# Patient Record
Sex: MALE | Race: WHITE | NOT HISPANIC OR LATINO | Employment: OTHER | URBAN - NONMETROPOLITAN AREA
[De-identification: names, ages, dates, MRNs, and addresses within clinical notes are randomized per-mention and may not be internally consistent; named-entity substitution may affect disease eponyms.]

---

## 2017-03-13 ENCOUNTER — OFFICE VISIT (OUTPATIENT)
Dept: CARDIOLOGY | Facility: CLINIC | Age: 77
End: 2017-03-13

## 2017-03-13 VITALS
DIASTOLIC BLOOD PRESSURE: 74 MMHG | WEIGHT: 223 LBS | BODY MASS INDEX: 31.92 KG/M2 | HEART RATE: 77 BPM | HEIGHT: 70 IN | SYSTOLIC BLOOD PRESSURE: 136 MMHG

## 2017-03-13 DIAGNOSIS — E78.2 MIXED HYPERLIPIDEMIA: ICD-10-CM

## 2017-03-13 DIAGNOSIS — I10 ESSENTIAL HYPERTENSION: ICD-10-CM

## 2017-03-13 DIAGNOSIS — I25.10 CORONARY ARTERY DISEASE INVOLVING NATIVE CORONARY ARTERY OF NATIVE HEART WITHOUT ANGINA PECTORIS: Primary | ICD-10-CM

## 2017-03-13 PROCEDURE — 99213 OFFICE O/P EST LOW 20 MIN: CPT | Performed by: INTERNAL MEDICINE

## 2017-03-13 RX ORDER — DONEPEZIL HYDROCHLORIDE 5 MG/1
5 TABLET, FILM COATED ORAL NIGHTLY
COMMUNITY
End: 2017-12-18

## 2017-03-13 NOTE — PROGRESS NOTES
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      • Palpitations [R00.2]     Overview Note:     2012, Holter with greater than 3200 PVCs     • Insomnia [G47.00]   • Asymptomatic PVCs [I49.3]   • CAD in native artery [I25.10]     Overview Note:     a. February 2006: Off-pump CABG x4, sequential LIMA to first diagonal and LAD, saphenous graft to OM1, and PL of the circumflex, Kody Jeronimo MD.   b. February 2012:  MPS per [facility], normal perfusion 51%.        1. CAD:  a. February 2006: Off-pump CABG x4, sequential LIMA to first diagonal and LAD, saphenous graft to OM1, and PL of the circumflex, Kody Jeronimo MD.    b. February 2012: MPS per [facility], normal perfusion 51%.   2. HTN.        6/16 echo, CUS  wnl - [facility]  3. Dyslipidemia:   a. August 2015: Total cholesterol 102, triglycerides 92, HDL 30, and LDL 54.   4. Impotence.    5. Cervical spine disease, remotely suggested surgical intervention. Patient deferred.    6. Vertigo.    7. Palpitations:  a. 2012, Holter with greater than 3200 PVCs.   8. Prior surgeries:   a. Right knee replacement December 2007.    b. Pending left knee replacement per Dr. Ruiz.   Allergies  No Known Allergies    Current Medications    Current Outpatient Prescriptions:   •  Acetaminophen (TYLENOL EXTRA STRENGTH PO), Take  by mouth As Needed., Disp: , Rfl:   •  amitriptyline (ELAVIL) 25 MG tablet, Take 1 tablet by mouth every night., Disp: 30 tablet, Rfl: 11  •  amLODIPine (NORVASC) 5 MG tablet, Take 5 mg by mouth daily., Disp: , Rfl:   •  aspirin 81 MG EC tablet, Take 81 mg by mouth daily., Disp: ,  "Rfl:   •  atorvastatin (LIPITOR) 20 MG tablet, Take 10 mg by mouth Daily., Disp: , Rfl:   •  clopidogrel (PLAVIX) 75 MG tablet, Take 75 mg by mouth daily., Disp: , Rfl:   •  Cyanocobalamin (B-12 PO), Take  by mouth Daily., Disp: , Rfl:   •  donepezil (ARICEPT) 5 MG tablet, Take 5 mg by mouth Every Night., Disp: , Rfl:   •  losartan (COZAAR) 100 MG tablet, Take 100 mg by mouth daily., Disp: , Rfl:   •  Omega-3 Fatty Acids (FISH OIL PO), Take  by mouth Daily., Disp: , Rfl:   •  sulfacetamide (BLEPH-10) 10 % ophthalmic solution, As Needed., Disp: , Rfl:   •  vitamin D (ERGOCALCIFEROL) 83013 UNITS capsule capsule, Take 50,000 Units by mouth 1 (one) time per week., Disp: , Rfl:     History of Present Illness   HPI    Pt denies any chest pain, dyspnea, dyspnea on exertion, orthopnea, PND, palpitations, lower extremity edema.  Was seen by cardiology in [place] over the last 6 months with no new treatment algorithm or recommendations.  He states he has been attempting to lose weight despite eating Pop tarts for breakfast.  He has good functional capacity with no overt palpitations presyncope shortness of breath with any activities that he can state.  He does comment that the medial aspect of his right foot has been somewhat discolored and since his surgery now 11 years prior     ROS:  All systems have been reviewed and are negative with the exception of those mentioned in the HPI.    OBJECTIVE:  Vitals:    03/13/17 0959 03/13/17 1007   BP: 130/74 136/74   BP Location: Right arm Left arm   Patient Position: Sitting Sitting   Pulse: 77    Weight: 223 lb (101 kg)    Height: 70\" (177.8 cm)      Physical Exam   Constitutional: He appears well-developed and well-nourished.   Neck: Normal range of motion. Neck supple. No hepatojugular reflux and no JVD present. Carotid bruit is not present. No tracheal deviation present. No thyromegaly present.   Cardiovascular: Normal rate, regular rhythm, S1 normal, S2 normal, intact distal " pulses and normal pulses.  PMI is not displaced.  Exam reveals no gallop, no distant heart sounds, no friction rub, no midsystolic click and no opening snap.    No murmur heard.  Pulses:       Radial pulses are 2+ on the right side, and 2+ on the left side.        Dorsalis pedis pulses are 2+ on the right side, and 2+ on the left side.        Posterior tibial pulses are 2+ on the right side, and 2+ on the left side.   Pulmonary/Chest: Effort normal and breath sounds normal. He has no wheezes. He has no rales.   Abdominal: Soft. Bowel sounds are normal. He exhibits no mass. There is no tenderness. There is no guarding.       Diagnostic Data:  Procedures      ASSESSMENT:   Diagnosis Plan   1. Coronary artery disease involving native coronary artery of native heart without angina pectoris     2. Essential hypertension     3. Mixed hyperlipidemia         PLAN:  Cad- stable post surgical revasc now 11 yrs prior.  Cont med RX.    Hl statin tolerant, now trying vit D    Htn controlled on arb    Fu 9 months    Padmini Terrazas, APRN, thank you for referring Mr. Soni for evaluation.  I have forwarded my electronically generated recommendations to you for review.  Please do not hesitate to call with any questions.      Dwayne Swartz MD, [facility]

## 2017-11-20 ENCOUNTER — TRANSCRIBE ORDERS (OUTPATIENT)
Dept: ADMINISTRATIVE | Facility: HOSPITAL | Age: 77
End: 2017-11-20

## 2017-11-20 ENCOUNTER — HOSPITAL ENCOUNTER (OUTPATIENT)
Dept: CARDIOLOGY | Facility: HOSPITAL | Age: 77
Discharge: HOME | End: 2017-11-20
Admitting: NURSE PRACTITIONER

## 2017-11-20 DIAGNOSIS — M79.605 PAIN OF LEFT LOWER EXTREMITY: ICD-10-CM

## 2017-11-20 DIAGNOSIS — M79.605 PAIN OF LEFT LOWER EXTREMITY: Primary | ICD-10-CM

## 2017-11-20 PROCEDURE — 93971 EXTREMITY STUDY: CPT | Performed by: RADIOLOGY

## 2017-11-20 PROCEDURE — 93971 EXTREMITY STUDY: CPT

## 2017-12-18 ENCOUNTER — OFFICE VISIT (OUTPATIENT)
Dept: CARDIOLOGY | Facility: CLINIC | Age: 77
End: 2017-12-18

## 2017-12-18 VITALS
OXYGEN SATURATION: 98 % | DIASTOLIC BLOOD PRESSURE: 72 MMHG | HEIGHT: 71 IN | HEART RATE: 70 BPM | BODY MASS INDEX: 29.96 KG/M2 | SYSTOLIC BLOOD PRESSURE: 114 MMHG | WEIGHT: 214 LBS

## 2017-12-18 DIAGNOSIS — I10 ESSENTIAL HYPERTENSION: ICD-10-CM

## 2017-12-18 DIAGNOSIS — E78.5 DYSLIPIDEMIA: ICD-10-CM

## 2017-12-18 DIAGNOSIS — I25.10 CAD IN NATIVE ARTERY: Primary | ICD-10-CM

## 2017-12-18 PROCEDURE — 99214 OFFICE O/P EST MOD 30 MIN: CPT | Performed by: INTERNAL MEDICINE

## 2017-12-18 NOTE — PROGRESS NOTES
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    Chief Complaint   Patient presents with   • Coronary Artery Disease       PROBLEM LIST:   1. CAD:  a. February 2006: Off-pump CABG x4, sequential LIMA to first diagonal and LAD, saphenous graft to OM1, and PL of the circumflex, Kody Jeronimo MD.    b. February 2012: MPS per [facility], normal perfusion 51%.   2. HTN  a. 6/16 echo, CUS  wnl - [facility]  3. Dyslipidemia:   a. August 2015: Total cholesterol 102, triglycerides 92, HDL 30, and LDL 54.   4. Palpitations:  a. 2012, Holter with greater than 3200 PVCs  5. Impotence.    6. Cervical spine disease, remotely suggested surgical intervention. Patient deferred.    7. Vertigo.    8. Prior surgeries:   a. Right knee replacement December 2007.    b.  left knee replacement per Dr. Ruiz 2016    Allergies  No Known Allergies    Current Medications    Current Outpatient Prescriptions:   •  Acetaminophen (TYLENOL EXTRA STRENGTH PO), Take  by mouth As Needed., Disp: , Rfl:   •  amLODIPine (NORVASC) 5 MG tablet, Take 5 mg by mouth daily., Disp: , Rfl:   •  aspirin 81 MG EC tablet, Take 81 mg by mouth daily., Disp: , Rfl:   •  atorvastatin (LIPITOR) 20 MG tablet, Take 10 mg by mouth Daily., Disp: , Rfl:   •  clopidogrel (PLAVIX) 75 MG tablet, Take 75 mg by mouth daily., Disp: , Rfl:   •  Cyanocobalamin (B-12 PO), Take  by mouth Daily., Disp: , Rfl:   •  losartan (COZAAR) 100 MG tablet, Take 100 mg by mouth daily., Disp: , Rfl:   •  Memantine HCl-Donepezil HCl (NAMZARIC) 28-10 MG capsule sustained-release 24 hr, Take  by mouth Daily., Disp: , Rfl:   •  Omega-3 Fatty Acids (FISH OIL PO), Take  by mouth Daily., Disp: , Rfl:   •  sulfacetamide (BLEPH-10) 10 % ophthalmic solution, As Needed., Disp: , Rfl:   •   "vitamin D (ERGOCALCIFEROL) 15923 UNITS capsule capsule, Take 50,000 Units by mouth 1 (one) time per week., Disp: , Rfl:       History of Present Illness     Pt denies any chest pain, dyspnea, dyspnea on exertion, orthopnea, PND, palpitations, lower extremity edema, or claudication.  Recently bought some land but given his hearing impairment did not understand restrictions.    ROS:  All systems have been reviewed and are negative with the exception of those mentioned in the HPI.    OBJECTIVE:  Vitals:    12/18/17 1006   BP: 114/72   BP Location: Right arm   Patient Position: Sitting   Pulse: 70   SpO2: 98%   Weight: 97.1 kg (214 lb)   Height: 180.3 cm (71\")     Physical Exam   Constitutional: He appears well-developed and well-nourished.   Neck: Normal range of motion. Neck supple. No hepatojugular reflux and no JVD present. Carotid bruit is not present. No tracheal deviation present. No thyromegaly present.   Cardiovascular: Normal rate, regular rhythm, S1 normal, S2 normal, intact distal pulses and normal pulses.  PMI is not displaced.  Exam reveals no gallop, no distant heart sounds, no friction rub, no midsystolic click and no opening snap.    No murmur heard.  Pulses:       Radial pulses are 2+ on the right side, and 2+ on the left side.        Dorsalis pedis pulses are 2+ on the right side, and 2+ on the left side.        Posterior tibial pulses are 2+ on the right side, and 2+ on the left side.   Pulmonary/Chest: Effort normal and breath sounds normal. He has no wheezes. He has no rales.   Abdominal: Soft. Bowel sounds are normal. He exhibits no mass. There is no tenderness. There is no guarding.       Diagnostic Data:  Procedures      ASSESSMENT:   Diagnosis Plan   1. CAD in native artery     2. Essential hypertension     3. Dyslipidemia       PLAN:  Cad- stable post surgical revasc now 12 yrs prior.  Cont med RX.     Hl statin tolerant,on vit D.  Commended weight loss.     Htn controlled on arb, " ccb    SIMON Reynolds, thank you for referring Mr. Soni for evaluation.  I have forwarded my electronically generated recommendations to you for review.  Please do not hesitate to call with any questions.    Scribed for Dwayne Swartz MD by Sarah Abraham PA-C. 12/18/2017  10:11 AM  I, Dwayne Swartz MD, personally performed the services described in this documentation as scribed by the above named individual in my presence, and it is both accurate and complete.  12/18/2017  10:30 AM    Dwayne Swartz MD, FACC

## 2018-03-16 ENCOUNTER — TRANSCRIBE ORDERS (OUTPATIENT)
Dept: ADMINISTRATIVE | Facility: HOSPITAL | Age: 78
End: 2018-03-16

## 2018-03-16 DIAGNOSIS — R19.7 DIARRHEA, UNSPECIFIED TYPE: Primary | ICD-10-CM

## 2018-03-16 DIAGNOSIS — R19.5 DARK STOOLS: ICD-10-CM

## 2018-03-19 ENCOUNTER — TELEPHONE (OUTPATIENT)
Dept: GENERAL RADIOLOGY | Facility: HOSPITAL | Age: 78
End: 2018-03-19

## 2018-03-19 ENCOUNTER — HOSPITAL ENCOUNTER (OUTPATIENT)
Dept: CT IMAGING | Facility: HOSPITAL | Age: 78
Discharge: HOME | End: 2018-03-19
Admitting: NURSE PRACTITIONER

## 2018-03-19 DIAGNOSIS — R19.7 DIARRHEA, UNSPECIFIED TYPE: ICD-10-CM

## 2018-03-19 DIAGNOSIS — R19.5 DARK STOOLS: ICD-10-CM

## 2018-03-19 LAB — CREAT BLDA-MCNC: 1 MG/DL (ref 0.6–1.3)

## 2018-03-19 PROCEDURE — 74177 CT ABD & PELVIS W/CONTRAST: CPT

## 2018-03-19 PROCEDURE — 74177 CT ABD & PELVIS W/CONTRAST: CPT | Performed by: RADIOLOGY

## 2018-03-19 PROCEDURE — 25010000002 IOPAMIDOL 61 % SOLUTION: Performed by: NURSE PRACTITIONER

## 2018-03-19 PROCEDURE — 82565 ASSAY OF CREATININE: CPT

## 2018-03-19 RX ADMIN — IOPAMIDOL 100 ML: 612 INJECTION, SOLUTION INTRAVENOUS at 09:30

## 2018-03-22 ENCOUNTER — OFFICE VISIT (OUTPATIENT)
Dept: SURGERY | Facility: CLINIC | Age: 78
End: 2018-03-22

## 2018-03-22 VITALS — BODY MASS INDEX: 29.96 KG/M2 | WEIGHT: 214 LBS | HEIGHT: 71 IN

## 2018-03-22 DIAGNOSIS — R19.7 DIARRHEA OF PRESUMED INFECTIOUS ORIGIN: Primary | ICD-10-CM

## 2018-03-22 DIAGNOSIS — R14.0 BLOATING: ICD-10-CM

## 2018-03-22 PROCEDURE — 99203 OFFICE O/P NEW LOW 30 MIN: CPT | Performed by: SURGERY

## 2018-03-22 RX ORDER — SODIUM, POTASSIUM,MAG SULFATES 17.5-3.13G
SOLUTION, RECONSTITUTED, ORAL ORAL
Qty: 2 BOTTLE | Refills: 0 | Status: ON HOLD | OUTPATIENT
Start: 2018-03-22 | End: 2018-05-16

## 2018-03-22 NOTE — PROGRESS NOTES
Amira Soni is a 77 y.o. male.     History of Present Illness He had diarrhea 2 weeks ago and had also gotten sick with nausea a week and a half ago. He had a CT showing gallstones and a left inguinal hernia. He had melanotic stool as well. He had a colonoscopy years ago. He was given Cipro and it improved.     The following portions of the patient's history were reviewed and updated as appropriate: current medications, past family history, past medical history, past social history, past surgical history and problem list.    Review of Systems   Constitutional: Negative for activity change, appetite change, chills, fever and unexpected weight change.   HENT: Negative for congestion, facial swelling and sore throat.    Eyes: Negative for photophobia and visual disturbance.   Respiratory: Negative for chest tightness, shortness of breath and wheezing.    Cardiovascular: Negative for chest pain, palpitations and leg swelling.   Gastrointestinal: Positive for abdominal pain and diarrhea. Negative for abdominal distention, anal bleeding, blood in stool, constipation, nausea, rectal pain and vomiting.   Endocrine: Negative for cold intolerance, heat intolerance, polydipsia and polyuria.   Genitourinary: Negative for difficulty urinating, dysuria, flank pain and urgency.   Musculoskeletal: Negative for back pain and myalgias.   Skin: Negative for rash and wound.   Allergic/Immunologic: Negative for immunocompromised state.   Neurological: Negative for dizziness, seizures, syncope, light-headedness, numbness and headaches.   Hematological: Negative for adenopathy. Does not bruise/bleed easily.   Psychiatric/Behavioral: Negative for behavioral problems and confusion. The patient is not nervous/anxious.        Objective   Physical Exam   Constitutional: He is oriented to person, place, and time. He appears well-developed and well-nourished. He does not appear ill. No distress.   HENT:   Head: Normocephalic. Head is  without laceration. Hair is normal.   Right Ear: Hearing and ear canal normal.   Left Ear: Hearing and ear canal normal.   Nose: Nose normal. No sinus tenderness. No epistaxis. Right sinus exhibits no maxillary sinus tenderness and no frontal sinus tenderness. Left sinus exhibits no maxillary sinus tenderness and no frontal sinus tenderness.   Eyes: Conjunctivae and lids are normal. Pupils are equal, round, and reactive to light.   Neck: Normal range of motion. No JVD present. No tracheal tenderness present. No tracheal deviation present. No thyroid mass and no thyromegaly present.   Cardiovascular: Normal rate and regular rhythm.  Exam reveals no gallop.    No murmur heard.  Pulmonary/Chest: Effort normal and breath sounds normal. No stridor. He has no wheezes. He exhibits no tenderness.   Abdominal: Soft. Bowel sounds are normal. He exhibits no distension, no ascites and no mass. There is no tenderness. There is no rebound and no guarding. No hernia.   Musculoskeletal: He exhibits no edema or deformity.   Lymphadenopathy:     He has no cervical adenopathy.     He has no axillary adenopathy.        Right: No inguinal and no supraclavicular adenopathy present.        Left: No inguinal and no supraclavicular adenopathy present.   Neurological: He is alert and oriented to person, place, and time. He exhibits normal muscle tone.   Skin: Skin is warm, dry and intact. No rash noted. No erythema. No pallor.   Psychiatric: He has a normal mood and affect. His behavior is normal. Thought content normal.   Vitals reviewed.      Assessment/Plan   Porsha was seen today for abdominal pain, back pain, nausea, chills and diarrhea.    Diagnoses and all orders for this visit:    Diarrhea of presumed infectious origin    Bloating    colonoscopy, I do not think the gallstones are causing any symptoms now.

## 2018-03-26 ENCOUNTER — OFFICE VISIT (OUTPATIENT)
Dept: UROLOGY | Facility: CLINIC | Age: 78
End: 2018-03-26

## 2018-03-26 VITALS — BODY MASS INDEX: 29.97 KG/M2 | HEIGHT: 71 IN | WEIGHT: 214.07 LBS

## 2018-03-26 DIAGNOSIS — N13.8 BPH WITH URINARY OBSTRUCTION: Primary | ICD-10-CM

## 2018-03-26 DIAGNOSIS — N40.1 BPH WITH URINARY OBSTRUCTION: Primary | ICD-10-CM

## 2018-03-26 PROCEDURE — 84153 ASSAY OF PSA TOTAL: CPT | Performed by: UROLOGY

## 2018-03-26 PROCEDURE — 51798 US URINE CAPACITY MEASURE: CPT | Performed by: UROLOGY

## 2018-03-26 PROCEDURE — 84154 ASSAY OF PSA FREE: CPT | Performed by: UROLOGY

## 2018-03-26 PROCEDURE — 99204 OFFICE O/P NEW MOD 45 MIN: CPT | Performed by: UROLOGY

## 2018-03-26 PROCEDURE — 36415 COLL VENOUS BLD VENIPUNCTURE: CPT | Performed by: UROLOGY

## 2018-03-26 RX ORDER — FINASTERIDE 5 MG/1
5 TABLET, FILM COATED ORAL DAILY
Qty: 30 TABLET | Refills: 3 | Status: SHIPPED | OUTPATIENT
Start: 2018-03-26 | End: 2018-09-26 | Stop reason: SDUPTHER

## 2018-03-26 NOTE — PROGRESS NOTES
Chief Complaint:          Chief Complaint   Patient presents with   • Benign Prostatic Hypertrophy   • bladder wall thickening   • diverticulum       HPI:   77 y.o. male.  77-year-old white male with his wife who gets up 3-4 times per night and has been taking herbal supplement.  He took Flomax over year ago for urinary didn't think it worked.  His urinalysis was not available because he could not give a specimen, his postvoid residuals 103 cc.  He has a number of diagnoses but also has early Alzheimer's disease.  His CT scan done on March 16 showed significant cholelithiasis, and BPH with a thick wall bladder and some prostatic calcification.  After review of his symptomatology and examination which was unremarkable and a placement finasteride initially, if unsuccessful he will need a lower tract investigation , with concentric bladder wall thickening consistent with BPH and no obvious tumors or filling defects.    Past Medical History:        Past Medical History:   Diagnosis Date   • Cervical spine disease     remotely suggested surgical intervention.  Patient deferred.    • Cervical spine disease    • Coronary artery disease    • Dyslipidemia    • Hypertension    • Impotence    • Palpitations    • Vertigo          Current Meds:     Current Outpatient Prescriptions   Medication Sig Dispense Refill   • Acetaminophen (TYLENOL EXTRA STRENGTH PO) Take  by mouth As Needed.     • amLODIPine (NORVASC) 5 MG tablet Take 5 mg by mouth daily.     • aspirin 81 MG EC tablet Take 81 mg by mouth daily.     • atorvastatin (LIPITOR) 20 MG tablet Take 10 mg by mouth Daily.     • clopidogrel (PLAVIX) 75 MG tablet Take 75 mg by mouth daily.     • Cyanocobalamin (B-12 PO) Take  by mouth Daily.     • losartan (COZAAR) 100 MG tablet Take 100 mg by mouth daily.     • Memantine HCl-Donepezil HCl (NAMZARIC) 28-10 MG capsule sustained-release 24 hr Take  by mouth Daily.     • Omega-3 Fatty Acids (FISH OIL PO) Take  by mouth Daily.     •  sulfacetamide (BLEPH-10) 10 % ophthalmic solution As Needed.     • SUPREP BOWEL PREP KIT 17.5-3.13-1.6 GM/180ML solution oral solution Dispense one Kit        Sig: Used as Directed 2 bottle 0   • vitamin D (ERGOCALCIFEROL) 29966 UNITS capsule capsule Take 50,000 Units by mouth 1 (one) time per week.       No current facility-administered medications for this visit.         Allergies:      No Known Allergies     Past Surgical History:     Past Surgical History:   Procedure Laterality Date   • ARTERIAL BYPASS SURGERY     • CORONARY ARTERY BYPASS GRAFT     • KNEE ARTHROPLASTY, PARTIAL REPLACEMENT      Lt , 2015   • REPLACEMENT TOTAL KNEE      Rt         Social History:     Social History     Social History   • Marital status:      Spouse name: N/A   • Number of children: N/A   • Years of education: N/A     Occupational History   • Not on file.     Social History Main Topics   • Smoking status: Former Smoker     Packs/day: 1.50     Years: 7.00     Types: Cigarettes, Pipe, Cigars     Quit date: 6/14/1986   • Smokeless tobacco: Never Used   • Alcohol use Yes      Comment: occi   • Drug use: No   • Sexual activity: Defer     Other Topics Concern   • Not on file     Social History Narrative   • No narrative on file       Family History:     Family History   Problem Relation Age of Onset   • No Known Problems Mother    • No Known Problems Father    • No Known Problems Sister    • No Known Problems Brother        Review of Systems:     Review of Systems   Constitutional: Negative.    HENT: Negative.    Eyes: Negative.    Respiratory: Negative.    Cardiovascular: Negative.    Gastrointestinal: Negative.    Endocrine: Negative.    Genitourinary: Positive for frequency.   Musculoskeletal: Negative.    Allergic/Immunologic: Negative.    Neurological: Negative.    Hematological: Negative.    Psychiatric/Behavioral: Negative.        Physical Exam:     Physical Exam   Constitutional: He is oriented to person, place, and time.  He appears well-developed and well-nourished.   HENT:   Head: Normocephalic and atraumatic.   Eyes: Conjunctivae and EOM are normal. Pupils are equal, round, and reactive to light.   Neck: Normal range of motion.   Cardiovascular: Normal rate, regular rhythm, normal heart sounds and intact distal pulses.    Pulmonary/Chest: Effort normal and breath sounds normal.   Abdominal: Soft. Bowel sounds are normal.   Genitourinary: Rectum normal, prostate normal and penis normal.   Genitourinary Comments: Soft nontender abdomen with no organomegaly, rigidity, or tenderness.  He has normal external genitalia and circumcised phallus with a freely movable foreskin bilaterally descended testes without masses there is no inguinal hernias adenopathy or abnormalities he had good rectal tone and a large smooth firm prostate.  There is no nodularity or any suspicious rectal abnormalities     Musculoskeletal: Normal range of motion.   Neurological: He is alert and oriented to person, place, and time. He has normal reflexes.   Skin: Skin is warm and dry.   Psychiatric: He has a normal mood and affect. His behavior is normal. Judgment and thought content normal.   Nursing note and vitals reviewed.      I have reviewed the following portions of the patient's history: allergies, current medications, past family history, past medical history, past social history, past surgical history, problem list and ROS and confirm it's accurate.      Procedure:       Assessment/Plan:   BPH: Discussed the pathophysiology of BPH and obstruction.  We discussed the static and dynamic effect effects of BPH as well as using 5 alpha reductase inhibitors versus alpha blockade.  We discussed the indications for transurethral surgery as well.  And/ or other therapeutic options available including all of the newer techniques.       Discussed the patient's BMI with him. BMI is above normal parameters. Follow-up plan includes:  educational material.      This  document has been electronically signed by DAVI HANDLEY MD March 26, 2018 8:50 AM

## 2018-03-28 LAB
PSA FREE MFR SERPL: 24.9 %
PSA FREE SERPL-MCNC: 0.97 NG/ML
PSA SERPL-MCNC: 3.9 NG/ML (ref 0–4)

## 2018-03-30 ENCOUNTER — ANESTHESIA EVENT (OUTPATIENT)
Dept: PERIOP | Facility: HOSPITAL | Age: 78
End: 2018-03-30

## 2018-03-30 ENCOUNTER — HOSPITAL ENCOUNTER (OUTPATIENT)
Facility: HOSPITAL | Age: 78
Setting detail: HOSPITAL OUTPATIENT SURGERY
Discharge: HOME | End: 2018-03-30
Attending: SURGERY | Admitting: SURGERY

## 2018-03-30 ENCOUNTER — ANESTHESIA (OUTPATIENT)
Dept: PERIOP | Facility: HOSPITAL | Age: 78
End: 2018-03-30

## 2018-03-30 VITALS
RESPIRATION RATE: 18 BRPM | OXYGEN SATURATION: 96 % | SYSTOLIC BLOOD PRESSURE: 130 MMHG | TEMPERATURE: 97.2 F | HEART RATE: 62 BPM | DIASTOLIC BLOOD PRESSURE: 78 MMHG

## 2018-03-30 PROCEDURE — 25010000002 PROPOFOL 10 MG/ML EMULSION: Performed by: NURSE ANESTHETIST, CERTIFIED REGISTERED

## 2018-03-30 PROCEDURE — 45378 DIAGNOSTIC COLONOSCOPY: CPT | Performed by: SURGERY

## 2018-03-30 RX ORDER — TAMSULOSIN HYDROCHLORIDE 0.4 MG/1
1 CAPSULE ORAL DAILY
Status: ON HOLD | COMMUNITY
Start: 2015-02-06 | End: 2018-05-16

## 2018-03-30 RX ORDER — SODIUM CHLORIDE 0.9 % (FLUSH) 0.9 %
1-10 SYRINGE (ML) INJECTION AS NEEDED
Status: DISCONTINUED | OUTPATIENT
Start: 2018-03-30 | End: 2018-03-30 | Stop reason: HOSPADM

## 2018-03-30 RX ORDER — LIDOCAINE HYDROCHLORIDE 20 MG/ML
INJECTION, SOLUTION INFILTRATION; PERINEURAL AS NEEDED
Status: DISCONTINUED | OUTPATIENT
Start: 2018-03-30 | End: 2018-03-30 | Stop reason: SURG

## 2018-03-30 RX ORDER — FENTANYL CITRATE 50 UG/ML
50 INJECTION, SOLUTION INTRAMUSCULAR; INTRAVENOUS
Status: DISCONTINUED | OUTPATIENT
Start: 2018-03-30 | End: 2018-03-30 | Stop reason: HOSPADM

## 2018-03-30 RX ORDER — PROPOFOL 10 MG/ML
VIAL (ML) INTRAVENOUS AS NEEDED
Status: DISCONTINUED | OUTPATIENT
Start: 2018-03-30 | End: 2018-03-30 | Stop reason: SURG

## 2018-03-30 RX ORDER — IPRATROPIUM BROMIDE AND ALBUTEROL SULFATE 2.5; .5 MG/3ML; MG/3ML
3 SOLUTION RESPIRATORY (INHALATION) ONCE AS NEEDED
Status: DISCONTINUED | OUTPATIENT
Start: 2018-03-30 | End: 2018-03-30 | Stop reason: HOSPADM

## 2018-03-30 RX ORDER — ONDANSETRON 2 MG/ML
4 INJECTION INTRAMUSCULAR; INTRAVENOUS ONCE AS NEEDED
Status: DISCONTINUED | OUTPATIENT
Start: 2018-03-30 | End: 2018-03-30 | Stop reason: HOSPADM

## 2018-03-30 RX ORDER — SODIUM CHLORIDE, SODIUM LACTATE, POTASSIUM CHLORIDE, CALCIUM CHLORIDE 600; 310; 30; 20 MG/100ML; MG/100ML; MG/100ML; MG/100ML
125 INJECTION, SOLUTION INTRAVENOUS CONTINUOUS
Status: DISCONTINUED | OUTPATIENT
Start: 2018-03-30 | End: 2018-03-30 | Stop reason: HOSPADM

## 2018-03-30 RX ADMIN — PROPOFOL 50 MG: 10 INJECTION, EMULSION INTRAVENOUS at 11:37

## 2018-03-30 RX ADMIN — PROPOFOL 50 MG: 10 INJECTION, EMULSION INTRAVENOUS at 11:31

## 2018-03-30 RX ADMIN — PROPOFOL 80 MG: 10 INJECTION, EMULSION INTRAVENOUS at 11:22

## 2018-03-30 RX ADMIN — PROPOFOL 50 MG: 10 INJECTION, EMULSION INTRAVENOUS at 11:26

## 2018-03-30 RX ADMIN — SODIUM CHLORIDE, POTASSIUM CHLORIDE, SODIUM LACTATE AND CALCIUM CHLORIDE: 600; 310; 30; 20 INJECTION, SOLUTION INTRAVENOUS at 11:19

## 2018-03-30 RX ADMIN — LIDOCAINE HYDROCHLORIDE 60 MG: 20 INJECTION, SOLUTION INFILTRATION; PERINEURAL at 11:22

## 2018-03-30 NOTE — ANESTHESIA PREPROCEDURE EVALUATION
Anesthesia Evaluation     no history of anesthetic complications:  NPO Solid Status: > 8 hours  NPO Liquid Status: > 8 hours           Airway   Mallampati: II  TM distance: >3 FB  Neck ROM: full  No difficulty expected  Dental    (+) poor dentition    Pulmonary - normal exam   Cardiovascular - normal exam    (+) hypertension, CAD, CABG,   (-) past MI      Neuro/Psych  (+) dizziness/light headedness,     GI/Hepatic/Renal/Endo      Musculoskeletal     Abdominal  - normal exam   Substance History      OB/GYN          Other                        Anesthesia Plan    ASA 3     general     intravenous induction   Anesthetic plan and risks discussed with patient.

## 2018-03-30 NOTE — ANESTHESIA POSTPROCEDURE EVALUATION
Patient: Porsha Soni    Procedure Summary     Date:  03/30/18 Room / Location:   [facility]  /  [facility]    Anesthesia Start:  1119 Anesthesia Stop:  1142    Procedure:  COLONOSCOPY (N/A ) Diagnosis:       Diarrhea of presumed infectious origin      Bloating      (Diarrhea of presumed infectious origin [A09])      (Bloating [R14.0])    Surgeon:  Simone Valderrama MD Provider:  Kentrell Villatoro MD    Anesthesia Type:  general ASA Status:  3          Anesthesia Type: general  Last vitals  BP   124/76 (03/30/18 1158)   Temp   97.2 °F (36.2 °C) (03/30/18 1143)   Pulse   94 (03/30/18 1158)   Resp   18 (03/30/18 1158)     SpO2   94 % (03/30/18 1158)     Post Anesthesia Care and Evaluation    Patient location during evaluation: PHASE II  Patient participation: complete - patient participated  Level of consciousness: awake and alert  Pain score: 1  Pain management: adequate  Airway patency: patent  Anesthetic complications: No anesthetic complications  PONV Status: controlled  Cardiovascular status: acceptable  Respiratory status: acceptable  Hydration status: acceptable

## 2018-04-16 ENCOUNTER — OFFICE VISIT (OUTPATIENT)
Dept: UROLOGY | Facility: CLINIC | Age: 78
End: 2018-04-16

## 2018-04-16 DIAGNOSIS — R97.20 ELEVATED PROSTATE SPECIFIC ANTIGEN (PSA): Primary | ICD-10-CM

## 2018-04-16 PROCEDURE — 99213 OFFICE O/P EST LOW 20 MIN: CPT | Performed by: UROLOGY

## 2018-04-16 NOTE — PROGRESS NOTES
Chief Complaint:          No chief complaint on file.      HPI:   77 y.o. male.  77-year-old white male with his wife who gets up 3-4 times per night and has been taking herbal supplement.  He took Flomax over year ago for urinary didn't think it worked.  His urinalysis was not available because he could not give a specimen, his postvoid residuals 103 cc.  He has a number of diagnoses but also has early Alzheimer's disease.  His CT scan done on March 16 showed significant cholelithiasis, and BPH with a thick wall bladder and some prostatic calcification.  After review of his symptomatology and examination which was unremarkable and a placement finasteride initially, if unsuccessful he will need a lower tract investigation , with concentric bladder wall thickening consistent with BPH and no obvious tumors or filling defects.  He returns his repeat PSA is 3.9 with a free PSA of 25%.  He has no discharge or good stream and no lower urinary tract symptomatology I'm comfortable to watch him I'll recheck him in 6 months    Past Medical History:        Past Medical History:   Diagnosis Date   • Arthritis    • Cervical spine disease     remotely suggested surgical intervention.  Patient deferred.    • Cervical spine disease    • Coronary artery disease    • Dyslipidemia    • Hypertension    • Impotence    • Palpitations    • Vertigo          Current Meds:     Current Outpatient Prescriptions   Medication Sig Dispense Refill   • Acetaminophen (TYLENOL EXTRA STRENGTH PO) Take  by mouth As Needed.     • amLODIPine (NORVASC) 5 MG tablet Take 5 mg by mouth daily.     • aspirin 81 MG EC tablet Take 81 mg by mouth daily.     • atorvastatin (LIPITOR) 20 MG tablet Take 10 mg by mouth Daily.     • clopidogrel (PLAVIX) 75 MG tablet Take 75 mg by mouth daily.     • Cyanocobalamin (B-12 PO) Take  by mouth Daily.     • finasteride (PROSCAR) 5 MG tablet Take 1 tablet by mouth Daily. 30 tablet 3   • losartan (COZAAR) 100 MG tablet Take 100  mg by mouth daily.     • Memantine HCl-Donepezil HCl (NAMZARIC) 28-10 MG capsule sustained-release 24 hr Take  by mouth Daily.     • Omega-3 Fatty Acids (FISH OIL PO) Take  by mouth Daily.     • SUPREP BOWEL PREP KIT 17.5-3.13-1.6 GM/180ML solution oral solution Dispense one Kit        Sig: Used as Directed 2 bottle 0   • tamsulosin (FLOMAX) 0.4 MG capsule 24 hr capsule Take 1 capsule by mouth Daily.     • vitamin D (ERGOCALCIFEROL) 06690 UNITS capsule capsule Take 50,000 Units by mouth 1 (one) time per week.       No current facility-administered medications for this visit.         Allergies:      No Known Allergies     Past Surgical History:     Past Surgical History:   Procedure Laterality Date   • ARTERIAL BYPASS SURGERY     • COLONOSCOPY N/A 3/30/2018    Procedure: COLONOSCOPY;  Surgeon: Simone Valderrama MD;  Location: [facility];  Service: Gastroenterology   • CORONARY ARTERY BYPASS GRAFT     • KNEE ARTHROPLASTY, PARTIAL REPLACEMENT      Lt , 2015   • REPLACEMENT TOTAL KNEE      Rt         Social History:     Social History     Social History   • Marital status:      Spouse name: N/A   • Number of children: N/A   • Years of education: N/A     Occupational History   • Not on file.     Social History Main Topics   • Smoking status: Former Smoker     Packs/day: 1.50     Years: 7.00     Types: Cigarettes, Pipe, Cigars     Quit date: 6/14/1986   • Smokeless tobacco: Never Used   • Alcohol use Yes      Comment: occi   • Drug use: No   • Sexual activity: Defer     Other Topics Concern   • Not on file     Social History Narrative   • No narrative on file       Family History:     Family History   Problem Relation Age of Onset   • No Known Problems Mother    • No Known Problems Father    • No Known Problems Sister    • No Known Problems Brother        Review of Systems:     Review of Systems   Constitutional: Negative.    HENT: Negative.    Eyes: Negative.    Respiratory: Negative.    Cardiovascular: Negative.     Gastrointestinal: Negative.    Endocrine: Negative.    Musculoskeletal: Negative.    Allergic/Immunologic: Negative.    Neurological: Negative.    Hematological: Negative.    Psychiatric/Behavioral: Negative.        Physical Exam:     Physical Exam   Constitutional: He is oriented to person, place, and time. He appears well-developed and well-nourished.   HENT:   Head: Normocephalic and atraumatic.   Eyes: Conjunctivae and EOM are normal. Pupils are equal, round, and reactive to light.   Neck: Normal range of motion.   Cardiovascular: Normal rate, regular rhythm, normal heart sounds and intact distal pulses.    Pulmonary/Chest: Effort normal and breath sounds normal.   Abdominal: Soft. Bowel sounds are normal.   Genitourinary: Rectum normal, prostate normal and penis normal.   Musculoskeletal: Normal range of motion.   Neurological: He is alert and oriented to person, place, and time. He has normal reflexes.   Skin: Skin is warm and dry.   Psychiatric: He has a normal mood and affect. His behavior is normal. Judgment and thought content normal.   Nursing note and vitals reviewed.      I have reviewed the following portions of the patient's history: allergies, current medications, past family history, past medical history, past social history, past surgical history, problem list and ROS and confirm it's accurate.      Procedure:       Assessment/Plan:   Elevated prostate specific antigen-we discussed the diagnosis of elevated prostate-specific antigen.  I explained the pathophysiology of PSA.  It is a serine protease that's function in the male reproductive tract is to facilitate the liquefaction of semen.  It is for this reason the body does not want it freely floating in the serum and why typically bound tightly to albumin.  We discussed why we used both a PSA free and total to determine the need for more aggressive therapy I discussed the normal range.  Additionally it was in the range of 1-4 but more recently  has been downgraded to something less than 2 or even approaching 1.  I discussed the risk of family history particularly the fact that the average male has a 14% risk of prostate cancer and that in the face of a positive diagnosis in a father it will tablet and any other first-generation relative continued tablet insofar that a father and brother with prostate cancer will produce almost a 50% risk of prostate cancer.  I discussed the use of the temporal use of PSA as the best option for monitoring.  We also discussed the fact that an elevated PSA is an isolated event does not mean that this is prostate cancer and should not engender worry in this regard. I discussed other things that can elevate PSA including constipation, prostatitis, infection, recent intercourse etc. as well as the risks and benefits associated with this.  Also discussed the fact that this is with a dilutional test and as a consequence of such were present produce slight variations on a single specimen.  Further discussed the risks and benefits of a prostate biopsy as well.  His repeat PSAs in the normal range with a normal free PSA his likelihood of prostate cancer is in the lower side recommend observation     Patient's There is no height or weight on file to calculate BMI. BMI is above normal parameters. Follow-up plan includes:  educational material.          This document has been electronically signed by DAVI HANDLEY MD April 16, 2018 9:23 AM

## 2018-04-19 PROBLEM — R97.20 ELEVATED PROSTATE SPECIFIC ANTIGEN (PSA): Status: ACTIVE | Noted: 2018-04-19

## 2018-05-16 ENCOUNTER — APPOINTMENT (OUTPATIENT)
Dept: GENERAL RADIOLOGY | Facility: HOSPITAL | Age: 78
End: 2018-05-16

## 2018-05-16 ENCOUNTER — ANESTHESIA EVENT (OUTPATIENT)
Dept: PERIOP | Facility: HOSPITAL | Age: 78
End: 2018-05-16

## 2018-05-16 ENCOUNTER — HOSPITAL ENCOUNTER (INPATIENT)
Facility: HOSPITAL | Age: 78
LOS: 7 days | Discharge: ANOTHER HOSPITAL | End: 2018-05-23
Attending: EMERGENCY MEDICINE | Admitting: SURGERY

## 2018-05-16 ENCOUNTER — ANESTHESIA (OUTPATIENT)
Dept: PERIOP | Facility: HOSPITAL | Age: 78
End: 2018-05-16

## 2018-05-16 ENCOUNTER — APPOINTMENT (OUTPATIENT)
Dept: CT IMAGING | Facility: HOSPITAL | Age: 78
End: 2018-05-16

## 2018-05-16 DIAGNOSIS — D72.829 LEUKOCYTOSIS, UNSPECIFIED TYPE: ICD-10-CM

## 2018-05-16 DIAGNOSIS — K85.90 ACUTE PANCREATITIS, UNSPECIFIED COMPLICATION STATUS, UNSPECIFIED PANCREATITIS TYPE: ICD-10-CM

## 2018-05-16 DIAGNOSIS — A41.9 SEPSIS, DUE TO UNSPECIFIED ORGANISM: Primary | ICD-10-CM

## 2018-05-16 DIAGNOSIS — K81.9 CHOLECYSTITIS: ICD-10-CM

## 2018-05-16 LAB
A-A DO2: 196.5 MMHG (ref 0–300)
A-A DO2: 213 MMHG (ref 0–300)
A-A DO2: 34.1 MMHG (ref 0–300)
A-A DO2: >300 MMHG (ref 0–300)
ABO GROUP BLD: NORMAL
ABO GROUP BLD: NORMAL
ALBUMIN SERPL-MCNC: 3.6 G/DL (ref 3.4–4.8)
ALBUMIN/GLOB SERPL: 1.2 G/DL (ref 1.5–2.5)
ALP SERPL-CCNC: 458 U/L (ref 40–129)
ALT SERPL W P-5'-P-CCNC: 285 U/L (ref 10–44)
ANION GAP SERPL CALCULATED.3IONS-SCNC: 15.8 MMOL/L (ref 3.6–11.2)
ARTERIAL PATENCY WRIST A: ABNORMAL
ARTERIAL PATENCY WRIST A: POSITIVE
AST SERPL-CCNC: 225 U/L (ref 10–34)
ATMOSPHERIC PRESS: 726 MMHG
BASE EXCESS BLDA CALC-SCNC: -11.4 MMOL/L
BASE EXCESS BLDA CALC-SCNC: -3.7 MMOL/L
BASE EXCESS BLDA CALC-SCNC: -8.3 MMOL/L
BASE EXCESS BLDA CALC-SCNC: -9.8 MMOL/L
BASOPHILS # BLD AUTO: 0.03 10*3/MM3 (ref 0–0.3)
BASOPHILS NFR BLD AUTO: 0.1 % (ref 0–2)
BDY SITE: ABNORMAL
BILIRUB SERPL-MCNC: 4 MG/DL (ref 0.2–1.8)
BLD GP AB SCN SERPL QL: NEGATIVE
BNP SERPL-MCNC: 166 PG/ML (ref 0–100)
BODY TEMPERATURE: 98.5 C
BODY TEMPERATURE: 98.6 C
BUN BLD-MCNC: 18 MG/DL (ref 7–21)
BUN/CREAT SERPL: 13.6 (ref 7–25)
CALCIUM SPEC-SCNC: 9.1 MG/DL (ref 7.7–10)
CHLORIDE SERPL-SCNC: 91 MMOL/L (ref 99–112)
CK MB SERPL-CCNC: 1.92 NG/ML (ref 0–5)
CK MB SERPL-RTO: 3 % (ref 0–3)
CK SERPL-CCNC: 65 U/L (ref 24–204)
CO2 SERPL-SCNC: 25.2 MMOL/L (ref 24.3–31.9)
COHGB MFR BLD: 1.5 % (ref 0–5)
COHGB MFR BLD: 1.7 % (ref 0–5)
COHGB MFR BLD: 1.7 % (ref 0–5)
COHGB MFR BLD: 1.9 % (ref 0–5)
CREAT BLD-MCNC: 1.32 MG/DL (ref 0.43–1.29)
D DIMER PPP FEU-MCNC: 6.95 MCGFEU/ML (ref 0–0.5)
D-LACTATE SERPL-SCNC: 2.4 MMOL/L (ref 0.5–2)
D-LACTATE SERPL-SCNC: 3 MMOL/L (ref 0.5–2)
D-LACTATE SERPL-SCNC: 3.9 MMOL/L (ref 0.5–2)
DEPRECATED RDW RBC AUTO: 45.3 FL (ref 37–54)
EOSINOPHIL # BLD AUTO: 0 10*3/MM3 (ref 0–0.7)
EOSINOPHIL NFR BLD AUTO: 0 % (ref 0–7)
ERYTHROCYTE [DISTWIDTH] IN BLOOD BY AUTOMATED COUNT: 13.4 % (ref 11.5–14.5)
GFR SERPL CREATININE-BSD FRML MDRD: 52 ML/MIN/1.73
GLOBULIN UR ELPH-MCNC: 3.1 GM/DL
GLUCOSE BLD-MCNC: 156 MG/DL (ref 70–110)
HCO3 BLDA-SCNC: 18 MMOL/L (ref 22–26)
HCO3 BLDA-SCNC: 18.3 MMOL/L (ref 22–26)
HCO3 BLDA-SCNC: 21.4 MMOL/L (ref 22–26)
HCO3 BLDA-SCNC: 25.8 MMOL/L (ref 22–26)
HCT VFR BLD AUTO: 42.9 % (ref 42–52)
HCT VFR BLD CALC: 39 % (ref 42–52)
HCT VFR BLD CALC: 40 % (ref 42–52)
HCT VFR BLD CALC: 42 % (ref 42–52)
HCT VFR BLD CALC: 42 % (ref 42–52)
HGB BLD-MCNC: 14.4 G/DL (ref 14–18)
HGB BLDA-MCNC: 13.2 G/DL (ref 12–16)
HGB BLDA-MCNC: 13.5 G/DL (ref 12–16)
HGB BLDA-MCNC: 14.2 G/DL (ref 12–16)
HGB BLDA-MCNC: 14.2 G/DL (ref 12–16)
HOLD SPECIMEN: NORMAL
HOROWITZ INDEX BLD+IHG-RTO: 100 %
HOROWITZ INDEX BLD+IHG-RTO: 21 %
HOROWITZ INDEX BLD+IHG-RTO: 50 %
HOROWITZ INDEX BLD+IHG-RTO: 50 %
IMM GRANULOCYTES # BLD: 0.17 10*3/MM3 (ref 0–0.03)
IMM GRANULOCYTES NFR BLD: 0.6 % (ref 0–0.5)
INR PPP: 1.38 (ref 0.9–1.1)
LIPASE SERPL-CCNC: 1582 U/L (ref 13–60)
LYMPHOCYTES # BLD AUTO: 1.45 10*3/MM3 (ref 1–3)
LYMPHOCYTES NFR BLD AUTO: 5.4 % (ref 16–46)
M PNEUMO IGM SER QL: NEGATIVE
MCH RBC QN AUTO: 32.1 PG (ref 27–33)
MCHC RBC AUTO-ENTMCNC: 33.6 G/DL (ref 33–37)
MCV RBC AUTO: 95.5 FL (ref 80–94)
METHGB BLD QL: 0.2 % (ref 0–3)
METHGB BLD QL: 0.3 % (ref 0–3)
METHGB BLD QL: 0.4 % (ref 0–3)
METHGB BLD QL: 0.5 % (ref 0–3)
MODALITY: ABNORMAL
MONOCYTES # BLD AUTO: 0.64 10*3/MM3 (ref 0.1–0.9)
MONOCYTES NFR BLD AUTO: 2.4 % (ref 0–12)
NEUTROPHILS # BLD AUTO: 24.38 10*3/MM3 (ref 1.4–6.5)
NEUTROPHILS NFR BLD AUTO: 91.5 % (ref 40–75)
OSMOLALITY SERPL CALC.SUM OF ELEC: 269.6 MOSM/KG (ref 273–305)
OXYHGB MFR BLDV: 85.9 % (ref 85–100)
OXYHGB MFR BLDV: 88.9 % (ref 85–100)
OXYHGB MFR BLDV: 92.7 % (ref 85–100)
OXYHGB MFR BLDV: 93.4 % (ref 85–100)
PCO2 BLDA: 153.9 MM HG (ref 35–45)
PCO2 BLDA: 38.8 MM HG (ref 35–45)
PCO2 BLDA: 41.7 MM HG (ref 35–45)
PCO2 BLDA: 46.7 MM HG (ref 35–45)
PCO2 TEMP ADJ BLD: ABNORMAL MM HG (ref 35–48)
PEEP RESPIRATORY: 5 CM[H2O]
PH BLDA: 6.84 PH UNITS (ref 7.35–7.45)
PH BLDA: 7.2 PH UNITS (ref 7.35–7.45)
PH BLDA: 7.26 PH UNITS (ref 7.35–7.45)
PH BLDA: 7.36 PH UNITS (ref 7.35–7.45)
PH GAST: 4 [PH]
PH, TEMP CORRECTED: ABNORMAL PH UNITS (ref 7.35–7.45)
PLATELET # BLD AUTO: 567 10*3/MM3 (ref 130–400)
PMV BLD AUTO: 8.3 FL (ref 6–10)
PO2 BLDA: 62 MM HG (ref 80–100)
PO2 BLDA: 79.6 MM HG (ref 80–100)
PO2 BLDA: 88.5 MM HG (ref 80–100)
PO2 BLDA: 91.4 MM HG (ref 80–100)
PO2 TEMP ADJ BLD: ABNORMAL MM HG (ref 83–108)
POTASSIUM BLD-SCNC: 4.6 MMOL/L (ref 3.5–5.3)
PROT SERPL-MCNC: 6.7 G/DL (ref 6–8)
PROTHROMBIN TIME: 17.2 SECONDS (ref 11–15.4)
PSV: 10 CMH2O
RBC # BLD AUTO: 4.49 10*6/MM3 (ref 4.7–6.1)
RH BLD: NEGATIVE
RH BLD: NEGATIVE
SAO2 % BLDCOA: 88 % (ref 90–100)
SAO2 % BLDCOA: 90.4 % (ref 90–100)
SAO2 % BLDCOA: 94.6 % (ref 90–100)
SAO2 % BLDCOA: 95.4 % (ref 90–100)
SET MECH RESP RATE: 20
SODIUM BLD-SCNC: 132 MMOL/L (ref 135–153)
T&S EXPIRATION DATE: NORMAL
TROPONIN I SERPL-MCNC: 0.01 NG/ML
TROPONIN I SERPL-MCNC: 0.03 NG/ML
VENTILATOR MODE: ABNORMAL
VT ON VENT VENT: 550 ML
WBC NRBC COR # BLD: 26.67 10*3/MM3 (ref 4.5–12.5)
WHOLE BLOOD HOLD SPECIMEN: NORMAL
WHOLE BLOOD HOLD SPECIMEN: NORMAL

## 2018-05-16 PROCEDURE — 94799 UNLISTED PULMONARY SVC/PX: CPT

## 2018-05-16 PROCEDURE — 36600 WITHDRAWAL OF ARTERIAL BLOOD: CPT | Performed by: EMERGENCY MEDICINE

## 2018-05-16 PROCEDURE — 83050 HGB METHEMOGLOBIN QUAN: CPT | Performed by: ANESTHESIOLOGY

## 2018-05-16 PROCEDURE — 25010000002 ONDANSETRON PER 1 MG: Performed by: NURSE ANESTHETIST, CERTIFIED REGISTERED

## 2018-05-16 PROCEDURE — 83880 ASSAY OF NATRIURETIC PEPTIDE: CPT | Performed by: EMERGENCY MEDICINE

## 2018-05-16 PROCEDURE — 71275 CT ANGIOGRAPHY CHEST: CPT | Performed by: RADIOLOGY

## 2018-05-16 PROCEDURE — 25010000002 NEOSTIGMINE 10 MG/10ML SOLUTION: Performed by: NURSE ANESTHETIST, CERTIFIED REGISTERED

## 2018-05-16 PROCEDURE — 87070 CULTURE OTHR SPECIMN AEROBIC: CPT | Performed by: SURGERY

## 2018-05-16 PROCEDURE — 99285 EMERGENCY DEPT VISIT HI MDM: CPT

## 2018-05-16 PROCEDURE — 83690 ASSAY OF LIPASE: CPT | Performed by: EMERGENCY MEDICINE

## 2018-05-16 PROCEDURE — 85610 PROTHROMBIN TIME: CPT | Performed by: EMERGENCY MEDICINE

## 2018-05-16 PROCEDURE — 83050 HGB METHEMOGLOBIN QUAN: CPT | Performed by: EMERGENCY MEDICINE

## 2018-05-16 PROCEDURE — 25010000002 PIPERACILLIN-TAZOBACTAM: Performed by: PHYSICIAN ASSISTANT

## 2018-05-16 PROCEDURE — 25010000002 MORPHINE PER 10 MG: Performed by: EMERGENCY MEDICINE

## 2018-05-16 PROCEDURE — 84484 ASSAY OF TROPONIN QUANT: CPT | Performed by: EMERGENCY MEDICINE

## 2018-05-16 PROCEDURE — 82553 CREATINE MB FRACTION: CPT | Performed by: PHYSICIAN ASSISTANT

## 2018-05-16 PROCEDURE — 25010000002 PIPERACILLIN-TAZOBACTAM: Performed by: EMERGENCY MEDICINE

## 2018-05-16 PROCEDURE — C1751 CATH, INF, PER/CENT/MIDLINE: HCPCS | Performed by: SURGERY

## 2018-05-16 PROCEDURE — 87186 SC STD MICRODIL/AGAR DIL: CPT | Performed by: SURGERY

## 2018-05-16 PROCEDURE — 74177 CT ABD & PELVIS W/CONTRAST: CPT

## 2018-05-16 PROCEDURE — 93005 ELECTROCARDIOGRAM TRACING: CPT | Performed by: EMERGENCY MEDICINE

## 2018-05-16 PROCEDURE — 71045 X-RAY EXAM CHEST 1 VIEW: CPT | Performed by: RADIOLOGY

## 2018-05-16 PROCEDURE — 71275 CT ANGIOGRAPHY CHEST: CPT

## 2018-05-16 PROCEDURE — 25010000002 VITAMIN K1 PER 1 MG: Performed by: SURGERY

## 2018-05-16 PROCEDURE — 99284 EMERGENCY DEPT VISIT MOD MDM: CPT | Performed by: SURGERY

## 2018-05-16 PROCEDURE — 85379 FIBRIN DEGRADATION QUANT: CPT | Performed by: EMERGENCY MEDICINE

## 2018-05-16 PROCEDURE — 0 IOPAMIDOL PER 1 ML: Performed by: EMERGENCY MEDICINE

## 2018-05-16 PROCEDURE — 25010000002 PROPOFOL 10 MG/ML EMULSION: Performed by: NURSE ANESTHETIST, CERTIFIED REGISTERED

## 2018-05-16 PROCEDURE — 86901 BLOOD TYPING SEROLOGIC RH(D): CPT

## 2018-05-16 PROCEDURE — 71045 X-RAY EXAM CHEST 1 VIEW: CPT

## 2018-05-16 PROCEDURE — 36556 INSERT NON-TUNNEL CV CATH: CPT | Performed by: SURGERY

## 2018-05-16 PROCEDURE — 74177 CT ABD & PELVIS W/CONTRAST: CPT | Performed by: RADIOLOGY

## 2018-05-16 PROCEDURE — 83986 ASSAY PH BODY FLUID NOS: CPT | Performed by: SURGERY

## 2018-05-16 PROCEDURE — 86901 BLOOD TYPING SEROLOGIC RH(D): CPT | Performed by: SURGERY

## 2018-05-16 PROCEDURE — 47605 CHOLECYSTECTOMY W/CHOLANG: CPT | Performed by: SURGERY

## 2018-05-16 PROCEDURE — 84484 ASSAY OF TROPONIN QUANT: CPT | Performed by: PHYSICIAN ASSISTANT

## 2018-05-16 PROCEDURE — 94002 VENT MGMT INPAT INIT DAY: CPT

## 2018-05-16 PROCEDURE — 82375 ASSAY CARBOXYHB QUANT: CPT | Performed by: EMERGENCY MEDICINE

## 2018-05-16 PROCEDURE — 82550 ASSAY OF CK (CPK): CPT | Performed by: PHYSICIAN ASSISTANT

## 2018-05-16 PROCEDURE — 83605 ASSAY OF LACTIC ACID: CPT | Performed by: EMERGENCY MEDICINE

## 2018-05-16 PROCEDURE — 99291 CRITICAL CARE FIRST HOUR: CPT | Performed by: HOSPITALIST

## 2018-05-16 PROCEDURE — 76000 FLUOROSCOPY <1 HR PHYS/QHP: CPT

## 2018-05-16 PROCEDURE — 86900 BLOOD TYPING SEROLOGIC ABO: CPT

## 2018-05-16 PROCEDURE — 80053 COMPREHEN METABOLIC PANEL: CPT | Performed by: EMERGENCY MEDICINE

## 2018-05-16 PROCEDURE — 25010000002 PHENYLEPHRINE PER 1 ML: Performed by: NURSE ANESTHETIST, CERTIFIED REGISTERED

## 2018-05-16 PROCEDURE — C1726 CATH, BAL DIL, NON-VASCULAR: HCPCS | Performed by: SURGERY

## 2018-05-16 PROCEDURE — 86900 BLOOD TYPING SEROLOGIC ABO: CPT | Performed by: SURGERY

## 2018-05-16 PROCEDURE — 86738 MYCOPLASMA ANTIBODY: CPT | Performed by: PHYSICIAN ASSISTANT

## 2018-05-16 PROCEDURE — 85025 COMPLETE CBC W/AUTO DIFF WBC: CPT | Performed by: EMERGENCY MEDICINE

## 2018-05-16 PROCEDURE — 88304 TISSUE EXAM BY PATHOLOGIST: CPT | Performed by: SURGERY

## 2018-05-16 PROCEDURE — 82375 ASSAY CARBOXYHB QUANT: CPT | Performed by: ANESTHESIOLOGY

## 2018-05-16 PROCEDURE — 25010000002 VANCOMYCIN PER 500 MG: Performed by: EMERGENCY MEDICINE

## 2018-05-16 PROCEDURE — 82805 BLOOD GASES W/O2 SATURATION: CPT | Performed by: ANESTHESIOLOGY

## 2018-05-16 PROCEDURE — 83605 ASSAY OF LACTIC ACID: CPT | Performed by: HOSPITALIST

## 2018-05-16 PROCEDURE — 0FT44ZZ RESECTION OF GALLBLADDER, PERCUTANEOUS ENDOSCOPIC APPROACH: ICD-10-PCS | Performed by: SURGERY

## 2018-05-16 PROCEDURE — 25010000002 IOPAMIDOL 61 % SOLUTION: Performed by: SURGERY

## 2018-05-16 PROCEDURE — 25010000002 SUCCINYLCHOLINE PER 20 MG: Performed by: NURSE ANESTHETIST, CERTIFIED REGISTERED

## 2018-05-16 PROCEDURE — 87040 BLOOD CULTURE FOR BACTERIA: CPT | Performed by: EMERGENCY MEDICINE

## 2018-05-16 PROCEDURE — 87205 SMEAR GRAM STAIN: CPT | Performed by: SURGERY

## 2018-05-16 PROCEDURE — 25010000002 FENTANYL CITRATE (PF) 100 MCG/2ML SOLUTION: Performed by: NURSE ANESTHETIST, CERTIFIED REGISTERED

## 2018-05-16 PROCEDURE — 86850 RBC ANTIBODY SCREEN: CPT | Performed by: SURGERY

## 2018-05-16 PROCEDURE — 87077 CULTURE AEROBIC IDENTIFY: CPT | Performed by: SURGERY

## 2018-05-16 PROCEDURE — 74300 X-RAY BILE DUCTS/PANCREAS: CPT | Performed by: RADIOLOGY

## 2018-05-16 PROCEDURE — 36600 WITHDRAWAL OF ARTERIAL BLOOD: CPT | Performed by: ANESTHESIOLOGY

## 2018-05-16 PROCEDURE — 82805 BLOOD GASES W/O2 SATURATION: CPT | Performed by: EMERGENCY MEDICINE

## 2018-05-16 PROCEDURE — 25010000002 LORAZEPAM PER 2 MG: Performed by: NURSE ANESTHETIST, CERTIFIED REGISTERED

## 2018-05-16 RX ORDER — MORPHINE SULFATE 2 MG/ML
2 INJECTION, SOLUTION INTRAMUSCULAR; INTRAVENOUS ONCE
Status: COMPLETED | OUTPATIENT
Start: 2018-05-16 | End: 2018-05-16

## 2018-05-16 RX ORDER — HYDROCODONE BITARTRATE AND ACETAMINOPHEN 7.5; 325 MG/1; MG/1
1 TABLET ORAL EVERY 4 HOURS PRN
Status: DISCONTINUED | OUTPATIENT
Start: 2018-05-16 | End: 2018-05-23 | Stop reason: HOSPADM

## 2018-05-16 RX ORDER — AMLODIPINE BESYLATE 5 MG/1
5 TABLET ORAL DAILY
Status: DISCONTINUED | OUTPATIENT
Start: 2018-05-16 | End: 2018-05-16

## 2018-05-16 RX ORDER — PROPOFOL 10 MG/ML
VIAL (ML) INTRAVENOUS AS NEEDED
Status: DISCONTINUED | OUTPATIENT
Start: 2018-05-16 | End: 2018-05-16 | Stop reason: SURG

## 2018-05-16 RX ORDER — LIDOCAINE HYDROCHLORIDE 20 MG/ML
INJECTION, SOLUTION INFILTRATION; PERINEURAL AS NEEDED
Status: DISCONTINUED | OUTPATIENT
Start: 2018-05-16 | End: 2018-05-16 | Stop reason: SURG

## 2018-05-16 RX ORDER — HEPARIN SODIUM 5000 [USP'U]/ML
5000 INJECTION, SOLUTION INTRAVENOUS; SUBCUTANEOUS EVERY 12 HOURS SCHEDULED
Status: DISCONTINUED | OUTPATIENT
Start: 2018-05-17 | End: 2018-05-23 | Stop reason: HOSPADM

## 2018-05-16 RX ORDER — CLOPIDOGREL BISULFATE 75 MG/1
75 TABLET ORAL DAILY
Status: CANCELLED | OUTPATIENT
Start: 2018-05-16

## 2018-05-16 RX ORDER — LORAZEPAM 2 MG/ML
2 INJECTION INTRAMUSCULAR ONCE
Status: COMPLETED | OUTPATIENT
Start: 2018-05-16 | End: 2018-05-16

## 2018-05-16 RX ORDER — ASPIRIN 81 MG/1
81 TABLET ORAL DAILY
Status: DISCONTINUED | OUTPATIENT
Start: 2018-05-16 | End: 2018-05-23 | Stop reason: HOSPADM

## 2018-05-16 RX ORDER — DONEPEZIL HYDROCHLORIDE 5 MG/1
10 TABLET, FILM COATED ORAL NIGHTLY
Status: DISCONTINUED | OUTPATIENT
Start: 2018-05-16 | End: 2018-05-23 | Stop reason: HOSPADM

## 2018-05-16 RX ORDER — GLYCOPYRROLATE 0.2 MG/ML
INJECTION INTRAMUSCULAR; INTRAVENOUS AS NEEDED
Status: DISCONTINUED | OUTPATIENT
Start: 2018-05-16 | End: 2018-05-16 | Stop reason: SURG

## 2018-05-16 RX ORDER — MEPERIDINE HYDROCHLORIDE 50 MG/ML
12.5 INJECTION INTRAMUSCULAR; INTRAVENOUS; SUBCUTANEOUS
Status: DISCONTINUED | OUTPATIENT
Start: 2018-05-16 | End: 2018-05-16 | Stop reason: HOSPADM

## 2018-05-16 RX ORDER — SODIUM CHLORIDE 9 MG/ML
100 INJECTION, SOLUTION INTRAVENOUS CONTINUOUS
Status: DISCONTINUED | OUTPATIENT
Start: 2018-05-16 | End: 2018-05-17

## 2018-05-16 RX ORDER — MEMANTINE HYDROCHLORIDE 10 MG/1
10 TABLET ORAL EVERY 12 HOURS SCHEDULED
Status: DISCONTINUED | OUTPATIENT
Start: 2018-05-16 | End: 2018-05-23 | Stop reason: HOSPADM

## 2018-05-16 RX ORDER — SODIUM CHLORIDE, SODIUM LACTATE, POTASSIUM CHLORIDE, CALCIUM CHLORIDE 600; 310; 30; 20 MG/100ML; MG/100ML; MG/100ML; MG/100ML
125 INJECTION, SOLUTION INTRAVENOUS CONTINUOUS
Status: DISCONTINUED | OUTPATIENT
Start: 2018-05-16 | End: 2018-05-16

## 2018-05-16 RX ORDER — SODIUM CHLORIDE 0.9 % (FLUSH) 0.9 %
10 SYRINGE (ML) INJECTION AS NEEDED
Status: DISCONTINUED | OUTPATIENT
Start: 2018-05-16 | End: 2018-05-23 | Stop reason: HOSPADM

## 2018-05-16 RX ORDER — CETIRIZINE HYDROCHLORIDE 10 MG/1
10 TABLET ORAL DAILY
Status: DISCONTINUED | OUTPATIENT
Start: 2018-05-16 | End: 2018-05-23 | Stop reason: HOSPADM

## 2018-05-16 RX ORDER — ONDANSETRON 2 MG/ML
INJECTION INTRAMUSCULAR; INTRAVENOUS AS NEEDED
Status: DISCONTINUED | OUTPATIENT
Start: 2018-05-16 | End: 2018-05-16 | Stop reason: SURG

## 2018-05-16 RX ORDER — SODIUM CHLORIDE 9 MG/ML
INJECTION, SOLUTION INTRAVENOUS AS NEEDED
Status: DISCONTINUED | OUTPATIENT
Start: 2018-05-16 | End: 2018-05-16 | Stop reason: HOSPADM

## 2018-05-16 RX ORDER — FENTANYL CITRATE 50 UG/ML
50 INJECTION, SOLUTION INTRAMUSCULAR; INTRAVENOUS
Status: DISCONTINUED | OUTPATIENT
Start: 2018-05-16 | End: 2018-05-16 | Stop reason: HOSPADM

## 2018-05-16 RX ORDER — SUCCINYLCHOLINE CHLORIDE 20 MG/ML
INJECTION INTRAMUSCULAR; INTRAVENOUS AS NEEDED
Status: DISCONTINUED | OUTPATIENT
Start: 2018-05-16 | End: 2018-05-16 | Stop reason: SURG

## 2018-05-16 RX ORDER — NEOSTIGMINE METHYLSULFATE 1 MG/ML
INJECTION, SOLUTION INTRAVENOUS AS NEEDED
Status: DISCONTINUED | OUTPATIENT
Start: 2018-05-16 | End: 2018-05-16 | Stop reason: SURG

## 2018-05-16 RX ORDER — LOSARTAN POTASSIUM 50 MG/1
100 TABLET ORAL DAILY
Status: DISCONTINUED | OUTPATIENT
Start: 2018-05-16 | End: 2018-05-16

## 2018-05-16 RX ORDER — OXYCODONE HYDROCHLORIDE AND ACETAMINOPHEN 5; 325 MG/1; MG/1
1 TABLET ORAL ONCE AS NEEDED
Status: DISCONTINUED | OUTPATIENT
Start: 2018-05-16 | End: 2018-05-16 | Stop reason: HOSPADM

## 2018-05-16 RX ORDER — CHLORAL HYDRATE 500 MG
1000 CAPSULE ORAL DAILY
COMMUNITY

## 2018-05-16 RX ORDER — FINASTERIDE 5 MG/1
5 TABLET, FILM COATED ORAL DAILY
Status: DISCONTINUED | OUTPATIENT
Start: 2018-05-16 | End: 2018-05-23 | Stop reason: HOSPADM

## 2018-05-16 RX ORDER — PANTOPRAZOLE SODIUM 40 MG/1
40 TABLET, DELAYED RELEASE ORAL
Status: DISCONTINUED | OUTPATIENT
Start: 2018-05-17 | End: 2018-05-17 | Stop reason: CLARIF

## 2018-05-16 RX ORDER — IPRATROPIUM BROMIDE AND ALBUTEROL SULFATE 2.5; .5 MG/3ML; MG/3ML
3 SOLUTION RESPIRATORY (INHALATION) ONCE AS NEEDED
Status: DISCONTINUED | OUTPATIENT
Start: 2018-05-16 | End: 2018-05-16 | Stop reason: HOSPADM

## 2018-05-16 RX ORDER — SODIUM CHLORIDE 0.9 % (FLUSH) 0.9 %
1-10 SYRINGE (ML) INJECTION AS NEEDED
Status: DISCONTINUED | OUTPATIENT
Start: 2018-05-16 | End: 2018-05-21

## 2018-05-16 RX ORDER — ONDANSETRON 2 MG/ML
4 INJECTION INTRAMUSCULAR; INTRAVENOUS ONCE AS NEEDED
Status: DISCONTINUED | OUTPATIENT
Start: 2018-05-16 | End: 2018-05-16 | Stop reason: HOSPADM

## 2018-05-16 RX ORDER — FENTANYL CITRATE 50 UG/ML
INJECTION, SOLUTION INTRAMUSCULAR; INTRAVENOUS AS NEEDED
Status: DISCONTINUED | OUTPATIENT
Start: 2018-05-16 | End: 2018-05-16 | Stop reason: SURG

## 2018-05-16 RX ORDER — DOCUSATE SODIUM 100 MG/1
100 CAPSULE, LIQUID FILLED ORAL 2 TIMES DAILY
Status: DISCONTINUED | OUTPATIENT
Start: 2018-05-16 | End: 2018-05-23 | Stop reason: HOSPADM

## 2018-05-16 RX ORDER — ROCURONIUM BROMIDE 10 MG/ML
INJECTION, SOLUTION INTRAVENOUS AS NEEDED
Status: DISCONTINUED | OUTPATIENT
Start: 2018-05-16 | End: 2018-05-16 | Stop reason: SURG

## 2018-05-16 RX ORDER — LORATADINE 10 MG/1
10 TABLET ORAL DAILY
Status: ON HOLD | COMMUNITY
End: 2020-10-23

## 2018-05-16 RX ORDER — FAMOTIDINE 10 MG/ML
INJECTION, SOLUTION INTRAVENOUS AS NEEDED
Status: DISCONTINUED | OUTPATIENT
Start: 2018-05-16 | End: 2018-05-16 | Stop reason: SURG

## 2018-05-16 RX ORDER — SODIUM CHLORIDE 0.9 % (FLUSH) 0.9 %
1-10 SYRINGE (ML) INJECTION AS NEEDED
Status: DISCONTINUED | OUTPATIENT
Start: 2018-05-16 | End: 2018-05-23 | Stop reason: HOSPADM

## 2018-05-16 RX ORDER — POLYETHYLENE GLYCOL 3350 17 G/17G
17 POWDER, FOR SOLUTION ORAL 2 TIMES DAILY
Status: DISPENSED | OUTPATIENT
Start: 2018-05-16 | End: 2018-05-20

## 2018-05-16 RX ORDER — SODIUM CHLORIDE 0.9 % (FLUSH) 0.9 %
1-10 SYRINGE (ML) INJECTION AS NEEDED
Status: DISCONTINUED | OUTPATIENT
Start: 2018-05-16 | End: 2018-05-16 | Stop reason: HOSPADM

## 2018-05-16 RX ORDER — ONDANSETRON 2 MG/ML
4 INJECTION INTRAMUSCULAR; INTRAVENOUS EVERY 4 HOURS PRN
Status: DISCONTINUED | OUTPATIENT
Start: 2018-05-16 | End: 2018-05-23 | Stop reason: HOSPADM

## 2018-05-16 RX ADMIN — FENTANYL CITRATE 100 MCG: 50 INJECTION INTRAMUSCULAR; INTRAVENOUS at 15:03

## 2018-05-16 RX ADMIN — PHENYLEPHRINE HYDROCHLORIDE 200 MCG: 10 INJECTION, SOLUTION INTRAMUSCULAR; INTRAVENOUS; SUBCUTANEOUS at 15:37

## 2018-05-16 RX ADMIN — PHENYLEPHRINE HYDROCHLORIDE 200 MCG: 10 INJECTION, SOLUTION INTRAMUSCULAR; INTRAVENOUS; SUBCUTANEOUS at 15:30

## 2018-05-16 RX ADMIN — PIPERACILLIN SODIUM,TAZOBACTAM SODIUM 3.38 G: 3; .375 INJECTION, POWDER, FOR SOLUTION INTRAVENOUS at 23:13

## 2018-05-16 RX ADMIN — ONDANSETRON 4 MG: 2 INJECTION, SOLUTION INTRAMUSCULAR; INTRAVENOUS at 15:06

## 2018-05-16 RX ADMIN — PHENYLEPHRINE HYDROCHLORIDE 100 MCG: 10 INJECTION, SOLUTION INTRAMUSCULAR; INTRAVENOUS; SUBCUTANEOUS at 15:15

## 2018-05-16 RX ADMIN — PROPOFOL 120 MG: 10 INJECTION, EMULSION INTRAVENOUS at 15:06

## 2018-05-16 RX ADMIN — PHENYLEPHRINE HYDROCHLORIDE 100 MCG: 10 INJECTION, SOLUTION INTRAMUSCULAR; INTRAVENOUS; SUBCUTANEOUS at 15:20

## 2018-05-16 RX ADMIN — PHENYLEPHRINE HYDROCHLORIDE 200 MCG: 10 INJECTION, SOLUTION INTRAMUSCULAR; INTRAVENOUS; SUBCUTANEOUS at 15:50

## 2018-05-16 RX ADMIN — NEOSTIGMINE METHYLSULFATE 3 MG: 1 INJECTION, SOLUTION INTRAVENOUS at 17:05

## 2018-05-16 RX ADMIN — LIDOCAINE HYDROCHLORIDE 60 MG: 20 INJECTION, SOLUTION INFILTRATION; PERINEURAL at 15:06

## 2018-05-16 RX ADMIN — SUCCINYLCHOLINE CHLORIDE 100 MG: 20 INJECTION, SOLUTION INTRAMUSCULAR; INTRAVENOUS at 18:30

## 2018-05-16 RX ADMIN — ROCURONIUM BROMIDE 30 MG: 10 INJECTION INTRAVENOUS at 15:06

## 2018-05-16 RX ADMIN — PHENYLEPHRINE HYDROCHLORIDE 200 MCG: 10 INJECTION, SOLUTION INTRAMUSCULAR; INTRAVENOUS; SUBCUTANEOUS at 15:44

## 2018-05-16 RX ADMIN — PHENYLEPHRINE HYDROCHLORIDE 200 MCG: 10 INJECTION, SOLUTION INTRAMUSCULAR; INTRAVENOUS; SUBCUTANEOUS at 15:24

## 2018-05-16 RX ADMIN — GLYCOPYRROLATE 0.4 MG: 0.2 INJECTION, SOLUTION INTRAMUSCULAR; INTRAVENOUS at 17:05

## 2018-05-16 RX ADMIN — MORPHINE SULFATE 2 MG: 2 INJECTION, SOLUTION INTRAMUSCULAR; INTRAVENOUS at 11:14

## 2018-05-16 RX ADMIN — PHENYLEPHRINE HYDROCHLORIDE 100 MCG: 10 INJECTION, SOLUTION INTRAMUSCULAR; INTRAVENOUS; SUBCUTANEOUS at 15:13

## 2018-05-16 RX ADMIN — PHENYLEPHRINE HYDROCHLORIDE 100 MCG: 10 INJECTION, SOLUTION INTRAMUSCULAR; INTRAVENOUS; SUBCUTANEOUS at 15:21

## 2018-05-16 RX ADMIN — SODIUM CHLORIDE, POTASSIUM CHLORIDE, SODIUM LACTATE AND CALCIUM CHLORIDE: 600; 310; 30; 20 INJECTION, SOLUTION INTRAVENOUS at 16:05

## 2018-05-16 RX ADMIN — PHENYLEPHRINE HYDROCHLORIDE 10 MG: 10 INJECTION INTRAVENOUS at 16:10

## 2018-05-16 RX ADMIN — VANCOMYCIN HYDROCHLORIDE 2000 MG: 5 INJECTION, POWDER, LYOPHILIZED, FOR SOLUTION INTRAVENOUS at 11:16

## 2018-05-16 RX ADMIN — IOPAMIDOL 100 ML: 755 INJECTION, SOLUTION INTRAVENOUS at 09:48

## 2018-05-16 RX ADMIN — LORAZEPAM 2 MG: 2 INJECTION INTRAMUSCULAR; INTRAVENOUS at 18:40

## 2018-05-16 RX ADMIN — SODIUM CHLORIDE 2190 ML: 9 INJECTION, SOLUTION INTRAVENOUS at 09:03

## 2018-05-16 RX ADMIN — TAZOBACTAM SODIUM AND PIPERACILLIN SODIUM 4.5 G: .5; 4 INJECTION, POWDER, LYOPHILIZED, FOR SOLUTION INTRAVENOUS at 09:03

## 2018-05-16 RX ADMIN — SODIUM CHLORIDE, POTASSIUM CHLORIDE, SODIUM LACTATE AND CALCIUM CHLORIDE 125 ML/HR: 600; 310; 30; 20 INJECTION, SOLUTION INTRAVENOUS at 14:47

## 2018-05-16 RX ADMIN — PHYTONADIONE 10 MG: 10 INJECTION, EMULSION INTRAMUSCULAR; INTRAVENOUS; SUBCUTANEOUS at 14:17

## 2018-05-16 RX ADMIN — FAMOTIDINE 20 MG: 10 INJECTION, SOLUTION INTRAVENOUS at 15:06

## 2018-05-16 RX ADMIN — NOREPINEPHRINE BITARTRATE 0.1 MCG/KG/MIN: 1 INJECTION INTRAVENOUS at 18:15

## 2018-05-16 RX ADMIN — PHENYLEPHRINE HYDROCHLORIDE 100 MCG: 10 INJECTION, SOLUTION INTRAMUSCULAR; INTRAVENOUS; SUBCUTANEOUS at 15:11

## 2018-05-16 NOTE — NURSING NOTE
Procedure verified with patient and patient family. No restrictions getting blood or blood products.

## 2018-05-16 NOTE — CONSULTS
Consulting physician:  Dr. Montalvo    Referring physician: Dr. Rolon    Date of consultation: 05/16/18     Chief complaint ruptured GB with sepsis    Subjective     Patient is a 77 y.o. male who presented to the ED with a 2 week history of right upper quadrant pain.  History is obtained from patient's wife as he cannot hear without hearing aid.  Wife reports abdominal pain occasional nausea and vomiting.  She said last night he had chills and he became so weak he could hardly stand up.  He presented to the emergency room where a CT scan demonstrated a partially ruptured gallbladder.  Patient was tachycardic and had an elevated lactate.      Review of Systems  Review of Systems - General ROS: negative for - weight loss  Psychological ROS: negative for - behavioral disorder  Ophthalmic ROS: negative for - dry eyes.  Positive for hearing loss  ENT ROS: negative for - vertigo or vocal changes  Hematological and Lymphatic ROS: negative for - swollen lymph nodes, DVT, PE.   Respiratory ROS: negative for - sputum changes or stridor.  Negative for shortness of breath  Cardiovascular ROS: negative for - irregular heartbeat or murmur.  Negative for chest pain  Gastrointestinal ROS: negative for - blood in stools or change in stools  Genito-Urinary ROS: negative for - hematuria or incontinence  Musculoskeletal ROS: negative for - gait disturbance      History  Past Medical History:   Diagnosis Date   • Arthritis    • Cervical spine disease     remotely suggested surgical intervention.  Patient deferred.    • Cervical spine disease    • Coronary artery disease    • Dyslipidemia    • Hypertension    • Impotence    • Palpitations    • Vertigo      Past Surgical History:   Procedure Laterality Date   • ARTERIAL BYPASS SURGERY     • COLONOSCOPY N/A 3/30/2018    Procedure: COLONOSCOPY;  Surgeon: Simone Valderrama MD;  Location: [facility];  Service: Gastroenterology   • CORONARY ARTERY BYPASS GRAFT     • KNEE ARTHROPLASTY,  PARTIAL REPLACEMENT      Lt , 2015   • REPLACEMENT TOTAL KNEE      Rt     Family History   Problem Relation Age of Onset   • No Known Problems Mother    • No Known Problems Father    • No Known Problems Sister    • No Known Problems Brother      Social History   Substance Use Topics   • Smoking status: Former Smoker     Packs/day: 1.50     Years: 7.00     Types: Cigarettes, Pipe, Cigars     Quit date: 6/14/1986   • Smokeless tobacco: Never Used   • Alcohol use Yes      Comment: occi       (Not in a hospital admission)  Allergies:  Patient has no known allergies.    Objective     Vital Signs  Temp:  [98.1 °F (36.7 °C)-98.7 °F (37.1 °C)] 98.1 °F (36.7 °C)  Heart Rate:  [] 102  Resp:  [18-20] 18  BP: ()/(60-80) 108/71    Physical Exam:  General:  This is a WD WN White male in no acute distress  Vital signs stable, afebrile.  Room air saturation is 94%  HEENT exam:  WNL. Sclera are anicteric.  EOMI  Neck:  supple, FROM.  No JVD.  Trachea midline  Lungs:  Respiratory effort normal. Auscultation: Clear, without wheezes, rhonchi, rales  Heart:  Regular rate and rhythm, without murmur, gallop, rub.  No pedal edema  Abdomen: Bowel sounds are present.  Right upper quadrant tenderness with guarding  Musculoskeletal:  muscle strength/tone is normal.    Psyc:  alert, oriented x 3.  Mood and affect are appropriate  skin:  Warm with good turgor.  Without rash or lesion  extremities:  Examination of the extremities revealed no cyanosis, clubbing or edema.    Results Review:     Results from last 7 days  Lab Units 05/16/18  0757   TROPONIN I ng/mL 0.011       Results from last 7 days  Lab Units 05/16/18  1218 05/16/18  0824 05/16/18  0757   LACTATE mmol/L 3.0* 3.9*  --    WBC 10*3/mm3  --   --  26.67*   HEMOGLOBIN g/dL  --   --  14.4   HEMATOCRIT %  --   --  42.9   PLATELETS 10*3/mm3  --   --  567*   INR   --   --  1.38*       Results from last 7 days  Lab Units 05/16/18  1156   PH, ARTERIAL pH units 7.359   PO2 ART mm  Hg 62.0*   PCO2, ARTERIAL mm Hg 38.8   HCO3 ART mmol/L 21.4*       Results from last 7 days  Lab Units 05/16/18  0757   SODIUM mmol/L 132*   POTASSIUM mmol/L 4.6   CHLORIDE mmol/L 91*   CO2 mmol/L 25.2   BUN mg/dL 18   CREATININE mg/dL 1.32*   EGFR IF NONAFRICN AM mL/min/1.73 52*   CALCIUM mg/dL 9.1   GLUCOSE mg/dL 156*   ALBUMIN g/dL 3.60   BILIRUBIN mg/dL 4.0*   ALK PHOS U/L 458*   AST (SGOT) U/L 225*   ALT (SGPT) U/L 285*   Estimated Creatinine Clearance: 53.4 mL/min (A) (by C-G formula based on SCr of 1.32 mg/dL (H)).  No results found for: AMMONIA                    Imaging:  Imaging Results (last 24 hours)     Procedure Component Value Units Date/Time    CT Abdomen Pelvis With Contrast [[id number]] Collected:  05/16/18 1007     Updated:  05/16/18 1011    Narrative:       EXAM: CT ABDOMEN PELVIS W CONTRAST-              TECHNIQUE: Multiple axial CT images were obtained from lung bases  through pubic symphysis WITH administration of IV contrast. Reformatted  images in the coronal and/or sagittal plane(s) were generated from the  axial data set to facilitate diagnostic accuracy and/or surgical  planning.  Oral Contrast:NONE.     Radiation dose reduction techniques were utilized per ALARA protocol.  Automated exposure control was initiated through either or CareDose or  DoseRight software packages by  protocol.       DOSE:         Clinical information  abd pain      Comparison  NONE.     Findings  LOWER THORAX: SMALL LEFT PLEURAL EFFUSION WITH LEFT LOWER LOBE AIRSPACE  DISEASE.     ABDOMEN:        LIVER: Homogeneous. No focal hepatic mass or ductal dilatation.        GALLBLADDER: FINDINGS WHICH ARE MOST CONSISTENT WITH ACUTE  CHOLECYSTITIS GIVEN PRESENCE OF MULTIPLE GALLSTONES AND WORRISOME FOR  PARTIAL RUPTURE WITH AN ADJACENT FLUID COLLECTION SITUATED WITHIN THE  GALLBLADDER FOSSA ABUTTING THE UNDERSURFACE OF SEGMENT 3 LIVER THAT IS  APPROXIMATELY 6.6 CM.        PANCREAS: Unremarkable. No mass or  ductal dilatation.        SPLEEN: Homogeneous. No splenomegaly.        ADRENALS: No mass.        KIDNEYS/URETERS: No mass. No obstructive uropathy.  No evidence of  urolithiasis.        GI TRACT: DIVERTICULOSIS OF THE COLON WITHOUT DIVERTICULITIS.        PERITONEUM: No free air. No free fluid or loculated fluid  collections.        MESENTERY: Unremarkable.        LYMPH NODES: No lymphadenopathy.        VASCULATURE: No evidence of aneurysm.        ABDOMINAL WALL: FAT ONLY CONTAINING LEFT INGUINAL HERNIA, INDIRECT  TYPE.        OTHER: None.     PELVIS:        BLADDER: RIGHT POSTERIOR BLADDER WALL DIVERTICULUM.        REPRODUCTIVE: PROSTATE ENLARGEMENT WITH CENTRAL CALCIFICATIONS.        APPENDIX: Nondistended. No surrounding inflammation.     BONES: No acute bony abnormality.       Impression:       Impression:  1. FINDINGS WHICH ARE MOST SUSPICIOUS FOR CHOLECYSTITIS WITH PARTIAL  RUPTURE AND AN ADJACENT GALLBLADDER FOSSA FLUID COLLECTION THAT IS 6.6  CM. ADJACENT INFLAMMATION OF THE LIVER WITH ABSCESS FORMATION LESS  LIKELY.  2. LEFT LOWER LOBE AIRSPACE DISEASE WITH LEFT PLEURAL EFFUSION.  3. MILD SECONDARY ILEUS.  4. PROSTATE ENLARGEMENT AND OTHER NONACUTE FINDINGS AS ABOVE.        This report was finalized on 5/16/2018 10:09 AM by Dr. Adebayo Dodd MD.       CT Chest Pulmonary Embolism With Contrast [[id number]] Collected:  05/16/18 0943     Updated:  05/16/18 0949    Narrative:       EXAMINATION: CT CHEST PULMONARY EMBOLISM W CONTRAST-      Technique: Multiple CT axial images were obtained through the level of  pulmonary arteries, following IV contrast administration per CT PE  protocol.  Volume Rendered 3D or MIP images performed.     Radiation dose reduction techniques were utilized per ALARA protocol.  Automated exposure control was initiated through either or [employer] or  DoseRight software packages by  protocol.                  CLINICAL INDICATION:    Chest pain     COMPARISON:    Chest x-ray  performed on same day.     FINDINGS: Small left pleural effusion subpulmonic in location. This  appears nonloculated. Left lower lobe airspace disease partially  consolidative representing combination of atelectasis and pneumonia.     No pulmonary embolism is identified.     Mild cardiomegaly. Moderate coronary vascular calcifications. No right  pleural effusion. No pericardial effusion. Chronic appearing lung  changes. No suspicious pulmonary nodules or masses. No pneumothorax.     Prior changes of CABG.     Gastroesophageal reflux. Hiatal hernia.     Imaging through the liver demonstrates cholelithiasis with abnormal  inflammatory changes surrounding the gallbladder. Additionally, there is  subtle low-density involving segment 3 of the liver abutting the fissure  ligament of teres that is approximately 6.3 cm and is new since the  previous exam and thus concerning for a more acute process such as  abscess. Stranding around the liver is also noted.       Impression:          1. No PE.   2. Left lower lobe pneumonia with left subpulmonic pleural effusion  which appears nonloculated.  3. 6.3 cm low-density lesion which is developed in the interim involving  the segment 3 portion of the liver or abutting the liver. Considerations  would include cholecystitis with rupture and adjacent fluid collection  formation versus involvement of the liver with liver abscess formation.  4. Cardiomegaly and chronic appearing lung changes.     This report was finalized on 5/16/2018 9:47 AM by Dr. Adebayo Dodd MD.       XR Chest 1 View [[id number]] Collected:  05/16/18 0835     Updated:  05/16/18 0838    Narrative:       EXAMINATION: XR CHEST 1 VW-      CLINICAL INDICATION:     Chest pain protocol     TECHNIQUE:  XR CHEST 1 VW-      COMPARISON: 10/15/1950      FINDINGS:   Low volumes with bibasilar airspace disease likely atelectasis.   Cardiomegaly with changes of prior CABG.   No pneumothorax.   No pleural effusion.   No acute  osseous findings.            Impression:       Decreased lung volumes with bibasilar airspace disease.  Otherwise stable chest.     This report was finalized on 5/16/2018 8:36 AM by Dr. Adebayo Dodd MD.           CT report and images were personally reviewed with Dr. Dodd.  The common bile duct is not dilated.  Although the lipase is elevated there are no significant CT findings of pancreatitis      Impression:  Patient Active Problem List   Diagnosis Code   • Insomnia G47.00   • Asymptomatic PVCs I49.3   • CAD in native artery I25.10   • Hypertension I10   • Dyslipidemia E78.5   • Palpitations R00.2   • Diarrhea of presumed infectious origin A09   • Bloating R14.0   • Elevated prostate specific antigen (PSA) R97.20   • Cholecystitis K81.9   Impression: Cholecystitis with rupture of the gallbladder and sepsis    Plan:  Proceed with open cholecystectomy with possible cholangiogram      Discussion:  Based upon the CT findings I do not see any benefit to attempt laparoscopic cholecystectomy.  In addition the patient is on Plavix which will make bleeding harder to control.  Given the fact that the patient is septic and the gallbladder is ruptured treatment with antibiotics for several days until the Plavix has worn off is not an option.  I did discuss possible ERCP with GI but given that the duct is not dilated and if the patient does not have extensive pancreatitis changes I think it is best to proceed with surgery, reserving ERCP for the postop period if needed.  Errors in dictation may reflect use of voice recognition software and not all errors in transcription may have been detected prior to signing.  Rosie Montalvo MD  05/16/18  1:56 PM    Time: Time spent:

## 2018-05-16 NOTE — OP NOTE
Open cholecystectomy with cholangiogram    Pre-op:  Perforated gallbladder with abscess    Post-op:  Same    Surgeon:  Rosie Montalvo M.D., F.A.C.S.    Assistant:  Albin    Anesthesia:  general      Indications: Perforated gallbladder with abscess       Procedure Details   After obtaining informed consent and receiving preoperative antibiotics and with venous compression boots in place, the patient was taken to the operating room and placed under anesthesia.  Ambrocio Catheter was placed and the abdomen was prepped and draped in a sterile fashion.  A right subcostal incision was made and carried down to the anterior fascia.  This was in size.  The rectus muscle was divided and the posterior fascia and peritoneum were divided and the peritoneum was entered.  There was bilious free fluid in the upper abdomen.  The Bookwalter retractor was placed.  The falciform ligament was divided.  The plane between the omentum and liver was opened and there was a large amount of bile, several 100 cc as well is libia pus.  The area was retracted.  The entire fundus of the gallbladder had perforated and was absent.  The inner wall of the remaining gallbladder was identified.  Stones were evacuated.  Once the wall of the gallbladder was identified with a finger inside the gallbladder outer wall was identified and this was used to dissect and remove the gallbladder piecemeal from distal to proximal.  Dissection was carried out very carefully as one approached the area of the vessels and the ducts the Kitner dissector was used to tease away the remaining gallbladder from the surrounding structures.  The neck of the gallbladder was identified and the cholangiocatheter was passed down into the duodenum without resistance and brought back into the main bile duct were cholangiogram demonstrated normal anatomy and no evidence of choledocholithiasis.  The gallbladder neck remnant was oversewn with a running 3-0 silk suture.  The area was  irrigated copiously.  A MISHEL drain was placed and brought out and sutured in with 2-0 silk.  Posterior fascia was closed with a running 0 Vicryl suture.  Anterior fascia was closed with a PDS suture and the skin was closed with staples.  Dressing was placed.  The patient tolerated the procedure well and was taken to the recovery room in stable condition.    Findings:  Perforated gallbladder with abscess    Estimated Blood Loss:  100 cc    Blood Administered: none           Drains: 10 flat MISHEL           Specimens:   ID Type Source Tests Collected by Time   1 : C&S FROM ABDOMINAL CAVITY. Swab Abdominal Wall CULTURE, ROUTINE Rosie Montalvo MD 5/16/2018 1538   A : Gallbladder Tissue Gallbladder TISSUE PATHOLOGY EXAM Rosie Montalvo MD 5/16/2018 1605        Grafts and Implants: No       Complications:  none           Disposition: PACU - hemodynamically stable.           Condition: stable

## 2018-05-16 NOTE — ED PROVIDER NOTES
"Subjective   Patient is a 69-year-old gentleman who presented with multiple complaints.  His original complaint was chest pain and \"not feeling well\".  Patient can't really describe the chest pain he says is an achy substernal pain that doesn't radiate.  Associated with some nausea and vomiting no shortness of breath or diaphoresis.  Patient also complaining of some epigastric and right upper quadrant abdominal pain that radiates to the back.  This has been present for weeks.  No associated melena or bright red blood per rectum.  Patient is on Plavix.  Patient says he's had some mild diarrhea but otherwise no change in urination or bowel movements        History provided by:  Patient and spouse  Chest Pain   Pain location:  Substernal area  Pain quality: aching and dull    Pain radiates to:  Does not radiate  Pain severity:  Mild  Onset quality:  Gradual  Timing:  Intermittent  Progression:  Waxing and waning  Chronicity:  New  Relieved by:  Nothing  Worsened by:  Nothing  Ineffective treatments:  None tried  Associated symptoms: abdominal pain, back pain, nausea and vomiting    Associated symptoms: no cough, no diaphoresis, no fever, no orthopnea, no shortness of breath and no syncope        Review of Systems   Constitutional: Negative.  Negative for chills, diaphoresis and fever.   HENT: Negative.    Respiratory: Negative.  Negative for cough and shortness of breath.    Cardiovascular: Positive for chest pain. Negative for orthopnea and syncope.   Gastrointestinal: Positive for abdominal pain, diarrhea, nausea and vomiting. Negative for abdominal distention, anal bleeding, blood in stool and constipation.   Endocrine: Negative.    Genitourinary: Negative.  Negative for dysuria and hematuria.   Musculoskeletal: Positive for back pain.   Skin: Negative.    Neurological: Negative.    Psychiatric/Behavioral: Negative.    All other systems reviewed and are negative.      Past Medical History:   Diagnosis Date   • " Arthritis    • Cervical spine disease     remotely suggested surgical intervention.  Patient deferred.    • Cervical spine disease    • Coronary artery disease    • Dyslipidemia    • Hypertension    • Impotence    • Palpitations    • Vertigo        No Known Allergies    Past Surgical History:   Procedure Laterality Date   • ARTERIAL BYPASS SURGERY     • COLONOSCOPY N/A 3/30/2018    Procedure: COLONOSCOPY;  Surgeon: Simone Valderrama MD;  Location: [facility];  Service: Gastroenterology   • CORONARY ARTERY BYPASS GRAFT     • KNEE ARTHROPLASTY, PARTIAL REPLACEMENT      Lt , 2015   • REPLACEMENT TOTAL KNEE      Rt       Family History   Problem Relation Age of Onset   • No Known Problems Mother    • No Known Problems Father    • No Known Problems Sister    • No Known Problems Brother        Social History     Social History   • Marital status:      Social History Main Topics   • Smoking status: Former Smoker     Packs/day: 1.50     Years: 7.00     Types: Cigarettes, Pipe, Cigars     Quit date: 6/14/1986   • Smokeless tobacco: Never Used   • Alcohol use Yes      Comment: occi   • Drug use: No   • Sexual activity: Defer     Other Topics Concern   • Not on file           Objective   Physical Exam   Constitutional: He is oriented to person, place, and time. He appears well-developed and well-nourished. No distress.   HENT:   Head: Normocephalic and atraumatic.   Right Ear: External ear normal.   Left Ear: External ear normal.   Nose: Nose normal.   Eyes: Conjunctivae and EOM are normal. Pupils are equal, round, and reactive to light.   Neck: Normal range of motion. Neck supple. No JVD present. No tracheal deviation present.   Cardiovascular: Normal rate, regular rhythm and normal heart sounds.    No murmur heard.  Pulmonary/Chest: Effort normal and breath sounds normal. No respiratory distress. He has no wheezes.   Abdominal: Soft. Bowel sounds are normal. There is no tenderness.   Musculoskeletal: Normal range of  motion. He exhibits no edema or deformity.   Neurological: He is alert and oriented to person, place, and time. No cranial nerve deficit or sensory deficit.   Skin: Skin is warm and dry. Capillary refill takes less than 2 seconds. No rash noted. He is not diaphoretic. No erythema. No pallor.   Psychiatric: He has a normal mood and affect. His behavior is normal. Thought content normal.   Vitals reviewed.      Procedures           ED Course  ED Course as of May 16 1610   Wed May 16, 2018   0805 Sinus tachcyardia, normal axis, normal interval, normal QRS. Nonspecific st/t wave changes no STEMI criteria    [NB]   1100 We have no ICU/pcu beds here.  Pt has what appears to be ruptured cholecystitis and pancreatitis. He is septic. He was given 30 cc/kg bolus of normal saline as well as Zosyn and vancomycin here.  He's had a map of greater than 65 at all times.  He has good cap refill in all 4 extremities his heart rate is now 98 his blood pressure is a map that is 65.  His good capillary refill in all 4 extremities less than 2 seconds.  Patient does not require pressors at this time.  [NB]   1112 We have no pcu/icu beds so pt will need to be transferred. Spoke with surgeon at [facility] and he said they have no icu beds so he can not accept  [NB]   1125 Spoke with linda who is reviewing pts info to see if she is willing to admit pt.   [NB]   1153 Spoke with linda who agrees to take pt to OR.  She says keep pt npo but asked that we have the hospitalist admit.  We do have a PCU bed at this time according to house supervisor  [NB]   1154 Spok with dr donnelly who agrees to admit.   [NB]   1331 Patient was seen by Dr. Montalvo here who is taking him to OR.    [NB]   1355 Pt apparently started to refuse surgery.  Pt is now altered and I don't think he can understand the risks.  His wife consented to the surgery who also thinks the pt is altered. The charge nurse sam went to see the pt with me and she agrees the pt is  altered. The pt had apparently refused surgery to dr mckeon who said the pt is altered as well.   Surgery team was here to take the pt down when he started to refuse.  I don't think the patient is of sound mind to decline surgery at this time. He is oreinted to person only.  He thinks he is at his place of employment currently and does not know he is in a hospital.  I believe he will die if he does not havesurgery. The patients wife wants him to go and she thinks if he was in his right mind he would go.   [NB]      ED Course User Index  [NB] Mark Rolon MD                  MDM  Number of Diagnoses or Management Options  Acute pancreatitis, unspecified complication status, unspecified pancreatitis type:   Cholecystitis:   Leukocytosis, unspecified type:   Sepsis, due to unspecified organism:      Amount and/or Complexity of Data Reviewed  Clinical lab tests: ordered and reviewed  Tests in the radiology section of CPT®: ordered and reviewed  Discuss the patient with other providers: yes    Risk of Complications, Morbidity, and/or Mortality  Presenting problems: high  Diagnostic procedures: high  Management options: high    Critical Care  Total time providing critical care: 30-74 minutes (54 minutes)        Final diagnoses:   Sepsis, due to unspecified organism   Acute pancreatitis, unspecified complication status, unspecified pancreatitis type   Leukocytosis, unspecified type   Cholecystitis            Mark Rolon MD  05/16/18 5527

## 2018-05-16 NOTE — ED NOTES
[facility] does not have any available beds. Dr. Rolon on phone with Dr. Montalvo at this time.      Lenore Grimm  05/16/18 3052

## 2018-05-16 NOTE — ANESTHESIA PROCEDURE NOTES
Airway  Urgency: elective    Date/Time: 5/16/2018 3:07 PM  Airway not difficult    General Information and Staff    Patient location during procedure: OR  Anesthesiologist: SANJEEV MCCORD  CRNA: IRINEO YI    Indications and Patient Condition  Indications for airway management: airway protection    Preoxygenated: yes  MILS maintained throughout  Mask difficulty assessment: 0 - not attempted    Final Airway Details  Final airway type: endotracheal airway      Successful airway: ETT  Cuffed: yes   Successful intubation technique: direct laryngoscopy  Facilitating devices/methods: intubating stylet  Endotracheal tube insertion site: oral  Blade: Nighat  Blade size: #3  ETT size: 7.5 mm  Cormack-Lehane Classification: grade I - full view of glottis  Placement verified by: chest auscultation, capnometry and palpation of cuff   Cuff volume (mL): 10  Measured from: lips  ETT to lips (cm): 21  Number of attempts at approach: 1    Additional Comments  Dentition and lips same as preop

## 2018-05-16 NOTE — ED NOTES
Orange fall risk bracelet placed on patient at this time; ensured that ED stretcher was locked and in lowest position with side rails up x1, call light within reach. Education given to patient on importance of not getting up without assistance from ED staff. Patient verbalizes understanding and is agreeable to seek help with ADL needs.      Erick Lopez RN  05/16/18 5075

## 2018-05-16 NOTE — ANESTHESIA PREPROCEDURE EVALUATION
Anesthesia Evaluation                  Airway   Mallampati: II  TM distance: >3 FB  Neck ROM: full  no difficulty expected and Possible difficult intubation  Dental - normal exam   (+) poor dentition    Pulmonary - normal exam   (+) a smoker,   Cardiovascular - normal exam    (+) hypertension, CAD, CABG,       Neuro/Psych  (+) dizziness/light headedness,     GI/Hepatic/Renal/Endo    (+) obesity,       Musculoskeletal     Abdominal  - normal exam    Bowel sounds: normal.   Substance History      OB/GYN          Other   (+) arthritis                     Anesthesia Plan    ASA 4     general     intravenous induction   Anesthetic plan and risks discussed with patient.

## 2018-05-16 NOTE — ANESTHESIA PROCEDURE NOTES
Airway  Urgency: elective    Date/Time: 5/16/2018 6:30 PM  Airway not difficult    General Information and Staff    Patient location during procedure: OR  Anesthesiologist: SANJEEV MCCORD  CRNA: IRINEO YI    Indications and Patient Condition  Indications for airway management: airway protection    Preoxygenated: yes  MILS maintained throughout  Mask difficulty assessment: 0 - not attempted    Final Airway Details  Final airway type: endotracheal airway      Successful airway: ETT  Cuffed: yes   Successful intubation technique: direct laryngoscopy  Facilitating devices/methods: intubating stylet  Endotracheal tube insertion site: oral  Blade: Nighat  Blade size: #3  ETT size: 8.0 mm  Cormack-Lehane Classification: grade I - full view of glottis  Placement verified by: chest auscultation, capnometry and palpation of cuff   Cuff volume (mL): 10  Measured from: lips  ETT to lips (cm): 21  Number of attempts at approach: 1    Additional Comments  Dentition and lips same as preop

## 2018-05-16 NOTE — ED NOTES
Pt accidentally pulled out iv in right ac, no bleeding/redness/swelling noted to site. md notified.      Kay Ramirez RN  05/16/18 9600

## 2018-05-16 NOTE — ED NOTES
Contacted [facility] regarding patient transfer to [facility]. Will have general surgeon on call paged.      Lenore Grimm  05/16/18 1715

## 2018-05-17 ENCOUNTER — APPOINTMENT (OUTPATIENT)
Dept: ULTRASOUND IMAGING | Facility: HOSPITAL | Age: 78
End: 2018-05-17

## 2018-05-17 LAB
A-A DO2: 122.4 MMHG (ref 0–300)
A-A DO2: 175.3 MMHG (ref 0–300)
ALBUMIN SERPL-MCNC: 2.2 G/DL (ref 3.4–4.8)
ALBUMIN/GLOB SERPL: 1 G/DL (ref 1.5–2.5)
ALP SERPL-CCNC: 185 U/L (ref 40–129)
ALT SERPL W P-5'-P-CCNC: 127 U/L (ref 10–44)
AMYLASE SERPL-CCNC: 192 U/L (ref 28–100)
ANION GAP SERPL CALCULATED.3IONS-SCNC: 6.4 MMOL/L (ref 3.6–11.2)
ARTERIAL PATENCY WRIST A: POSITIVE
ARTERIAL PATENCY WRIST A: POSITIVE
AST SERPL-CCNC: 54 U/L (ref 10–34)
ATMOSPHERIC PRESS: 725 MMHG
ATMOSPHERIC PRESS: 726 MMHG
BACTERIA UR QL AUTO: ABNORMAL /HPF
BASE EXCESS BLDA CALC-SCNC: -2.4 MMOL/L
BASE EXCESS BLDA CALC-SCNC: -3.1 MMOL/L
BASOPHILS # BLD AUTO: 0.02 10*3/MM3 (ref 0–0.3)
BASOPHILS NFR BLD AUTO: 0.1 % (ref 0–2)
BDY SITE: ABNORMAL
BDY SITE: ABNORMAL
BILIRUB SERPL-MCNC: 1.6 MG/DL (ref 0.2–1.8)
BILIRUB UR QL STRIP: ABNORMAL
BODY TEMPERATURE: 98.6 C
BODY TEMPERATURE: 98.6 C
BUN BLD-MCNC: 21 MG/DL (ref 7–21)
BUN/CREAT SERPL: 17.4 (ref 7–25)
CALCIUM SPEC-SCNC: 7.5 MG/DL (ref 7.7–10)
CHLORIDE SERPL-SCNC: 104 MMOL/L (ref 99–112)
CK MB SERPL-CCNC: 1.52 NG/ML (ref 0–5)
CK MB SERPL-CCNC: 1.93 NG/ML (ref 0–5)
CK MB SERPL-RTO: 2.1 % (ref 0–3)
CK MB SERPL-RTO: 2.8 % (ref 0–3)
CK SERPL-CCNC: 70 U/L (ref 24–204)
CK SERPL-CCNC: 74 U/L (ref 24–204)
CK SERPL-CCNC: 74 U/L (ref 24–204)
CLARITY UR: CLEAR
CO2 SERPL-SCNC: 22.6 MMOL/L (ref 24.3–31.9)
COHGB MFR BLD: 1 % (ref 0–5)
COHGB MFR BLD: 1.4 % (ref 0–5)
COLOR UR: ABNORMAL
CREAT BLD-MCNC: 1.21 MG/DL (ref 0.43–1.29)
CRP SERPL-MCNC: 22.97 MG/DL (ref 0–0.99)
D-LACTATE SERPL-SCNC: 1.3 MMOL/L (ref 0.5–2)
DEPRECATED RDW RBC AUTO: 48 FL (ref 37–54)
EOSINOPHIL # BLD AUTO: 0 10*3/MM3 (ref 0–0.7)
EOSINOPHIL NFR BLD AUTO: 0 % (ref 0–7)
ERYTHROCYTE [DISTWIDTH] IN BLOOD BY AUTOMATED COUNT: 13.9 % (ref 11.5–14.5)
GFR SERPL CREATININE-BSD FRML MDRD: 58 ML/MIN/1.73
GLOBULIN UR ELPH-MCNC: 2.2 GM/DL
GLUCOSE BLD-MCNC: 149 MG/DL (ref 70–110)
GLUCOSE BLDC GLUCOMTR-MCNC: 117 MG/DL (ref 70–130)
GLUCOSE UR STRIP-MCNC: NEGATIVE MG/DL
HCO3 BLDA-SCNC: 20 MMOL/L (ref 22–26)
HCO3 BLDA-SCNC: 21.8 MMOL/L (ref 22–26)
HCT VFR BLD AUTO: 35.4 % (ref 42–52)
HCT VFR BLD CALC: 38 % (ref 42–52)
HCT VFR BLD CALC: 40 % (ref 42–52)
HGB BLD-MCNC: 12 G/DL (ref 14–18)
HGB BLDA-MCNC: 12.8 G/DL (ref 12–16)
HGB BLDA-MCNC: 13.7 G/DL (ref 12–16)
HGB UR QL STRIP.AUTO: NEGATIVE
HOROWITZ INDEX BLD+IHG-RTO: 35 %
HOROWITZ INDEX BLD+IHG-RTO: 45 %
HYALINE CASTS UR QL AUTO: ABNORMAL /LPF
IMM GRANULOCYTES # BLD: 0.07 10*3/MM3 (ref 0–0.03)
IMM GRANULOCYTES NFR BLD: 0.4 % (ref 0–0.5)
INR PPP: 1.73 (ref 0.9–1.1)
KETONES UR QL STRIP: NEGATIVE
L PNEUMO1 AG UR QL IA: NEGATIVE
LEUKOCYTE ESTERASE UR QL STRIP.AUTO: ABNORMAL
LIPASE SERPL-CCNC: 135 U/L (ref 13–60)
LYMPHOCYTES # BLD AUTO: 1.69 10*3/MM3 (ref 1–3)
LYMPHOCYTES NFR BLD AUTO: 9.4 % (ref 16–46)
MAGNESIUM SERPL-MCNC: 1.6 MG/DL (ref 1.7–2.6)
MCH RBC QN AUTO: 32.1 PG (ref 27–33)
MCHC RBC AUTO-ENTMCNC: 33.9 G/DL (ref 33–37)
MCV RBC AUTO: 94.7 FL (ref 80–94)
METHGB BLD QL: 0.3 % (ref 0–3)
METHGB BLD QL: 0.3 % (ref 0–3)
MODALITY: ABNORMAL
MODALITY: ABNORMAL
MONOCYTES # BLD AUTO: 1.43 10*3/MM3 (ref 0.1–0.9)
MONOCYTES NFR BLD AUTO: 8 % (ref 0–12)
NEUTROPHILS # BLD AUTO: 14.7 10*3/MM3 (ref 1.4–6.5)
NEUTROPHILS NFR BLD AUTO: 82.1 % (ref 40–75)
NITRITE UR QL STRIP: POSITIVE
NRBC BLD MANUAL-RTO: 0 /100 WBC (ref 0–0)
OSMOLALITY SERPL CALC.SUM OF ELEC: 272.2 MOSM/KG (ref 273–305)
OXYHGB MFR BLDV: 93.3 % (ref 85–100)
OXYHGB MFR BLDV: 96.3 % (ref 85–100)
PCO2 BLDA: 30.5 MM HG (ref 35–45)
PCO2 BLDA: 36 MM HG (ref 35–45)
PEEP RESPIRATORY: 5 CM[H2O]
PEEP RESPIRATORY: 5 CM[H2O]
PH BLDA: 7.4 PH UNITS (ref 7.35–7.45)
PH BLDA: 7.43 PH UNITS (ref 7.35–7.45)
PH UR STRIP.AUTO: <=5 [PH] (ref 5–8)
PHOSPHATE SERPL-MCNC: 3.2 MG/DL (ref 2.7–4.5)
PLATELET # BLD AUTO: 459 10*3/MM3 (ref 130–400)
PMV BLD AUTO: 8.8 FL (ref 6–10)
PO2 BLDA: 73.1 MM HG (ref 80–100)
PO2 BLDA: 95.6 MM HG (ref 80–100)
POTASSIUM BLD-SCNC: 4.6 MMOL/L (ref 3.5–5.3)
PROT SERPL-MCNC: 4.4 G/DL (ref 6–8)
PROT UR QL STRIP: ABNORMAL
PROTHROMBIN TIME: 20.5 SECONDS (ref 11–15.4)
PSV: 8 CMH2O
RBC # BLD AUTO: 3.74 10*6/MM3 (ref 4.7–6.1)
RBC # UR: ABNORMAL /HPF
REF LAB TEST METHOD: ABNORMAL
SAO2 % BLDCOA: 94.5 % (ref 90–100)
SAO2 % BLDCOA: 98 % (ref 90–100)
SET MECH RESP RATE: 0
SET MECH RESP RATE: 20
SODIUM BLD-SCNC: 133 MMOL/L (ref 135–153)
SP GR UR STRIP: >1.03 (ref 1–1.03)
SQUAMOUS #/AREA URNS HPF: ABNORMAL /HPF
TROPONIN I SERPL-MCNC: 0.03 NG/ML
TROPONIN I SERPL-MCNC: 0.03 NG/ML
UROBILINOGEN UR QL STRIP: ABNORMAL
VENTILATOR MODE: ABNORMAL
VT ON VENT VENT: 550 ML
WBC NRBC COR # BLD: 17.91 10*3/MM3 (ref 4.5–12.5)
WBC UR QL AUTO: ABNORMAL /HPF

## 2018-05-17 PROCEDURE — 25010000002 PROPOFOL 1000 MG/ML EMULSION: Performed by: HOSPITALIST

## 2018-05-17 PROCEDURE — 94799 UNLISTED PULMONARY SVC/PX: CPT

## 2018-05-17 PROCEDURE — 94003 VENT MGMT INPAT SUBQ DAY: CPT

## 2018-05-17 PROCEDURE — 83050 HGB METHEMOGLOBIN QUAN: CPT | Performed by: INTERNAL MEDICINE

## 2018-05-17 PROCEDURE — 82962 GLUCOSE BLOOD TEST: CPT

## 2018-05-17 PROCEDURE — 36600 WITHDRAWAL OF ARTERIAL BLOOD: CPT | Performed by: HOSPITALIST

## 2018-05-17 PROCEDURE — 82375 ASSAY CARBOXYHB QUANT: CPT | Performed by: HOSPITALIST

## 2018-05-17 PROCEDURE — 87086 URINE CULTURE/COLONY COUNT: CPT | Performed by: EMERGENCY MEDICINE

## 2018-05-17 PROCEDURE — 84484 ASSAY OF TROPONIN QUANT: CPT | Performed by: PHYSICIAN ASSISTANT

## 2018-05-17 PROCEDURE — 82553 CREATINE MB FRACTION: CPT | Performed by: PHYSICIAN ASSISTANT

## 2018-05-17 PROCEDURE — 82375 ASSAY CARBOXYHB QUANT: CPT | Performed by: INTERNAL MEDICINE

## 2018-05-17 PROCEDURE — 25010000002 HEPARIN (PORCINE) PER 1000 UNITS: Performed by: SURGERY

## 2018-05-17 PROCEDURE — 82805 BLOOD GASES W/O2 SATURATION: CPT | Performed by: INTERNAL MEDICINE

## 2018-05-17 PROCEDURE — 25010000002 PROPOFOL 10 MG/ML EMULSION

## 2018-05-17 PROCEDURE — 93970 EXTREMITY STUDY: CPT | Performed by: RADIOLOGY

## 2018-05-17 PROCEDURE — 82805 BLOOD GASES W/O2 SATURATION: CPT | Performed by: HOSPITALIST

## 2018-05-17 PROCEDURE — 36600 WITHDRAWAL OF ARTERIAL BLOOD: CPT | Performed by: INTERNAL MEDICINE

## 2018-05-17 PROCEDURE — 25010000002 PIPERACILLIN-TAZOBACTAM: Performed by: PHYSICIAN ASSISTANT

## 2018-05-17 PROCEDURE — 83605 ASSAY OF LACTIC ACID: CPT | Performed by: INTERNAL MEDICINE

## 2018-05-17 PROCEDURE — 83690 ASSAY OF LIPASE: CPT | Performed by: PHYSICIAN ASSISTANT

## 2018-05-17 PROCEDURE — 82550 ASSAY OF CK (CPK): CPT | Performed by: PHYSICIAN ASSISTANT

## 2018-05-17 PROCEDURE — 99024 POSTOP FOLLOW-UP VISIT: CPT | Performed by: SURGERY

## 2018-05-17 PROCEDURE — 86140 C-REACTIVE PROTEIN: CPT | Performed by: PHYSICIAN ASSISTANT

## 2018-05-17 PROCEDURE — 87899 AGENT NOS ASSAY W/OPTIC: CPT | Performed by: PHYSICIAN ASSISTANT

## 2018-05-17 PROCEDURE — 93970 EXTREMITY STUDY: CPT

## 2018-05-17 PROCEDURE — 84100 ASSAY OF PHOSPHORUS: CPT | Performed by: PHYSICIAN ASSISTANT

## 2018-05-17 PROCEDURE — 85610 PROTHROMBIN TIME: CPT | Performed by: SURGERY

## 2018-05-17 PROCEDURE — 83735 ASSAY OF MAGNESIUM: CPT | Performed by: PHYSICIAN ASSISTANT

## 2018-05-17 PROCEDURE — 85025 COMPLETE CBC W/AUTO DIFF WBC: CPT | Performed by: PHYSICIAN ASSISTANT

## 2018-05-17 PROCEDURE — 80053 COMPREHEN METABOLIC PANEL: CPT | Performed by: SURGERY

## 2018-05-17 PROCEDURE — 25010000002 VANCOMYCIN PER 500 MG: Performed by: PHYSICIAN ASSISTANT

## 2018-05-17 PROCEDURE — 82150 ASSAY OF AMYLASE: CPT | Performed by: HOSPITALIST

## 2018-05-17 PROCEDURE — 83050 HGB METHEMOGLOBIN QUAN: CPT | Performed by: HOSPITALIST

## 2018-05-17 PROCEDURE — 94640 AIRWAY INHALATION TREATMENT: CPT

## 2018-05-17 PROCEDURE — 99233 SBSQ HOSP IP/OBS HIGH 50: CPT | Performed by: INTERNAL MEDICINE

## 2018-05-17 PROCEDURE — 99291 CRITICAL CARE FIRST HOUR: CPT | Performed by: INTERNAL MEDICINE

## 2018-05-17 PROCEDURE — 81001 URINALYSIS AUTO W/SCOPE: CPT | Performed by: EMERGENCY MEDICINE

## 2018-05-17 RX ORDER — MAGNESIUM SULFATE HEPTAHYDRATE 40 MG/ML
2 INJECTION, SOLUTION INTRAVENOUS AS NEEDED
Status: DISCONTINUED | OUTPATIENT
Start: 2018-05-17 | End: 2018-05-23 | Stop reason: HOSPADM

## 2018-05-17 RX ORDER — SODIUM CHLORIDE 0.9 % (FLUSH) 0.9 %
10 SYRINGE (ML) INJECTION AS NEEDED
Status: DISCONTINUED | OUTPATIENT
Start: 2018-05-17 | End: 2018-05-23 | Stop reason: HOSPADM

## 2018-05-17 RX ORDER — SODIUM CHLORIDE 0.9 % (FLUSH) 0.9 %
10 SYRINGE (ML) INJECTION EVERY 12 HOURS SCHEDULED
Status: DISCONTINUED | OUTPATIENT
Start: 2018-05-17 | End: 2018-05-23 | Stop reason: HOSPADM

## 2018-05-17 RX ORDER — MAGNESIUM SULFATE HEPTAHYDRATE 40 MG/ML
4 INJECTION, SOLUTION INTRAVENOUS AS NEEDED
Status: DISCONTINUED | OUTPATIENT
Start: 2018-05-17 | End: 2018-05-23 | Stop reason: HOSPADM

## 2018-05-17 RX ORDER — POTASSIUM CHLORIDE 7.45 MG/ML
10 INJECTION INTRAVENOUS
Status: DISCONTINUED | OUTPATIENT
Start: 2018-05-17 | End: 2018-05-23 | Stop reason: HOSPADM

## 2018-05-17 RX ORDER — MAGNESIUM SULFATE HEPTAHYDRATE 40 MG/ML
4 INJECTION, SOLUTION INTRAVENOUS ONCE
Status: COMPLETED | OUTPATIENT
Start: 2018-05-17 | End: 2018-05-17

## 2018-05-17 RX ORDER — LANSOPRAZOLE
30 KIT
Status: DISCONTINUED | OUTPATIENT
Start: 2018-05-17 | End: 2018-05-21 | Stop reason: CLARIF

## 2018-05-17 RX ORDER — POTASSIUM CHLORIDE 750 MG/1
40 CAPSULE, EXTENDED RELEASE ORAL AS NEEDED
Status: DISCONTINUED | OUTPATIENT
Start: 2018-05-17 | End: 2018-05-23 | Stop reason: HOSPADM

## 2018-05-17 RX ORDER — SODIUM CHLORIDE 9 MG/ML
50 INJECTION, SOLUTION INTRAVENOUS CONTINUOUS
Status: DISCONTINUED | OUTPATIENT
Start: 2018-05-17 | End: 2018-05-22

## 2018-05-17 RX ORDER — PROPOFOL 10 MG/ML
VIAL (ML) INTRAVENOUS
Status: COMPLETED
Start: 2018-05-17 | End: 2018-05-17

## 2018-05-17 RX ORDER — CHLORHEXIDINE GLUCONATE 0.12 MG/ML
15 RINSE ORAL EVERY 12 HOURS SCHEDULED
Status: DISCONTINUED | OUTPATIENT
Start: 2018-05-17 | End: 2018-05-19

## 2018-05-17 RX ORDER — POTASSIUM CHLORIDE 1.5 G/1.77G
40 POWDER, FOR SOLUTION ORAL AS NEEDED
Status: DISCONTINUED | OUTPATIENT
Start: 2018-05-17 | End: 2018-05-23 | Stop reason: HOSPADM

## 2018-05-17 RX ADMIN — CHLORHEXIDINE GLUCONATE 15 ML: 1.2 RINSE ORAL at 03:00

## 2018-05-17 RX ADMIN — MAGNESIUM SULFATE HEPTAHYDRATE 4 G: 40 INJECTION, SOLUTION INTRAVENOUS at 09:46

## 2018-05-17 RX ADMIN — PROPOFOL 10 MCG/KG/MIN: 10 INJECTION, EMULSION INTRAVENOUS at 01:00

## 2018-05-17 RX ADMIN — VANCOMYCIN HYDROCHLORIDE 1250 MG: 5 INJECTION, POWDER, LYOPHILIZED, FOR SOLUTION INTRAVENOUS at 04:38

## 2018-05-17 RX ADMIN — CHLORHEXIDINE GLUCONATE 15 ML: 1.2 RINSE ORAL at 09:52

## 2018-05-17 RX ADMIN — NOREPINEPHRINE BITARTRATE 0.04 MCG/KG/MIN: 1 INJECTION INTRAVENOUS at 18:42

## 2018-05-17 RX ADMIN — HEPARIN SODIUM 5000 UNITS: 5000 INJECTION, SOLUTION INTRAVENOUS; SUBCUTANEOUS at 21:26

## 2018-05-17 RX ADMIN — PIPERACILLIN SODIUM,TAZOBACTAM SODIUM 3.38 G: 3; .375 INJECTION, POWDER, FOR SOLUTION INTRAVENOUS at 15:30

## 2018-05-17 RX ADMIN — SODIUM CHLORIDE 100 ML/HR: 9 INJECTION, SOLUTION INTRAVENOUS at 15:30

## 2018-05-17 RX ADMIN — IPRATROPIUM BROMIDE 0.5 MG: 0.5 SOLUTION RESPIRATORY (INHALATION) at 19:07

## 2018-05-17 RX ADMIN — PROPOFOL 25 MCG/KG/MIN: 10 INJECTION, EMULSION INTRAVENOUS at 02:23

## 2018-05-17 RX ADMIN — IPRATROPIUM BROMIDE 0.5 MG: 0.5 SOLUTION RESPIRATORY (INHALATION) at 06:24

## 2018-05-17 RX ADMIN — SODIUM CHLORIDE 100 ML/HR: 9 INJECTION, SOLUTION INTRAVENOUS at 05:50

## 2018-05-17 RX ADMIN — Medication 10 ML: at 09:52

## 2018-05-17 RX ADMIN — IPRATROPIUM BROMIDE 0.5 MG: 0.5 SOLUTION RESPIRATORY (INHALATION) at 12:25

## 2018-05-17 RX ADMIN — PIPERACILLIN SODIUM,TAZOBACTAM SODIUM 3.38 G: 3; .375 INJECTION, POWDER, FOR SOLUTION INTRAVENOUS at 23:00

## 2018-05-17 RX ADMIN — IPRATROPIUM BROMIDE 0.5 MG: 0.5 SOLUTION RESPIRATORY (INHALATION) at 00:51

## 2018-05-17 RX ADMIN — Medication 10 ML: at 03:00

## 2018-05-17 RX ADMIN — Medication: at 01:11

## 2018-05-17 RX ADMIN — PIPERACILLIN SODIUM,TAZOBACTAM SODIUM 3.38 G: 3; .375 INJECTION, POWDER, FOR SOLUTION INTRAVENOUS at 05:53

## 2018-05-17 RX ADMIN — Medication 10 ML: at 21:26

## 2018-05-17 NOTE — PROGRESS NOTES
Pharmacy was consulted to dose vancomycin and Zosyn for sepsis/intra-abdominal infection. Based on an estimated CrCl of 56mL/min an extended infusion Zosyn dose of 3.375g q8hr has been ordered. A vancomycin dose of 1250mg q18hr has been ordered and scheduled from the initial 2g loading dose the patient received in the ED. A trough will be obtained at steady state to determine if we are within the therapeutic trough range of 15-20mg/L. Thank you for the consult. Pharmacy will continue to follow.     Thank you,   Billie Robins [facility]  10:00 PM

## 2018-05-17 NOTE — PLAN OF CARE
Problem: Patient Care Overview  Goal: Plan of Care Review   05/17/18 1852   Coping/Psychosocial   Plan of Care Reviewed With patient;spouse   Plan of Care Review   Progress improving     Goal: Interprofessional Rounds/Family Conf   05/17/18 1852   Interdisciplinary Rounds/Family Conf   Participants ;pharmacy;social work/services       Problem: Fall Risk (Adult)  Goal: Identify Related Risk Factors and Signs and Symptoms   05/17/18 1852   Fall Risk (Adult)   Related Risk Factors (Fall Risk) age-related changes;confusion/agitation;culprit medication(s);environment unfamiliar;homeostatic imbalance   Signs and Symptoms (Fall Risk) presence of risk factors     Goal: Absence of Fall   05/17/18 1852   Fall Risk (Adult)   Absence of Fall making progress toward outcome       Problem: Wound (Includes Pressure Injury) (Adult)  Goal: Signs and Symptoms of Listed Potential Problems Will be Absent, Minimized or Managed (Wound)   05/17/18 1852   Goal/Outcome Evaluation   Problems Assessed (Wound) all   Problems Present (Wound) none       Problem: Breathing Pattern Ineffective (Adult)  Goal: Identify Related Risk Factors and Signs and Symptoms   05/17/18 1852   Breathing Pattern Ineffective (Adult)   Related Risk Factors (Breathing Pattern Ineffective) immobility;surgery;infection     Goal: Anxiety/Fear Reduction   05/17/18 1852   Breathing Pattern Ineffective (Adult)   Anxiety/Fear Reduction making progress toward outcome       Problem: Ventilation, Mechanical Invasive (Adult)  Goal: Signs and Symptoms of Listed Potential Problems Will be Absent, Minimized or Managed (Ventilation, Mechanical Invasive)   05/17/18 1852   Goal/Outcome Evaluation   Problems Assessed (Mechanical Ventilation, Invasive) all   Problems Present (Mech Vent, Invasive) none       Problem: Sepsis/Septic Shock (Adult)  Goal: Signs and Symptoms of Listed Potential Problems Will be Absent, Minimized or Managed (Sepsis/Septic Shock)   05/17/18 1852    Goal/Outcome Evaluation   Problems Assessed (Sepsis) all   Problems Present (Sepsis) situational response;hypoxia/hypoxemia;hypoperfusion/hemodynamic instability       Problem: Skin Injury Risk (Adult)  Goal: Identify Related Risk Factors and Signs and Symptoms   05/17/18 1852   Skin Injury Risk (Adult)   Related Risk Factors (Skin Injury Risk) critical care admission;infection;mechanical forces;mobility impaired;medication;medical devices;tissue perfusion altered     Goal: Skin Health and Integrity   05/17/18 1852   Skin Injury Risk (Adult)   Skin Health and Integrity making progress toward outcome       Problem: Cholecystectomy (Adult)  Goal: Signs and Symptoms of Listed Potential Problems Will be Absent, Minimized or Managed (Cholecystectomy)   05/17/18 1852   Goal/Outcome Evaluation   Problems Assessed (Cholecystectomy) all   Problems Present (Cholecystectomy) situational response;respiratory compromise;postoperative urinary retention     Goal: Anesthesia/Sedation Recovery   05/17/18 1852   Goal/Outcome Evaluation   Anesthesia/Sedation Recovery recovered to baseline       Problem: Restraint, Nonbehavioral (Nonviolent)  Goal: Rationale and Justification   05/17/18 1852   Restraint, Nonbehavioral (Nonviolent)   Rationale and Justification prevent line/tube removal;failure of less restrictive safety measures     Goal: Nonbehavioral (Nonviolent) Restraint: Absence of Injury/Harm   05/17/18 1852   Restraint, Nonbehavioral (Nonviolent)   Nonbehavioral (Nonviolent) Restraint: Absence of Injury/Harm met     Goal: Nonbehavioral (Nonviolent) Restraint: Achievement of Discontinuation Criteria   05/17/18 1852   Restraint, Nonbehavioral (Nonviolent)   Nonbehavioral (Nonviolent) Restraint: Achievement of Discontinuation Criteria met     Goal: Nonbehavioral (Nonviolent) Restraint: Preservation of Dignity and Wellbeing   05/17/18 1852   Restraint, Nonbehavioral (Nonviolent)   Nonbehavioral (Nonviolent) Restraint: Preservation  of Dignity and Wellbeing met

## 2018-05-17 NOTE — OP NOTE
Central line insertion subclavian    Surgeon:  Rosie Montalvo M.D., LETY    [facility]  5/16/2018    Pre op Diagnosis: post op hypotension  Post op Diagnosis:  same    Anesthesia: 1% lidocaine    Procedure:  After obtaining informed consent patient was placed in the Trendelenburg position.  With use of the Seldinger technique the left subclavian vein was cannulated.  Guidewire was passed without resistance.  The tract was dilated and the triple-lumen catheter was passed to 20 cm and sutured in place. Good blood return was obtained from all 3 ports.  Dressing was placed.    CXR result: good positiion, no ptx    Blood administered:  none      Rosie Montalvo MD     Date: 5/16/2018  Time: 8:42 PM

## 2018-05-17 NOTE — PLAN OF CARE
Problem: Wound (Includes Pressure Injury) (Adult)  Goal: Signs and Symptoms of Listed Potential Problems Will be Absent, Minimized or Managed (Wound)  Outcome: Ongoing (interventions implemented as appropriate)

## 2018-05-17 NOTE — CONSULTS
Referring Provider: Dr. Brown  Reason for Consultation: Respiratory failure post surgery      Chief complaint shortness of breath      History of present illness:  Mr. Soni is a 77-year-old male that presented to a complaint of abdominal pain and weakness.  He stated that this has been going on for 3-4 days.  Concern for cholecystitis with partial rupture.  He was taking a large emergently and a laparoscopic cholecystectomy.  He was extubated post surgery but his CO2 level continued to elevate.  He was then reinutbated and transferred to the critical care unit for further management.    Review Of Systems:    History unobtainable from patient due to intubation        History  Past Medical History:   Diagnosis Date   • Arthritis    • Cervical spine disease     remotely suggested surgical intervention.  Patient deferred.    • Cervical spine disease    • Coronary artery disease    • Dyslipidemia    • Hypertension    • Impotence    • Palpitations    • Vertigo    , Past Surgical History:   Procedure Laterality Date   • ARTERIAL BYPASS SURGERY     • CHOLECYSTECTOMY WITH INTRAOPERATIVE CHOLANGIOGRAM N/A 5/16/2018    Procedure: OPEN CHOLECYSTECTOMY;  Surgeon: Rosie Montalvo MD;  Location: [facility] OR;  Service: General   • COLONOSCOPY N/A 3/30/2018    Procedure: COLONOSCOPY;  Surgeon: Simone Valderrama MD;  Location: [facility];  Service: Gastroenterology   • CORONARY ARTERY BYPASS GRAFT     • KNEE ARTHROPLASTY, PARTIAL REPLACEMENT      Lt , 2015   • REPLACEMENT TOTAL KNEE      Rt   , Family History   Problem Relation Age of Onset   • No Known Problems Mother    • No Known Problems Father    • No Known Problems Sister    • No Known Problems Brother    , Social History   Substance Use Topics   • Smoking status: Former Smoker     Packs/day: 1.50     Years: 7.00     Types: Cigarettes, Pipe, Cigars     Quit date: 6/14/1986   • Smokeless tobacco: Never Used   • Alcohol use Yes      Comment: occi   , Prescriptions Prior to  Admission   Medication Sig Dispense Refill Last Dose   • amLODIPine (NORVASC) 5 MG tablet Take 5 mg by mouth daily.   5/15/2018 at Unknown time   • aspirin 81 MG EC tablet Take 81 mg by mouth daily.   5/15/2018 at Unknown time   • clopidogrel (PLAVIX) 75 MG tablet Take 75 mg by mouth daily.   5/15/2018 at 0800   • finasteride (PROSCAR) 5 MG tablet Take 1 tablet by mouth Daily. 30 tablet 3 5/15/2018 at Unknown time   • loratadine (CLARITIN) 10 MG tablet Take 10 mg by mouth Daily.   5/15/2018 at Unknown time   • losartan (COZAAR) 100 MG tablet Take 100 mg by mouth daily.   5/15/2018 at Unknown time   • Memantine HCl-Donepezil HCl (NAMZARIC) 28-10 MG capsule sustained-release 24 hr Take 1 capsule by mouth Every Night.   5/15/2018 at Unknown time   • Omega-3 Fatty Acids (FISH OIL) 1000 MG capsule capsule Take 1,000 mg by mouth Daily With Breakfast.   5/15/2018 at Unknown time   • vitamin D (ERGOCALCIFEROL) 43951 UNITS capsule capsule Take 50,000 Units by mouth 1 (One) Time Per Week. Prior to [facility] Admission, Patient was on: takes on mondays 5/14/2018   , Scheduled Meds:    aspirin 81 mg Oral Daily   cetirizine 10 mg Oral Daily   chlorhexidine 15 mL Mouth/Throat Q12H   [START ON 5/21/2018] cholecalciferol 50,000 Units Oral Weekly   docusate sodium 100 mg Oral BID   donepezil 10 mg Oral Nightly   finasteride 5 mg Oral Daily   heparin (porcine) 5,000 Units Subcutaneous Q12H   ipratropium 0.5 mg Nebulization Q6H - RT   lansoprazole 30 mg Per G Tube Q AM   magnesium sulfate 4 g Intravenous Once   memantine 10 mg Oral Q12H   piperacillin-tazobactam 3.375 g Intravenous Q8H   polyethylene glycol 17 g Oral BID   sodium chloride 1,000 mL Intravenous Once   sodium chloride 10 mL Intracatheter Q12H   , Continuous Infusions:    fentaNYL (SUBLIMAZE) PCA 1500 mcg/30 mL syringe  Last Rate: Stopped (05/17/18 0619)   norepinephrine 0.02-0.3 mcg/kg/min Last Rate: 0.02 mcg/kg/min (05/17/18 6421)   propofol 5-50 mcg/kg/min Last Rate:  Stopped (05/17/18 0619)    and Allergies:  Patient has no known allergies.    Objective     Vital Signs   Temp:  [97 °F (36.1 °C)-99.5 °F (37.5 °C)] 99.5 °F (37.5 °C)  Heart Rate:  [] 103  Resp:  [9-28] 20  BP: ()/() 114/51  FiO2 (%):  [35 %-100 %] 35 %    Physical Exam:               GENERAL APPEARANCE: Intubated and sedated.  Appears to be resting comfortably in bed.     HEAD: normocephalic.    EYES: PERRLA.    THROAT: ET tube in place. Oral cavity and pharynx normal. No inflammation, swelling, exudate, or lesions.     NECK: Neck supple.     CARDIAC: Normal S1 and S2. No S3, S4 or murmurs. Rhythm is regular. There is no peripheral edema, cyanosis or pallor. Extremities are warm and well perfused. Capillary refill is less than 2 seconds. No carotid bruits.    RESPIRATORY: Clear to auscultation and percussion without rales, rhonchi, wheezing or diminished breath sounds.    GI: Positive bowel sounds. Soft, nondistended, nontender.     MUSCULOSKELETAL: No significant deformity or joint abnormality. No edema. Peripheral pulses intact.    NEUROLOGICAL: Unable to assess due to sedation status.     PSYCHIATRIC: Unable to assess due to sedation status.                                                                                    Results Review:    LABS:    Lab Results   Component Value Date    GLUCOSE 149 (H) 05/17/2018    BUN 21 05/17/2018    CREATININE 1.21 05/17/2018    EGFRIFNONA 58 (L) 05/17/2018    BCR 17.4 05/17/2018    CO2 22.6 (L) 05/17/2018    CALCIUM 7.5 (L) 05/17/2018    ALBUMIN 2.20 (L) 05/17/2018    LABIL2 1.0 (L) 05/17/2018    AST 54 (H) 05/17/2018     (H) 05/17/2018    WBC 17.91 (H) 05/17/2018    HGB 12.0 (L) 05/17/2018    HCT 35.4 (L) 05/17/2018    MCV 94.7 (H) 05/17/2018     (H) 05/17/2018     (L) 05/17/2018    K 4.6 05/17/2018     05/17/2018    ANIONGAP 6.4 05/17/2018       Lab Results   Component Value Date    INR 1.73 (H) 05/17/2018    INR 1.38 (H)  05/16/2018    INR 1.04 10/15/2015    PROTIME 20.5 (H) 05/17/2018    PROTIME 17.2 (H) 05/16/2018    PROTIME 11.5 10/15/2015         Results from last 7 days  Lab Units 05/17/18  0345 05/16/18  0757   INR  1.73* 1.38*                     I reviewed the patient's new clinical results.  I reviewed the patient's new imaging results and agree with the interpretation.      Assessment/Plan       Neuro: Patient is intubated and sedated per protocol. Will do sedation vacation this morning and spontaneous breathing trial accordingly.    Respiratory failure: Acute on chronic-hypercarbic-likely related to sedation medication given during surgery.  Patient placed on a weaning trial today.  He can recall-left lower lobe pneumonia noted.  Patient will need a follow-up CT scan in 6 weeks.  Discontinue IV fluids.  Continue scheduled inhalants and as needed neb treatments.      Pneumonia: Noted in left lower lobe on CT scan. WBC count is trending down.  Continue antibiotic management per infectious disease.      Peritonitis:  Continue antibiotic management per infectious disease.    Septic shock: Patient is on Levophed  and to maintain blood pressure.  Maintain  MAP > 65.  Continuous vital sign monitoring.      Hopefully patient will come off vasopressors as the pneumonia gets better and patient is off sedation.    TYRELL:  Creatinine is trending.  1.2 this morning.  Continue to monitor Monitor.       Endocrine: monitor glucose targeting 140-180.    Hypomagnesemia:  Level is 1.6.  Replaced per protocol.  Continue to monitor.    GI: Will obtain bedside swallow evaluation if he is extubated today. Continue GI prophylaxis.    DVT prophylaxis:  Continue heparin.          Principal Problem:    Cholecystitis  Active Problems:    Sepsis          Findings and my recommendations were discussed with family, nursing staff and consulting provider    SIMON Tavera  05/17/18  11:06 AM      Attestation: Scribed for Dr. James by Nohelia  SIMON Sutton, Artie James M.D. attest that the above note accurately reflects the work and decisions made  by me.  Patient was seen and evaluated by Dr. James, including history of present illness, physical exam, assessment, and treatment plan.  The above note was reviewed and edited by Dr. James. Cc time 32 minutes

## 2018-05-17 NOTE — ANESTHESIA POSTPROCEDURE EVALUATION
Patient: Porsha Soni    Procedure Summary     Date:  05/16/18 Room / Location:  [facility] OR 01 /  [facility] OR    Anesthesia Start:  1503 Anesthesia Stop:  1721    Procedure:  OPEN CHOLECYSTECTOMY (N/A Abdomen) Diagnosis:       Cholecystitis      (Cholecystitis [K81.9])    Surgeon:  Rosie Montalvo MD Provider:  Jake Parisi DO    Anesthesia Type:  general ASA Status:  4          Anesthesia Type: general  Last vitals  BP   114/55 (05/17/18 0603)   Temp   98.5 °F (36.9 °C) (05/17/18 0002)   Pulse   101 (05/17/18 0802)   Resp   19 (05/17/18 0802)     SpO2   94 % (05/17/18 0802)     Post Anesthesia Care and Evaluation    Patient location during evaluation: PACU  Patient participation: complete - patient cannot participate  Level of consciousness: obtunded/minimal responses  Pain score: 0  Pain management: adequate  PONV Status: NA  Cardiovascular status: hemodynamically unstable  Respiratory status: ETT  Hydration status: hypovolemic

## 2018-05-17 NOTE — PROGRESS NOTES
LOS: 1 day   Patient Care Team:  SIMON Reynolds as PCP - General  SIMON Reynolds as PCP - Family Medicine  Dwayne Swartz MD as PCP - Claims Attributed    Post op day # 1, status post open cholecystectomy for perforated GB    Subjective     Interval History:  Patient extubated this morning.  Was reintubated in RR last night for hypercarbia and hypotension.  Small bowel movement. Sleepy but arousable     History taken from patient.      Objective     Vital Signs  Temp:  [97 °F (36.1 °C)-99.7 °F (37.6 °C)] 99.7 °F (37.6 °C)  Heart Rate:  [] 106  Resp:  [9-28] 21  BP: ()/() 96/47  FiO2 (%):  [35 %-100 %] 35 %    Physical Exam:  This is a well-developed well-nourished  white male in no acute distress  HEENT examination: Sclera are anicteric  Lungs: Clear  Abdomen: Abdomen is obese and distended.  L sounds absent  Skin/incisions: Surgical dressing dry.  MISHEL with serosanguineous fluid, no bile     Results Review:      Results from last 7 days  Lab Units 05/17/18  0909 05/17/18  0345 05/16/18  2132   CK TOTAL U/L 74  74 70 65   CKMB ng/mL 1.52 1.93 1.92   CK MB INDEX % 2.1 2.8 3.0   TROPONIN I ng/mL 0.025 0.034 0.026       Results from last 7 days  Lab Units 05/17/18  0909 05/17/18  0345 05/16/18  2309 05/16/18  1218  05/16/18  0757   CRP mg/dL  --  22.97*  --   --   --   --    LACTATE mmol/L 1.3  --  2.4* 3.0*  < >  --    WBC 10*3/mm3  --  17.91*  --   --   --  26.67*   HEMOGLOBIN g/dL  --  12.0*  --   --   --  14.4   HEMATOCRIT %  --  35.4*  --   --   --  42.9   PLATELETS 10*3/mm3  --  459*  --   --   --  567*   INR   --  1.73*  --   --   --  1.38*   < > = values in this interval not displayed.    Results from last 7 days  Lab Units 05/17/18  0821   PH, ARTERIAL pH units 7.401   PO2 ART mm Hg 73.1*   PCO2, ARTERIAL mm Hg 36.0   HCO3 ART mmol/L 21.8*       Results from last 7 days  Lab Units 05/17/18  0345 05/16/18  0757   SODIUM mmol/L 133* 132*   POTASSIUM mmol/L 4.6 4.6    MAGNESIUM mg/dL 1.6*  --    CHLORIDE mmol/L 104 91*   CO2 mmol/L 22.6* 25.2   BUN mg/dL 21 18   CREATININE mg/dL 1.21 1.32*   EGFR IF NONAFRICN AM mL/min/1.73 58* 52*   CALCIUM mg/dL 7.5* 9.1   GLUCOSE mg/dL 149* 156*   ALBUMIN g/dL 2.20* 3.60   BILIRUBIN mg/dL 1.6 4.0*   ALK PHOS U/L 185* 458*   AST (SGOT) U/L 54* 225*   ALT (SGPT) U/L 127* 285*   Estimated Creatinine Clearance: 61.1 mL/min (by C-G formula based on SCr of 1.21 mg/dL).  No results found for: AMMONIA      Blood Culture   Date Value Ref Range Status   05/16/2018 No growth at 24 hours  Preliminary   05/16/2018 No growth at 24 hours  Preliminary                Imaging:  Imaging Results (last 24 hours)     Procedure Component Value Units Date/Time    US Venous Doppler Lower Extremity Bilateral (duplex) [[id number]] Collected:  05/17/18 1258     Updated:  05/17/18 1301    Narrative:       US VENOUS DOPPLER LOWER EXTREMITY BILATERAL (DUPLEX)-     CLINICAL INDICATION: elevated d-dimer; A41.9-Sepsis, unspecified  organism; K85.90-Acute pancreatitis without necrosis or infection,  unspecified; D72.829-Elevated white blood cell count, unspecified;  K81.9-Cholecystitis, unspecified          COMPARISON: Left leg dated 11/20/2017      TECHNIQUE: Color Doppler imaging was used with compression and  augmentation to evaluate the lower extremity deep venous system.     FINDINGS:   There is patent spontaneous flow from the common femoral vein through  the posterior tibial veins.  There was no internal clot or area of noncompressibility.  Normal augmentation was elicited where applicable.       Impression:       No DVT in the lower extremities on today's exam.      This report was finalized on 5/17/2018 12:59 PM by Dr. Cliff Brdoy MD.       XR Chest 1 View [[id number]] Collected:  05/16/18 2246     Updated:  05/16/18 2249    Narrative:       EXAMINATION: XR CHEST 1 VW-      CLINICAL INDICATION:     intubation; A41.9-Sepsis, unspecified organism;  K85.90-Acute  pancreatitis without necrosis or infection, unspecified;  D72.829-Elevated white blood cell count, unspecified;  K81.9-Cholecystitis, unspecified     TECHNIQUE:  XR CHEST 1 VW-      COMPARISON: 05/16/2018 6:04 PM      FINDINGS:   ET tube has been placed with tip at level of clavicles. Left subclavian  deep line stable in appearance and positioning. Improved aeration. Left  greater than right basilar airspace disease improved since the previous  exam. Small left effusion. Cardiomegaly and prior CABG changes again  noted.       Impression:       1. Support devices as above.  2. Improved aeration with decreasing basilar airspace disease.     This report was finalized on 5/16/2018 10:47 PM by Dr. Adebayo Dodd MD.       XR Chest 1 View [[id number]] Collected:  05/16/18 2246     Updated:  05/16/18 2248    Narrative:       EXAMINATION: XR CHEST 1 VW-      CLINICAL INDICATION:     central line placement; A41.9-Sepsis,  unspecified organism; K85.90-Acute pancreatitis without necrosis or  infection, unspecified; D72.829-Elevated white blood cell count,  unspecified; K81.9-Cholecystitis, unspecified     TECHNIQUE:  XR CHEST 1 VW-      COMPARISON: 05/16/2018      FINDINGS:   Interval placement of left subclavian deep line with tip at the  cavoatrial junction. No pneumothorax. Cardiomegaly and left greater than  right basilar airspace disease stable from previous.       Impression:       Left subclavian deep line placement with tip in satisfactory  position and no pneumothorax. Stable bilateral airspace disease.     This report was finalized on 5/16/2018 10:46 PM by Dr. Adebayo Dodd MD.       FL Surgery Fluoro [[id number]] Collected:  05/16/18 1642     Updated:  05/16/18 1645    Narrative:       EXAMINATION: FL SURGERY FLUORO-      CLINICAL INDICATION:     IOC; A41.9-Sepsis, unspecified organism;  K85.90-Acute pancreatitis without necrosis or infection, unspecified;  D72.829-Elevated white blood cell count,  unspecified;  K81.9-Cholecystitis, unspecified     TECHNIQUE:  FL SURGERY FLUORO-      FLUOROSCOPY TIME: 37.5 seconds     FINDINGS:   Intraoperative cholangiogram demonstrates normal caliber common bile  duct. No fixed filling defect or stricture. Passage of contrast into  duodenum.        Impression:       As above.     This report was finalized on 5/16/2018 4:43 PM by Dr. Adebayo Dodd MD.              Impression:  Status post open cholecystectomy for perforated gallbladder with septic shock  Laboratory studies improved and patient clinically improving    Plan:  Continue current support  Hold Plavix  Track culture results    Errors end dictation may reflect use of voice recognition software and not all errors in transcription may have been detected prior to signing.  Rosie Montalvo MD  05/17/18  1:08 PM

## 2018-05-17 NOTE — PLAN OF CARE
Problem: Ventilation, Mechanical Invasive (Adult)  Goal: Signs and Symptoms of Listed Potential Problems Will be Absent, Minimized or Managed (Ventilation, Mechanical Invasive)  Outcome: Ongoing (interventions implemented as appropriate)

## 2018-05-17 NOTE — PLAN OF CARE
Problem: Cholecystectomy (Adult)  Goal: Signs and Symptoms of Listed Potential Problems Will be Absent, Minimized or Managed (Cholecystectomy)  Outcome: Ongoing (interventions implemented as appropriate)

## 2018-05-17 NOTE — NURSING NOTE
Stage 2 pressure injury noted to right and left buttocks  Present on admission to hospital  Treatment ordered       05/15/18 1001   Wound 05/16/18 2100 Left medial gluteal pressure injury   Date first assessed/Time first assessed: 05/16/18 2100   Present On Admission : yes;picture taken  Side: Left  Orientation: medial  Location: gluteal  Type: pressure injury  Stage, Pressure Injury: Stage 2   Dressing Appearance open to air   Base moist;pink   Periwound excoriated   Wound Length (cm) 1.8 cm   Wound Width (cm) 1 cm   Wound Depth (cm) 0.1 cm   Drainage Characteristics/Odor clear   Drainage Amount scant   Wound 05/16/18 2100 Right medial gluteal pressure injury   Date first assessed/Time first assessed: 05/16/18 2100   Present On Admission : yes;picture taken  Side: Right  Orientation: medial  Location: gluteal  Type: pressure injury  Stage, Pressure Injury: Stage 2   Dressing Appearance open to air   Base pink;moist   Periwound dry;excoriated   Wound Length (cm) 0.5 cm   Wound Width (cm) 1 cm   Drainage Characteristics/Odor clear   Drainage Amount scant

## 2018-05-17 NOTE — PROGRESS NOTES
THC Physician - Brief Progress Note  PERMANENT  05/16/2018 21:34    [facility]  [facility] - U - 10 - [place], [place] ([facility])    LORRI PIMENTEL    Date of Service 05/16/2018 21:34    HPI/Events of Note AICU Provider Assessment Note    78 y/o male admitted to ICU from OR for Septic shock with need for open cholecystectomy wtih cholangiogram in setting of perforated gallbladder with abscess. TLC placed and on Levophed after fluid challenge and antibiotics. Pt remains intubated but had   required reintubation post-op. MISHEL drain in place.    PMH: gallstones, HTN, hyperlipidemia, CAD s/p CABG, R TKR, L partial knee arthroplasty    Labs: wbc 26.67, glu 156, Cr 1.32, na 132, AST//285, alk phos 458, TB 4.0, Trop 0.011, INR 1.38, lipase 1582, lactic acid 3.9 to 3.0, ABG 7.359/39/62 to 6.842/154/89 to 7.204/47/91 to 7.261/41.7/80    Imaging:  CXR: ET tube in place. prior sternotomy, L SC TLC. L effusion &/or retrocardiac opacity  CT A/P: cholecystitis with partial rupture and adjacent fossa fluid collection (6.6 cm). LLL airspace disease with L pleural effusion. mild ileus. BPH  CTA Chest: no PE. LLL PNA with effusion. Cardiomegaly    Assessment and Plan:    -s/p IV fluid challenge, lactic acid < 4, maintain MAP > 65  -on Levophed drip  -pancx  -Vanco/Zosyn  -LTVVV with goal TV < 7 cc/kg IBW, wean FIO2, oral care  -bronchodilators        __Y___   Video Assessment performed  __Y___   Most recent labs reviewed  __Y___   Vital Signs reviewed  __Y___   Best Practices addressed:                 VTE prophylaxis: SCD, HSQ                 SUP (when indicated): Protonix                 Glycemic control: < 180                      Please notify bedside physician when present or [facility] if glc > 180 X 2                 Sepsis guidelines: yes                 Lung protective strategy: yes    _____     Spoke with bedside RN  __N___     Orders written      Contact [facility] for any needs if bedside  physician is not present.      Interventions Major-Hypercarbia - evaluation and management, Hypoxemia - evaluation and management, Infection - evaluation and management, Respiratory failure - evaluation and management, Sepsis - evaluation and management, Shock - evaluation and   management        Electronically Signed by: West Cabrera) on 05/16/2018 21:58

## 2018-05-17 NOTE — PLAN OF CARE
Problem: Patient Care Overview  Goal: Plan of Care Review  Outcome: Ongoing (interventions implemented as appropriate)    Goal: Individualization and Mutuality  Outcome: Ongoing (interventions implemented as appropriate)    Goal: Discharge Needs Assessment  Outcome: Ongoing (interventions implemented as appropriate)    Goal: Interprofessional Rounds/Family Conf  Outcome: Ongoing (interventions implemented as appropriate)      Problem: Fall Risk (Adult)  Goal: Identify Related Risk Factors and Signs and Symptoms  Outcome: Ongoing (interventions implemented as appropriate)    Goal: Absence of Fall  Outcome: Ongoing (interventions implemented as appropriate)      Problem: Wound (Includes Pressure Injury) (Adult)  Goal: Signs and Symptoms of Listed Potential Problems Will be Absent, Minimized or Managed (Wound)  Outcome: Ongoing (interventions implemented as appropriate)      Problem: Breathing Pattern Ineffective (Adult)  Goal: Identify Related Risk Factors and Signs and Symptoms  Outcome: Ongoing (interventions implemented as appropriate)    Goal: Anxiety/Fear Reduction  Outcome: Ongoing (interventions implemented as appropriate)      Problem: Ventilation, Mechanical Invasive (Adult)  Goal: Signs and Symptoms of Listed Potential Problems Will be Absent, Minimized or Managed (Ventilation, Mechanical Invasive)  Outcome: Ongoing (interventions implemented as appropriate)      Problem: Sepsis/Septic Shock (Adult)  Goal: Signs and Symptoms of Listed Potential Problems Will be Absent, Minimized or Managed (Sepsis/Septic Shock)  Outcome: Ongoing (interventions implemented as appropriate)      Problem: Skin Injury Risk (Adult)  Goal: Identify Related Risk Factors and Signs and Symptoms  Outcome: Ongoing (interventions implemented as appropriate)    Goal: Skin Health and Integrity  Outcome: Ongoing (interventions implemented as appropriate)      Problem: Cholecystectomy (Adult)  Goal: Signs and Symptoms of Listed Potential  Problems Will be Absent, Minimized or Managed (Cholecystectomy)  Outcome: Ongoing (interventions implemented as appropriate)    Goal: Anesthesia/Sedation Recovery  Outcome: Ongoing (interventions implemented as appropriate)      Problem: Restraint, Nonbehavioral (Nonviolent)  Goal: Rationale and Justification  Outcome: Ongoing (interventions implemented as appropriate)    Goal: Nonbehavioral (Nonviolent) Restraint: Absence of Injury/Harm  Outcome: Ongoing (interventions implemented as appropriate)    Goal: Nonbehavioral (Nonviolent) Restraint: Achievement of Discontinuation Criteria  Outcome: Ongoing (interventions implemented as appropriate)    Goal: Nonbehavioral (Nonviolent) Restraint: Preservation of Dignity and Wellbeing  Outcome: Ongoing (interventions implemented as appropriate)

## 2018-05-17 NOTE — PLAN OF CARE
Problem: Cholecystectomy (Adult)  Goal: Anesthesia/Sedation Recovery  Outcome: Ongoing (interventions implemented as appropriate)

## 2018-05-17 NOTE — SIGNIFICANT NOTE
F/u this pm for pt status to particpate in dysphagia evaluation. His wife is present at bedside. He continues lethargic, fleetingly arousable to verbal and tactile stimuli. He is not able to functionally participate at this time. RT reports this status somewhat consistent today.     SLP to f/u     Thank you-  Laura De Jesus M.S., CCC/SLP

## 2018-05-17 NOTE — PLAN OF CARE
Problem: Breathing Pattern Ineffective (Adult)  Goal: Identify Related Risk Factors and Signs and Symptoms  Outcome: Ongoing (interventions implemented as appropriate)    Goal: Effective Oxygenation/Ventilation  Outcome: Outcome(s) achieved Date Met: 05/17/18    Goal: Anxiety/Fear Reduction  Outcome: Ongoing (interventions implemented as appropriate)

## 2018-05-17 NOTE — PROGRESS NOTES
Discharge Planning Assessment   [facility]     Patient Name: Porsha Soni  MRN: [id number]  Today's Date: 5/17/2018    Admit Date: 5/16/2018          Discharge Needs Assessment     Row Name 05/17/18 1427       Living Environment    Lives With spouse   Eliazar    Current Living Arrangements home/apartment/condo    Primary Care Provided by self    Provides Primary Care For no one    Family Caregiver if Needed none    Quality of Family Relationships helpful;involved;supportive    Able to Return to Prior Arrangements yes       Resource/Environmental Concerns    Resource/Environmental Concerns none    Transportation Concerns car, none       Transition Planning    Patient/Family Anticipates Transition to home with family    Patient/Family Anticipated Services at Transition     Transportation Anticipated family or friend will provide       Discharge Needs Assessment    Concerns to be Addressed no discharge needs identified    Equipment Currently Used at Home walker, rolling    Equipment Needed After Discharge cane, straight    Current Discharge Risk chronically ill            Discharge Plan     Row Name 05/17/18 1429       Plan    Plan Pt lives with his spouse, Eliazar and plans to return home at discharge. Pt does not have home health at this time. Pt has a rolling walker and cane via unknown provider.  Pt's spouse is requesting a 3-in-1 commode to be arranged at discharge. Pt does not have a POA or Advance Directive.  Pt fills her prescriptions at [facility] and does not have issues with prescription coverage. Pt will be transported home via private auto.  SS will follow                  Demographic Summary     Row Name 05/17/18 1420       General Information    Admission Type inpatient    Reason for Consult discharge planning    Preferred Language English            Functional Status     Row Name 05/17/18 1427       Functional Status    Usual Activity Tolerance good    Current Activity Tolerance good         Cindy Esparza

## 2018-05-17 NOTE — CONSULTS
INFECTIOUS DISEASE CONSULTATION REPORT      Referring Provider: Dr. Brown  Reason for Consultation: Severe Sepsis, GB rupture      Principal problem: Cholecystitis    Subjective .     History of present illness:    As you well know Dr. Brown, Mr. Porsha Soni is a 77 y.o. years old male with past medical history significant for coronary artery disease, hypertension, who presented to [facility] on 5/16/2018 for 2 week history of right upper quadrant pain with occasional nausea and vomiting.  Patient was found to have ruptured gallbladder and was taken to the OR.  He was admitted to CCU where he continues to be intubated and sedated.  Wife at bedside and provided most of history of present illness.  She reports he had chills with sweating and weakness.  Patient was found have an elevated lactic acid 2.4 with leukocytosis and a significantly elevated CRP level.  Culture from abdominal wall showing gram-negative bacilli.  Continues to be on the ventilator at 35% FiO2.    Infectious Disease consultation was requested for antimicrobial management.     History taken from: chart family RN    Case was discussed with family and nursing staff    Review of Systems: unable to obtain as the patient is intubated and sedated    Past Medical History    Past Medical History:   Diagnosis Date   • Arthritis    • Cervical spine disease     remotely suggested surgical intervention.  Patient deferred.    • Cervical spine disease    • Coronary artery disease    • Dyslipidemia    • Hypertension    • Impotence    • Palpitations    • Vertigo        Past Surgical History    Past Surgical History:   Procedure Laterality Date   • ARTERIAL BYPASS SURGERY     • COLONOSCOPY N/A 3/30/2018    Procedure: COLONOSCOPY;  Surgeon: Simone Valderrama MD;  Location: [facility];  Service: Gastroenterology   • CORONARY ARTERY BYPASS GRAFT     • KNEE ARTHROPLASTY, PARTIAL REPLACEMENT      Lt , 2015   • REPLACEMENT TOTAL KNEE    "   Rt       Family History    Family History   Problem Relation Age of Onset   • No Known Problems Mother    • No Known Problems Father    • No Known Problems Sister    • No Known Problems Brother        Social History    Social History   Substance Use Topics   • Smoking status: Former Smoker     Packs/day: 1.50     Years: 7.00     Types: Cigarettes, Pipe, Cigars     Quit date: 6/14/1986   • Smokeless tobacco: Never Used   • Alcohol use Yes      Comment: occi       Allergies    Patient has no known allergies.    Objective     /51   Pulse 103   Temp 99.5 °F (37.5 °C) (Oral)   Resp 20   Ht 177.8 cm (70\")   Wt 105 kg (231 lb 12.8 oz)   SpO2 94%   BMI 33.26 kg/m²     Temp:  [97 °F (36.1 °C)-99.5 °F (37.5 °C)] 99.5 °F (37.5 °C)        Intake/Output Summary (Last 24 hours) at 05/17/18 1033  Last data filed at 05/17/18 0619   Gross per 24 hour   Intake             4017 ml   Output             1365 ml   Net             2652 ml         Physical Exam:      General Appearance:   Intubated, sedated, wife at bedside    Head:    Normocephalic, without obvious abnormality, atraumatic   Eyes:            Lids and lashes normal, conjunctivae and sclerae normal, no   icterus, no pallor, corneas clear, PERRLA   Ears:    Ears appear intact with no abnormalities noted   Throat:   No oral lesions, no thrush, oral mucosa moist   Neck:   No adenopathy, supple, trachea midline, no thyromegaly, no   carotid bruit, no JVD   Back:     No tenderness to percussion or palpation, range of motion   normal   Lungs:     Clear to auscultation,respirations regular, even and unlabored. No wheezing, no ronchi and no crackles.    Heart:    Regular rhythm and normal rate, normal S1 and S2, no            murmur, no gallop, no rub, no click   Chest Wall:    No abnormalities observed   Abdomen:     Distended, Normal bowel sounds, no masses, no organomegaly, soft, non-tender, no guarding, no rebound tenderness   Rectal:     Deferred   Extremities:  "  Moves all extremities well, no edema, no cyanosis, no             redness   Pulses:   Pulses palpable and equal bilaterally   Skin:   No bleeding, bruising or rash   Lymph nodes:   No palpable adenopathy   Neurologic:   Intubated, sedated        Results:      Results from last 7 days  Lab Units 05/17/18  0345 05/16/18  0757   WBC 10*3/mm3 17.91* 26.67*     Lab Results   Component Value Date    NEUTROABS 14.70 (H) 05/17/2018         Results from last 7 days  Lab Units 05/17/18  0345   CREATININE mg/dL 1.21         Results from last 7 days  Lab Units 05/17/18  0345   CRP mg/dL 22.97*       Imaging Results (last 24 hours)     Procedure Component Value Units Date/Time    XR Chest 1 View [[id number]] Collected:  05/16/18 2246     Updated:  05/16/18 2249    Narrative:       EXAMINATION: XR CHEST 1 VW-      CLINICAL INDICATION:     intubation; A41.9-Sepsis, unspecified organism;  K85.90-Acute pancreatitis without necrosis or infection, unspecified;  D72.829-Elevated white blood cell count, unspecified;  K81.9-Cholecystitis, unspecified     TECHNIQUE:  XR CHEST 1 VW-      COMPARISON: 05/16/2018 6:04 PM      FINDINGS:   ET tube has been placed with tip at level of clavicles. Left subclavian  deep line stable in appearance and positioning. Improved aeration. Left  greater than right basilar airspace disease improved since the previous  exam. Small left effusion. Cardiomegaly and prior CABG changes again  noted.       Impression:       1. Support devices as above.  2. Improved aeration with decreasing basilar airspace disease.     This report was finalized on 5/16/2018 10:47 PM by Dr. Adebayo Dodd MD.       XR Chest 1 View [[id number]] Collected:  05/16/18 2246     Updated:  05/16/18 2248    Narrative:       EXAMINATION: XR CHEST 1 VW-      CLINICAL INDICATION:     central line placement; A41.9-Sepsis,  unspecified organism; K85.90-Acute pancreatitis without necrosis or  infection, unspecified; D72.829-Elevated white blood  cell count,  unspecified; K81.9-Cholecystitis, unspecified     TECHNIQUE:  XR CHEST 1 VW-      COMPARISON: 05/16/2018      FINDINGS:   Interval placement of left subclavian deep line with tip at the  cavoatrial junction. No pneumothorax. Cardiomegaly and left greater than  right basilar airspace disease stable from previous.       Impression:       Left subclavian deep line placement with tip in satisfactory  position and no pneumothorax. Stable bilateral airspace disease.     This report was finalized on 5/16/2018 10:46 PM by Dr. Adebayo Dodd MD.       FL Surgery Fluoro [[id number]] Collected:  05/16/18 1642     Updated:  05/16/18 1645    Narrative:       EXAMINATION: FL SURGERY FLUORO-      CLINICAL INDICATION:     IOC; A41.9-Sepsis, unspecified organism;  K85.90-Acute pancreatitis without necrosis or infection, unspecified;  D72.829-Elevated white blood cell count, unspecified;  K81.9-Cholecystitis, unspecified     TECHNIQUE:  FL SURGERY FLUORO-      FLUOROSCOPY TIME: 37.5 seconds     FINDINGS:   Intraoperative cholangiogram demonstrates normal caliber common bile  duct. No fixed filling defect or stricture. Passage of contrast into  duodenum.        Impression:       As above.     This report was finalized on 5/16/2018 4:43 PM by Dr. Adebayo Dodd MD.               Cultures:    Blood Culture   Date Value Ref Range Status   05/16/2018 No growth at 24 hours  Preliminary   05/16/2018 No growth at 24 hours  Preliminary       Results Review:    I have personally reviewed laboratory data, culture results, radiology studies and antimicrobial therapy.    Hospital Medications (active)       Dose Frequency Start End    aspirin EC tablet 81 mg 81 mg Daily 5/16/2018     Sig - Route: Take 1 tablet by mouth Daily. - Oral    cetirizine (zyrTEC) tablet 10 mg 10 mg Daily 5/16/2018     Sig - Route: Take 1 tablet by mouth Daily. - Oral    chlorhexidine (PERIDEX) 0.12 % solution 15 mL 15 mL Every 12 Hours Scheduled 5/17/2018      "Sig - Route: Apply 15 mL to the mouth or throat Every 12 (Twelve) Hours. - Mouth/Throat    cholecalciferol (VITAMIN D3) capsule 50,000 Units 50,000 Units Weekly 5/21/2018     Sig - Route: Take 1 capsule by mouth 1 (One) Time Per Week. - Oral    docusate sodium (COLACE) capsule 100 mg 100 mg 2 Times Daily 5/16/2018     Sig - Route: Take 1 capsule by mouth 2 (Two) Times a Day. - Oral    donepezil (ARICEPT) tablet 10 mg 10 mg Nightly 5/16/2018     Sig - Route: Take 2 tablets by mouth Every Night. - Oral    FENTANYL PCA 1500 MCG/30 ML (BHCOR) PCA  Titrated 5/17/2018 5/27/2018    Sig - Route: Infuse  into a venous catheter Dose Adjusted By Provider As Needed. - Intravenous    finasteride (PROSCAR) tablet 5 mg 5 mg Daily 5/16/2018     Sig - Route: Take 1 tablet by mouth Daily. - Oral    heparin (porcine) 5000 UNIT/ML injection 5,000 Units 5,000 Units Every 12 Hours Scheduled 5/17/2018     Sig - Route: Inject 1 mL under the skin Every 12 (Twelve) Hours. - Subcutaneous    HYDROcodone-acetaminophen (NORCO) 7.5-325 MG per tablet 1 tablet 1 tablet Every 4 Hours PRN 5/16/2018 5/26/2018    Sig - Route: Take 1 tablet by mouth Every 4 (Four) Hours As Needed for Moderate Pain . - Oral    ipratropium (ATROVENT) nebulizer solution 0.5 mg 0.5 mg Every 6 Hours - RT 5/17/2018     Sig - Route: Take 2.5 mL by nebulization Every 6 (Six) Hours. - Nebulization    lansoprazole (FIRST) oral suspension 30 mg 30 mg Every Early Morning 5/17/2018     Sig - Route: 10 mL by Per G Tube route Every Morning. - Per G Tube    LORazepam (ATIVAN) injection 2 mg 2 mg Once 5/16/2018 5/16/2018    Sig - Route: Infuse 1 mL into a venous catheter 1 (One) Time. - Intravenous    Magnesium Sulfate 2 gram / 50mL Infusion (GIVE X 3 BAGS TO EQUAL 6GM TOTAL DOSE) - Mg 1.1 - 1/5 mg/dl 2 g As Needed 5/17/2018     Sig - Route: Infuse 50 mL into a venous catheter As Needed (See Administration Instructions). - Intravenous    Linked Group 1:  \"Or\" Linked Group Details        " "Magnesium Sulfate 2 gram Bolus, followed by 8 gram infusion (total Mg dose 10 grams)- Mg less than or equal to 1mg/dL 2 g As Needed 5/17/2018     Sig - Route: Infuse 50 mL into a venous catheter As Needed (See Administration Instructions). - Intravenous    Linked Group 1:  \"Or\" Linked Group Details        Magnesium Sulfate 4 gram infusion- Mg 1.6-1.9 mg/dL 4 g As Needed 5/17/2018     Sig - Route: Infuse 100 mL into a venous catheter As Needed (See Administration Instructions). - Intravenous    Linked Group 1:  \"Or\" Linked Group Details        Magnesium Sulfate 4 gram infusion- Mg 1.6-1.9 mg/dL 4 g Once 5/17/2018     Sig - Route: Infuse 100 mL into a venous catheter 1 (One) Time. - Intravenous    memantine (NAMENDA) tablet 10 mg 10 mg Every 12 Hours Scheduled 5/16/2018     Sig - Route: Take 1 tablet by mouth Every 12 (Twelve) Hours. - Oral    morphine injection 2 mg 2 mg Once 5/16/2018 5/16/2018    Sig - Route: Infuse 1 mL into a venous catheter 1 (One) Time. - Intravenous    morphine injection 4 mg 4 mg Every 3 Hours PRN 5/16/2018     Sig - Route: Infuse 1 mL into a venous catheter Every 3 (Three) Hours As Needed for Severe Pain . - Intravenous    norepinephrine (LEVOPHED) 8,000 mcg in sodium chloride 0.9 % 250 mL (32 mcg/mL) infusion 0.02-0.3 mcg/kg/min × 95.3 kg Titrated 5/16/2018     Sig - Route: Infuse 1.906-28.59 mcg/min into a venous catheter Dose Adjusted By Provider As Needed. - Intravenous    ondansetron (ZOFRAN) injection 4 mg 4 mg Every 4 Hours PRN 5/16/2018     Sig - Route: Infuse 2 mL into a venous catheter Every 4 (Four) Hours As Needed for Nausea or Vomiting. - Intravenous    phytonadione (AQUA-MEPHYTON, VITAMIN K) 10 mg in sodium chloride 0.9 % 50 mL IVPB (MAR Hold) ((MAR Hold) since 5/16/2018  2:04 PM) 10 mg Once 5/16/2018 5/16/2018    Sig - Route: Infuse 10 mg into a venous catheter 1 (One) Time. - Intravenous    piperacillin-tazobactam (ZOSYN) 3.375 g/100 mL 0.9% NS IVPB (mbp) 3.375 g Every 8 " "Hours 5/16/2018 5/30/2018    Sig - Route: Infuse 100 mL into a venous catheter Every 8 (Eight) Hours. - Intravenous    polyethylene glycol (MIRALAX) powder 17 g 17 g 2 Times Daily 5/16/2018 5/20/2018    Sig - Route: Take 17 g by mouth 2 (Two) Times a Day. - Oral    potassium chloride (KLOR-CON) packet 40 mEq 40 mEq As Needed 5/17/2018     Sig - Route: Take 40 mEq by mouth As Needed (potassium replacement, see admin instructions). - Oral    Linked Group 2:  \"Or\" Linked Group Details        potassium chloride (MICRO-K) CR capsule 40 mEq 40 mEq As Needed 5/17/2018     Sig - Route: Take 4 capsules by mouth As Needed (potassium replacement.  see admin instructions). - Oral    Linked Group 2:  \"Or\" Linked Group Details        potassium chloride 10 mEq in 100 mL IVPB 10 mEq Every 1 Hour PRN 5/17/2018     Sig - Route: Infuse 100 mL into a venous catheter Every 1 (One) Hour As Needed (potassium protocol PERIPHERAL - see admin instructions). - Intravenous    Linked Group 2:  \"Or\" Linked Group Details        potassium phosphate 15 mmol in sodium chloride 0.9 % 100 mL infusion 15 mmol As Needed 5/17/2018     Sig - Route: Infuse 15 mmol into a venous catheter As Needed (Peripheral IV - Phosphorus 1.8 - 2.5). - Intravenous    Linked Group 3:  \"Or\" Linked Group Details        potassium phosphate 30 mmol in sodium chloride 0.9 % 250 mL infusion 30 mmol As Needed 5/17/2018     Sig - Route: Infuse 30 mmol into a venous catheter As Needed (Peripheral IV - Phosphorus 1.3 - 1.7). - Intravenous    Linked Group 3:  \"Or\" Linked Group Details        potassium phosphate 45 mmol in sodium chloride 0.9 % 500 mL infusion 45 mmol As Needed 5/17/2018     Sig - Route: Infuse 45 mmol into a venous catheter As Needed (Peripheral IV - Phosphorus Less Than 1.3). - Intravenous    Linked Group 3:  \"Or\" Linked Group Details        promethazine (PHENERGAN) 12.5 mg in sodium chloride 0.9 % 50 mL 12.5 mg Every 4 Hours PRN 5/16/2018     Sig - Route: Infuse " 12.5 mg into a venous catheter Every 4 (Four) Hours As Needed for Nausea or Vomiting. - Intravenous    propofol (DIPRIVAN) infusion 10 mg/mL 100 mL 5-50 mcg/kg/min × 105 kg Titrated 5/17/2018     Sig - Route: Infuse 525-5,250 mcg/min into a venous catheter Dose Adjusted By Provider As Needed. - Intravenous    sodium chloride 0.9 % bolus 1,000 mL 1,000 mL Once 5/16/2018     Sig - Route: Infuse 1,000 mL into a venous catheter 1 (One) Time. - Intravenous    sodium chloride 0.9 % flush 1-10 mL 1-10 mL As Needed 5/16/2018     Sig - Route: Infuse 1-10 mL into a venous catheter As Needed for Line Care. - Intravenous    Cosign for Ordering: Accepted by Evert Brown DO on 5/17/2018  8:54 AM    sodium chloride 0.9 % flush 1-10 mL 1-10 mL As Needed 5/16/2018     Sig - Route: Infuse 1-10 mL into a venous catheter As Needed for Line Care. - Intravenous    sodium chloride 0.9 % flush 10 mL 10 mL As Needed 5/16/2018     Sig - Route: Infuse 10 mL into a venous catheter As Needed for Line Care. - Intravenous    sodium chloride 0.9 % flush 10 mL 10 mL Every 12 Hours Scheduled 5/17/2018     Sig - Route: 10 mL by Intracatheter route Every 12 (Twelve) Hours. - Intracatheter    sodium chloride 0.9 % flush 10 mL 10 mL As Needed 5/17/2018     Sig - Route: 10 mL by Intracatheter route As Needed for Line Care (After Medication Administration or Blood Draw). - Intracatheter    sodium chloride 0.9 % flush 10 mL 10 mL As Needed 5/17/2018     Sig - Route: 10 mL by Intracatheter route As Needed for Line Care (After Medication Administration or Blood Draw). - Intracatheter    sodium chloride 0.9 % flush 10 mL 10 mL As Needed 5/17/2018     Sig - Route: 10 mL by Intracatheter route As Needed for Line Care (After Medication Administration or Blood Draw). - Intracatheter    sodium chloride 0.9 % flush 10 mL 10 mL As Needed 5/17/2018     Sig - Route: 10 mL by Intracatheter route As Needed for Line Care (After Medication Administration or  "Blood Draw). - Intracatheter    sodium chloride 0.9 % flush 10 mL 10 mL As Needed 5/17/2018     Sig - Route: 10 mL by Intracatheter route As Needed for Line Care (After Medication Administration or Blood Draw). - Intracatheter    sodium phosphates 15 mmol in sodium chloride 0.9 % 250 mL IVPB 15 mmol As Needed 5/17/2018     Sig - Route: Infuse 15 mmol into a venous catheter As Needed (Peripheral IV - Phosphorus 1.8 - 2.5 & Potassium Greater Than 4). - Intravenous    Linked Group 3:  \"Or\" Linked Group Details        sodium phosphates 30 mmol in sodium chloride 0.9 % 250 mL IVPB 30 mmol As Needed 5/17/2018     Sig - Route: Infuse 30 mmol into a venous catheter As Needed (Peripheral IV - Phosphorus 1.3-1.7 & Potassium Greater Than 4). - Intravenous    Linked Group 3:  \"Or\" Linked Group Details        sodium phosphates 45 mmol in sodium chloride 0.9 % 500 mL IVPB 45 mmol As Needed 5/17/2018     Sig - Route: Infuse 45 mmol into a venous catheter As Needed (Peripheral IV - Phosphorus Less Than 1.3 & Potassium Greater Than 4). - Intravenous    Linked Group 3:  \"Or\" Linked Group Details        vancomycin (VANCOCIN) 1,250 mg in sodium chloride 0.9 % 250 mL IVPB 1,250 mg Every 18 Hours 5/17/2018 5/31/2018    Sig - Route: Infuse 1,250 mg into a venous catheter Every 18 (Eighteen) Hours. - Intravenous    vancomycin (VANCOCIN) 2,000 mg in sodium chloride 0.9 % 500 mL IVPB 20 mg/kg × 95.3 kg Once 5/16/2018 5/16/2018    Sig - Route: Infuse 2,000 mg into a venous catheter 1 (One) Time. - Intravenous    amLODIPine (NORVASC) tablet 5 mg (Discontinued) 5 mg Daily 5/16/2018 5/16/2018    Sig - Route: Take 1 tablet by mouth Daily. - Oral    Reason for Discontinue: Not Efficacious    famotidine (PEPCID) injection (Discontinued)  As Needed 5/16/2018 5/16/2018    Sig - Route: Infuse  into a venous catheter As Needed. - Intravenous    Reason for Discontinue: Anesthesia Stop    fentaNYL citrate (PF) (SUBLIMAZE) injection 50 mcg (Discontinued) " 50 mcg Every 5 Minutes PRN 5/16/2018 5/16/2018    Sig - Route: Infuse 1 mL into a venous catheter Every 5 (Five) Minutes As Needed for Moderate Pain  or Severe Pain . - Intravenous    Reason for Discontinue: Patient Transfer    fentaNYL citrate (PF) (SUBLIMAZE) injection (Discontinued)  As Needed 5/16/2018 5/16/2018    Sig - Route: Infuse  into a venous catheter As Needed for Severe Pain . - Intravenous    Reason for Discontinue: Anesthesia Stop    glycopyrrolate (ROBINUL) injection (Discontinued)  As Needed 5/16/2018 5/16/2018    Sig - Route: Infuse  into a venous catheter As Needed for excess secretions. - Intravenous    Reason for Discontinue: Anesthesia Stop    iopamidol (ISOVUE-300) 61 % injection (Discontinued)  As Needed 5/16/2018 5/16/2018    Sig: As Needed.    Reason for Discontinue: Patient Discharge    ipratropium-albuterol (DUO-NEB) nebulizer solution 3 mL (Discontinued) 3 mL Once As Needed 5/16/2018 5/16/2018    Sig - Route: Take 3 mL by nebulization 1 (One) Time As Needed for Wheezing or Shortness of Air (bronchospasm). - Nebulization    Reason for Discontinue: Patient Transfer    lactated ringers infusion (Discontinued) 125 mL/hr Continuous 5/16/2018 5/16/2018    Sig - Route: Infuse 125 mL/hr into a venous catheter Continuous. - Intravenous    lidocaine (XYLOCAINE) 2% injection (Discontinued)  As Needed 5/16/2018 5/16/2018    Sig: As Needed.    Reason for Discontinue: Anesthesia Stop    losartan (COZAAR) tablet 100 mg (Discontinued) 100 mg Daily 5/16/2018 5/16/2018    Sig - Route: Take 2 tablets by mouth Daily. - Oral    Reason for Discontinue: Not Efficacious    Memantine HCl-Donepezil HCl 28-10 MG capsule sustained-release 24 hr 1 capsule (Discontinued) 1 capsule Nightly 5/16/2018 5/16/2018    Sig - Route: Take 1 capsule by mouth Every Night. - Oral    Reason for Discontinue: Formulary change    meperidine (DEMEROL) injection 12.5 mg (Discontinued) 12.5 mg Every 5 Minutes PRN 5/16/2018 5/16/2018     Sig - Route: Infuse 0.25 mL into a venous catheter Every 5 (Five) Minutes As Needed for Shivering (May repeat x 1). - Intravenous    Reason for Discontinue: Patient Transfer    neostigmine injection (Discontinued)  As Needed 5/16/2018 5/16/2018    Sig - Route: Infuse  into a venous catheter As Needed. - Intravenous    Reason for Discontinue: Anesthesia Stop    ondansetron (ZOFRAN) injection 4 mg (Discontinued) 4 mg Once As Needed 5/16/2018 5/16/2018    Sig - Route: Infuse 2 mL into a venous catheter 1 (One) Time As Needed for Nausea or Vomiting (may repeat times one dose). - Intravenous    Reason for Discontinue: Patient Transfer    ondansetron (ZOFRAN) injection (Discontinued)  As Needed 5/16/2018 5/16/2018    Sig - Route: Infuse  into a venous catheter As Needed for Nausea or Vomiting. - Intravenous    Reason for Discontinue: Anesthesia Stop    oxyCODONE-acetaminophen (PERCOCET) 5-325 MG per tablet 1 tablet (Discontinued) 1 tablet Once As Needed 5/16/2018 5/16/2018    Sig - Route: Take 1 tablet by mouth 1 (One) Time As Needed (pain). - Oral    Reason for Discontinue: Patient Transfer    pantoprazole (PROTONIX) EC tablet 40 mg (Discontinued) 40 mg Every Early Morning 5/17/2018 5/17/2018    Sig - Route: Take 1 tablet by mouth Every Morning. - Oral    Reason for Discontinue: Formulary change    Pharmacy to dose vancomycin (Discontinued)  Continuous PRN 5/16/2018 5/16/2018    Sig - Route: Continuous As Needed for Consult. - Does not apply    Reason for Discontinue: *Therapy completed    Cosign for Ordering: Accepted by Evert Brown DO on 5/17/2018  8:54 AM    Pharmacy to Dose Zosyn (Discontinued)  Continuous PRN 5/16/2018 5/16/2018    Sig - Route: Continuous As Needed for Consult. - Does not apply    Reason for Discontinue: *Therapy completed    Cosign for Ordering: Accepted by Evert Brown DO on 5/17/2018  8:54 AM    phenylephrine (VENITA-SYNEPHRINE) 10 mg in sodium chloride 0.9 % 100 mL infusion  (Discontinued)  Continuous PRN 5/16/2018 5/16/2018    Sig: Continuous As Needed.    Reason for Discontinue: Anesthesia Stop    phenylephrine (VENITA-SYNEPHRINE) injection (Discontinued)  As Needed 5/16/2018 5/16/2018    Sig - Route: Infuse  into a venous catheter As Needed. - Intravenous    Reason for Discontinue: Anesthesia Stop    piperacillin-tazobactam (ZOSYN) 3.375 g/100 mL 0.9% NS IVPB (mbp) (Discontinued) 3.375 g Every 8 Hours 5/16/2018 5/16/2018    Sig - Route: Infuse 100 mL into a venous catheter Every 8 (Eight) Hours. - Intravenous    Reason for Discontinue: Duplicate order    Propofol (DIPRIVAN) injection (Discontinued)  As Needed 5/16/2018 5/16/2018    Sig - Route: Infuse  into a venous catheter As Needed. - Intravenous    Reason for Discontinue: Anesthesia Stop    rocuronium (ZEMURON) injection (Discontinued)  As Needed 5/16/2018 5/16/2018    Sig - Route: Infuse  into a venous catheter As Needed. - Intravenous    Reason for Discontinue: Anesthesia Stop    sodium chloride 0.9 % bolus 1,000 mL (Discontinued) 1,000 mL Once 5/16/2018 5/16/2018    Sig - Route: Infuse 1,000 mL into a venous catheter 1 (One) Time. - Intravenous    sodium chloride 0.9 % flush 1-10 mL (Discontinued) 1-10 mL As Needed 5/16/2018 5/16/2018    Sig - Route: Infuse 1-10 mL into a venous catheter As Needed for Line Care. - Intravenous    Reason for Discontinue: Patient Transfer    sodium chloride 0.9 % infusion (Discontinued) 100 mL/hr Continuous 5/16/2018 5/17/2018    Sig - Route: Infuse 100 mL/hr into a venous catheter Continuous. - Intravenous    Cosign for Ordering: Accepted by Evert Brown DO on 5/17/2018  8:54 AM    sodium chloride 0.9 % solution (Discontinued)  As Needed 5/16/2018 5/16/2018    Sig: As Needed.    Reason for Discontinue: Patient Discharge    sodium chloride 0.9 % solution (Discontinued)  As Needed 5/16/2018 5/16/2018    Sig: As Needed.    Reason for Discontinue: Patient Discharge    sodium chloride 0.9% -  IBW for BMI>30 bolus 2,190 mL (Discontinued) 30 mL/kg × 73 kg (Ideal) Continuous 5/16/2018 5/16/2018    Sig - Route: Infuse 2,190 mL into a venous catheter Continuous. - Intravenous    Reason for Discontinue: Patient Transfer    succinylcholine (ANECTINE) injection (Discontinued)  As Needed 5/16/2018 5/16/2018    Sig: As Needed.    Reason for Discontinue: Anesthesia Stop              Assessment/Plan     ASSESSMENT:    1.  Septic shock on Levophed  2.  Peritonitis    PLAN:    We will follow-up on culture from abdominal wall showing growth of gram-negative bacilli and would recommend to continue Zosyn for now.  Vancomycin was discontinued as risk outweighs benefit.  CRP ordered for a.m.  We will continue to follow closely and adjust antibiotic therapy appropriately.  Case discussed with wife at bedside.      Patient's findings and recommendations were discussed with family and nursing staff    Code Status: Full Code    Kerri Fajardo PA-C  05/17/18  10:33 AM

## 2018-05-17 NOTE — SIGNIFICANT NOTE
Consult received, chart reviewed. Mr. Soni was seen at bedside in CCU. Dr. Montalvo did clear him to participate in FEES for candidacy for po intake as he is s/p cholecystectomy, however, pt is somewhat fatigued thi am. He is arousable, but fleetingly. Per this status, evaluation is deferred. SLP to f/u pm for status to participate.     Thank you-  MICHELLE Garza.S., CCC/SLP

## 2018-05-17 NOTE — PROGRESS NOTES
Subjective     History:   Porsha Soni is a 77 y.o. male admitted on 5/16/2018 secondary to Cholecystitis     Procedures:   5/16/18: Open cholecystectomy with cholangiogram  5/16/18: Reintubation in the PACU  5/16/18: Left subclavian central line placement  5/17/18: Extubation    Patient seen and examined with Jaye HESS RN. Awake with his wife present at bedside. Able to follow simple commands. RT present at bedside and have extubated to [facility]. Pt reports abdominal soreness but denies any other complaints. Currently on low dose vasopressor support but BP stable at present. No acute events overnight per RN.     History taken from: chart, and RN.      Objective     Vital Signs  Temp:  [97 °F (36.1 °C)-99.5 °F (37.5 °C)] 99.5 °F (37.5 °C)  Heart Rate:  [] 103  Resp:  [9-28] 19  BP: ()/() 114/51  FiO2 (%):  [35 %-100 %] 35 %    Intake/Output Summary (Last 24 hours) at 05/17/18 1012  Last data filed at 05/17/18 0619   Gross per 24 hour   Intake             6207 ml   Output             1365 ml   Net             4842 ml         Physical Exam:  General:    Awake, alert, able to follow simple commands,  in no acute distress   Heart:      Normal S1 and S2. Tachycardic. No significant murmur, rubs or gallops appreciated.   Lungs:     Respirations regular, even and unlabored. Diminished breath sounds throughout. No wheezes, rales or rhonchi.   Abdomen:   Soft. Mild generalized TTP. No guarding, rebound tenderness or organomegaly noted. Bowel sounds present x 4. Dressing appears c/d/i. (+) MISHEL drain in RUQ.    Extremities:  No clubbing, cyanosis or edema noted. Moves UE and LE equally B/L.     Results Review:      Results from last 7 days  Lab Units 05/17/18  0345 05/16/18  0757   WBC 10*3/mm3 17.91* 26.67*   HEMOGLOBIN g/dL 12.0* 14.4   PLATELETS 10*3/mm3 459* 567*       Results from last 7 days  Lab Units 05/17/18  0345 05/16/18  0757   SODIUM mmol/L 133* 132*   POTASSIUM mmol/L 4.6 4.6   CHLORIDE mmol/L 104 91*    CO2 mmol/L 22.6* 25.2   BUN mg/dL 21 18   CREATININE mg/dL 1.21 1.32*   CALCIUM mg/dL 7.5* 9.1   GLUCOSE mg/dL 149* 156*       Results from last 7 days  Lab Units 05/17/18  0345 05/16/18  0757   BILIRUBIN mg/dL 1.6 4.0*   ALK PHOS U/L 185* 458*   AST (SGOT) U/L 54* 225*   ALT (SGPT) U/L 127* 285*       Results from last 7 days  Lab Units 05/17/18  0345   MAGNESIUM mg/dL 1.6*       Results from last 7 days  Lab Units 05/17/18  0345 05/16/18  0757   INR  1.73* 1.38*       Results from last 7 days  Lab Units 05/17/18  0909 05/17/18  0345 05/16/18  2132   CK TOTAL U/L 74  74 70 65   TROPONIN I ng/mL 0.025 0.034 0.026   CK MB INDEX % 2.1 2.8 3.0       Imaging Results (last 24 hours)     Procedure Component Value Units Date/Time    XR Chest 1 View [[id number]] Collected:  05/16/18 2246     Updated:  05/16/18 2249    Narrative:       EXAMINATION: XR CHEST 1 VW-      CLINICAL INDICATION:     intubation; A41.9-Sepsis, unspecified organism;  K85.90-Acute pancreatitis without necrosis or infection, unspecified;  D72.829-Elevated white blood cell count, unspecified;  K81.9-Cholecystitis, unspecified     TECHNIQUE:  XR CHEST 1 VW-      COMPARISON: 05/16/2018 6:04 PM      FINDINGS:   ET tube has been placed with tip at level of clavicles. Left subclavian  deep line stable in appearance and positioning. Improved aeration. Left  greater than right basilar airspace disease improved since the previous  exam. Small left effusion. Cardiomegaly and prior CABG changes again  noted.       Impression:       1. Support devices as above.  2. Improved aeration with decreasing basilar airspace disease.     This report was finalized on 5/16/2018 10:47 PM by Dr. Adebayo Dodd MD.       XR Chest 1 View [[id number]] Collected:  05/16/18 2246     Updated:  05/16/18 2248    Narrative:       EXAMINATION: XR CHEST 1 VW-      CLINICAL INDICATION:     central line placement; A41.9-Sepsis,  unspecified organism; K85.90-Acute pancreatitis without  necrosis or  infection, unspecified; D72.829-Elevated white blood cell count,  unspecified; K81.9-Cholecystitis, unspecified     TECHNIQUE:  XR CHEST 1 VW-      COMPARISON: 05/16/2018      FINDINGS:   Interval placement of left subclavian deep line with tip at the  cavoatrial junction. No pneumothorax. Cardiomegaly and left greater than  right basilar airspace disease stable from previous.       Impression:       Left subclavian deep line placement with tip in satisfactory  position and no pneumothorax. Stable bilateral airspace disease.     This report was finalized on 5/16/2018 10:46 PM by Dr. Adebayo Dodd MD.       FL Surgery Fluoro [[id number]] Collected:  05/16/18 1642     Updated:  05/16/18 1645    Narrative:       EXAMINATION: FL SURGERY FLUORO-      CLINICAL INDICATION:     IOC; A41.9-Sepsis, unspecified organism;  K85.90-Acute pancreatitis without necrosis or infection, unspecified;  D72.829-Elevated white blood cell count, unspecified;  K81.9-Cholecystitis, unspecified     TECHNIQUE:  FL SURGERY FLUORO-      FLUOROSCOPY TIME: 37.5 seconds     FINDINGS:   Intraoperative cholangiogram demonstrates normal caliber common bile  duct. No fixed filling defect or stricture. Passage of contrast into  duodenum.        Impression:       As above.     This report was finalized on 5/16/2018 4:43 PM by Dr. Adebayo Dodd MD.               Medications:    aspirin 81 mg Oral Daily   cetirizine 10 mg Oral Daily   chlorhexidine 15 mL Mouth/Throat Q12H   [START ON 5/21/2018] cholecalciferol 50,000 Units Oral Weekly   docusate sodium 100 mg Oral BID   donepezil 10 mg Oral Nightly   finasteride 5 mg Oral Daily   heparin (porcine) 5,000 Units Subcutaneous Q12H   ipratropium 0.5 mg Nebulization Q6H - RT   lansoprazole 30 mg Per G Tube Q AM   magnesium sulfate 4 g Intravenous Once   memantine 10 mg Oral Q12H   piperacillin-tazobactam 3.375 g Intravenous Q8H   polyethylene glycol 17 g Oral BID   sodium chloride 1,000 mL  Intravenous Once   sodium chloride 10 mL Intracatheter Q12H   vancomycin 1,250 mg Intravenous Q18H       fentaNYL (SUBLIMAZE) PCA 1500 mcg/30 mL syringe  Last Rate: Stopped (05/17/18 0619)   norepinephrine 0.02-0.3 mcg/kg/min Last Rate: 0.02 mcg/kg/min (05/17/18 0729)   propofol 5-50 mcg/kg/min Last Rate: Stopped (05/17/18 0619)           Assessment/Plan   Septic shock: Likely 2/2 perforated gallbladder with abscess and concern for pneumonia. Leukocytosis has improved this AM and lactic acid has normalized. CRP elevated. Hypotension improving and vasopressor support is being weaned. Wound culture revealing growth of a GNR. Currently on Vanc and Zosyn. ID has been consulted. Cont to follow cultures, attempt to wean vasopressor support and repeat labs in the AM.     Acute hypoxic and hypercapnic respiratory failure: Likely 2/2 above. Reintubated in PACU. Improved overnight and extubated this AM. Currently on HFNC. Cont to monitor closely.     TYRELL: Likely 2/2 above. Improved this AM. Avoid any potentially nephrotoxic agents. Repeat labs in the AM.     Chest pain in the setting of known CAD: Likely more atypical and epigastric 2/2 above. No acute ischemic changes on EKG. Serial CE's are negative. Holding Plavix perioperatively per surgery. Monitor on telemetry.     Elevated liver enzymes: Likely 2/2 above. Improved today. Repeat CMP in the AM.     Hypomagnesemia: Start electrolyte replacement protocols.     (+) D-dimer: CT PE protocol negative for PE. Venous duplex US of the lower extremities ordered.     PPX  GI: PPI  DVT: SQ heparin       Pt is at high risk 2/2 septic shock, perforated GB with abscess, pneumonia, resp failure and TYRELL.       Evert Brown,   05/17/18  10:12 AM

## 2018-05-18 ENCOUNTER — APPOINTMENT (OUTPATIENT)
Dept: CT IMAGING | Facility: HOSPITAL | Age: 78
End: 2018-05-18

## 2018-05-18 ENCOUNTER — APPOINTMENT (OUTPATIENT)
Dept: GENERAL RADIOLOGY | Facility: HOSPITAL | Age: 78
End: 2018-05-18

## 2018-05-18 LAB
A-A DO2: 136.9 MMHG (ref 0–300)
ABO + RH BLD: NORMAL
ABO + RH BLD: NORMAL
ALBUMIN SERPL-MCNC: 2.4 G/DL (ref 3.4–4.8)
ALBUMIN/GLOB SERPL: 1.1 G/DL (ref 1.5–2.5)
ALP SERPL-CCNC: 130 U/L (ref 40–129)
ALT SERPL W P-5'-P-CCNC: 82 U/L (ref 10–44)
AMMONIA BLD-SCNC: 22 UMOL/L (ref 16–60)
ANION GAP SERPL CALCULATED.3IONS-SCNC: 2.6 MMOL/L (ref 3.6–11.2)
ARTERIAL PATENCY WRIST A: POSITIVE
AST SERPL-CCNC: 28 U/L (ref 10–34)
ATMOSPHERIC PRESS: 725 MMHG
BACTERIA SPEC AEROBE CULT: ABNORMAL
BASE EXCESS BLDA CALC-SCNC: -2.6 MMOL/L
BASOPHILS # BLD AUTO: 0.01 10*3/MM3 (ref 0–0.3)
BASOPHILS NFR BLD AUTO: 0.1 % (ref 0–2)
BDY SITE: ABNORMAL
BH BB BLOOD EXPIRATION DATE: NORMAL
BH BB BLOOD EXPIRATION DATE: NORMAL
BH BB BLOOD TYPE BARCODE: 600
BH BB BLOOD TYPE BARCODE: 600
BH BB DISPENSE STATUS: NORMAL
BH BB DISPENSE STATUS: NORMAL
BH BB PRODUCT CODE: NORMAL
BH BB PRODUCT CODE: NORMAL
BH BB UNIT NUMBER: NORMAL
BH BB UNIT NUMBER: NORMAL
BILIRUB SERPL-MCNC: 0.7 MG/DL (ref 0.2–1.8)
BODY TEMPERATURE: 98.6 C
BUN BLD-MCNC: 29 MG/DL (ref 7–21)
BUN/CREAT SERPL: 26.1 (ref 7–25)
CALCIUM SPEC-SCNC: 7.7 MG/DL (ref 7.7–10)
CHLORIDE SERPL-SCNC: 106 MMOL/L (ref 99–112)
CO2 SERPL-SCNC: 24.4 MMOL/L (ref 24.3–31.9)
COHGB MFR BLD: 0.8 % (ref 0–5)
CREAT BLD-MCNC: 1.11 MG/DL (ref 0.43–1.29)
CRP SERPL-MCNC: 26.89 MG/DL (ref 0–0.99)
DEPRECATED RDW RBC AUTO: 52 FL (ref 37–54)
EOSINOPHIL # BLD AUTO: 0 10*3/MM3 (ref 0–0.7)
EOSINOPHIL NFR BLD AUTO: 0 % (ref 0–7)
ERYTHROCYTE [DISTWIDTH] IN BLOOD BY AUTOMATED COUNT: 14.5 % (ref 11.5–14.5)
GAS FLOW AIRWAY: 45 LPM
GFR SERPL CREATININE-BSD FRML MDRD: 64 ML/MIN/1.73
GLOBULIN UR ELPH-MCNC: 2.1 GM/DL
GLUCOSE BLD-MCNC: 104 MG/DL (ref 70–110)
GRAM STN SPEC: ABNORMAL
GRAM STN SPEC: ABNORMAL
HCO3 BLDA-SCNC: 22.4 MMOL/L (ref 22–26)
HCT VFR BLD AUTO: 32.4 % (ref 42–52)
HCT VFR BLD CALC: 34 % (ref 42–52)
HGB BLD-MCNC: 10.4 G/DL (ref 14–18)
HGB BLDA-MCNC: 11.5 G/DL (ref 12–16)
HOROWITZ INDEX BLD+IHG-RTO: 39 %
IMM GRANULOCYTES # BLD: 0.07 10*3/MM3 (ref 0–0.03)
IMM GRANULOCYTES NFR BLD: 0.4 % (ref 0–0.5)
LIPASE SERPL-CCNC: 50 U/L (ref 13–60)
LYMPHOCYTES # BLD AUTO: 1.87 10*3/MM3 (ref 1–3)
LYMPHOCYTES NFR BLD AUTO: 9.6 % (ref 16–46)
MAGNESIUM SERPL-MCNC: 2.8 MG/DL (ref 1.7–2.6)
MCH RBC QN AUTO: 31.4 PG (ref 27–33)
MCHC RBC AUTO-ENTMCNC: 32.1 G/DL (ref 33–37)
MCV RBC AUTO: 97.9 FL (ref 80–94)
METHGB BLD QL: 0.2 % (ref 0–3)
MODALITY: ABNORMAL
MONOCYTES # BLD AUTO: 1.32 10*3/MM3 (ref 0.1–0.9)
MONOCYTES NFR BLD AUTO: 6.8 % (ref 0–12)
NEUTROPHILS # BLD AUTO: 16.23 10*3/MM3 (ref 1.4–6.5)
NEUTROPHILS NFR BLD AUTO: 83.1 % (ref 40–75)
NRBC BLD MANUAL-RTO: 0 /100 WBC (ref 0–0)
OSMOLALITY SERPL CALC.SUM OF ELEC: 272.5 MOSM/KG (ref 273–305)
OXYHGB MFR BLDV: 94.6 % (ref 85–100)
PCO2 BLDA: 39.6 MM HG (ref 35–45)
PH BLDA: 7.37 PH UNITS (ref 7.35–7.45)
PHOSPHATE SERPL-MCNC: 3.1 MG/DL (ref 2.7–4.5)
PLATELET # BLD AUTO: 430 10*3/MM3 (ref 130–400)
PMV BLD AUTO: 8.8 FL (ref 6–10)
PO2 BLDA: 81.9 MM HG (ref 80–100)
POTASSIUM BLD-SCNC: 4.4 MMOL/L (ref 3.5–5.3)
PROT SERPL-MCNC: 4.5 G/DL (ref 6–8)
RBC # BLD AUTO: 3.31 10*6/MM3 (ref 4.7–6.1)
SAO2 % BLDCOA: 95.6 % (ref 90–100)
SODIUM BLD-SCNC: 133 MMOL/L (ref 135–153)
UNIT  ABO: NORMAL
UNIT  ABO: NORMAL
UNIT  RH: NORMAL
UNIT  RH: NORMAL
WBC NRBC COR # BLD: 19.5 10*3/MM3 (ref 4.5–12.5)

## 2018-05-18 PROCEDURE — 36600 WITHDRAWAL OF ARTERIAL BLOOD: CPT | Performed by: INTERNAL MEDICINE

## 2018-05-18 PROCEDURE — 85025 COMPLETE CBC W/AUTO DIFF WBC: CPT | Performed by: INTERNAL MEDICINE

## 2018-05-18 PROCEDURE — 82805 BLOOD GASES W/O2 SATURATION: CPT | Performed by: INTERNAL MEDICINE

## 2018-05-18 PROCEDURE — 70450 CT HEAD/BRAIN W/O DYE: CPT | Performed by: RADIOLOGY

## 2018-05-18 PROCEDURE — 94799 UNLISTED PULMONARY SVC/PX: CPT

## 2018-05-18 PROCEDURE — 83050 HGB METHEMOGLOBIN QUAN: CPT | Performed by: INTERNAL MEDICINE

## 2018-05-18 PROCEDURE — 25010000002 PIPERACILLIN-TAZOBACTAM: Performed by: PHYSICIAN ASSISTANT

## 2018-05-18 PROCEDURE — 71045 X-RAY EXAM CHEST 1 VIEW: CPT

## 2018-05-18 PROCEDURE — 25010000002 HEPARIN (PORCINE) PER 1000 UNITS: Performed by: SURGERY

## 2018-05-18 PROCEDURE — 70450 CT HEAD/BRAIN W/O DYE: CPT

## 2018-05-18 PROCEDURE — 82375 ASSAY CARBOXYHB QUANT: CPT | Performed by: INTERNAL MEDICINE

## 2018-05-18 PROCEDURE — 80053 COMPREHEN METABOLIC PANEL: CPT | Performed by: INTERNAL MEDICINE

## 2018-05-18 PROCEDURE — 99024 POSTOP FOLLOW-UP VISIT: CPT | Performed by: SURGERY

## 2018-05-18 PROCEDURE — 83735 ASSAY OF MAGNESIUM: CPT | Performed by: INTERNAL MEDICINE

## 2018-05-18 PROCEDURE — 82140 ASSAY OF AMMONIA: CPT | Performed by: INTERNAL MEDICINE

## 2018-05-18 PROCEDURE — 86140 C-REACTIVE PROTEIN: CPT | Performed by: INTERNAL MEDICINE

## 2018-05-18 PROCEDURE — 71045 X-RAY EXAM CHEST 1 VIEW: CPT | Performed by: RADIOLOGY

## 2018-05-18 PROCEDURE — 83690 ASSAY OF LIPASE: CPT | Performed by: INTERNAL MEDICINE

## 2018-05-18 PROCEDURE — 99233 SBSQ HOSP IP/OBS HIGH 50: CPT | Performed by: INTERNAL MEDICINE

## 2018-05-18 PROCEDURE — 84100 ASSAY OF PHOSPHORUS: CPT | Performed by: INTERNAL MEDICINE

## 2018-05-18 RX ADMIN — SODIUM CHLORIDE 100 ML/HR: 9 INJECTION, SOLUTION INTRAVENOUS at 10:19

## 2018-05-18 RX ADMIN — SODIUM CHLORIDE 100 ML/HR: 9 INJECTION, SOLUTION INTRAVENOUS at 22:16

## 2018-05-18 RX ADMIN — Medication 10 ML: at 08:21

## 2018-05-18 RX ADMIN — PIPERACILLIN SODIUM,TAZOBACTAM SODIUM 3.38 G: 3; .375 INJECTION, POWDER, FOR SOLUTION INTRAVENOUS at 15:32

## 2018-05-18 RX ADMIN — IPRATROPIUM BROMIDE 0.5 MG: 0.5 SOLUTION RESPIRATORY (INHALATION) at 19:05

## 2018-05-18 RX ADMIN — IPRATROPIUM BROMIDE 0.5 MG: 0.5 SOLUTION RESPIRATORY (INHALATION) at 07:16

## 2018-05-18 RX ADMIN — SODIUM CHLORIDE 100 ML/HR: 9 INJECTION, SOLUTION INTRAVENOUS at 01:18

## 2018-05-18 RX ADMIN — HEPARIN SODIUM 5000 UNITS: 5000 INJECTION, SOLUTION INTRAVENOUS; SUBCUTANEOUS at 20:08

## 2018-05-18 RX ADMIN — PIPERACILLIN SODIUM,TAZOBACTAM SODIUM 3.38 G: 3; .375 INJECTION, POWDER, FOR SOLUTION INTRAVENOUS at 06:00

## 2018-05-18 RX ADMIN — IPRATROPIUM BROMIDE 0.5 MG: 0.5 SOLUTION RESPIRATORY (INHALATION) at 13:12

## 2018-05-18 RX ADMIN — HEPARIN SODIUM 5000 UNITS: 5000 INJECTION, SOLUTION INTRAVENOUS; SUBCUTANEOUS at 08:20

## 2018-05-18 RX ADMIN — CHLORHEXIDINE GLUCONATE 15 ML: 1.2 RINSE ORAL at 20:08

## 2018-05-18 RX ADMIN — PIPERACILLIN SODIUM,TAZOBACTAM SODIUM 3.38 G: 3; .375 INJECTION, POWDER, FOR SOLUTION INTRAVENOUS at 23:57

## 2018-05-18 RX ADMIN — DEXMEDETOMIDINE HYDROCHLORIDE 0.2 MCG/KG/HR: 100 INJECTION, SOLUTION INTRAVENOUS at 10:09

## 2018-05-18 RX ADMIN — IPRATROPIUM BROMIDE 0.5 MG: 0.5 SOLUTION RESPIRATORY (INHALATION) at 00:11

## 2018-05-18 RX ADMIN — CHLORHEXIDINE GLUCONATE 15 ML: 1.2 RINSE ORAL at 08:18

## 2018-05-18 NOTE — PLAN OF CARE
Problem: Breathing Pattern Ineffective (Adult)  Goal: Anxiety/Fear Reduction  Outcome: Ongoing (interventions implemented as appropriate)

## 2018-05-18 NOTE — PLAN OF CARE
Problem: Patient Care Overview  Goal: Plan of Care Review  Outcome: Ongoing (interventions implemented as appropriate)    Goal: Individualization and Mutuality  Outcome: Ongoing (interventions implemented as appropriate)    Goal: Discharge Needs Assessment  Outcome: Ongoing (interventions implemented as appropriate)      Problem: Fall Risk (Adult)  Goal: Identify Related Risk Factors and Signs and Symptoms  Outcome: Outcome(s) achieved Date Met: 05/18/18    Goal: Absence of Fall  Outcome: Ongoing (interventions implemented as appropriate)      Problem: Sepsis/Septic Shock (Adult)  Goal: Signs and Symptoms of Listed Potential Problems Will be Absent, Minimized or Managed (Sepsis/Septic Shock)  Outcome: Ongoing (interventions implemented as appropriate)      Problem: Skin Injury Risk (Adult)  Goal: Identify Related Risk Factors and Signs and Symptoms  Outcome: Ongoing (interventions implemented as appropriate)      Problem: Cholecystectomy (Adult)  Goal: Signs and Symptoms of Listed Potential Problems Will be Absent, Minimized or Managed (Cholecystectomy)  Outcome: Ongoing (interventions implemented as appropriate)

## 2018-05-18 NOTE — SIGNIFICANT NOTE
F/u this pm for status to participate in dysphagia evaluation. Mr. Soni is lethargic, unarousable to functionally participate. Evaluation is deferred. D/w Dr. Brown.     Thank you-  TAMI GarzaS, CCC/SLP

## 2018-05-18 NOTE — PROGRESS NOTES
Antimicrobial Length of Therapy:    Zosyn IV - Day 3 of 14    Ava Glass, [facility]  05/18/18  8:47 AM

## 2018-05-18 NOTE — SIGNIFICANT NOTE
F/u this am for pt status to participate. Dr. Brown and RN, Virginia, are present at bedside. Pt continues w/ fatigue and confusion. MD reports plan to initiate precedex therapy w/ request to defer evaluation this am.     SLP to f/u this pm.     Thank you-  Laura De Jesus M.S. CCC/SLP

## 2018-05-18 NOTE — PROGRESS NOTES
Discharge Planning Assessment   [facility]     Patient Name: Porsha Soni  MRN: [id number]  Today's Date: 5/18/2018    Admit Date: 5/16/2018         Discharge Plan     Row Name 05/18/18 1417       Plan    Plan Pt lives with his spouse, Eliazar and plans to return home at discharge. Pt does not have home health at this time. Pt has a rolling walker and cane via unknown provider.  Pt's spouse is requesting a 3-in-1 commode to be arranged at discharge.  Pt fills her prescriptions at [facility] and does not have issues with prescription coverage. Pt will be transported home via private auto.  SS will follow        Expected Discharge Date and Time     Expected Discharge Date Expected Discharge Time    May 27, 2018             Cindy Esparza

## 2018-05-18 NOTE — PLAN OF CARE
Problem: Breathing Pattern Ineffective (Adult)  Goal: Identify Related Risk Factors and Signs and Symptoms  Outcome: Ongoing (interventions implemented as appropriate)

## 2018-05-18 NOTE — PROGRESS NOTES
Subjective     History:   Porsha Soni is a 77 y.o. male admitted on 5/16/2018 secondary to Cholecystitis     Procedures:   5/16/18: Open cholecystectomy with cholangiogram  5/16/18: Reintubation in the PACU  5/16/18: Left subclavian central line placement  5/17/18: Extubation    Patient seen and examined this AM with Virginia, RN. Currently on HFNC. Briefly awakens but falls back to sleep during my encounter. Oriented to person only. Able to follow a few simple commands. Remains on vasopressor support but Levophed is being weaned. UOP has remained marginal.     History taken from: chart, and RN.      Objective     Vital Signs  Temp:  [97.8 °F (36.6 °C)-99.5 °F (37.5 °C)] 98 °F (36.7 °C)  Heart Rate:  [] 80  Resp:  [16-21] 16  BP: ()/(46-65) 107/62    Intake/Output Summary (Last 24 hours) at 05/18/18 1518  Last data filed at 05/18/18 1019   Gross per 24 hour   Intake          2727.25 ml   Output              650 ml   Net          2077.25 ml         Physical Exam:  General:    Briefly awakens but falls back to sleep during my encounter, oriented to person only, able to follow a few simple commands   Heart:      Normal S1 and S2. Regular rate and rhythm. No significant murmur, rubs or gallops appreciated.   Lungs:     Respirations regular, even and unlabored. Diminished breath sounds throughout. No wheezes, rales or rhonchi.   Abdomen:   Soft. Nontender. No guarding, rebound tenderness or organomegaly noted. Bowel sounds present x 4. Dressing appears c/d/i. (+) MISHEL drain in RUQ.    Extremities:  No clubbing, cyanosis or edema noted. Moves UE and LE equally B/L.     Results Review:      Results from last 7 days  Lab Units 05/18/18  0104 05/17/18  0345 05/16/18  0757   WBC 10*3/mm3 19.50* 17.91* 26.67*   HEMOGLOBIN g/dL 10.4* 12.0* 14.4   PLATELETS 10*3/mm3 430* 459* 567*       Results from last 7 days  Lab Units 05/18/18  0104 05/17/18  0345 05/16/18  0757   SODIUM mmol/L 133* 133* 132*   POTASSIUM mmol/L 4.4  4.6 4.6   CHLORIDE mmol/L 106 104 91*   CO2 mmol/L 24.4 22.6* 25.2   BUN mg/dL 29* 21 18   CREATININE mg/dL 1.11 1.21 1.32*   CALCIUM mg/dL 7.7 7.5* 9.1   GLUCOSE mg/dL 104 149* 156*       Results from last 7 days  Lab Units 05/18/18  0104 05/17/18  0345 05/16/18  0757   BILIRUBIN mg/dL 0.7 1.6 4.0*   ALK PHOS U/L 130* 185* 458*   AST (SGOT) U/L 28 54* 225*   ALT (SGPT) U/L 82* 127* 285*       Results from last 7 days  Lab Units 05/18/18  0104 05/17/18  0345   MAGNESIUM mg/dL 2.8* 1.6*       Results from last 7 days  Lab Units 05/17/18  0345 05/16/18  0757   INR  1.73* 1.38*       Results from last 7 days  Lab Units 05/17/18  0909 05/17/18  0345 05/16/18  2132   CK TOTAL U/L 74  74 70 65   TROPONIN I ng/mL 0.025 0.034 0.026   CK MB INDEX % 2.1 2.8 3.0       Imaging Results (last 24 hours)     Procedure Component Value Units Date/Time    XR Chest AP [[id number]] Collected:  05/18/18 0814     Updated:  05/18/18 0817    Narrative:       EXAMINATION: XR CHEST AP-      CLINICAL INDICATION:     Respiratory failure, pneumonia; A41.9-Sepsis,  unspecified organism; K85.90-Acute pancreatitis without necrosis or  infection, unspecified; D72.829-Elevated white blood cell count,  unspecified; K81.9-Cholecystitis, unspecified     TECHNIQUE:  XR CHEST AP-      COMPARISON: 05/16/2018      FINDINGS:   Left subclavian deep line with tip in the region of cavoatrial junction.  Left lower lobe airspace disease stable. Right perihilar airspace  disease also stable.   Cardiomegaly with left pleural effusion again noted.   No pneumothorax.   No right effusion.   No acute osseous findings.  ET tube has been removed.       Impression:       Interval removal of ET tube. Otherwise stable chest.     This report was finalized on 5/18/2018 8:14 AM by Dr. Adebayo Dodd MD.               Medications:    aspirin 81 mg Oral Daily   cetirizine 10 mg Oral Daily   chlorhexidine 15 mL Mouth/Throat Q12H   [START ON 5/21/2018] cholecalciferol 50,000 Units  Oral Weekly   docusate sodium 100 mg Oral BID   donepezil 10 mg Oral Nightly   finasteride 5 mg Oral Daily   heparin (porcine) 5,000 Units Subcutaneous Q12H   ipratropium 0.5 mg Nebulization Q6H - RT   lansoprazole 30 mg Per G Tube Q AM   memantine 10 mg Oral Q12H   piperacillin-tazobactam 3.375 g Intravenous Q8H   polyethylene glycol 17 g Oral BID   sodium chloride 1,000 mL Intravenous Once   sodium chloride 10 mL Intracatheter Q12H       dexmedetomidine 0.2-1.5 mcg/kg/hr Last Rate: Stopped (05/18/18 1432)   fentaNYL (SUBLIMAZE) PCA 1500 mcg/30 mL syringe  Last Rate: Stopped (05/17/18 0619)   norepinephrine 0.02-0.3 mcg/kg/min Last Rate: 0.02 mcg/kg/min (05/18/18 1143)   propofol 5-50 mcg/kg/min Last Rate: Stopped (05/17/18 0619)   sodium chloride 100 mL/hr Last Rate: 100 mL/hr (05/18/18 1019)           Assessment/Plan   Septic shock: Likely 2/2 perforated gallbladder with abscess and concern for pneumonia. WBC and CRP elevated. Hypotension improving and vasopressor support is being weaned. Wound culture revealing growth of a GNR. Currently on Zosyn per ID. Cont to follow cultures, attempt to wean vasopressor support and repeat labs in the AM.     Acute hypoxic and hypercapnic respiratory failure: Likely 2/2 above. Extubated yesterday. Remains on HFNC. ABG stable today. Cont to monitor closely.     AMS/encephalopathy: Possibly related to anesthesia during surgery and sedating meds while intubated. ABG stable this AM. Will start Precedex. May need to order CT head if no improvement today.     TYRELL: Likely 2/2 above. Overall improved and stable this AM. Cont maintenance fluids. Avoid any potentially nephrotoxic agents. Repeat labs in the AM.     Chest pain in the setting of known CAD: Likely more atypical and epigastric 2/2 above. No acute ischemic changes on EKG. Serial CE's are negative. Holding Plavix perioperatively per surgery. Monitor on telemetry.     Elevated liver enzymes: Likely 2/2 above. Improved today.  Repeat CMP in the AM.     Hypomagnesemia: Resolved today. Cont electrolyte replacement protocols.     (+) D-dimer: CT PE protocol negative for PE. Venous duplex US of the lower extremities negative for DVT.     PPX  GI: PPI  DVT: SQ heparin       Pt is at high risk 2/2 septic shock, perforated GB with abscess, pneumonia, resp failure, AMS and TYRELL.       Evert Brown DO  05/18/18  3:18 PM

## 2018-05-18 NOTE — PROGRESS NOTES
"  I have personally seen and examined the patient today and discussed overnight interval progress and pertinent issues with nursing staff.    Subjective:    Patient was extubated yesterday and continues to be drowsy.  Low-grade fever overnight with worsening leukocytosis and CRP level up to 26.89 from 22.97.  Wife at bedside in case discussed with her.      History taken from: chart RN      Vital Signs    /60   Pulse 85   Temp 99.1 °F (37.3 °C) (Oral)   Resp 16   Ht 177.8 cm (70\")   Wt 105 kg (230 lb 14.4 oz)   SpO2 96%   BMI 33.13 kg/m²     Temp:  [97.8 °F (36.6 °C)-99.7 °F (37.6 °C)] 99.1 °F (37.3 °C)      Intake/Output Summary (Last 24 hours) at 05/18/18 1017  Last data filed at 05/18/18 0600   Gross per 24 hour   Intake          2295.58 ml   Output              650 ml   Net          1645.58 ml     Intake & Output (last 3 days)       05/15 0701 - 05/16 0700 05/16 0701 - 05/17 0700 05/17 0701 - 05/18 0700 05/18 0701 - 05/19 0700    I.V. (mL/kg)  2567 (24.4) 1995.6 (19)     IV Piggyback  5930 300     Total Intake(mL/kg)  8497 (80.9) 2295.6 (21.9)     Urine (mL/kg/hr)  910 585 (0.2)     Drains  255 65 (0)     Blood  200      Total Output   1365 650      Net   +7132 +1645.6                    Physical Exam:                 General Appearance:   BiPAP, asleep, comfortable wife at bedside    Head:    Normocephalic, without obvious abnormality, atraumatic   Eyes:            Lids and lashes normal, conjunctivae and sclerae normal, no   icterus, no pallor, corneas clear, PERRLA   Ears:    Ears appear intact with no abnormalities noted   Throat:   No oral lesions, no thrush, oral mucosa moist   Neck:   No adenopathy, supple, trachea midline, no thyromegaly, no   carotid bruit, no JVD   Back:     No tenderness to percussion or palpation, range of motion   normal   Lungs:     Clear to auscultation,respirations regular, even and unlabored. No wheezing, no ronchi and no crackles.    Heart:    Regular rhythm and " normal rate, normal S1 and S2, no            murmur, no gallop, no rub, no click   Chest Wall:    No abnormalities observed   Abdomen:     Distended, Normal bowel sounds, no masses, no organomegaly, soft, non-tender, no guarding, no rebound tenderness   Rectal:     Deferred   Extremities:   Moves all extremities well, no edema, no cyanosis, no             redness   Pulses:   Pulses palpable and equal bilaterally   Skin:   No bleeding, bruising or rash   Lymph nodes:   No palpable adenopathy   Neurologic:   BiPAP, asleep          Results:      Results from last 7 days  Lab Units 05/18/18  0104 05/17/18  0345 05/16/18  0757   WBC 10*3/mm3 19.50* 17.91* 26.67*     Lab Results   Component Value Date    NEUTROABS 16.23 (H) 05/18/2018         Results from last 7 days  Lab Units 05/18/18  0104   CREATININE mg/dL 1.11         Results from last 7 days  Lab Units 05/18/18  0104 05/17/18  0345   CRP mg/dL 26.89* 22.97*       Imaging Results (last 24 hours)     Procedure Component Value Units Date/Time    XR Chest AP [[id number]] Collected:  05/18/18 0814     Updated:  05/18/18 0817    Narrative:       EXAMINATION: XR CHEST AP-      CLINICAL INDICATION:     Respiratory failure, pneumonia; A41.9-Sepsis,  unspecified organism; K85.90-Acute pancreatitis without necrosis or  infection, unspecified; D72.829-Elevated white blood cell count,  unspecified; K81.9-Cholecystitis, unspecified     TECHNIQUE:  XR CHEST AP-      COMPARISON: 05/16/2018      FINDINGS:   Left subclavian deep line with tip in the region of cavoatrial junction.  Left lower lobe airspace disease stable. Right perihilar airspace  disease also stable.   Cardiomegaly with left pleural effusion again noted.   No pneumothorax.   No right effusion.   No acute osseous findings.  ET tube has been removed.       Impression:       Interval removal of ET tube. Otherwise stable chest.     This report was finalized on 5/18/2018 8:14 AM by Dr. Adebayo Dodd MD.        Venous  Doppler Lower Extremity Bilateral (duplex) [[id number]] Collected:  05/17/18 1258     Updated:  05/17/18 1301    Narrative:       US VENOUS DOPPLER LOWER EXTREMITY BILATERAL (DUPLEX)-     CLINICAL INDICATION: elevated d-dimer; A41.9-Sepsis, unspecified  organism; K85.90-Acute pancreatitis without necrosis or infection,  unspecified; D72.829-Elevated white blood cell count, unspecified;  K81.9-Cholecystitis, unspecified          COMPARISON: Left leg dated 11/20/2017      TECHNIQUE: Color Doppler imaging was used with compression and  augmentation to evaluate the lower extremity deep venous system.     FINDINGS:   There is patent spontaneous flow from the common femoral vein through  the posterior tibial veins.  There was no internal clot or area of noncompressibility.  Normal augmentation was elicited where applicable.       Impression:       No DVT in the lower extremities on today's exam.      This report was finalized on 5/17/2018 12:59 PM by Dr. Cliff Brody MD.               Results Review:    I have personally reviewed laboratory data, culture results, radiology studies and antimicrobial therapy.    Hospital Medications (active)       Dose Frequency Start End    aspirin EC tablet 81 mg 81 mg Daily 5/16/2018     Sig - Route: Take 1 tablet by mouth Daily. - Oral    cetirizine (zyrTEC) tablet 10 mg 10 mg Daily 5/16/2018     Sig - Route: Take 1 tablet by mouth Daily. - Oral    chlorhexidine (PERIDEX) 0.12 % solution 15 mL 15 mL Every 12 Hours Scheduled 5/17/2018     Sig - Route: Apply 15 mL to the mouth or throat Every 12 (Twelve) Hours. - Mouth/Throat    cholecalciferol (VITAMIN D3) capsule 50,000 Units 50,000 Units Weekly 5/21/2018     Sig - Route: Take 1 capsule by mouth 1 (One) Time Per Week. - Oral    dexmedetomidine HCl (PRECEDEX) 400 mcg in sodium chloride 0.9 % 100 mL (4 mcg/mL) infusion 0.2-1.5 mcg/kg/hr × 105 kg Titrated 5/18/2018     Sig - Route: Infuse .5 mcg/hr into a venous catheter Dose  "Adjusted By Provider As Needed. - Intravenous    docusate sodium (COLACE) capsule 100 mg 100 mg 2 Times Daily 5/16/2018     Sig - Route: Take 1 capsule by mouth 2 (Two) Times a Day. - Oral    donepezil (ARICEPT) tablet 10 mg 10 mg Nightly 5/16/2018     Sig - Route: Take 2 tablets by mouth Every Night. - Oral    FENTANYL PCA 1500 MCG/30 ML (BHCOR) PCA  Titrated 5/17/2018 5/27/2018    Sig - Route: Infuse  into a venous catheter Dose Adjusted By Provider As Needed. - Intravenous    finasteride (PROSCAR) tablet 5 mg 5 mg Daily 5/16/2018     Sig - Route: Take 1 tablet by mouth Daily. - Oral    heparin (porcine) 5000 UNIT/ML injection 5,000 Units 5,000 Units Every 12 Hours Scheduled 5/17/2018     Sig - Route: Inject 1 mL under the skin Every 12 (Twelve) Hours. - Subcutaneous    HYDROcodone-acetaminophen (NORCO) 7.5-325 MG per tablet 1 tablet 1 tablet Every 4 Hours PRN 5/16/2018 5/26/2018    Sig - Route: Take 1 tablet by mouth Every 4 (Four) Hours As Needed for Moderate Pain . - Oral    ipratropium (ATROVENT) nebulizer solution 0.5 mg 0.5 mg Every 6 Hours - RT 5/17/2018     Sig - Route: Take 2.5 mL by nebulization Every 6 (Six) Hours. - Nebulization    lansoprazole (FIRST) oral suspension 30 mg 30 mg Every Early Morning 5/17/2018     Sig - Route: 10 mL by Per G Tube route Every Morning. - Per G Tube    Magnesium Sulfate 2 gram / 50mL Infusion (GIVE X 3 BAGS TO EQUAL 6GM TOTAL DOSE) - Mg 1.1 - 1/5 mg/dl 2 g As Needed 5/17/2018     Sig - Route: Infuse 50 mL into a venous catheter As Needed (See Administration Instructions). - Intravenous    Linked Group 1:  \"Or\" Linked Group Details        Magnesium Sulfate 2 gram Bolus, followed by 8 gram infusion (total Mg dose 10 grams)- Mg less than or equal to 1mg/dL 2 g As Needed 5/17/2018     Sig - Route: Infuse 50 mL into a venous catheter As Needed (See Administration Instructions). - Intravenous    Linked Group 1:  \"Or\" Linked Group Details        Magnesium Sulfate 4 gram infusion- " "Mg 1.6-1.9 mg/dL 4 g As Needed 5/17/2018     Sig - Route: Infuse 100 mL into a venous catheter As Needed (See Administration Instructions). - Intravenous    Linked Group 1:  \"Or\" Linked Group Details        Magnesium Sulfate 4 gram infusion- Mg 1.6-1.9 mg/dL 4 g Once 5/17/2018 5/17/2018    Sig - Route: Infuse 100 mL into a venous catheter 1 (One) Time. - Intravenous    memantine (NAMENDA) tablet 10 mg 10 mg Every 12 Hours Scheduled 5/16/2018     Sig - Route: Take 1 tablet by mouth Every 12 (Twelve) Hours. - Oral    morphine injection 4 mg 4 mg Every 3 Hours PRN 5/16/2018     Sig - Route: Infuse 1 mL into a venous catheter Every 3 (Three) Hours As Needed for Severe Pain . - Intravenous    norepinephrine (LEVOPHED) 8,000 mcg in sodium chloride 0.9 % 250 mL (32 mcg/mL) infusion 0.02-0.3 mcg/kg/min × 95.3 kg Titrated 5/16/2018     Sig - Route: Infuse 1.906-28.59 mcg/min into a venous catheter Dose Adjusted By Provider As Needed. - Intravenous    ondansetron (ZOFRAN) injection 4 mg 4 mg Every 4 Hours PRN 5/16/2018     Sig - Route: Infuse 2 mL into a venous catheter Every 4 (Four) Hours As Needed for Nausea or Vomiting. - Intravenous    piperacillin-tazobactam (ZOSYN) 3.375 g/100 mL 0.9% NS IVPB (mbp) 3.375 g Every 8 Hours 5/16/2018 5/30/2018    Sig - Route: Infuse 100 mL into a venous catheter Every 8 (Eight) Hours. - Intravenous    polyethylene glycol (MIRALAX) powder 17 g 17 g 2 Times Daily 5/16/2018 5/20/2018    Sig - Route: Take 17 g by mouth 2 (Two) Times a Day. - Oral    potassium chloride (KLOR-CON) packet 40 mEq 40 mEq As Needed 5/17/2018     Sig - Route: Take 40 mEq by mouth As Needed (potassium replacement, see admin instructions). - Oral    Linked Group 2:  \"Or\" Linked Group Details        potassium chloride (MICRO-K) CR capsule 40 mEq 40 mEq As Needed 5/17/2018     Sig - Route: Take 4 capsules by mouth As Needed (potassium replacement.  see admin instructions). - Oral    Linked Group 2:  \"Or\" Linked Group " "Details        potassium chloride 10 mEq in 100 mL IVPB 10 mEq Every 1 Hour PRN 5/17/2018     Sig - Route: Infuse 100 mL into a venous catheter Every 1 (One) Hour As Needed (potassium protocol PERIPHERAL - see admin instructions). - Intravenous    Linked Group 2:  \"Or\" Linked Group Details        potassium phosphate 15 mmol in sodium chloride 0.9 % 100 mL infusion 15 mmol As Needed 5/17/2018     Sig - Route: Infuse 15 mmol into a venous catheter As Needed (Peripheral IV - Phosphorus 1.8 - 2.5). - Intravenous    Linked Group 3:  \"Or\" Linked Group Details        potassium phosphate 30 mmol in sodium chloride 0.9 % 250 mL infusion 30 mmol As Needed 5/17/2018     Sig - Route: Infuse 30 mmol into a venous catheter As Needed (Peripheral IV - Phosphorus 1.3 - 1.7). - Intravenous    Linked Group 3:  \"Or\" Linked Group Details        potassium phosphate 45 mmol in sodium chloride 0.9 % 500 mL infusion 45 mmol As Needed 5/17/2018     Sig - Route: Infuse 45 mmol into a venous catheter As Needed (Peripheral IV - Phosphorus Less Than 1.3). - Intravenous    Linked Group 3:  \"Or\" Linked Group Details        promethazine (PHENERGAN) 12.5 mg in sodium chloride 0.9 % 50 mL 12.5 mg Every 4 Hours PRN 5/16/2018     Sig - Route: Infuse 12.5 mg into a venous catheter Every 4 (Four) Hours As Needed for Nausea or Vomiting. - Intravenous    propofol (DIPRIVAN) infusion 10 mg/mL 100 mL 5-50 mcg/kg/min × 105 kg Titrated 5/17/2018     Sig - Route: Infuse 525-5,250 mcg/min into a venous catheter Dose Adjusted By Provider As Needed. - Intravenous    sodium chloride 0.9 % bolus 1,000 mL 1,000 mL Once 5/16/2018     Sig - Route: Infuse 1,000 mL into a venous catheter 1 (One) Time. - Intravenous    sodium chloride 0.9 % flush 1-10 mL 1-10 mL As Needed 5/16/2018     Sig - Route: Infuse 1-10 mL into a venous catheter As Needed for Line Care. - Intravenous    Cosign for Ordering: Accepted by Evert Brown DO on 5/17/2018  8:54 AM    sodium " "chloride 0.9 % flush 1-10 mL 1-10 mL As Needed 5/16/2018     Sig - Route: Infuse 1-10 mL into a venous catheter As Needed for Line Care. - Intravenous    sodium chloride 0.9 % flush 10 mL 10 mL As Needed 5/16/2018     Sig - Route: Infuse 10 mL into a venous catheter As Needed for Line Care. - Intravenous    sodium chloride 0.9 % flush 10 mL 10 mL Every 12 Hours Scheduled 5/17/2018     Sig - Route: 10 mL by Intracatheter route Every 12 (Twelve) Hours. - Intracatheter    sodium chloride 0.9 % flush 10 mL 10 mL As Needed 5/17/2018     Sig - Route: 10 mL by Intracatheter route As Needed for Line Care (After Medication Administration or Blood Draw). - Intracatheter    sodium chloride 0.9 % flush 10 mL 10 mL As Needed 5/17/2018     Sig - Route: 10 mL by Intracatheter route As Needed for Line Care (After Medication Administration or Blood Draw). - Intracatheter    sodium chloride 0.9 % flush 10 mL 10 mL As Needed 5/17/2018     Sig - Route: 10 mL by Intracatheter route As Needed for Line Care (After Medication Administration or Blood Draw). - Intracatheter    sodium chloride 0.9 % flush 10 mL 10 mL As Needed 5/17/2018     Sig - Route: 10 mL by Intracatheter route As Needed for Line Care (After Medication Administration or Blood Draw). - Intracatheter    sodium chloride 0.9 % flush 10 mL 10 mL As Needed 5/17/2018     Sig - Route: 10 mL by Intracatheter route As Needed for Line Care (After Medication Administration or Blood Draw). - Intracatheter    sodium chloride 0.9 % infusion 100 mL/hr Continuous 5/17/2018     Sig - Route: Infuse 100 mL/hr into a venous catheter Continuous. - Intravenous    sodium phosphates 15 mmol in sodium chloride 0.9 % 250 mL IVPB 15 mmol As Needed 5/17/2018     Sig - Route: Infuse 15 mmol into a venous catheter As Needed (Peripheral IV - Phosphorus 1.8 - 2.5 & Potassium Greater Than 4). - Intravenous    Linked Group 3:  \"Or\" Linked Group Details        sodium phosphates 30 mmol in sodium chloride " "0.9 % 250 mL IVPB 30 mmol As Needed 5/17/2018     Sig - Route: Infuse 30 mmol into a venous catheter As Needed (Peripheral IV - Phosphorus 1.3-1.7 & Potassium Greater Than 4). - Intravenous    Linked Group 3:  \"Or\" Linked Group Details        sodium phosphates 45 mmol in sodium chloride 0.9 % 500 mL IVPB 45 mmol As Needed 5/17/2018     Sig - Route: Infuse 45 mmol into a venous catheter As Needed (Peripheral IV - Phosphorus Less Than 1.3 & Potassium Greater Than 4). - Intravenous    Linked Group 3:  \"Or\" Linked Group Details        vancomycin (VANCOCIN) 1,250 mg in sodium chloride 0.9 % 250 mL IVPB (Discontinued) 1,250 mg Every 18 Hours 5/17/2018 5/17/2018    Sig - Route: Infuse 1,250 mg into a venous catheter Every 18 (Eighteen) Hours. - Intravenous            Cultures:    Blood Culture   Date Value Ref Range Status   05/16/2018 No growth at 2 days  Preliminary   05/16/2018 No growth at 2 days  Preliminary           Assessment/Plan     ASSESSMENT:    1.  Septic shock on Levophed  2.  Peritonitis     PLAN:    Patient was extubated yesterday and continues to be drowsy.  Low-grade fever overnight with worsening leukocytosis and CRP level up to 26.89 from 22.97.  Wife at bedside in case discussed with her.  Worsening labs could be related to recent surgery and we will recheck CRP level morning and continue to follow culture results closely and adjust antibiotic therapy appropriately.     We will follow-up on culture from abdominal wall showing growth of gram-negative bacilli and would recommend to continue Zosyn for now.  Vancomycin was discontinued as risk outweighs benefit.     Current Antimicrobials:    Zosyn 3.375 g IV every 8 hours initiated on 5/16/2018      Patient's findings and recommendations were discussed with family and nursing staff    Code Status: Full Code    Kerri Fajardo PA-C  05/18/18  10:17 AM    "

## 2018-05-18 NOTE — PROGRESS NOTES
[facility]    Patient Name:  Porsha Soni  YOB: 1940  MRN: [id number]  Admit Date:  5/16/2018    Rec MVI daily due to pressure injury.      Electronically signed by:  Madison Campos RD  05/18/18 11:47 AM

## 2018-05-18 NOTE — PROGRESS NOTES
LOS: 2 days   Patient Care Team:  SIMON Reynolds as PCP - General  SIMON Reynolds as PCP - Family Medicine  Dwayne Swartz MD as PCP - Claims Attributed    Post op day # 2, status post open cholecystectomy for perforated GB    Subjective     Interval History:  Patient extubated yesterday. Sleepy but arousable.  Follows commands but very lethargic.  Still on low dose levophed.    History taken nursing, wife.      Objective     Vital Signs  Temp:  [97.8 °F (36.6 °C)-99.5 °F (37.5 °C)] 98 °F (36.7 °C)  Heart Rate:  [] 80  Resp:  [16-21] 16  BP: ()/(46-65) 107/62    Physical Exam:  This is a well-developed well-nourished  white male in no acute distress  HEENT examination: Sclera are anicteric  Lungs: Clear  Abdomen: Abdomen is obese and distended.  bowel sounds absent  Skin/incisions: Surgical dressing dry.  MISHEL with serosanguineous fluid, no bile     Results Review:      Results from last 7 days  Lab Units 05/17/18  0909 05/17/18  0345 05/16/18  2132   CK TOTAL U/L 74  74 70 65   CKMB ng/mL 1.52 1.93 1.92   CK MB INDEX % 2.1 2.8 3.0   TROPONIN I ng/mL 0.025 0.034 0.026       Results from last 7 days  Lab Units 05/18/18  0104 05/17/18  0909 05/17/18  0345 05/16/18  2309 05/16/18  1218  05/16/18  0757   CRP mg/dL 26.89*  --  22.97*  --   --   --   --    LACTATE mmol/L  --  1.3  --  2.4* 3.0*  < >  --    WBC 10*3/mm3 19.50*  --  17.91*  --   --   --  26.67*   HEMOGLOBIN g/dL 10.4*  --  12.0*  --   --   --  14.4   HEMATOCRIT % 32.4*  --  35.4*  --   --   --  42.9   PLATELETS 10*3/mm3 430*  --  459*  --   --   --  567*   INR   --   --  1.73*  --   --   --  1.38*   < > = values in this interval not displayed.    Results from last 7 days  Lab Units 05/18/18  0451   PH, ARTERIAL pH units 7.371   PO2 ART mm Hg 81.9   PCO2, ARTERIAL mm Hg 39.6   HCO3 ART mmol/L 22.4       Results from last 7 days  Lab Units 05/18/18  0104 05/17/18  0345 05/16/18  0757   SODIUM mmol/L 133* 133* 132*    POTASSIUM mmol/L 4.4 4.6 4.6   MAGNESIUM mg/dL 2.8* 1.6*  --    CHLORIDE mmol/L 106 104 91*   CO2 mmol/L 24.4 22.6* 25.2   BUN mg/dL 29* 21 18   CREATININE mg/dL 1.11 1.21 1.32*   EGFR IF NONAFRICN AM mL/min/1.73 64 58* 52*   CALCIUM mg/dL 7.7 7.5* 9.1   GLUCOSE mg/dL 104 149* 156*   ALBUMIN g/dL 2.40* 2.20* 3.60   BILIRUBIN mg/dL 0.7 1.6 4.0*   ALK PHOS U/L 130* 185* 458*   AST (SGOT) U/L 28 54* 225*   ALT (SGPT) U/L 82* 127* 285*   Estimated Creatinine Clearance: 66.6 mL/min (by C-G formula based on SCr of 1.11 mg/dL).  No results found for: AMMONIA      Blood Culture   Date Value Ref Range Status   05/16/2018 No growth at 24 hours  Preliminary   05/16/2018 No growth at 24 hours  Preliminary                Imaging:  Imaging Results (last 24 hours)     Procedure Component Value Units Date/Time    XR Chest AP [[id number]] Collected:  05/18/18 0814     Updated:  05/18/18 0817    Narrative:       EXAMINATION: XR CHEST AP-      CLINICAL INDICATION:     Respiratory failure, pneumonia; A41.9-Sepsis,  unspecified organism; K85.90-Acute pancreatitis without necrosis or  infection, unspecified; D72.829-Elevated white blood cell count,  unspecified; K81.9-Cholecystitis, unspecified     TECHNIQUE:  XR CHEST AP-      COMPARISON: 05/16/2018      FINDINGS:   Left subclavian deep line with tip in the region of cavoatrial junction.  Left lower lobe airspace disease stable. Right perihilar airspace  disease also stable.   Cardiomegaly with left pleural effusion again noted.   No pneumothorax.   No right effusion.   No acute osseous findings.  ET tube has been removed.       Impression:       Interval removal of ET tube. Otherwise stable chest.     This report was finalized on 5/18/2018 8:14 AM by Dr. Adebayo Dodd MD.              Impression:  Status post open cholecystectomy for perforated gallbladder with septic shock - ecoli  LFT's improving but CRP and WBC bumped a little today  Lethargic - not clear why patient is so  lethargic but surgically appears stable.    Plan:  Continue current support  Hold Plavix  CT head today    Errors end dictation may reflect use of voice recognition software and not all errors in transcription may have been detected prior to signing.  Rosie Montalvo MD  05/18/18  4:07 PM

## 2018-05-19 ENCOUNTER — ANCILLARY PROCEDURE (OUTPATIENT)
Dept: SPEECH THERAPY | Facility: HOSPITAL | Age: 78
End: 2018-05-19
Attending: INTERNAL MEDICINE

## 2018-05-19 ENCOUNTER — APPOINTMENT (OUTPATIENT)
Dept: GENERAL RADIOLOGY | Facility: HOSPITAL | Age: 78
End: 2018-05-19

## 2018-05-19 LAB
A-A DO2: 144.7 MMHG (ref 0–300)
ALBUMIN SERPL-MCNC: 2.6 G/DL (ref 3.4–4.8)
ALBUMIN/GLOB SERPL: 1.2 G/DL (ref 1.5–2.5)
ALP SERPL-CCNC: 113 U/L (ref 40–129)
ALT SERPL W P-5'-P-CCNC: 59 U/L (ref 10–44)
ANION GAP SERPL CALCULATED.3IONS-SCNC: 4.4 MMOL/L (ref 3.6–11.2)
ARTERIAL PATENCY WRIST A: POSITIVE
AST SERPL-CCNC: 22 U/L (ref 10–34)
ATMOSPHERIC PRESS: 725 MMHG
BACTERIA SPEC AEROBE CULT: NO GROWTH
BASE EXCESS BLDA CALC-SCNC: -3.6 MMOL/L
BASOPHILS # BLD AUTO: 0.02 10*3/MM3 (ref 0–0.3)
BASOPHILS NFR BLD AUTO: 0.1 % (ref 0–2)
BDY SITE: ABNORMAL
BILIRUB SERPL-MCNC: 0.6 MG/DL (ref 0.2–1.8)
BODY TEMPERATURE: 98.9 C
BUN BLD-MCNC: 30 MG/DL (ref 7–21)
BUN/CREAT SERPL: 33 (ref 7–25)
CALCIUM SPEC-SCNC: 7.6 MG/DL (ref 7.7–10)
CHLORIDE SERPL-SCNC: 111 MMOL/L (ref 99–112)
CO2 SERPL-SCNC: 22.6 MMOL/L (ref 24.3–31.9)
COHGB MFR BLD: 0.9 % (ref 0–5)
CREAT BLD-MCNC: 0.91 MG/DL (ref 0.43–1.29)
CRP SERPL-MCNC: 19.59 MG/DL (ref 0–0.99)
DEPRECATED RDW RBC AUTO: 51.2 FL (ref 37–54)
EOSINOPHIL # BLD AUTO: 0 10*3/MM3 (ref 0–0.7)
EOSINOPHIL NFR BLD AUTO: 0 % (ref 0–7)
ERYTHROCYTE [DISTWIDTH] IN BLOOD BY AUTOMATED COUNT: 14.3 % (ref 11.5–14.5)
GAS FLOW AIRWAY: 45 LPM
GFR SERPL CREATININE-BSD FRML MDRD: 81 ML/MIN/1.73
GLOBULIN UR ELPH-MCNC: 2.2 GM/DL
GLUCOSE BLD-MCNC: 93 MG/DL (ref 70–110)
HCO3 BLDA-SCNC: 22.2 MMOL/L (ref 22–26)
HCT VFR BLD AUTO: 32.6 % (ref 42–52)
HCT VFR BLD CALC: 34 % (ref 42–52)
HGB BLD-MCNC: 10.4 G/DL (ref 14–18)
HGB BLDA-MCNC: 11.4 G/DL (ref 12–16)
HOROWITZ INDEX BLD+IHG-RTO: 40 %
IMM GRANULOCYTES # BLD: 0.2 10*3/MM3 (ref 0–0.03)
IMM GRANULOCYTES NFR BLD: 0.8 % (ref 0–0.5)
LYMPHOCYTES # BLD AUTO: 1.6 10*3/MM3 (ref 1–3)
LYMPHOCYTES NFR BLD AUTO: 6.4 % (ref 16–46)
MAGNESIUM SERPL-MCNC: 2.8 MG/DL (ref 1.7–2.6)
MCH RBC QN AUTO: 32.1 PG (ref 27–33)
MCHC RBC AUTO-ENTMCNC: 31.9 G/DL (ref 33–37)
MCV RBC AUTO: 100.6 FL (ref 80–94)
METHGB BLD QL: 0.3 % (ref 0–3)
MODALITY: ABNORMAL
MONOCYTES # BLD AUTO: 1.58 10*3/MM3 (ref 0.1–0.9)
MONOCYTES NFR BLD AUTO: 6.3 % (ref 0–12)
NEUTROPHILS # BLD AUTO: 21.51 10*3/MM3 (ref 1.4–6.5)
NEUTROPHILS NFR BLD AUTO: 86.4 % (ref 40–75)
OSMOLALITY SERPL CALC.SUM OF ELEC: 281.6 MOSM/KG (ref 273–305)
OXYHGB MFR BLDV: 93.2 % (ref 85–100)
PCO2 BLDA: 43.3 MM HG (ref 35–45)
PH BLDA: 7.33 PH UNITS (ref 7.35–7.45)
PHOSPHATE SERPL-MCNC: 2.9 MG/DL (ref 2.7–4.5)
PLATELET # BLD AUTO: 559 10*3/MM3 (ref 130–400)
PMV BLD AUTO: 8.1 FL (ref 6–10)
PO2 BLDA: 75.1 MM HG (ref 80–100)
POTASSIUM BLD-SCNC: 4.4 MMOL/L (ref 3.5–5.3)
PROT SERPL-MCNC: 4.8 G/DL (ref 6–8)
RBC # BLD AUTO: 3.24 10*6/MM3 (ref 4.7–6.1)
SAO2 % BLDCOA: 94.3 % (ref 90–100)
SODIUM BLD-SCNC: 138 MMOL/L (ref 135–153)
WBC NRBC COR # BLD: 24.91 10*3/MM3 (ref 4.5–12.5)

## 2018-05-19 PROCEDURE — 83735 ASSAY OF MAGNESIUM: CPT | Performed by: INTERNAL MEDICINE

## 2018-05-19 PROCEDURE — 84100 ASSAY OF PHOSPHORUS: CPT | Performed by: INTERNAL MEDICINE

## 2018-05-19 PROCEDURE — G8996 SWALLOW CURRENT STATUS: HCPCS

## 2018-05-19 PROCEDURE — 94799 UNLISTED PULMONARY SVC/PX: CPT

## 2018-05-19 PROCEDURE — 82375 ASSAY CARBOXYHB QUANT: CPT | Performed by: INTERNAL MEDICINE

## 2018-05-19 PROCEDURE — 99233 SBSQ HOSP IP/OBS HIGH 50: CPT | Performed by: INTERNAL MEDICINE

## 2018-05-19 PROCEDURE — 80053 COMPREHEN METABOLIC PANEL: CPT | Performed by: INTERNAL MEDICINE

## 2018-05-19 PROCEDURE — 83050 HGB METHEMOGLOBIN QUAN: CPT | Performed by: INTERNAL MEDICINE

## 2018-05-19 PROCEDURE — 82805 BLOOD GASES W/O2 SATURATION: CPT | Performed by: INTERNAL MEDICINE

## 2018-05-19 PROCEDURE — G8998 SWALLOW D/C STATUS: HCPCS

## 2018-05-19 PROCEDURE — 85025 COMPLETE CBC W/AUTO DIFF WBC: CPT | Performed by: INTERNAL MEDICINE

## 2018-05-19 PROCEDURE — 71045 X-RAY EXAM CHEST 1 VIEW: CPT

## 2018-05-19 PROCEDURE — G8997 SWALLOW GOAL STATUS: HCPCS

## 2018-05-19 PROCEDURE — 86140 C-REACTIVE PROTEIN: CPT | Performed by: PHYSICIAN ASSISTANT

## 2018-05-19 PROCEDURE — 71045 X-RAY EXAM CHEST 1 VIEW: CPT | Performed by: RADIOLOGY

## 2018-05-19 PROCEDURE — 25010000002 CEFTRIAXONE: Performed by: PHYSICIAN ASSISTANT

## 2018-05-19 PROCEDURE — 92612 ENDOSCOPY SWALLOW (FEES) VID: CPT

## 2018-05-19 PROCEDURE — 25010000002 PIPERACILLIN-TAZOBACTAM: Performed by: PHYSICIAN ASSISTANT

## 2018-05-19 PROCEDURE — 36600 WITHDRAWAL OF ARTERIAL BLOOD: CPT | Performed by: INTERNAL MEDICINE

## 2018-05-19 PROCEDURE — 25010000002 HEPARIN (PORCINE) PER 1000 UNITS: Performed by: SURGERY

## 2018-05-19 PROCEDURE — 99024 POSTOP FOLLOW-UP VISIT: CPT | Performed by: SURGERY

## 2018-05-19 RX ADMIN — SODIUM CHLORIDE 100 ML/HR: 9 INJECTION, SOLUTION INTRAVENOUS at 18:11

## 2018-05-19 RX ADMIN — IPRATROPIUM BROMIDE 0.5 MG: 0.5 SOLUTION RESPIRATORY (INHALATION) at 06:50

## 2018-05-19 RX ADMIN — SODIUM CHLORIDE 100 ML/HR: 9 INJECTION, SOLUTION INTRAVENOUS at 08:48

## 2018-05-19 RX ADMIN — HEPARIN SODIUM 5000 UNITS: 5000 INJECTION, SOLUTION INTRAVENOUS; SUBCUTANEOUS at 20:41

## 2018-05-19 RX ADMIN — MEMANTINE HYDROCHLORIDE 10 MG: 10 TABLET, FILM COATED ORAL at 20:41

## 2018-05-19 RX ADMIN — IPRATROPIUM BROMIDE 0.5 MG: 0.5 SOLUTION RESPIRATORY (INHALATION) at 19:21

## 2018-05-19 RX ADMIN — METRONIDAZOLE 500 MG: 500 INJECTION, SOLUTION INTRAVENOUS at 15:21

## 2018-05-19 RX ADMIN — PIPERACILLIN SODIUM,TAZOBACTAM SODIUM 3.38 G: 3; .375 INJECTION, POWDER, FOR SOLUTION INTRAVENOUS at 06:45

## 2018-05-19 RX ADMIN — IPRATROPIUM BROMIDE 0.5 MG: 0.5 SOLUTION RESPIRATORY (INHALATION) at 13:55

## 2018-05-19 RX ADMIN — HEPARIN SODIUM 5000 UNITS: 5000 INJECTION, SOLUTION INTRAVENOUS; SUBCUTANEOUS at 08:47

## 2018-05-19 RX ADMIN — DONEPEZIL HYDROCHLORIDE 10 MG: 5 TABLET, FILM COATED ORAL at 20:41

## 2018-05-19 RX ADMIN — CEFTRIAXONE 2 G: 2 INJECTION, POWDER, FOR SOLUTION INTRAMUSCULAR; INTRAVENOUS at 15:21

## 2018-05-19 RX ADMIN — Medication 10 ML: at 11:05

## 2018-05-19 RX ADMIN — IPRATROPIUM BROMIDE 0.5 MG: 0.5 SOLUTION RESPIRATORY (INHALATION) at 00:15

## 2018-05-19 NOTE — PLAN OF CARE
Problem: Patient Care Overview  Goal: Plan of Care Review  Outcome: Outcome(s) achieved Date Met: 05/19/18 05/19/18 1124   Coping/Psychosocial   Plan of Care Reviewed With patient;spouse   Plan of Care Review   Progress Improving    FEES w/ no evidence of laryngeal penetration or aspiration.

## 2018-05-19 NOTE — PLAN OF CARE
Problem: Patient Care Overview  Goal: Plan of Care Review  Outcome: Ongoing (interventions implemented as appropriate)   05/19/18 0538   Coping/Psychosocial   Plan of Care Reviewed With patient;spouse   Plan of Care Review   Progress no change   OTHER   Outcome Summary Patient remains stable throughout shift. Levophed stopped during shfit and paient remains hsmydynamically stable and afebrile..        Problem: Fall Risk (Adult)  Goal: Absence of Fall  Outcome: Ongoing (interventions implemented as appropriate)      Problem: Wound (Includes Pressure Injury) (Adult)  Goal: Signs and Symptoms of Listed Potential Problems Will be Absent, Minimized or Managed (Wound)  Outcome: Ongoing (interventions implemented as appropriate)      Problem: Breathing Pattern Ineffective (Adult)  Goal: Identify Related Risk Factors and Signs and Symptoms  Outcome: Ongoing (interventions implemented as appropriate)    Goal: Anxiety/Fear Reduction  Outcome: Ongoing (interventions implemented as appropriate)      Problem: Sepsis/Septic Shock (Adult)  Goal: Signs and Symptoms of Listed Potential Problems Will be Absent, Minimized or Managed (Sepsis/Septic Shock)  Outcome: Ongoing (interventions implemented as appropriate)      Problem: Skin Injury Risk (Adult)  Goal: Identify Related Risk Factors and Signs and Symptoms  Outcome: Ongoing (interventions implemented as appropriate)    Goal: Skin Health and Integrity  Outcome: Ongoing (interventions implemented as appropriate)      Problem: Cholecystectomy (Adult)  Goal: Signs and Symptoms of Listed Potential Problems Will be Absent, Minimized or Managed (Cholecystectomy)  Outcome: Ongoing (interventions implemented as appropriate)

## 2018-05-19 NOTE — PROGRESS NOTES
"  I have personally seen and examined the patient today and discussed overnight interval progress and pertinent issues with nursing staff.    Subjective:    Culture finalized as pansusceptible Escherichia coli showing improvement with persistent leukocytosis.   More awake today off of pressors.     History taken from: chart RN      Vital Signs    /91   Pulse 86   Temp 99.5 °F (37.5 °C) (Oral)   Resp 22   Ht 177.8 cm (70\")   Wt 105 kg (230 lb 14.4 oz)   SpO2 97%   BMI 33.13 kg/m²     Temp:  [97.9 °F (36.6 °C)-99.5 °F (37.5 °C)] 99.5 °F (37.5 °C)      Intake/Output Summary (Last 24 hours) at 05/19/18 1343  Last data filed at 05/19/18 0848   Gross per 24 hour   Intake          3222.81 ml   Output              905 ml   Net          2317.81 ml     Intake & Output (last 3 days)       05/16 0701 - 05/17 0700 05/17 0701 - 05/18 0700 05/18 0701 - 05/19 0700 05/19 0701 - 05/20 0700    I.V. (mL/kg) 2567 (24.4) 1995.6 (19) 2301.2 (21.9) 1053.3 (10)    IV Piggyback 5930 300 300     Total Intake(mL/kg) 8497 (80.9) 2295.6 (21.9) 2601.2 (24.8) 1053.3 (10)    Urine (mL/kg/hr) 910 585 (0.2) 850 (0.3)     Drains 255 65 (0) 55 (0)     Stool   0 (0)     Blood 200       Total Output 1365 650 905      Net +7132 +1645.6 +1696.2 +1053.3            Unmeasured Stool Occurrence   2 x           Physical Exam:                 General Appearance:   More awake, high flow oxygen, comfortable wife at bedside    Head:    Normocephalic, without obvious abnormality, atraumatic   Eyes:            Lids and lashes normal, conjunctivae and sclerae normal, no   icterus, no pallor, corneas clear, PERRLA   Ears:    Ears appear intact with no abnormalities noted   Throat:   No oral lesions, no thrush, oral mucosa moist   Neck:   No adenopathy, supple, trachea midline, no thyromegaly, no   carotid bruit, no JVD   Back:     No tenderness to percussion or palpation, range of motion   normal   Lungs:     Clear to auscultation,respirations regular, " even and unlabored. No wheezing, no ronchi and no crackles.    Heart:    Regular rhythm and normal rate, normal S1 and S2, no            murmur, no gallop, no rub, no click   Chest Wall:    No abnormalities observed   Abdomen:     Distended, Normal bowel sounds, no masses, no organomegaly, soft, non-tender, no guarding, no rebound tenderness   Rectal:     Deferred   Extremities:   Moves all extremities well, no edema, no cyanosis, no             redness   Pulses:   Pulses palpable and equal bilaterally   Skin:   No bleeding, bruising or rash   Lymph nodes:   No palpable adenopathy   Neurologic:   More awake and alert         Results:      Results from last 7 days  Lab Units 05/19/18  0130 05/18/18  0104 05/17/18  0345 05/16/18  0757   WBC 10*3/mm3 24.91* 19.50* 17.91* 26.67*     Lab Results   Component Value Date    NEUTROABS 21.51 (H) 05/19/2018         Results from last 7 days  Lab Units 05/19/18  0131   CREATININE mg/dL 0.91         Results from last 7 days  Lab Units 05/19/18  0131 05/18/18  0104 05/17/18  0345   CRP mg/dL 19.59* 26.89* 22.97*       Imaging Results (last 24 hours)     Procedure Component Value Units Date/Time    Fiberoptic Endo (fees) [[id number]] Resulted:  05/19/18 1136     Updated:  05/19/18 1136    Narrative:       This procedure was auto-finalized with no dictation required.    CT Head Without Contrast [[id number]] Collected:  05/19/18 1042     Updated:  05/19/18 1045    Narrative:       EXAMINATION: CT HEAD WO CONTRAST-      CLINICAL INDICATION:     AMS; A41.9-Sepsis, unspecified organism;  K85.90-Acute pancreatitis without necrosis or infection, unspecified;  D72.829-Elevated white blood cell count, unspecified;  K81.9-Cholecystitis, unspecified     COMPARISON:    None     Technique: Multiple CT axial images were obtained through the level of  the brain without IV contrast administration. Reformatted images in the  coronal and/or sagittal plane(s) were generated from the axial data set  to  facilitate diagnostic accuracy and/or surgical planning.     Radiation dose reduction techniques were utilized per ALARA protocol.  Automated exposure control was initiated through either or [employer] or  [employer] software packages by  protocol.       DOSE (DLP mGy-cm): 2393.3 mGy.cm     FINDINGS:     Diffuse cerebral atrophy is noted. Chronic small vessel ischemic  disease.  No acute intracranial abnormality.  No midline shift or mass effect.  No hydrocephalus or intracranial hemorrhage.  There is no CT evidence of acute vascular territory infarct.  Bone windows show no acute osseous abnormality.       Impression:       Atrophy and chronic small vessel ischemic disease in part  age-related. No CT evidence of acute intracranial abnormality.     This report was finalized on 5/19/2018 10:43 AM by Dr. Adebayo Dodd MD.       XR Chest AP [[id number]] Collected:  05/19/18 1041     Updated:  05/19/18 1044    Narrative:       EXAMINATION: XR CHEST AP-      CLINICAL INDICATION:     Pneumonia; A41.9-Sepsis, unspecified organism;  K85.90-Acute pancreatitis without necrosis or infection, unspecified;  D72.829-Elevated white blood cell count, unspecified;  K81.9-Cholecystitis, unspecified     TECHNIQUE:  XR CHEST AP-      COMPARISON: 05/18/2018      FINDINGS:   Left subclavian deep line noted with tip in the distal SVC.  Left third of the right basilar airspace disease not significant change.     Cardiomegaly stable.   No pneumothorax.   Stable left effusion.   Stable appearance of the bony structures.  Right-sided skin fold is noted.       Impression:       Stable chest.     This report was finalized on 5/19/2018 10:42 AM by Dr. Adebayo Dodd MD.               Results Review:    I have personally reviewed laboratory data, culture results, radiology studies and antimicrobial therapy.    Hospital Medications (active)       Dose Frequency Start End    aspirin EC tablet 81 mg 81 mg Daily 5/16/2018     Sig - Route:  Take 1 tablet by mouth Daily. - Oral    cetirizine (zyrTEC) tablet 10 mg 10 mg Daily 5/16/2018     Sig - Route: Take 1 tablet by mouth Daily. - Oral    chlorhexidine (PERIDEX) 0.12 % solution 15 mL 15 mL Every 12 Hours Scheduled 5/17/2018     Sig - Route: Apply 15 mL to the mouth or throat Every 12 (Twelve) Hours. - Mouth/Throat    cholecalciferol (VITAMIN D3) capsule 50,000 Units 50,000 Units Weekly 5/21/2018     Sig - Route: Take 1 capsule by mouth 1 (One) Time Per Week. - Oral    dexmedetomidine HCl (PRECEDEX) 400 mcg in sodium chloride 0.9 % 100 mL (4 mcg/mL) infusion 0.2-1.5 mcg/kg/hr × 105 kg Titrated 5/18/2018     Sig - Route: Infuse .5 mcg/hr into a venous catheter Dose Adjusted By Provider As Needed. - Intravenous    docusate sodium (COLACE) capsule 100 mg 100 mg 2 Times Daily 5/16/2018     Sig - Route: Take 1 capsule by mouth 2 (Two) Times a Day. - Oral    donepezil (ARICEPT) tablet 10 mg 10 mg Nightly 5/16/2018     Sig - Route: Take 2 tablets by mouth Every Night. - Oral    FENTANYL PCA 1500 MCG/30 ML (BHCOR) PCA  Titrated 5/17/2018 5/27/2018    Sig - Route: Infuse  into a venous catheter Dose Adjusted By Provider As Needed. - Intravenous    finasteride (PROSCAR) tablet 5 mg 5 mg Daily 5/16/2018     Sig - Route: Take 1 tablet by mouth Daily. - Oral    heparin (porcine) 5000 UNIT/ML injection 5,000 Units 5,000 Units Every 12 Hours Scheduled 5/17/2018     Sig - Route: Inject 1 mL under the skin Every 12 (Twelve) Hours. - Subcutaneous    HYDROcodone-acetaminophen (NORCO) 7.5-325 MG per tablet 1 tablet 1 tablet Every 4 Hours PRN 5/16/2018 5/26/2018    Sig - Route: Take 1 tablet by mouth Every 4 (Four) Hours As Needed for Moderate Pain . - Oral    ipratropium (ATROVENT) nebulizer solution 0.5 mg 0.5 mg Every 6 Hours - RT 5/17/2018     Sig - Route: Take 2.5 mL by nebulization Every 6 (Six) Hours. - Nebulization    lansoprazole (FIRST) oral suspension 30 mg 30 mg Every Early Morning 5/17/2018     Sig -  "Route: 10 mL by Per G Tube route Every Morning. - Per G Tube    Magnesium Sulfate 2 gram / 50mL Infusion (GIVE X 3 BAGS TO EQUAL 6GM TOTAL DOSE) - Mg 1.1 - 1/5 mg/dl 2 g As Needed 5/17/2018     Sig - Route: Infuse 50 mL into a venous catheter As Needed (See Administration Instructions). - Intravenous    Linked Group 1:  \"Or\" Linked Group Details        Magnesium Sulfate 2 gram Bolus, followed by 8 gram infusion (total Mg dose 10 grams)- Mg less than or equal to 1mg/dL 2 g As Needed 5/17/2018     Sig - Route: Infuse 50 mL into a venous catheter As Needed (See Administration Instructions). - Intravenous    Linked Group 1:  \"Or\" Linked Group Details        Magnesium Sulfate 4 gram infusion- Mg 1.6-1.9 mg/dL 4 g As Needed 5/17/2018     Sig - Route: Infuse 100 mL into a venous catheter As Needed (See Administration Instructions). - Intravenous    Linked Group 1:  \"Or\" Linked Group Details        Magnesium Sulfate 4 gram infusion- Mg 1.6-1.9 mg/dL 4 g Once 5/17/2018 5/17/2018    Sig - Route: Infuse 100 mL into a venous catheter 1 (One) Time. - Intravenous    memantine (NAMENDA) tablet 10 mg 10 mg Every 12 Hours Scheduled 5/16/2018     Sig - Route: Take 1 tablet by mouth Every 12 (Twelve) Hours. - Oral    morphine injection 4 mg 4 mg Every 3 Hours PRN 5/16/2018     Sig - Route: Infuse 1 mL into a venous catheter Every 3 (Three) Hours As Needed for Severe Pain . - Intravenous    norepinephrine (LEVOPHED) 8,000 mcg in sodium chloride 0.9 % 250 mL (32 mcg/mL) infusion 0.02-0.3 mcg/kg/min × 95.3 kg Titrated 5/16/2018     Sig - Route: Infuse 1.906-28.59 mcg/min into a venous catheter Dose Adjusted By Provider As Needed. - Intravenous    ondansetron (ZOFRAN) injection 4 mg 4 mg Every 4 Hours PRN 5/16/2018     Sig - Route: Infuse 2 mL into a venous catheter Every 4 (Four) Hours As Needed for Nausea or Vomiting. - Intravenous    piperacillin-tazobactam (ZOSYN) 3.375 g/100 mL 0.9% NS IVPB (mbp) 3.375 g Every 8 Hours 5/16/2018 " "5/30/2018    Sig - Route: Infuse 100 mL into a venous catheter Every 8 (Eight) Hours. - Intravenous    polyethylene glycol (MIRALAX) powder 17 g 17 g 2 Times Daily 5/16/2018 5/20/2018    Sig - Route: Take 17 g by mouth 2 (Two) Times a Day. - Oral    potassium chloride (KLOR-CON) packet 40 mEq 40 mEq As Needed 5/17/2018     Sig - Route: Take 40 mEq by mouth As Needed (potassium replacement, see admin instructions). - Oral    Linked Group 2:  \"Or\" Linked Group Details        potassium chloride (MICRO-K) CR capsule 40 mEq 40 mEq As Needed 5/17/2018     Sig - Route: Take 4 capsules by mouth As Needed (potassium replacement.  see admin instructions). - Oral    Linked Group 2:  \"Or\" Linked Group Details        potassium chloride 10 mEq in 100 mL IVPB 10 mEq Every 1 Hour PRN 5/17/2018     Sig - Route: Infuse 100 mL into a venous catheter Every 1 (One) Hour As Needed (potassium protocol PERIPHERAL - see admin instructions). - Intravenous    Linked Group 2:  \"Or\" Linked Group Details        potassium phosphate 15 mmol in sodium chloride 0.9 % 100 mL infusion 15 mmol As Needed 5/17/2018     Sig - Route: Infuse 15 mmol into a venous catheter As Needed (Peripheral IV - Phosphorus 1.8 - 2.5). - Intravenous    Linked Group 3:  \"Or\" Linked Group Details        potassium phosphate 30 mmol in sodium chloride 0.9 % 250 mL infusion 30 mmol As Needed 5/17/2018     Sig - Route: Infuse 30 mmol into a venous catheter As Needed (Peripheral IV - Phosphorus 1.3 - 1.7). - Intravenous    Linked Group 3:  \"Or\" Linked Group Details        potassium phosphate 45 mmol in sodium chloride 0.9 % 500 mL infusion 45 mmol As Needed 5/17/2018     Sig - Route: Infuse 45 mmol into a venous catheter As Needed (Peripheral IV - Phosphorus Less Than 1.3). - Intravenous    Linked Group 3:  \"Or\" Linked Group Details        promethazine (PHENERGAN) 12.5 mg in sodium chloride 0.9 % 50 mL 12.5 mg Every 4 Hours PRN 5/16/2018     Sig - Route: Infuse 12.5 mg into a " venous catheter Every 4 (Four) Hours As Needed for Nausea or Vomiting. - Intravenous    propofol (DIPRIVAN) infusion 10 mg/mL 100 mL 5-50 mcg/kg/min × 105 kg Titrated 5/17/2018     Sig - Route: Infuse 525-5,250 mcg/min into a venous catheter Dose Adjusted By Provider As Needed. - Intravenous    sodium chloride 0.9 % bolus 1,000 mL 1,000 mL Once 5/16/2018     Sig - Route: Infuse 1,000 mL into a venous catheter 1 (One) Time. - Intravenous    sodium chloride 0.9 % flush 1-10 mL 1-10 mL As Needed 5/16/2018     Sig - Route: Infuse 1-10 mL into a venous catheter As Needed for Line Care. - Intravenous    Cosign for Ordering: Accepted by Evert Brown DO on 5/17/2018  8:54 AM    sodium chloride 0.9 % flush 1-10 mL 1-10 mL As Needed 5/16/2018     Sig - Route: Infuse 1-10 mL into a venous catheter As Needed for Line Care. - Intravenous    sodium chloride 0.9 % flush 10 mL 10 mL As Needed 5/16/2018     Sig - Route: Infuse 10 mL into a venous catheter As Needed for Line Care. - Intravenous    sodium chloride 0.9 % flush 10 mL 10 mL Every 12 Hours Scheduled 5/17/2018     Sig - Route: 10 mL by Intracatheter route Every 12 (Twelve) Hours. - Intracatheter    sodium chloride 0.9 % flush 10 mL 10 mL As Needed 5/17/2018     Sig - Route: 10 mL by Intracatheter route As Needed for Line Care (After Medication Administration or Blood Draw). - Intracatheter    sodium chloride 0.9 % flush 10 mL 10 mL As Needed 5/17/2018     Sig - Route: 10 mL by Intracatheter route As Needed for Line Care (After Medication Administration or Blood Draw). - Intracatheter    sodium chloride 0.9 % flush 10 mL 10 mL As Needed 5/17/2018     Sig - Route: 10 mL by Intracatheter route As Needed for Line Care (After Medication Administration or Blood Draw). - Intracatheter    sodium chloride 0.9 % flush 10 mL 10 mL As Needed 5/17/2018     Sig - Route: 10 mL by Intracatheter route As Needed for Line Care (After Medication Administration or Blood Draw). -  "Intracatheter    sodium chloride 0.9 % flush 10 mL 10 mL As Needed 5/17/2018     Sig - Route: 10 mL by Intracatheter route As Needed for Line Care (After Medication Administration or Blood Draw). - Intracatheter    sodium chloride 0.9 % infusion 100 mL/hr Continuous 5/17/2018     Sig - Route: Infuse 100 mL/hr into a venous catheter Continuous. - Intravenous    sodium phosphates 15 mmol in sodium chloride 0.9 % 250 mL IVPB 15 mmol As Needed 5/17/2018     Sig - Route: Infuse 15 mmol into a venous catheter As Needed (Peripheral IV - Phosphorus 1.8 - 2.5 & Potassium Greater Than 4). - Intravenous    Linked Group 3:  \"Or\" Linked Group Details        sodium phosphates 30 mmol in sodium chloride 0.9 % 250 mL IVPB 30 mmol As Needed 5/17/2018     Sig - Route: Infuse 30 mmol into a venous catheter As Needed (Peripheral IV - Phosphorus 1.3-1.7 & Potassium Greater Than 4). - Intravenous    Linked Group 3:  \"Or\" Linked Group Details        sodium phosphates 45 mmol in sodium chloride 0.9 % 500 mL IVPB 45 mmol As Needed 5/17/2018     Sig - Route: Infuse 45 mmol into a venous catheter As Needed (Peripheral IV - Phosphorus Less Than 1.3 & Potassium Greater Than 4). - Intravenous    Linked Group 3:  \"Or\" Linked Group Details        vancomycin (VANCOCIN) 1,250 mg in sodium chloride 0.9 % 250 mL IVPB (Discontinued) 1,250 mg Every 18 Hours 5/17/2018 5/17/2018    Sig - Route: Infuse 1,250 mg into a venous catheter Every 18 (Eighteen) Hours. - Intravenous            Cultures:    Blood Culture   Date Value Ref Range Status   05/16/2018 No growth at 2 days  Preliminary   05/16/2018 No growth at 2 days  Preliminary           Assessment/Plan     ASSESSMENT:    1.  Septic shock on Levophed  2.  Peritonitis     PLAN:    Culture finalized as pansusceptible Escherichia coli showing improvement with persistent leukocytosis.   More awake today off of pressors. In the setting of pansusceptible E coli And clinical improvement as well as improved CRP " level would recommend to change antibiotic coverage to high dose Rocephin 2 g IV every 24 hours and Flagyl 500 g iv every 8 hours      Current Antimicrobials:    Rocephin 2 g IV every 24 hours and Flagyl 500 g iv every 8 hours      Patient's findings and recommendations were discussed with family and nursing staff    Code Status: Full Code    Kerri Fajardo PA-C  05/19/18  1:43 PM

## 2018-05-19 NOTE — PROGRESS NOTES
CC: acute cholecystitis    Patient mental status improving.  On high flow nasal cannula.  Bowel movement overnight.  MISHEL serosanguinous.    AFVSS  NAD,AOx3  RRR  CTAB diminished bases  S/appropriately tender/MISHEL serosanguinous/incision c/d/i    CT head negative    Lab Results   Component Value Date    GLUCOSE 93 05/19/2018    BUN 30 (H) 05/19/2018    CREATININE 0.91 05/19/2018    EGFRIFNONA 81 05/19/2018    BCR 33.0 (H) 05/19/2018    K 4.4 05/19/2018    CO2 22.6 (L) 05/19/2018    CALCIUM 7.6 (L) 05/19/2018    ALBUMIN 2.60 (L) 05/19/2018    LABIL2 1.2 (L) 05/19/2018    AST 22 05/19/2018    ALT 59 (H) 05/19/2018     WBC   Date Value Ref Range Status   05/19/2018 24.91 (H) 4.50 - 12.50 10*3/mm3 Final     RBC   Date Value Ref Range Status   05/19/2018 3.24 (L) 4.70 - 6.10 10*6/mm3 Final     Hemoglobin   Date Value Ref Range Status   05/19/2018 10.4 (L) 14.0 - 18.0 g/dL Final     Hematocrit   Date Value Ref Range Status   05/19/2018 32.6 (L) 42.0 - 52.0 % Final     MCV   Date Value Ref Range Status   05/19/2018 100.6 (H) 80.0 - 94.0 fL Final     MCH   Date Value Ref Range Status   05/19/2018 32.1 27.0 - 33.0 pg Final     MCHC   Date Value Ref Range Status   05/19/2018 31.9 (L) 33.0 - 37.0 g/dL Final     RDW   Date Value Ref Range Status   05/19/2018 14.3 11.5 - 14.5 % Final     RDW-SD   Date Value Ref Range Status   05/19/2018 51.2 37.0 - 54.0 fl Final     MPV   Date Value Ref Range Status   05/19/2018 8.1 6.0 - 10.0 fL Final     Platelets   Date Value Ref Range Status   05/19/2018 559 (H) 130 - 400 10*3/mm3 Final     Neutrophil %   Date Value Ref Range Status   05/19/2018 86.4 (H) 40.0 - 75.0 % Final     Lymphocyte %   Date Value Ref Range Status   05/19/2018 6.4 (L) 16.0 - 46.0 % Final     Monocyte %   Date Value Ref Range Status   05/19/2018 6.3 0.0 - 12.0 % Final     Eosinophil %   Date Value Ref Range Status   05/19/2018 0.0 0.0 - 7.0 % Final     Basophil %   Date Value Ref Range Status   05/19/2018 0.1 0.0 - 2.0 %  Final     Immature Grans %   Date Value Ref Range Status   05/19/2018 0.8 (H) 0.0 - 0.5 % Final     Neutrophils, Absolute   Date Value Ref Range Status   05/19/2018 21.51 (H) 1.40 - 6.50 10*3/mm3 Final     Lymphocytes, Absolute   Date Value Ref Range Status   05/19/2018 1.60 1.00 - 3.00 10*3/mm3 Final     Monocytes, Absolute   Date Value Ref Range Status   05/19/2018 1.58 (H) 0.10 - 0.90 10*3/mm3 Final     Eosinophils, Absolute   Date Value Ref Range Status   05/19/2018 0.00 0.00 - 0.70 10*3/mm3 Final     Basophils, Absolute   Date Value Ref Range Status   05/19/2018 0.02 0.00 - 0.30 10*3/mm3 Final     Immature Grans, Absolute   Date Value Ref Range Status   05/19/2018 0.20 (H) 0.00 - 0.03 10*3/mm3 Final     nRBC   Date Value Ref Range Status   05/18/2018 0.0 0.0 - 0.0 /100 WBC Final     76 yo M s/p open cholecystectomy.  MISHEL serosanginous.  MS improving.  BM reported.  WBC elevated but afebrile.    -continue antibiotics  -swallow evaluation then begin clear liquid if safe  -wean oxygen.

## 2018-05-19 NOTE — PROGRESS NOTES
Subjective     History:   Porsha Soni is a 77 y.o. male admitted on 5/16/2018 secondary to Cholecystitis     Procedures:   5/16/18: Open cholecystectomy with cholangiogram  5/16/18: Reintubation in the PACU  5/16/18: Left subclavian central line placement  5/17/18: Extubation    Patient seen and examined with ROQUE Robbins. Currently on HFNC. Awakens upon my arrival. Oriented to person and place today. Denies any current complaints. Denies CP, dyspnea or palpitations. Reports some flatus. RN reports BM x 2 yesterday. Intermittently required vasopressors yesterday but Levophed has been off since 2300. UOP has improved. RN reports mental status has improved over the last 24 hours.      History taken from: chart, and RN.      Objective     Vital Signs  Temp:  [97.9 °F (36.6 °C)-99.2 °F (37.3 °C)] 99.1 °F (37.3 °C)  Heart Rate:  [77-98] 87  Resp:  [8-22] 22  BP: ()/(46-77) 118/64    Intake/Output Summary (Last 24 hours) at 05/19/18 0903  Last data filed at 05/19/18 0848   Gross per 24 hour   Intake          3654.48 ml   Output              905 ml   Net          2749.48 ml         Physical Exam:  General:   Appears more awake and alert today, oriented to person and place, follows simple commands    Heart:      Normal S1 and S2. Regular rate and rhythm. No significant murmur, rubs or gallops appreciated.   Lungs:     Respirations regular, even and unlabored. Diminished bibasilar breath sounds. No wheezes, rales or rhonchi.   Abdomen:   Soft. Mild generalized TTP. No guarding, rebound tenderness or organomegaly noted. Bowel sounds present x 4. Dressing appears c/d/i. (+) MISHEL drain in RUQ.    Extremities:  No clubbing, cyanosis or edema noted. Moves UE and LE equally B/L.     Results Review:      Results from last 7 days  Lab Units 05/19/18  0130 05/18/18  0104 05/17/18  0345 05/16/18  0757   WBC 10*3/mm3 24.91* 19.50* 17.91* 26.67*   HEMOGLOBIN g/dL 10.4* 10.4* 12.0* 14.4   PLATELETS 10*3/mm3 559* 430* 459* 567*        Results from last 7 days  Lab Units 05/19/18  0131 05/18/18  0104 05/17/18  0345 05/16/18  0757   SODIUM mmol/L 138 133* 133* 132*   POTASSIUM mmol/L 4.4 4.4 4.6 4.6   CHLORIDE mmol/L 111 106 104 91*   CO2 mmol/L 22.6* 24.4 22.6* 25.2   BUN mg/dL 30* 29* 21 18   CREATININE mg/dL 0.91 1.11 1.21 1.32*   CALCIUM mg/dL 7.6* 7.7 7.5* 9.1   GLUCOSE mg/dL 93 104 149* 156*       Results from last 7 days  Lab Units 05/19/18  0131 05/18/18  0104 05/17/18  0345 05/16/18  0757   BILIRUBIN mg/dL 0.6 0.7 1.6 4.0*   ALK PHOS U/L 113 130* 185* 458*   AST (SGOT) U/L 22 28 54* 225*   ALT (SGPT) U/L 59* 82* 127* 285*       Results from last 7 days  Lab Units 05/19/18  0131 05/18/18  0104 05/17/18  0345   MAGNESIUM mg/dL 2.8* 2.8* 1.6*       Results from last 7 days  Lab Units 05/17/18  0345 05/16/18  0757   INR  1.73* 1.38*       Results from last 7 days  Lab Units 05/17/18  0909 05/17/18  0345 05/16/18  2132   CK TOTAL U/L 74  74 70 65   TROPONIN I ng/mL 0.025 0.034 0.026   CK MB INDEX % 2.1 2.8 3.0       Imaging Results (last 24 hours)     Procedure Component Value Units Date/Time    XR Chest AP [[id number]] Updated:  05/19/18 0735    CT Head Without Contrast [[id number]] Updated:  05/18/18 1743            Medications:    aspirin 81 mg Oral Daily   cetirizine 10 mg Oral Daily   chlorhexidine 15 mL Mouth/Throat Q12H   [START ON 5/21/2018] cholecalciferol 50,000 Units Oral Weekly   docusate sodium 100 mg Oral BID   donepezil 10 mg Oral Nightly   finasteride 5 mg Oral Daily   heparin (porcine) 5,000 Units Subcutaneous Q12H   ipratropium 0.5 mg Nebulization Q6H - RT   lansoprazole 30 mg Per G Tube Q AM   memantine 10 mg Oral Q12H   piperacillin-tazobactam 3.375 g Intravenous Q8H   polyethylene glycol 17 g Oral BID   sodium chloride 1,000 mL Intravenous Once   sodium chloride 10 mL Intracatheter Q12H       dexmedetomidine 0.2-1.5 mcg/kg/hr Last Rate: Stopped (05/18/18 1432)   fentaNYL (SUBLIMAZE) PCA 1500 mcg/30 mL syringe  Last  Rate: Stopped (05/17/18 0619)   norepinephrine 0.02-0.3 mcg/kg/min Last Rate: 0.02 mcg/kg/min (05/18/18 1143)   propofol 5-50 mcg/kg/min Last Rate: Stopped (05/17/18 0619)   sodium chloride 100 mL/hr Last Rate: 100 mL/hr (05/19/18 0848)           Assessment/Plan   Septic shock: Likely 2/2 perforated gallbladder with abscess and concern for pneumonia. WBC has risen today but CRP improved. Hypotension improved and now off vasopressor support. Wound culture revealing E coli. Currently on Zosyn per ID. Surgery plans to start clear liquids. Speech consulted with hopes of completing swallow eval today now that he appears more awake and alert. Repeat labs in the AM. ID input appreciated.     Acute hypoxic and hypercapnic respiratory failure: Likely 2/2 above. Remains on HFNC. ABG stable today. Cont to monitor closely.     AMS/encephalopathy: Possibly related to anesthesia during surgery and sedating meds while intubated. Pt did not show significant change with Precedex yesterday. Official CT head report pending but no acute abnormalities per [facility] report. Overall improved today. Cont supportive treatment.     TYRELL: Likely 2/2 above. Overall improved and stable this AM. UOP has improved. Cont maintenance fluids. Avoid any potentially nephrotoxic agents. Repeat labs in the AM.     Chest pain in the setting of known CAD: Likely more atypical and epigastric 2/2 above. No acute ischemic changes on EKG. Serial CE's are negative. Holding Plavix perioperatively per surgery. Monitor on telemetry.     Elevated liver enzymes: Likely 2/2 above. Improving. Repeat CMP in the AM.     Hypomagnesemia: Resolved. Cont electrolyte replacement protocols.     (+) D-dimer: CT PE protocol negative for PE. Venous duplex US of the lower extremities negative for DVT.     PPX  GI: PPI  DVT: SQ heparin     Discussed with Dr. Larsen.       Pt is at high risk 2/2 septic shock, perforated GB with abscess, pneumonia, resp failure, AMS and TYRELL.        Evert Brown DO  05/19/18  9:03 AM

## 2018-05-19 NOTE — PLAN OF CARE
Problem: Patient Care Overview  Goal: Plan of Care Review  Outcome: Ongoing (interventions implemented as appropriate)    Goal: Individualization and Mutuality  Outcome: Ongoing (interventions implemented as appropriate)    Goal: Discharge Needs Assessment  Outcome: Ongoing (interventions implemented as appropriate)    Goal: Interprofessional Rounds/Family Conf  Outcome: Ongoing (interventions implemented as appropriate)      Problem: Fall Risk (Adult)  Goal: Absence of Fall  Outcome: Ongoing (interventions implemented as appropriate)      Problem: Wound (Includes Pressure Injury) (Adult)  Goal: Signs and Symptoms of Listed Potential Problems Will be Absent, Minimized or Managed (Wound)  Outcome: Ongoing (interventions implemented as appropriate)      Problem: Breathing Pattern Ineffective (Adult)  Goal: Identify Related Risk Factors and Signs and Symptoms  Outcome: Outcome(s) achieved Date Met: 05/19/18    Goal: Anxiety/Fear Reduction  Outcome: Ongoing (interventions implemented as appropriate)      Problem: Sepsis/Septic Shock (Adult)  Goal: Signs and Symptoms of Listed Potential Problems Will be Absent, Minimized or Managed (Sepsis/Septic Shock)  Outcome: Ongoing (interventions implemented as appropriate)      Problem: Skin Injury Risk (Adult)  Goal: Identify Related Risk Factors and Signs and Symptoms  Outcome: Outcome(s) achieved Date Met: 05/19/18    Goal: Skin Health and Integrity  Outcome: Ongoing (interventions implemented as appropriate)      Problem: Cholecystectomy (Adult)  Goal: Signs and Symptoms of Listed Potential Problems Will be Absent, Minimized or Managed (Cholecystectomy)  Outcome: Ongoing (interventions implemented as appropriate)    Goal: Anesthesia/Sedation Recovery  Outcome: Ongoing (interventions implemented as appropriate)

## 2018-05-19 NOTE — PROGRESS NOTES
Acute Care - Speech Language Pathology   Swallow Initial Evaluation [facility]     Patient Name: Porsha Soni  : 1940  MRN: [id number]  Today's Date: 2018     Admit Date: 2018    F/u this am for pt status. He is seen at bedside in CCU, spouse is present. Pt is a/a, upright and centered in bed, follows simple commands and participates in conversational exchanges. D/w him risks and benefits of instrumental FEES w/ verbal agreement to participate.     Plan: FEES w/ further recs pending.     Thank  You-  Thuy Price M.A., CCC-SLP    Visit Dx:     ICD-10-CM ICD-9-CM   1. Sepsis, due to unspecified organism A41.9 038.9     995.91   2. Acute pancreatitis, unspecified complication status, unspecified pancreatitis type K85.90 577.0   3. Leukocytosis, unspecified type D72.829 288.60   4. Cholecystitis K81.9 575.10     Patient Active Problem List   Diagnosis   • Insomnia   • Asymptomatic PVCs   • CAD in native artery   • Hypertension   • Dyslipidemia   • Palpitations   • Diarrhea of presumed infectious origin   • Bloating   • Elevated prostate specific antigen (PSA)   • Cholecystitis   • Sepsis     Past Medical History:   Diagnosis Date   • Arthritis    • Cervical spine disease     remotely suggested surgical intervention.  Patient deferred.    • Cervical spine disease    • Coronary artery disease    • Dyslipidemia    • Hypertension    • Impotence    • Palpitations    • Vertigo      Past Surgical History:   Procedure Laterality Date   • ARTERIAL BYPASS SURGERY     • CHOLECYSTECTOMY WITH INTRAOPERATIVE CHOLANGIOGRAM N/A 2018    Procedure: OPEN CHOLECYSTECTOMY;  Surgeon: Rosie Montalvo MD;  Location: [facility] OR;  Service: General   • COLONOSCOPY N/A 3/30/2018    Procedure: COLONOSCOPY;  Surgeon: Simone Valderrama MD;  Location: [facility];  Service: Gastroenterology   • CORONARY ARTERY BYPASS GRAFT     • KNEE ARTHROPLASTY, PARTIAL REPLACEMENT      Lt ,    • REPLACEMENT TOTAL KNEE      Rt        EDUCATION  The patient has been educated in the following areas:   Dysphagia (Swallowing Impairment).    SLP Recommendation and Plan          Time Calculation:       Thuy Price MA,CCC-SLP  5/19/2018

## 2018-05-19 NOTE — MBS/VFSS/FEES
Acute Care - Speech Language Pathology   Swallow Initial Evaluation  [facility]   FLEXIBLE ENDOSCOPIC EVALUATION OF SWALLOWING     Patient Name: Porsha Soni  : 1940  MRN: [id number]  Today's Date: 2018     Admit Date: 2018     Porsha Soni is seen in CCU CC09/1C to participate in an instrumental FEES to evaluate safety/efficacy of swallowing fnx, determine safest/least restrictive diet tolerance and candidacy for po intake. He is a/a, upright and centered in bed, follows simple commands and participates in conversational exchanges. A/a and mental status improved. His spouse and his RN are present during this evaluation.     Social History     Social History   • Marital status:      Spouse name: N/A   • Number of children: N/A   • Years of education: N/A     Occupational History   • Not on file.     Social History Main Topics   • Smoking status: Former Smoker     Packs/day: 1.50     Years: 7.00     Types: Cigarettes, Pipe, Cigars     Quit date: 1986   • Smokeless tobacco: Never Used   • Alcohol use Yes      Comment: occi   • Drug use: No   • Sexual activity: Defer     Other Topics Concern   • Not on file     Social History Narrative   • No narrative on file      Chest xray 18 reveals stable chest per radiologist.     Diet Orders (active)     Start     Ordered    18 0157  NPO Diet  Diet Effective Now      18 0158          Currently observed on O2 via HF NC w/o complications.     Risks and benefits of this procedure are explained w/ pt agreeing to participate.     Chart review, d/w RN reveal no contraindications for this procedure. Proceed per protocol. Vitals are stable.     Pt is positioned upright and centered in bed to accept presentations of solid cracker, puree, honey thick, nectar thick, and thin liquids via spoon, cup, and straw. He does not self feed during this evaluation.     Facial/oral structures are symmetrical upon observation. Oral mucosa are slightly  xerostomic, pink and clean. Secretions are clear, thin, and well controlled. OROM/NUBIA is slightly delayed but adequate to imitate oral postures. Gag is not assessed. Volitional cough is intact, clear in quality, non-productive. Vocal quality is adequate in intensity, clear in quality w/ intelligible speech.     Endoscope is entered via the R nare w/o complications. Nasal mucosa are moist, pink, and clean. Secretions are clear, thin, controlled. Endoscope passes easily into the hypopharynx.     Upon entry into the hypopharynx, epiglottic resting posture is midline. Pharyngeal and laryngeal mucosa/structures are moist, pink, and clean. Clear thin secretions pooled diffusely in the pharynx. No significant edema or erythema. Cued vowel prolongation reveals bilateral VF adduction. Appearance concerning for contact ulcerations of the posterior lateral true VF bilaterally, R>L. No other obvious mucosal abnormalities.     Upon po presentations, adequate bolus anticipation w/ good labial seal for bolus clearance. Bolus formation, manipulation, and mastication are adequate. Transit is timely w/o significant oral residue. Linguavelar seal is adequate w/ minimal premature spillage of thin liquids only, controlled in the bilateral pyriform sinuses. No laryngeal penetration or aspiration evidenced before the swallow.     Pharyngeal swallow is timely w/ adequate hyolaryngeal elevation and full epilgottic inversion. Pharyngeal contraction is wfl w/o significant residue. No laryngeal penetration or aspiration evidenced during or after the swallow.     Endoscope is removed w/o complications. Bed is returned to lowest floor position w/ call bell within reach. RN is aware of pt status. Vitals are stable.     Impression: Per this evaluation, pt presents w/ wfl oropharyngeal swallow w/o laryngeal penetration or aspiration evidenced. RN reports clear liquid diet recommended by MD. Upgrade as tolerated per MD. Fully a/a for all po intake.      Recommmendations:  1. Clear liquids, upgrade to regular consistency and thin liquids as tolerated per MD.   2. Meds whole in puree/thins.   3. Upright and centered for all po intake.   4 Fully a/a for all po intake.   5. Oral care protocol.   6. Assist w/ meals PRN.   SLP to f/u for diet tolerance.     D/w pt and pt's spouse results and recommendations w/ verbal agreement.     D/w RN and Dr. Brown results and recommendations w/ verbal agreement.     Thank you for allowing me to participate in the care of your patient-  Thuy Price M.A., CCC-SLP    Visit Dx:     ICD-10-CM ICD-9-CM   1. Sepsis, due to unspecified organism A41.9 038.9     995.91   2. Acute pancreatitis, unspecified complication status, unspecified pancreatitis type K85.90 577.0   3. Leukocytosis, unspecified type D72.829 288.60   4. Cholecystitis K81.9 575.10     Patient Active Problem List   Diagnosis   • Insomnia   • Asymptomatic PVCs   • CAD in native artery   • Hypertension   • Dyslipidemia   • Palpitations   • Diarrhea of presumed infectious origin   • Bloating   • Elevated prostate specific antigen (PSA)   • Cholecystitis   • Sepsis     Past Medical History:   Diagnosis Date   • Arthritis    • Cervical spine disease     remotely suggested surgical intervention.  Patient deferred.    • Cervical spine disease    • Coronary artery disease    • Dyslipidemia    • Hypertension    • Impotence    • Palpitations    • Vertigo      Past Surgical History:   Procedure Laterality Date   • ARTERIAL BYPASS SURGERY     • CHOLECYSTECTOMY WITH INTRAOPERATIVE CHOLANGIOGRAM N/A 5/16/2018    Procedure: OPEN CHOLECYSTECTOMY;  Surgeon: Rosie Montalvo MD;  Location: [facility] OR;  Service: General   • COLONOSCOPY N/A 3/30/2018    Procedure: COLONOSCOPY;  Surgeon: Simone Valderrama MD;  Location: [facility];  Service: Gastroenterology   • CORONARY ARTERY BYPASS GRAFT     • KNEE ARTHROPLASTY, PARTIAL REPLACEMENT      Lt , 2015   • REPLACEMENT TOTAL KNEE      Rt        EDUCATION  The patient has been educated in the following areas:   Dysphagia (Swallowing Impairment) Oral Care/Hydration Modified Diet Instruction.    SLP Recommendation and Plan  SLP to f/u for diet tolerance.     Plan of Care Reviewed With: patient, spouse  Plan of Care Review  Plan of Care Reviewed With: patient, spouse  Progress: improving       Time Calculation:         Time Calculation- SLP     Row Name 05/19/18 1124             Time Calculation- SLP    SLP - Next Appointment 05/21/18  -CM        User Key  (r) = Recorded By, (t) = Taken By, (c) = Cosigned By    Initials Name Provider Type     Thuy Price MA,CCC-SLP Speech Therapist        Therapy Charges for Today     Code Description Service Date Service Provider Modifiers Qty    00025183878 HC ST SWALLOWING CURRENT STATUS 5/19/2018 Thuy Price MA,CCC-SLP GN,  1    18959490461 HC ST SWALLOWING PROJECTED 5/19/2018 Thuy Price MA,CCC-SLP GN,  1    25742761373 HC ST SWALLOWING DISCHARGE 5/19/2018 Thuy Price MA,CCC-SLP GN,  1    51967914194 HC ST FIBEROPTIC ENDO EVAL SWALL 8 5/19/2018 Thuy Price MA,CCC-SLP GN 1        SLP G-Codes  SLP NOMS Used?: Yes  Functional Limitations: Swallowing  Swallow Current Status (): 0 percent impaired, limited or restricted  Swallow Goal Status (): 0 percent impaired, limited or restricted  Swallow Discharge Status (): 0 percent impaired, limited or restricted    Thuy Price MA, CCC-SLP  5/19/2018

## 2018-05-20 LAB
ALBUMIN SERPL-MCNC: 2.3 G/DL (ref 3.4–4.8)
ALBUMIN/GLOB SERPL: 1.1 G/DL (ref 1.5–2.5)
ALP SERPL-CCNC: 86 U/L (ref 40–129)
ALT SERPL W P-5'-P-CCNC: 43 U/L (ref 10–44)
ANION GAP SERPL CALCULATED.3IONS-SCNC: 0.4 MMOL/L (ref 3.6–11.2)
AST SERPL-CCNC: 23 U/L (ref 10–34)
BASOPHILS # BLD AUTO: 0.01 10*3/MM3 (ref 0–0.3)
BASOPHILS NFR BLD AUTO: 0.1 % (ref 0–2)
BILIRUB SERPL-MCNC: 0.4 MG/DL (ref 0.2–1.8)
BUN BLD-MCNC: 27 MG/DL (ref 7–21)
BUN/CREAT SERPL: 38.6 (ref 7–25)
CALCIUM SPEC-SCNC: 7.3 MG/DL (ref 7.7–10)
CHLORIDE SERPL-SCNC: 115 MMOL/L (ref 99–112)
CO2 SERPL-SCNC: 24.6 MMOL/L (ref 24.3–31.9)
CREAT BLD-MCNC: 0.7 MG/DL (ref 0.43–1.29)
CRP SERPL-MCNC: 11.52 MG/DL (ref 0–0.99)
DEPRECATED RDW RBC AUTO: 52.9 FL (ref 37–54)
EOSINOPHIL # BLD AUTO: 0.07 10*3/MM3 (ref 0–0.7)
EOSINOPHIL NFR BLD AUTO: 0.5 % (ref 0–7)
ERYTHROCYTE [DISTWIDTH] IN BLOOD BY AUTOMATED COUNT: 14.5 % (ref 11.5–14.5)
GFR SERPL CREATININE-BSD FRML MDRD: 109 ML/MIN/1.73
GLOBULIN UR ELPH-MCNC: 2.1 GM/DL
GLUCOSE BLD-MCNC: 122 MG/DL (ref 70–110)
HCT VFR BLD AUTO: 30.8 % (ref 42–52)
HGB BLD-MCNC: 9.4 G/DL (ref 14–18)
IMM GRANULOCYTES # BLD: 0.06 10*3/MM3 (ref 0–0.03)
IMM GRANULOCYTES NFR BLD: 0.4 % (ref 0–0.5)
LYMPHOCYTES # BLD AUTO: 1.78 10*3/MM3 (ref 1–3)
LYMPHOCYTES NFR BLD AUTO: 11.8 % (ref 16–46)
MAGNESIUM SERPL-MCNC: 2.6 MG/DL (ref 1.7–2.6)
MCH RBC QN AUTO: 31.4 PG (ref 27–33)
MCHC RBC AUTO-ENTMCNC: 30.5 G/DL (ref 33–37)
MCV RBC AUTO: 103 FL (ref 80–94)
MONOCYTES # BLD AUTO: 1.64 10*3/MM3 (ref 0.1–0.9)
MONOCYTES NFR BLD AUTO: 10.8 % (ref 0–12)
NEUTROPHILS # BLD AUTO: 11.57 10*3/MM3 (ref 1.4–6.5)
NEUTROPHILS NFR BLD AUTO: 76.4 % (ref 40–75)
OSMOLALITY SERPL CALC.SUM OF ELEC: 285.8 MOSM/KG (ref 273–305)
PHOSPHATE SERPL-MCNC: 1.8 MG/DL (ref 2.7–4.5)
PLATELET # BLD AUTO: 492 10*3/MM3 (ref 130–400)
PMV BLD AUTO: 8.2 FL (ref 6–10)
POTASSIUM BLD-SCNC: 3.9 MMOL/L (ref 3.5–5.3)
PROT SERPL-MCNC: 4.4 G/DL (ref 6–8)
RBC # BLD AUTO: 2.99 10*6/MM3 (ref 4.7–6.1)
SODIUM BLD-SCNC: 140 MMOL/L (ref 135–153)
WBC NRBC COR # BLD: 15.13 10*3/MM3 (ref 4.5–12.5)

## 2018-05-20 PROCEDURE — 86140 C-REACTIVE PROTEIN: CPT | Performed by: INTERNAL MEDICINE

## 2018-05-20 PROCEDURE — 99024 POSTOP FOLLOW-UP VISIT: CPT | Performed by: SURGERY

## 2018-05-20 PROCEDURE — 25010000002 CEFTRIAXONE: Performed by: PHYSICIAN ASSISTANT

## 2018-05-20 PROCEDURE — 84100 ASSAY OF PHOSPHORUS: CPT | Performed by: INTERNAL MEDICINE

## 2018-05-20 PROCEDURE — 85025 COMPLETE CBC W/AUTO DIFF WBC: CPT | Performed by: INTERNAL MEDICINE

## 2018-05-20 PROCEDURE — 99233 SBSQ HOSP IP/OBS HIGH 50: CPT | Performed by: INTERNAL MEDICINE

## 2018-05-20 PROCEDURE — 94799 UNLISTED PULMONARY SVC/PX: CPT

## 2018-05-20 PROCEDURE — 80053 COMPREHEN METABOLIC PANEL: CPT | Performed by: INTERNAL MEDICINE

## 2018-05-20 PROCEDURE — 25010000002 HEPARIN (PORCINE) PER 1000 UNITS: Performed by: SURGERY

## 2018-05-20 PROCEDURE — 83735 ASSAY OF MAGNESIUM: CPT | Performed by: INTERNAL MEDICINE

## 2018-05-20 RX ADMIN — DONEPEZIL HYDROCHLORIDE 10 MG: 5 TABLET, FILM COATED ORAL at 22:07

## 2018-05-20 RX ADMIN — MEMANTINE HYDROCHLORIDE 10 MG: 10 TABLET, FILM COATED ORAL at 09:03

## 2018-05-20 RX ADMIN — FINASTERIDE 5 MG: 5 TABLET, FILM COATED ORAL at 09:03

## 2018-05-20 RX ADMIN — HEPARIN SODIUM 5000 UNITS: 5000 INJECTION, SOLUTION INTRAVENOUS; SUBCUTANEOUS at 22:07

## 2018-05-20 RX ADMIN — CETIRIZINE HYDROCHLORIDE 10 MG: 10 TABLET ORAL at 09:03

## 2018-05-20 RX ADMIN — Medication 10 ML: at 09:04

## 2018-05-20 RX ADMIN — IPRATROPIUM BROMIDE 0.5 MG: 0.5 SOLUTION RESPIRATORY (INHALATION) at 13:22

## 2018-05-20 RX ADMIN — ASPIRIN 81 MG: 81 TABLET ORAL at 09:03

## 2018-05-20 RX ADMIN — IPRATROPIUM BROMIDE 0.5 MG: 0.5 SOLUTION RESPIRATORY (INHALATION) at 00:40

## 2018-05-20 RX ADMIN — SODIUM CHLORIDE 100 ML/HR: 9 INJECTION, SOLUTION INTRAVENOUS at 05:51

## 2018-05-20 RX ADMIN — POTASSIUM PHOSPHATE, MONOBASIC AND POTASSIUM PHOSPHATE, DIBASIC 15 MMOL: 224; 236 INJECTION, SOLUTION INTRAVENOUS at 05:52

## 2018-05-20 RX ADMIN — METRONIDAZOLE 500 MG: 500 INJECTION, SOLUTION INTRAVENOUS at 16:08

## 2018-05-20 RX ADMIN — CEFTRIAXONE 2 G: 2 INJECTION, POWDER, FOR SOLUTION INTRAMUSCULAR; INTRAVENOUS at 14:45

## 2018-05-20 RX ADMIN — IPRATROPIUM BROMIDE 0.5 MG: 0.5 SOLUTION RESPIRATORY (INHALATION) at 18:37

## 2018-05-20 RX ADMIN — MEMANTINE HYDROCHLORIDE 10 MG: 10 TABLET, FILM COATED ORAL at 22:07

## 2018-05-20 RX ADMIN — HEPARIN SODIUM 5000 UNITS: 5000 INJECTION, SOLUTION INTRAVENOUS; SUBCUTANEOUS at 09:03

## 2018-05-20 RX ADMIN — METRONIDAZOLE 500 MG: 500 INJECTION, SOLUTION INTRAVENOUS at 01:07

## 2018-05-20 RX ADMIN — IPRATROPIUM BROMIDE 0.5 MG: 0.5 SOLUTION RESPIRATORY (INHALATION) at 07:52

## 2018-05-20 RX ADMIN — DOCUSATE SODIUM 100 MG: 100 CAPSULE, LIQUID FILLED ORAL at 22:07

## 2018-05-20 RX ADMIN — METRONIDAZOLE 500 MG: 500 INJECTION, SOLUTION INTRAVENOUS at 08:45

## 2018-05-20 RX ADMIN — LANSOPRAZOLE 30 MG: KIT at 05:52

## 2018-05-20 NOTE — PROGRESS NOTES
CC: acute cholecystitis     Patient mental status improving.  On high flow nasal cannula.  Bowel movement overnight.  MISHEL serosanguinous. Tolerated clear liquids     AFVSS  NAD,AOx3  RRR  CTAB diminished bases  S/appropriately tender/MISHEL serosanguinous/incision c/d/i    76 yo M s/p open cholecystectomy.  MISHEL serosanginous.  MS improving.  BM reported.  WBC improving and afebrile.     -continue antibiotics  -soft diet  -wean oxygen  -OOB to chair

## 2018-05-20 NOTE — PLAN OF CARE
Problem: Patient Care Overview  Goal: Plan of Care Review  Outcome: Ongoing (interventions implemented as appropriate)   05/19/18 1124 05/19/18 1913 05/20/18 0559   Coping/Psychosocial   Plan of Care Reviewed With --  patient;spouse --    Plan of Care Review   Progress improving --  --    OTHER   Outcome Summary --  --  Patient remained stable through shift. Afebrile and hemodynamically stable. Remained on high-flow NC throughout shift.       Problem: Fall Risk (Adult)  Goal: Absence of Fall  Outcome: Ongoing (interventions implemented as appropriate)      Problem: Wound (Includes Pressure Injury) (Adult)  Goal: Signs and Symptoms of Listed Potential Problems Will be Absent, Minimized or Managed (Wound)  Outcome: Ongoing (interventions implemented as appropriate)      Problem: Breathing Pattern Ineffective (Adult)  Goal: Effective Oxygenation/Ventilation  Outcome: Ongoing (interventions implemented as appropriate)    Goal: Anxiety/Fear Reduction  Outcome: Ongoing (interventions implemented as appropriate)      Problem: Sepsis/Septic Shock (Adult)  Goal: Signs and Symptoms of Listed Potential Problems Will be Absent, Minimized or Managed (Sepsis/Septic Shock)  Outcome: Ongoing (interventions implemented as appropriate)      Problem: Skin Injury Risk (Adult)  Goal: Skin Health and Integrity  Outcome: Ongoing (interventions implemented as appropriate)      Problem: Cholecystectomy (Adult)  Goal: Signs and Symptoms of Listed Potential Problems Will be Absent, Minimized or Managed (Cholecystectomy)  Outcome: Ongoing (interventions implemented as appropriate)    Goal: Anesthesia/Sedation Recovery  Outcome: Ongoing (interventions implemented as appropriate)

## 2018-05-20 NOTE — PROGRESS NOTES
"  I have personally seen and examined the patient today and discussed overnight interval progress and pertinent issues with nursing staff.    Subjective:    The patient is more awake and alert today on high flow oxygen.  He has no complaints and no issues reported by his nurse overnight.  Wife at bedside case discussed with her.  CRP level showing improvement from 19.59 down to 11.52 with significantly improving leukocytosis as well on Rocephin and Flagyl.    History taken from: patient chart family RN      Vital Signs    /61 (BP Location: Right arm, Patient Position: Lying)   Pulse 78   Temp 99 °F (37.2 °C) (Oral)   Resp 16   Ht 177.8 cm (70\")   Wt 105 kg (230 lb 14.4 oz)   SpO2 97%   BMI 33.13 kg/m²     Temp:  [99 °F (37.2 °C)-99.4 °F (37.4 °C)] 99 °F (37.2 °C)      Intake/Output Summary (Last 24 hours) at 05/20/18 1054  Last data filed at 05/20/18 0900   Gross per 24 hour   Intake          2598.33 ml   Output             1299 ml   Net          1299.33 ml     Intake & Output (last 3 days)       05/17 0701 - 05/18 0700 05/18 0701 - 05/19 0700 05/19 0701 - 05/20 0700 05/20 0701 - 05/21 0700    P.O.    360    I.V. (mL/kg) 1995.6 (19) 2301.2 (21.9) 2991.7 (28.5)     IV Piggyback 300 300 300     Total Intake(mL/kg) 2295.6 (21.9) 2601.2 (24.8) 3291.7 (31.3) 360 (3.4)    Urine (mL/kg/hr) 585 (0.2) 850 (0.3) 1125 (0.4)     Drains 65 (0) 55 (0) 170 (0.1)     Stool  0 (0) 4 (0)     Blood        Total Output       Net +1645.6 +1696.2 +1992.7 +360            Unmeasured Stool Occurrence  2 x 5 x           Physical Exam:                 General Appearance:   More awake, high flow oxygen, comfortable wife at bedside    Head:    Normocephalic, without obvious abnormality, atraumatic   Eyes:            Lids and lashes normal, conjunctivae and sclerae normal, no   icterus, no pallor, corneas clear, PERRLA   Ears:    Ears appear intact with no abnormalities noted   Throat:   No oral lesions, no thrush, oral " mucosa moist   Neck:   No adenopathy, supple, trachea midline, no thyromegaly, no   carotid bruit, no JVD   Back:     No tenderness to percussion or palpation, range of motion   normal   Lungs:     Clear to auscultation,respirations regular, even and unlabored. No wheezing, no ronchi and no crackles.    Heart:    Regular rhythm and normal rate, normal S1 and S2, no            murmur, no gallop, no rub, no click   Chest Wall:    No abnormalities observed   Abdomen:     Distended, Normal bowel sounds, no masses, no organomegaly, soft, non-tender, no guarding, no rebound tenderness   Rectal:     Deferred   Extremities:   Moves all extremities well, no edema, no cyanosis, no             redness   Pulses:   Pulses palpable and equal bilaterally   Skin:   No bleeding, bruising or rash   Lymph nodes:   No palpable adenopathy   Neurologic:   More awake and alert         Results:      Results from last 7 days  Lab Units 05/20/18  0123 05/19/18  0130 05/18/18  0104 05/17/18  0345 05/16/18  0757   WBC 10*3/mm3 15.13* 24.91* 19.50* 17.91* 26.67*     Lab Results   Component Value Date    NEUTROABS 11.57 (H) 05/20/2018         Results from last 7 days  Lab Units 05/20/18  0123   CREATININE mg/dL 0.70         Results from last 7 days  Lab Units 05/20/18  0123 05/19/18  0131 05/18/18  0104   CRP mg/dL 11.52* 19.59* 26.89*       Imaging Results (last 24 hours)     Procedure Component Value Units Date/Time    Fiberoptic Endo (fees) [[id number]] Resulted:  05/19/18 1136     Updated:  05/19/18 1136    Narrative:       This procedure was auto-finalized with no dictation required.            Results Review:    I have personally reviewed laboratory data, culture results, radiology studies and antimicrobial therapy.    Hospital Medications (active)       Dose Frequency Start End    aspirin EC tablet 81 mg 81 mg Daily 5/16/2018     Sig - Route: Take 1 tablet by mouth Daily. - Oral    cetirizine (zyrTEC) tablet 10 mg 10 mg Daily 5/16/2018      Sig - Route: Take 1 tablet by mouth Daily. - Oral    chlorhexidine (PERIDEX) 0.12 % solution 15 mL 15 mL Every 12 Hours Scheduled 5/17/2018     Sig - Route: Apply 15 mL to the mouth or throat Every 12 (Twelve) Hours. - Mouth/Throat    cholecalciferol (VITAMIN D3) capsule 50,000 Units 50,000 Units Weekly 5/21/2018     Sig - Route: Take 1 capsule by mouth 1 (One) Time Per Week. - Oral    dexmedetomidine HCl (PRECEDEX) 400 mcg in sodium chloride 0.9 % 100 mL (4 mcg/mL) infusion 0.2-1.5 mcg/kg/hr × 105 kg Titrated 5/18/2018     Sig - Route: Infuse .5 mcg/hr into a venous catheter Dose Adjusted By Provider As Needed. - Intravenous    docusate sodium (COLACE) capsule 100 mg 100 mg 2 Times Daily 5/16/2018     Sig - Route: Take 1 capsule by mouth 2 (Two) Times a Day. - Oral    donepezil (ARICEPT) tablet 10 mg 10 mg Nightly 5/16/2018     Sig - Route: Take 2 tablets by mouth Every Night. - Oral    FENTANYL PCA 1500 MCG/30 ML (BHCOR) PCA  Titrated 5/17/2018 5/27/2018    Sig - Route: Infuse  into a venous catheter Dose Adjusted By Provider As Needed. - Intravenous    finasteride (PROSCAR) tablet 5 mg 5 mg Daily 5/16/2018     Sig - Route: Take 1 tablet by mouth Daily. - Oral    heparin (porcine) 5000 UNIT/ML injection 5,000 Units 5,000 Units Every 12 Hours Scheduled 5/17/2018     Sig - Route: Inject 1 mL under the skin Every 12 (Twelve) Hours. - Subcutaneous    HYDROcodone-acetaminophen (NORCO) 7.5-325 MG per tablet 1 tablet 1 tablet Every 4 Hours PRN 5/16/2018 5/26/2018    Sig - Route: Take 1 tablet by mouth Every 4 (Four) Hours As Needed for Moderate Pain . - Oral    ipratropium (ATROVENT) nebulizer solution 0.5 mg 0.5 mg Every 6 Hours - RT 5/17/2018     Sig - Route: Take 2.5 mL by nebulization Every 6 (Six) Hours. - Nebulization    lansoprazole (FIRST) oral suspension 30 mg 30 mg Every Early Morning 5/17/2018     Sig - Route: 10 mL by Per G Tube route Every Morning. - Per G Tube    Magnesium Sulfate 2 gram / 50mL  "Infusion (GIVE X 3 BAGS TO EQUAL 6GM TOTAL DOSE) - Mg 1.1 - 1/5 mg/dl 2 g As Needed 5/17/2018     Sig - Route: Infuse 50 mL into a venous catheter As Needed (See Administration Instructions). - Intravenous    Linked Group 1:  \"Or\" Linked Group Details        Magnesium Sulfate 2 gram Bolus, followed by 8 gram infusion (total Mg dose 10 grams)- Mg less than or equal to 1mg/dL 2 g As Needed 5/17/2018     Sig - Route: Infuse 50 mL into a venous catheter As Needed (See Administration Instructions). - Intravenous    Linked Group 1:  \"Or\" Linked Group Details        Magnesium Sulfate 4 gram infusion- Mg 1.6-1.9 mg/dL 4 g As Needed 5/17/2018     Sig - Route: Infuse 100 mL into a venous catheter As Needed (See Administration Instructions). - Intravenous    Linked Group 1:  \"Or\" Linked Group Details        Magnesium Sulfate 4 gram infusion- Mg 1.6-1.9 mg/dL 4 g Once 5/17/2018 5/17/2018    Sig - Route: Infuse 100 mL into a venous catheter 1 (One) Time. - Intravenous    memantine (NAMENDA) tablet 10 mg 10 mg Every 12 Hours Scheduled 5/16/2018     Sig - Route: Take 1 tablet by mouth Every 12 (Twelve) Hours. - Oral    morphine injection 4 mg 4 mg Every 3 Hours PRN 5/16/2018     Sig - Route: Infuse 1 mL into a venous catheter Every 3 (Three) Hours As Needed for Severe Pain . - Intravenous    norepinephrine (LEVOPHED) 8,000 mcg in sodium chloride 0.9 % 250 mL (32 mcg/mL) infusion 0.02-0.3 mcg/kg/min × 95.3 kg Titrated 5/16/2018     Sig - Route: Infuse 1.906-28.59 mcg/min into a venous catheter Dose Adjusted By Provider As Needed. - Intravenous    ondansetron (ZOFRAN) injection 4 mg 4 mg Every 4 Hours PRN 5/16/2018     Sig - Route: Infuse 2 mL into a venous catheter Every 4 (Four) Hours As Needed for Nausea or Vomiting. - Intravenous    piperacillin-tazobactam (ZOSYN) 3.375 g/100 mL 0.9% NS IVPB (mbp) 3.375 g Every 8 Hours 5/16/2018 5/30/2018    Sig - Route: Infuse 100 mL into a venous catheter Every 8 (Eight) Hours. - Intravenous " "   polyethylene glycol (MIRALAX) powder 17 g 17 g 2 Times Daily 5/16/2018 5/20/2018    Sig - Route: Take 17 g by mouth 2 (Two) Times a Day. - Oral    potassium chloride (KLOR-CON) packet 40 mEq 40 mEq As Needed 5/17/2018     Sig - Route: Take 40 mEq by mouth As Needed (potassium replacement, see admin instructions). - Oral    Linked Group 2:  \"Or\" Linked Group Details        potassium chloride (MICRO-K) CR capsule 40 mEq 40 mEq As Needed 5/17/2018     Sig - Route: Take 4 capsules by mouth As Needed (potassium replacement.  see admin instructions). - Oral    Linked Group 2:  \"Or\" Linked Group Details        potassium chloride 10 mEq in 100 mL IVPB 10 mEq Every 1 Hour PRN 5/17/2018     Sig - Route: Infuse 100 mL into a venous catheter Every 1 (One) Hour As Needed (potassium protocol PERIPHERAL - see admin instructions). - Intravenous    Linked Group 2:  \"Or\" Linked Group Details        potassium phosphate 15 mmol in sodium chloride 0.9 % 100 mL infusion 15 mmol As Needed 5/17/2018     Sig - Route: Infuse 15 mmol into a venous catheter As Needed (Peripheral IV - Phosphorus 1.8 - 2.5). - Intravenous    Linked Group 3:  \"Or\" Linked Group Details        potassium phosphate 30 mmol in sodium chloride 0.9 % 250 mL infusion 30 mmol As Needed 5/17/2018     Sig - Route: Infuse 30 mmol into a venous catheter As Needed (Peripheral IV - Phosphorus 1.3 - 1.7). - Intravenous    Linked Group 3:  \"Or\" Linked Group Details        potassium phosphate 45 mmol in sodium chloride 0.9 % 500 mL infusion 45 mmol As Needed 5/17/2018     Sig - Route: Infuse 45 mmol into a venous catheter As Needed (Peripheral IV - Phosphorus Less Than 1.3). - Intravenous    Linked Group 3:  \"Or\" Linked Group Details        promethazine (PHENERGAN) 12.5 mg in sodium chloride 0.9 % 50 mL 12.5 mg Every 4 Hours PRN 5/16/2018     Sig - Route: Infuse 12.5 mg into a venous catheter Every 4 (Four) Hours As Needed for Nausea or Vomiting. - Intravenous    propofol " (DIPRIVAN) infusion 10 mg/mL 100 mL 5-50 mcg/kg/min × 105 kg Titrated 5/17/2018     Sig - Route: Infuse 525-5,250 mcg/min into a venous catheter Dose Adjusted By Provider As Needed. - Intravenous    sodium chloride 0.9 % bolus 1,000 mL 1,000 mL Once 5/16/2018     Sig - Route: Infuse 1,000 mL into a venous catheter 1 (One) Time. - Intravenous    sodium chloride 0.9 % flush 1-10 mL 1-10 mL As Needed 5/16/2018     Sig - Route: Infuse 1-10 mL into a venous catheter As Needed for Line Care. - Intravenous    Cosign for Ordering: Accepted by Evert Brown DO on 5/17/2018  8:54 AM    sodium chloride 0.9 % flush 1-10 mL 1-10 mL As Needed 5/16/2018     Sig - Route: Infuse 1-10 mL into a venous catheter As Needed for Line Care. - Intravenous    sodium chloride 0.9 % flush 10 mL 10 mL As Needed 5/16/2018     Sig - Route: Infuse 10 mL into a venous catheter As Needed for Line Care. - Intravenous    sodium chloride 0.9 % flush 10 mL 10 mL Every 12 Hours Scheduled 5/17/2018     Sig - Route: 10 mL by Intracatheter route Every 12 (Twelve) Hours. - Intracatheter    sodium chloride 0.9 % flush 10 mL 10 mL As Needed 5/17/2018     Sig - Route: 10 mL by Intracatheter route As Needed for Line Care (After Medication Administration or Blood Draw). - Intracatheter    sodium chloride 0.9 % flush 10 mL 10 mL As Needed 5/17/2018     Sig - Route: 10 mL by Intracatheter route As Needed for Line Care (After Medication Administration or Blood Draw). - Intracatheter    sodium chloride 0.9 % flush 10 mL 10 mL As Needed 5/17/2018     Sig - Route: 10 mL by Intracatheter route As Needed for Line Care (After Medication Administration or Blood Draw). - Intracatheter    sodium chloride 0.9 % flush 10 mL 10 mL As Needed 5/17/2018     Sig - Route: 10 mL by Intracatheter route As Needed for Line Care (After Medication Administration or Blood Draw). - Intracatheter    sodium chloride 0.9 % flush 10 mL 10 mL As Needed 5/17/2018     Sig - Route: 10 mL  "by Intracatheter route As Needed for Line Care (After Medication Administration or Blood Draw). - Intracatheter    sodium chloride 0.9 % infusion 100 mL/hr Continuous 5/17/2018     Sig - Route: Infuse 100 mL/hr into a venous catheter Continuous. - Intravenous    sodium phosphates 15 mmol in sodium chloride 0.9 % 250 mL IVPB 15 mmol As Needed 5/17/2018     Sig - Route: Infuse 15 mmol into a venous catheter As Needed (Peripheral IV - Phosphorus 1.8 - 2.5 & Potassium Greater Than 4). - Intravenous    Linked Group 3:  \"Or\" Linked Group Details        sodium phosphates 30 mmol in sodium chloride 0.9 % 250 mL IVPB 30 mmol As Needed 5/17/2018     Sig - Route: Infuse 30 mmol into a venous catheter As Needed (Peripheral IV - Phosphorus 1.3-1.7 & Potassium Greater Than 4). - Intravenous    Linked Group 3:  \"Or\" Linked Group Details        sodium phosphates 45 mmol in sodium chloride 0.9 % 500 mL IVPB 45 mmol As Needed 5/17/2018     Sig - Route: Infuse 45 mmol into a venous catheter As Needed (Peripheral IV - Phosphorus Less Than 1.3 & Potassium Greater Than 4). - Intravenous    Linked Group 3:  \"Or\" Linked Group Details        vancomycin (VANCOCIN) 1,250 mg in sodium chloride 0.9 % 250 mL IVPB (Discontinued) 1,250 mg Every 18 Hours 5/17/2018 5/17/2018    Sig - Route: Infuse 1,250 mg into a venous catheter Every 18 (Eighteen) Hours. - Intravenous            Cultures:    Blood Culture   Date Value Ref Range Status   05/16/2018 No growth at 2 days  Preliminary   05/16/2018 No growth at 2 days  Preliminary           Assessment/Plan     ASSESSMENT:    1.  Septic shock on Levophed  2.  Peritonitis     PLAN:    The patient is more awake and alert today on high flow oxygen.  He has no complaints and no issues reported by his nurse overnight.  Wife at bedside case discussed with her.  CRP level showing improvement from 19.59 down to 11.52 with significantly improving leukocytosis as well on Rocephin and Flagyl.    Culture finalized as " pansusceptible Escherichia coli.         Current Antimicrobials:    Rocephin 2 g IV every 24 hours and Flagyl 500 g iv every 8 hours (de-escalated from Zosyn on 5/20/18)      Patient's findings and recommendations were discussed with family and nursing staff    Code Status: Full Code    Kerri Fajardo PA-C  05/20/18  10:54 AM

## 2018-05-20 NOTE — PROGRESS NOTES
Subjective     History:   Porsha Soni is a 77 y.o. male admitted on 5/16/2018 secondary to Cholecystitis     Procedures:   5/16/18: Open cholecystectomy with cholangiogram  5/16/18: Reintubation in the PACU  5/16/18: Left subclavian central line placement  5/17/18: Extubation    Patient seen and examined with ROQUE Silverman. Remains on HFNC this AM. Awake and alert. Mental status appears overall improved. Oriented to person and place. Able to follow commands. His wife is at bedside and states he is behaving more like himself again. Denies any current complaints. Tolerating clear liquids. (+) BM. BP remaining stable off vasopressors. No acute events overnight per RN.       History taken from: chart, and RN.      Objective     Vital Signs  Temp:  [99 °F (37.2 °C)-99.4 °F (37.4 °C)] 99 °F (37.2 °C)  Heart Rate:  [71-97] 76  Resp:  [13-22] 16  BP: (107-135)/(52-91) 133/60    Intake/Output Summary (Last 24 hours) at 05/20/18 1019  Last data filed at 05/20/18 0900   Gross per 24 hour   Intake          2598.33 ml   Output             1299 ml   Net          1299.33 ml         Physical Exam:  General:   Appears more awake and alert again today, oriented to person and place, follows simple commands    Heart:      Normal S1 and S2. Regular rate and rhythm. No significant murmur, rubs or gallops appreciated.   Lungs:     Respirations regular, even and unlabored. Diminished bibasilar breath sounds. No wheezes, rales or rhonchi.   Abdomen:   Soft. Mild generalized TTP. No guarding, rebound tenderness or organomegaly noted. Bowel sounds present x 4. Dressing appears c/d/i. (+) MISHEL drain in RUQ.    Extremities:  No clubbing, cyanosis or edema noted. Moves UE and LE equally B/L.     Results Review:      Results from last 7 days  Lab Units 05/20/18  0123 05/19/18  0130 05/18/18  0104 05/17/18  0345 05/16/18  0757   WBC 10*3/mm3 15.13* 24.91* 19.50* 17.91* 26.67*   HEMOGLOBIN g/dL 9.4* 10.4* 10.4* 12.0* 14.4   PLATELETS 10*3/mm3 492* 559*  430* 459* 567*       Results from last 7 days  Lab Units 05/20/18  0123 05/19/18  0131 05/18/18  0104 05/17/18  0345 05/16/18  0757   SODIUM mmol/L 140 138 133* 133* 132*   POTASSIUM mmol/L 3.9 4.4 4.4 4.6 4.6   CHLORIDE mmol/L 115* 111 106 104 91*   CO2 mmol/L 24.6 22.6* 24.4 22.6* 25.2   BUN mg/dL 27* 30* 29* 21 18   CREATININE mg/dL 0.70 0.91 1.11 1.21 1.32*   CALCIUM mg/dL 7.3* 7.6* 7.7 7.5* 9.1   GLUCOSE mg/dL 122* 93 104 149* 156*       Results from last 7 days  Lab Units 05/20/18  0123 05/19/18  0131 05/18/18  0104 05/17/18  0345 05/16/18  0757   BILIRUBIN mg/dL 0.4 0.6 0.7 1.6 4.0*   ALK PHOS U/L 86 113 130* 185* 458*   AST (SGOT) U/L 23 22 28 54* 225*   ALT (SGPT) U/L 43 59* 82* 127* 285*       Results from last 7 days  Lab Units 05/20/18  0123 05/19/18  0131 05/18/18  0104 05/17/18  0345   MAGNESIUM mg/dL 2.6 2.8* 2.8* 1.6*       Results from last 7 days  Lab Units 05/17/18  0345 05/16/18  0757   INR  1.73* 1.38*       Results from last 7 days  Lab Units 05/17/18  0909 05/17/18  0345 05/16/18  2132   CK TOTAL U/L 74  74 70 65   TROPONIN I ng/mL 0.025 0.034 0.026   CK MB INDEX % 2.1 2.8 3.0       Imaging Results (last 24 hours)     Procedure Component Value Units Date/Time    Fiberoptic Endo (fees) [[id number]] Resulted:  05/19/18 1136     Updated:  05/19/18 1136    Narrative:       This procedure was auto-finalized with no dictation required.    CT Head Without Contrast [[id number]] Collected:  05/19/18 1042     Updated:  05/19/18 1045    Narrative:       EXAMINATION: CT HEAD WO CONTRAST-      CLINICAL INDICATION:     AMS; A41.9-Sepsis, unspecified organism;  K85.90-Acute pancreatitis without necrosis or infection, unspecified;  D72.829-Elevated white blood cell count, unspecified;  K81.9-Cholecystitis, unspecified     COMPARISON:    None     Technique: Multiple CT axial images were obtained through the level of  the brain without IV contrast administration. Reformatted images in the  coronal and/or  sagittal plane(s) were generated from the axial data set  to facilitate diagnostic accuracy and/or surgical planning.     Radiation dose reduction techniques were utilized per ALARA protocol.  Automated exposure control was initiated through either or WebTV or  [employer] software packages by  protocol.       DOSE (DLP mGy-cm): 2393.3 mGy.cm     FINDINGS:     Diffuse cerebral atrophy is noted. Chronic small vessel ischemic  disease.  No acute intracranial abnormality.  No midline shift or mass effect.  No hydrocephalus or intracranial hemorrhage.  There is no CT evidence of acute vascular territory infarct.  Bone windows show no acute osseous abnormality.       Impression:       Atrophy and chronic small vessel ischemic disease in part  age-related. No CT evidence of acute intracranial abnormality.     This report was finalized on 5/19/2018 10:43 AM by Dr. Adebayo Dodd MD.       XR Chest AP [[id number]] Collected:  05/19/18 1041     Updated:  05/19/18 1044    Narrative:       EXAMINATION: XR CHEST AP-      CLINICAL INDICATION:     Pneumonia; A41.9-Sepsis, unspecified organism;  K85.90-Acute pancreatitis without necrosis or infection, unspecified;  D72.829-Elevated white blood cell count, unspecified;  K81.9-Cholecystitis, unspecified     TECHNIQUE:  XR CHEST AP-      COMPARISON: 05/18/2018      FINDINGS:   Left subclavian deep line noted with tip in the distal SVC.  Left third of the right basilar airspace disease not significant change.     Cardiomegaly stable.   No pneumothorax.   Stable left effusion.   Stable appearance of the bony structures.  Right-sided skin fold is noted.       Impression:       Stable chest.     This report was finalized on 5/19/2018 10:42 AM by Dr. Adebayo Dodd MD.               Medications:    aspirin 81 mg Oral Daily   ceftriaxone 2 g Intravenous Q24H   cetirizine 10 mg Oral Daily   [START ON 5/21/2018] cholecalciferol 50,000 Units Oral Weekly   docusate sodium 100 mg  Oral BID   donepezil 10 mg Oral Nightly   finasteride 5 mg Oral Daily   heparin (porcine) 5,000 Units Subcutaneous Q12H   ipratropium 0.5 mg Nebulization Q6H - RT   lansoprazole 30 mg Per G Tube Q AM   memantine 10 mg Oral Q12H   metroNIDAZOLE 500 mg Intravenous Q8H   sodium chloride 1,000 mL Intravenous Once   sodium chloride 10 mL Intracatheter Q12H       norepinephrine 0.02-0.3 mcg/kg/min Last Rate: Stopped (05/19/18 1900)   sodium chloride 100 mL/hr Last Rate: 100 mL/hr (05/20/18 0551)           Assessment/Plan   Septic shock: Likely 2/2 perforated gallbladder with abscess and concern for pneumonia. WBC and CRP both improved today. BP stable off vasopressor support. Wound culture revealing E coli. Currently on Rocephin and Flagyl per ID. Tolerating clear liquids and surgery to advance diet today. Repeat labs in the AM. ID and surgery input appreciated.     Acute hypoxic and hypercapnic respiratory failure: Likely 2/2 above. Remains on HFNC. Attempt to wean O2. Cont to monitor closely.     AMS/encephalopathy: Possibly related to anesthesia during surgery and sedating meds while intubated. CT head negative for any acute intracranial abnormalities. Mental status slowly improving. Cont supportive treatment.     TYRELL: Likely 2/2 above. Overall improved and stable this AM. UOP has improved. Cont maintenance fluids. Avoid any potentially nephrotoxic agents. Repeat labs in the AM.     Chest pain in the setting of known CAD: Likely more atypical and epigastric 2/2 above. No acute ischemic changes on EKG. Serial CE's are negative. Holding Plavix perioperatively per surgery. Monitor on telemetry.     Elevated liver enzymes: Likely 2/2 above. Improving. Repeat CMP in the AM.     Hypomagnesemia: Resolved. Cont electrolyte replacement protocols.     Hypophosphatemia: Replete per protocol.     (+) D-dimer: CT PE protocol negative for PE. Venous duplex US of the lower extremities negative for DVT.     PPX  GI: PPI  DVT: SQ  heparin     Transfer to PCU.     Pt is at high risk 2/2 septic shock, perforated GB with abscess, pneumonia, resp failure, AMS and TYRELL.       Evert Brown DO  05/20/18  10:19 AM

## 2018-05-21 PROBLEM — J96.01 ACUTE RESPIRATORY FAILURE WITH HYPOXIA (HCC): Status: ACTIVE | Noted: 2018-05-21

## 2018-05-21 LAB
ALBUMIN SERPL-MCNC: 2.4 G/DL (ref 3.4–4.8)
ALBUMIN/GLOB SERPL: 1.1 G/DL (ref 1.5–2.5)
ALP SERPL-CCNC: 80 U/L (ref 40–129)
ALT SERPL W P-5'-P-CCNC: 36 U/L (ref 10–44)
ANION GAP SERPL CALCULATED.3IONS-SCNC: 1.6 MMOL/L (ref 3.6–11.2)
AST SERPL-CCNC: 21 U/L (ref 10–34)
BACTERIA SPEC AEROBE CULT: NORMAL
BACTERIA SPEC AEROBE CULT: NORMAL
BASOPHILS # BLD AUTO: 0.01 10*3/MM3 (ref 0–0.3)
BASOPHILS NFR BLD AUTO: 0.1 % (ref 0–2)
BILIRUB SERPL-MCNC: 0.4 MG/DL (ref 0.2–1.8)
BUN BLD-MCNC: 18 MG/DL (ref 7–21)
BUN/CREAT SERPL: 30 (ref 7–25)
CALCIUM SPEC-SCNC: 7.3 MG/DL (ref 7.7–10)
CHLORIDE SERPL-SCNC: 115 MMOL/L (ref 99–112)
CO2 SERPL-SCNC: 25.4 MMOL/L (ref 24.3–31.9)
CREAT BLD-MCNC: 0.6 MG/DL (ref 0.43–1.29)
CRP SERPL-MCNC: 6.58 MG/DL (ref 0–0.99)
DEPRECATED RDW RBC AUTO: 53.2 FL (ref 37–54)
EOSINOPHIL # BLD AUTO: 0.19 10*3/MM3 (ref 0–0.7)
EOSINOPHIL NFR BLD AUTO: 1.3 % (ref 0–7)
ERYTHROCYTE [DISTWIDTH] IN BLOOD BY AUTOMATED COUNT: 14.4 % (ref 11.5–14.5)
GFR SERPL CREATININE-BSD FRML MDRD: 130 ML/MIN/1.73
GLOBULIN UR ELPH-MCNC: 2.1 GM/DL
GLUCOSE BLD-MCNC: 103 MG/DL (ref 70–110)
HCT VFR BLD AUTO: 32.1 % (ref 42–52)
HGB BLD-MCNC: 9.9 G/DL (ref 14–18)
IMM GRANULOCYTES # BLD: 0.1 10*3/MM3 (ref 0–0.03)
IMM GRANULOCYTES NFR BLD: 0.7 % (ref 0–0.5)
LYMPHOCYTES # BLD AUTO: 1.94 10*3/MM3 (ref 1–3)
LYMPHOCYTES NFR BLD AUTO: 13.4 % (ref 16–46)
MAGNESIUM SERPL-MCNC: 2.2 MG/DL (ref 1.7–2.6)
MCH RBC QN AUTO: 31.7 PG (ref 27–33)
MCHC RBC AUTO-ENTMCNC: 30.8 G/DL (ref 33–37)
MCV RBC AUTO: 102.9 FL (ref 80–94)
MONOCYTES # BLD AUTO: 1.81 10*3/MM3 (ref 0.1–0.9)
MONOCYTES NFR BLD AUTO: 12.5 % (ref 0–12)
NEUTROPHILS # BLD AUTO: 10.48 10*3/MM3 (ref 1.4–6.5)
NEUTROPHILS NFR BLD AUTO: 72 % (ref 40–75)
OSMOLALITY SERPL CALC.SUM OF ELEC: 285.3 MOSM/KG (ref 273–305)
PHOSPHATE SERPL-MCNC: 2.1 MG/DL (ref 2.7–4.5)
PLATELET # BLD AUTO: 478 10*3/MM3 (ref 130–400)
PMV BLD AUTO: 8.4 FL (ref 6–10)
POTASSIUM BLD-SCNC: 3.9 MMOL/L (ref 3.5–5.3)
PROT SERPL-MCNC: 4.5 G/DL (ref 6–8)
RBC # BLD AUTO: 3.12 10*6/MM3 (ref 4.7–6.1)
SODIUM BLD-SCNC: 142 MMOL/L (ref 135–153)
WBC NRBC COR # BLD: 14.53 10*3/MM3 (ref 4.5–12.5)

## 2018-05-21 PROCEDURE — 97530 THERAPEUTIC ACTIVITIES: CPT

## 2018-05-21 PROCEDURE — 86140 C-REACTIVE PROTEIN: CPT | Performed by: INTERNAL MEDICINE

## 2018-05-21 PROCEDURE — 97163 PT EVAL HIGH COMPLEX 45 MIN: CPT

## 2018-05-21 PROCEDURE — 80053 COMPREHEN METABOLIC PANEL: CPT | Performed by: INTERNAL MEDICINE

## 2018-05-21 PROCEDURE — 25010000002 CEFTRIAXONE: Performed by: PHYSICIAN ASSISTANT

## 2018-05-21 PROCEDURE — 92526 ORAL FUNCTION THERAPY: CPT

## 2018-05-21 PROCEDURE — 97167 OT EVAL HIGH COMPLEX 60 MIN: CPT

## 2018-05-21 PROCEDURE — 83735 ASSAY OF MAGNESIUM: CPT | Performed by: INTERNAL MEDICINE

## 2018-05-21 PROCEDURE — 99024 POSTOP FOLLOW-UP VISIT: CPT | Performed by: SURGERY

## 2018-05-21 PROCEDURE — 25010000002 HEPARIN (PORCINE) PER 1000 UNITS: Performed by: SURGERY

## 2018-05-21 PROCEDURE — 94799 UNLISTED PULMONARY SVC/PX: CPT

## 2018-05-21 PROCEDURE — G8987 SELF CARE CURRENT STATUS: HCPCS

## 2018-05-21 PROCEDURE — G8978 MOBILITY CURRENT STATUS: HCPCS

## 2018-05-21 PROCEDURE — G8979 MOBILITY GOAL STATUS: HCPCS

## 2018-05-21 PROCEDURE — G8988 SELF CARE GOAL STATUS: HCPCS

## 2018-05-21 PROCEDURE — 99233 SBSQ HOSP IP/OBS HIGH 50: CPT | Performed by: FAMILY MEDICINE

## 2018-05-21 PROCEDURE — 84100 ASSAY OF PHOSPHORUS: CPT | Performed by: INTERNAL MEDICINE

## 2018-05-21 PROCEDURE — 85025 COMPLETE CBC W/AUTO DIFF WBC: CPT | Performed by: INTERNAL MEDICINE

## 2018-05-21 RX ORDER — PANTOPRAZOLE SODIUM 40 MG/1
40 TABLET, DELAYED RELEASE ORAL
Status: DISCONTINUED | OUTPATIENT
Start: 2018-05-21 | End: 2018-05-23 | Stop reason: HOSPADM

## 2018-05-21 RX ADMIN — MEMANTINE HYDROCHLORIDE 10 MG: 10 TABLET, FILM COATED ORAL at 21:35

## 2018-05-21 RX ADMIN — DOCUSATE SODIUM 100 MG: 100 CAPSULE, LIQUID FILLED ORAL at 09:27

## 2018-05-21 RX ADMIN — DONEPEZIL HYDROCHLORIDE 10 MG: 5 TABLET, FILM COATED ORAL at 21:35

## 2018-05-21 RX ADMIN — POTASSIUM PHOSPHATE, MONOBASIC AND POTASSIUM PHOSPHATE, DIBASIC 15 MMOL: 224; 236 INJECTION, SOLUTION INTRAVENOUS at 04:49

## 2018-05-21 RX ADMIN — CETIRIZINE HYDROCHLORIDE 10 MG: 10 TABLET ORAL at 09:27

## 2018-05-21 RX ADMIN — ASPIRIN 81 MG: 81 TABLET ORAL at 09:27

## 2018-05-21 RX ADMIN — CEFTRIAXONE 2 G: 2 INJECTION, POWDER, FOR SOLUTION INTRAMUSCULAR; INTRAVENOUS at 16:07

## 2018-05-21 RX ADMIN — MEMANTINE HYDROCHLORIDE 10 MG: 10 TABLET, FILM COATED ORAL at 09:27

## 2018-05-21 RX ADMIN — METRONIDAZOLE 500 MG: 500 INJECTION, SOLUTION INTRAVENOUS at 09:28

## 2018-05-21 RX ADMIN — IPRATROPIUM BROMIDE 0.5 MG: 0.5 SOLUTION RESPIRATORY (INHALATION) at 18:47

## 2018-05-21 RX ADMIN — FINASTERIDE 5 MG: 5 TABLET, FILM COATED ORAL at 09:27

## 2018-05-21 RX ADMIN — IPRATROPIUM BROMIDE 0.5 MG: 0.5 SOLUTION RESPIRATORY (INHALATION) at 12:58

## 2018-05-21 RX ADMIN — OFLOXACIN 50000 UNITS: 300 TABLET, COATED ORAL at 09:27

## 2018-05-21 RX ADMIN — SODIUM CHLORIDE 100 ML/HR: 9 INJECTION, SOLUTION INTRAVENOUS at 16:07

## 2018-05-21 RX ADMIN — Medication 10 ML: at 01:14

## 2018-05-21 RX ADMIN — HEPARIN SODIUM 5000 UNITS: 5000 INJECTION, SOLUTION INTRAVENOUS; SUBCUTANEOUS at 09:27

## 2018-05-21 RX ADMIN — IPRATROPIUM BROMIDE 0.5 MG: 0.5 SOLUTION RESPIRATORY (INHALATION) at 00:30

## 2018-05-21 RX ADMIN — HEPARIN SODIUM 5000 UNITS: 5000 INJECTION, SOLUTION INTRAVENOUS; SUBCUTANEOUS at 21:35

## 2018-05-21 RX ADMIN — SODIUM CHLORIDE 100 ML/HR: 9 INJECTION, SOLUTION INTRAVENOUS at 04:59

## 2018-05-21 RX ADMIN — IPRATROPIUM BROMIDE 0.5 MG: 0.5 SOLUTION RESPIRATORY (INHALATION) at 08:03

## 2018-05-21 RX ADMIN — METRONIDAZOLE 500 MG: 500 INJECTION, SOLUTION INTRAVENOUS at 01:14

## 2018-05-21 RX ADMIN — Medication 10 ML: at 21:36

## 2018-05-21 RX ADMIN — DOCUSATE SODIUM 100 MG: 100 CAPSULE, LIQUID FILLED ORAL at 21:35

## 2018-05-21 RX ADMIN — PANTOPRAZOLE SODIUM 40 MG: 40 TABLET, DELAYED RELEASE ORAL at 06:04

## 2018-05-21 RX ADMIN — Medication 10 ML: at 09:27

## 2018-05-21 NOTE — PROGRESS NOTES
Acute Care - Speech Language Pathology   Swallow Follow Up [facility]     Patient Name: Porsha Soni  : 1940  MRN: [id number]  Today's Date: 2018     Admit Date: 2018    Mr. Soni is seen at bedside this pm in CCU for diet tolerance. He is s/p FEES 18 w/ wfl oropharyngeal swallow, no laryngeal penetration or aspiration evidenced. He is resting upon SLP entry, easily awakens to verbal stimuli to be a/a, upright and centered in bed.     Mr. Soni accepts multiple po presentations of thin liquids via cup w/o overt s/s aspiration, no s/s indicative of silent aspiration as he is w/o changes in vocal quality, respirations or secretions post po presentations. Continue current least restrictive po diet per MD. Fully a/a and upright and centered for all po intake.     No further SLP f/u warranted at this time.     Thank you for allowing me to participate in the care of your patient-  Thuy Price M.A., CCC-SLP    Visit Dx:     ICD-10-CM ICD-9-CM   1. Sepsis, due to unspecified organism A41.9 038.9     995.91   2. Acute pancreatitis, unspecified complication status, unspecified pancreatitis type K85.90 577.0   3. Leukocytosis, unspecified type D72.829 288.60   4. Cholecystitis K81.9 575.10     Patient Active Problem List   Diagnosis   • Insomnia   • Asymptomatic PVCs   • CAD in native artery   • Hypertension   • Dyslipidemia   • Palpitations   • Diarrhea of presumed infectious origin   • Bloating   • Elevated prostate specific antigen (PSA)   • Cholecystitis   • Sepsis   • Acute respiratory failure with hypoxia     Past Medical History:   Diagnosis Date   • Arthritis    • Cervical spine disease     remotely suggested surgical intervention.  Patient deferred.    • Cervical spine disease    • Coronary artery disease    • Dyslipidemia    • Hypertension    • Impotence    • Palpitations    • Vertigo      Past Surgical History:   Procedure Laterality Date   • ARTERIAL BYPASS SURGERY     • CHOLECYSTECTOMY  WITH INTRAOPERATIVE CHOLANGIOGRAM N/A 5/16/2018    Procedure: OPEN CHOLECYSTECTOMY;  Surgeon: Rosie Montalvo MD;  Location:  [facility] OR;  Service: General   • COLONOSCOPY N/A 3/30/2018    Procedure: COLONOSCOPY;  Surgeon: Simone Valderrama MD;  Location: [facility] OR;  Service: Gastroenterology   • CORONARY ARTERY BYPASS GRAFT     • KNEE ARTHROPLASTY, PARTIAL REPLACEMENT      Lt , 2015   • REPLACEMENT TOTAL KNEE      Rt     EDUCATION  The patient has been educated in the following areas:   Dysphagia (Swallowing Impairment).    SLP Recommendation and Plan  No further SLP f/u warranted at this time.     Plan of Care Reviewed With: patient  Plan of Care Review  Plan of Care Reviewed With: patient  Progress: no change       Time Calculation:     Therapy Charges for Today     Code Description Service Date Service Provider Modifiers Qty    60416698869 HC ST TREATMENT SWALLOW 1 5/21/2018 Thuy Price MA,CCC-SLP GN 1        SLP G-Codes  SLP NOMS Used?: Yes  Functional Limitations: Swallowing  Swallow Current Status (): 0 percent impaired, limited or restricted  Swallow Goal Status (): 0 percent impaired, limited or restricted  Swallow Discharge Status (): 0 percent impaired, limited or restricted    Thuy Price MA,CCC-SLP  5/21/2018

## 2018-05-21 NOTE — PROGRESS NOTES
"  I have personally seen and examined the patient today and discussed overnight interval progress and pertinent issues with nursing staff.    Subjective:    The patient was moved from CCU to PCU and is more awake and alert today tolerating his diet.  Continues of drainage from MISHEL drains.  No fever or diarrhea reported overnight.  On nasal cannula and seems to be breathing well with no shortness of breath or cough.  CRP level shown significant improvement from 11 down to 6.58 with improving leukocytosis as well.  Wife at bedside in case discussed with her.    History taken from: patient chart family RN      Vital Signs    /61 (BP Location: Right arm, Patient Position: Lying)   Pulse 71   Temp 98.6 °F (37 °C) (Oral)   Resp 18   Ht 177.8 cm (70\")   Wt 106 kg (233 lb 14.4 oz)   SpO2 93%   BMI 33.56 kg/m²     Temp:  [98 °F (36.7 °C)-99.1 °F (37.3 °C)] 98.6 °F (37 °C)      Intake/Output Summary (Last 24 hours) at 05/21/18 1059  Last data filed at 05/21/18 1000   Gross per 24 hour   Intake          3108.33 ml   Output             1910 ml   Net          1198.33 ml     Intake & Output (last 3 days)       05/18 0701 - 05/19 0700 05/19 0701 - 05/20 0700 05/20 0701 - 05/21 0700 05/21 0701 - 05/22 0700    P.O.   600 240    I.V. (mL/kg) 2301.2 (21.9) 2991.7 (28.5) 2328.3 (22)     IV Piggyback 300 300 300     Total Intake(mL/kg) 2601.2 (24.8) 3291.7 (31.3) 3228.3 (30.5) 240 (2.3)    Urine (mL/kg/hr) 850 (0.3) 1125 (0.4) 1275 (0.5) 325 (0.8)    Drains 55 (0) 170 (0.1) 310 (0.1)     Stool 0 (0) 4 (0)      Total Output 905 1299 1585 325    Net +1696.2 +1992.7 +1643.3 -85            Unmeasured Stool Occurrence 2 x 5 x            Physical Exam:                 General Appearance:   More awake, high flow oxygen, comfortable wife at bedside    Head:    Normocephalic, without obvious abnormality, atraumatic   Eyes:            Lids and lashes normal, conjunctivae and sclerae normal, no   icterus, no pallor, corneas clear, " PERRLA   Ears:    Ears appear intact with no abnormalities noted   Throat:   No oral lesions, no thrush, oral mucosa moist   Neck:   No adenopathy, supple, trachea midline, no thyromegaly, no   carotid bruit, no JVD   Back:     No tenderness to percussion or palpation, range of motion   normal   Lungs:     Clear to auscultation,respirations regular, even and unlabored. No wheezing, no ronchi and no crackles.    Heart:    Regular rhythm and normal rate, normal S1 and S2, no            murmur, no gallop, no rub, no click   Chest Wall:    No abnormalities observed   Abdomen:     Distended, Normal bowel sounds, no masses, no organomegaly, soft, non-tender, no guarding, no rebound tenderness   Rectal:     Deferred   Extremities:   Moves all extremities well, no edema, no cyanosis, no             redness   Pulses:   Pulses palpable and equal bilaterally   Skin:   No bleeding, bruising or rash   Lymph nodes:   No palpable adenopathy   Neurologic:   More awake and alert         Results:      Results from last 7 days  Lab Units 05/21/18  0142 05/20/18  0123 05/19/18  0130 05/18/18  0104 05/17/18  0345 05/16/18  0757   WBC 10*3/mm3 14.53* 15.13* 24.91* 19.50* 17.91* 26.67*     Lab Results   Component Value Date    NEUTROABS 10.48 (H) 05/21/2018         Results from last 7 days  Lab Units 05/21/18  0142   CREATININE mg/dL 0.60         Results from last 7 days  Lab Units 05/21/18  0142 05/20/18  0123 05/19/18  0131   CRP mg/dL 6.58* 11.52* 19.59*     Results Review:    I have personally reviewed laboratory data, culture results, radiology studies and antimicrobial therapy.    Hospital Medications (active)       Dose Frequency Start End    aspirin EC tablet 81 mg 81 mg Daily 5/16/2018     Sig - Route: Take 1 tablet by mouth Daily. - Oral    cetirizine (zyrTEC) tablet 10 mg 10 mg Daily 5/16/2018     Sig - Route: Take 1 tablet by mouth Daily. - Oral    chlorhexidine (PERIDEX) 0.12 % solution 15 mL 15 mL Every 12 Hours Scheduled  5/17/2018     Sig - Route: Apply 15 mL to the mouth or throat Every 12 (Twelve) Hours. - Mouth/Throat    cholecalciferol (VITAMIN D3) capsule 50,000 Units 50,000 Units Weekly 5/21/2018     Sig - Route: Take 1 capsule by mouth 1 (One) Time Per Week. - Oral    dexmedetomidine HCl (PRECEDEX) 400 mcg in sodium chloride 0.9 % 100 mL (4 mcg/mL) infusion 0.2-1.5 mcg/kg/hr × 105 kg Titrated 5/18/2018     Sig - Route: Infuse .5 mcg/hr into a venous catheter Dose Adjusted By Provider As Needed. - Intravenous    docusate sodium (COLACE) capsule 100 mg 100 mg 2 Times Daily 5/16/2018     Sig - Route: Take 1 capsule by mouth 2 (Two) Times a Day. - Oral    donepezil (ARICEPT) tablet 10 mg 10 mg Nightly 5/16/2018     Sig - Route: Take 2 tablets by mouth Every Night. - Oral    FENTANYL PCA 1500 MCG/30 ML (BHCOR) PCA  Titrated 5/17/2018 5/27/2018    Sig - Route: Infuse  into a venous catheter Dose Adjusted By Provider As Needed. - Intravenous    finasteride (PROSCAR) tablet 5 mg 5 mg Daily 5/16/2018     Sig - Route: Take 1 tablet by mouth Daily. - Oral    heparin (porcine) 5000 UNIT/ML injection 5,000 Units 5,000 Units Every 12 Hours Scheduled 5/17/2018     Sig - Route: Inject 1 mL under the skin Every 12 (Twelve) Hours. - Subcutaneous    HYDROcodone-acetaminophen (NORCO) 7.5-325 MG per tablet 1 tablet 1 tablet Every 4 Hours PRN 5/16/2018 5/26/2018    Sig - Route: Take 1 tablet by mouth Every 4 (Four) Hours As Needed for Moderate Pain . - Oral    ipratropium (ATROVENT) nebulizer solution 0.5 mg 0.5 mg Every 6 Hours - RT 5/17/2018     Sig - Route: Take 2.5 mL by nebulization Every 6 (Six) Hours. - Nebulization    lansoprazole (FIRST) oral suspension 30 mg 30 mg Every Early Morning 5/17/2018     Sig - Route: 10 mL by Per G Tube route Every Morning. - Per G Tube    Magnesium Sulfate 2 gram / 50mL Infusion (GIVE X 3 BAGS TO EQUAL 6GM TOTAL DOSE) - Mg 1.1 - 1/5 mg/dl 2 g As Needed 5/17/2018     Sig - Route: Infuse 50 mL into a  "venous catheter As Needed (See Administration Instructions). - Intravenous    Linked Group 1:  \"Or\" Linked Group Details        Magnesium Sulfate 2 gram Bolus, followed by 8 gram infusion (total Mg dose 10 grams)- Mg less than or equal to 1mg/dL 2 g As Needed 5/17/2018     Sig - Route: Infuse 50 mL into a venous catheter As Needed (See Administration Instructions). - Intravenous    Linked Group 1:  \"Or\" Linked Group Details        Magnesium Sulfate 4 gram infusion- Mg 1.6-1.9 mg/dL 4 g As Needed 5/17/2018     Sig - Route: Infuse 100 mL into a venous catheter As Needed (See Administration Instructions). - Intravenous    Linked Group 1:  \"Or\" Linked Group Details        Magnesium Sulfate 4 gram infusion- Mg 1.6-1.9 mg/dL 4 g Once 5/17/2018 5/17/2018    Sig - Route: Infuse 100 mL into a venous catheter 1 (One) Time. - Intravenous    memantine (NAMENDA) tablet 10 mg 10 mg Every 12 Hours Scheduled 5/16/2018     Sig - Route: Take 1 tablet by mouth Every 12 (Twelve) Hours. - Oral    morphine injection 4 mg 4 mg Every 3 Hours PRN 5/16/2018     Sig - Route: Infuse 1 mL into a venous catheter Every 3 (Three) Hours As Needed for Severe Pain . - Intravenous    norepinephrine (LEVOPHED) 8,000 mcg in sodium chloride 0.9 % 250 mL (32 mcg/mL) infusion 0.02-0.3 mcg/kg/min × 95.3 kg Titrated 5/16/2018     Sig - Route: Infuse 1.906-28.59 mcg/min into a venous catheter Dose Adjusted By Provider As Needed. - Intravenous    ondansetron (ZOFRAN) injection 4 mg 4 mg Every 4 Hours PRN 5/16/2018     Sig - Route: Infuse 2 mL into a venous catheter Every 4 (Four) Hours As Needed for Nausea or Vomiting. - Intravenous    piperacillin-tazobactam (ZOSYN) 3.375 g/100 mL 0.9% NS IVPB (mbp) 3.375 g Every 8 Hours 5/16/2018 5/30/2018    Sig - Route: Infuse 100 mL into a venous catheter Every 8 (Eight) Hours. - Intravenous    polyethylene glycol (MIRALAX) powder 17 g 17 g 2 Times Daily 5/16/2018 5/20/2018    Sig - Route: Take 17 g by mouth 2 (Two) Times " "a Day. - Oral    potassium chloride (KLOR-CON) packet 40 mEq 40 mEq As Needed 5/17/2018     Sig - Route: Take 40 mEq by mouth As Needed (potassium replacement, see admin instructions). - Oral    Linked Group 2:  \"Or\" Linked Group Details        potassium chloride (MICRO-K) CR capsule 40 mEq 40 mEq As Needed 5/17/2018     Sig - Route: Take 4 capsules by mouth As Needed (potassium replacement.  see admin instructions). - Oral    Linked Group 2:  \"Or\" Linked Group Details        potassium chloride 10 mEq in 100 mL IVPB 10 mEq Every 1 Hour PRN 5/17/2018     Sig - Route: Infuse 100 mL into a venous catheter Every 1 (One) Hour As Needed (potassium protocol PERIPHERAL - see admin instructions). - Intravenous    Linked Group 2:  \"Or\" Linked Group Details        potassium phosphate 15 mmol in sodium chloride 0.9 % 100 mL infusion 15 mmol As Needed 5/17/2018     Sig - Route: Infuse 15 mmol into a venous catheter As Needed (Peripheral IV - Phosphorus 1.8 - 2.5). - Intravenous    Linked Group 3:  \"Or\" Linked Group Details        potassium phosphate 30 mmol in sodium chloride 0.9 % 250 mL infusion 30 mmol As Needed 5/17/2018     Sig - Route: Infuse 30 mmol into a venous catheter As Needed (Peripheral IV - Phosphorus 1.3 - 1.7). - Intravenous    Linked Group 3:  \"Or\" Linked Group Details        potassium phosphate 45 mmol in sodium chloride 0.9 % 500 mL infusion 45 mmol As Needed 5/17/2018     Sig - Route: Infuse 45 mmol into a venous catheter As Needed (Peripheral IV - Phosphorus Less Than 1.3). - Intravenous    Linked Group 3:  \"Or\" Linked Group Details        promethazine (PHENERGAN) 12.5 mg in sodium chloride 0.9 % 50 mL 12.5 mg Every 4 Hours PRN 5/16/2018     Sig - Route: Infuse 12.5 mg into a venous catheter Every 4 (Four) Hours As Needed for Nausea or Vomiting. - Intravenous    propofol (DIPRIVAN) infusion 10 mg/mL 100 mL 5-50 mcg/kg/min × 105 kg Titrated 5/17/2018     Sig - Route: Infuse 525-5,250 mcg/min into a venous " catheter Dose Adjusted By Provider As Needed. - Intravenous    sodium chloride 0.9 % bolus 1,000 mL 1,000 mL Once 5/16/2018     Sig - Route: Infuse 1,000 mL into a venous catheter 1 (One) Time. - Intravenous    sodium chloride 0.9 % flush 1-10 mL 1-10 mL As Needed 5/16/2018     Sig - Route: Infuse 1-10 mL into a venous catheter As Needed for Line Care. - Intravenous    Cosign for Ordering: Accepted by Evert Brown DO on 5/17/2018  8:54 AM    sodium chloride 0.9 % flush 1-10 mL 1-10 mL As Needed 5/16/2018     Sig - Route: Infuse 1-10 mL into a venous catheter As Needed for Line Care. - Intravenous    sodium chloride 0.9 % flush 10 mL 10 mL As Needed 5/16/2018     Sig - Route: Infuse 10 mL into a venous catheter As Needed for Line Care. - Intravenous    sodium chloride 0.9 % flush 10 mL 10 mL Every 12 Hours Scheduled 5/17/2018     Sig - Route: 10 mL by Intracatheter route Every 12 (Twelve) Hours. - Intracatheter    sodium chloride 0.9 % flush 10 mL 10 mL As Needed 5/17/2018     Sig - Route: 10 mL by Intracatheter route As Needed for Line Care (After Medication Administration or Blood Draw). - Intracatheter    sodium chloride 0.9 % flush 10 mL 10 mL As Needed 5/17/2018     Sig - Route: 10 mL by Intracatheter route As Needed for Line Care (After Medication Administration or Blood Draw). - Intracatheter    sodium chloride 0.9 % flush 10 mL 10 mL As Needed 5/17/2018     Sig - Route: 10 mL by Intracatheter route As Needed for Line Care (After Medication Administration or Blood Draw). - Intracatheter    sodium chloride 0.9 % flush 10 mL 10 mL As Needed 5/17/2018     Sig - Route: 10 mL by Intracatheter route As Needed for Line Care (After Medication Administration or Blood Draw). - Intracatheter    sodium chloride 0.9 % flush 10 mL 10 mL As Needed 5/17/2018     Sig - Route: 10 mL by Intracatheter route As Needed for Line Care (After Medication Administration or Blood Draw). - Intracatheter    sodium chloride 0.9 %  "infusion 100 mL/hr Continuous 5/17/2018     Sig - Route: Infuse 100 mL/hr into a venous catheter Continuous. - Intravenous    sodium phosphates 15 mmol in sodium chloride 0.9 % 250 mL IVPB 15 mmol As Needed 5/17/2018     Sig - Route: Infuse 15 mmol into a venous catheter As Needed (Peripheral IV - Phosphorus 1.8 - 2.5 & Potassium Greater Than 4). - Intravenous    Linked Group 3:  \"Or\" Linked Group Details        sodium phosphates 30 mmol in sodium chloride 0.9 % 250 mL IVPB 30 mmol As Needed 5/17/2018     Sig - Route: Infuse 30 mmol into a venous catheter As Needed (Peripheral IV - Phosphorus 1.3-1.7 & Potassium Greater Than 4). - Intravenous    Linked Group 3:  \"Or\" Linked Group Details        sodium phosphates 45 mmol in sodium chloride 0.9 % 500 mL IVPB 45 mmol As Needed 5/17/2018     Sig - Route: Infuse 45 mmol into a venous catheter As Needed (Peripheral IV - Phosphorus Less Than 1.3 & Potassium Greater Than 4). - Intravenous    Linked Group 3:  \"Or\" Linked Group Details        vancomycin (VANCOCIN) 1,250 mg in sodium chloride 0.9 % 250 mL IVPB (Discontinued) 1,250 mg Every 18 Hours 5/17/2018 5/17/2018    Sig - Route: Infuse 1,250 mg into a venous catheter Every 18 (Eighteen) Hours. - Intravenous            Cultures:    Blood Culture   Date Value Ref Range Status   05/16/2018 No growth at 2 days  Preliminary   05/16/2018 No growth at 2 days  Preliminary           Assessment/Plan     ASSESSMENT:    1.  Septic shock on Levophed  2.  Peritonitis     PLAN:    The patient was moved from CCU to PCU and is more awake and alert today tolerating his diet.  Continues of drainage from MISHEL drains.  No fever or diarrhea reported overnight.  On nasal cannula and seems to be breathing well with no shortness of breath or cough.  CRP level shown significant improvement from 11 down to 6.58 with improving leukocytosis as well.  Wife at bedside in case discussed with her.    Recommend to continue Rocephin and Flagyl for now with " hope to de-escalate soon.   Culture finalized as pansusceptible Escherichia coli.     Current Antimicrobials:    Rocephin 2 g IV every 24 hours and Flagyl 500 g iv every 8 hours (de-escalated from Zosyn on 5/20/18)      Patient's findings and recommendations were discussed with family and nursing staff    Code Status: Full Code    Kerri Fajardo PA-C  05/21/18  10:59 AM

## 2018-05-21 NOTE — PLAN OF CARE
Problem: Fall Risk (Adult)  Goal: Identify Related Risk Factors and Signs and Symptoms  Outcome: Ongoing (interventions implemented as appropriate)    Goal: Absence of Fall  Outcome: Ongoing (interventions implemented as appropriate)      Problem: Wound (Includes Pressure Injury) (Adult)  Goal: Signs and Symptoms of Listed Potential Problems Will be Absent, Minimized or Managed (Wound)  Outcome: Ongoing (interventions implemented as appropriate)      Problem: Breathing Pattern Ineffective (Adult)  Goal: Identify Related Risk Factors and Signs and Symptoms  Outcome: Ongoing (interventions implemented as appropriate)    Goal: Effective Oxygenation/Ventilation  Outcome: Ongoing (interventions implemented as appropriate)    Goal: Anxiety/Fear Reduction  Outcome: Ongoing (interventions implemented as appropriate)      Problem: Sepsis/Septic Shock (Adult)  Goal: Signs and Symptoms of Listed Potential Problems Will be Absent, Minimized or Managed (Sepsis/Septic Shock)  Outcome: Ongoing (interventions implemented as appropriate)      Problem: Skin Injury Risk (Adult)  Goal: Skin Health and Integrity  Outcome: Ongoing (interventions implemented as appropriate)

## 2018-05-21 NOTE — PROGRESS NOTES
[facility]    Name:  Porsha Soni   Age:  77 y.o.  Sex:  male  :  1940  MRN:  [id number]   Visit Number:  [id number]  Admission Date:  2018  Date Of Service:  18  Primary Care Physician:  SIMON Reynolds     LOS: 5 days :  Patient Care Team:  SIMON Reynolds as PCP - General  SIMON Reynolds as PCP - Family Medicine  Dwayne Swartz MD as PCP - Claims Attributed:    Chief Complaint:      F/U of Cholecystitis/Sepsis/Acute Resp Failure with Hypoxia    Subjective / Interval History:     I have reviewed labs/imaging/records from this hospitalization, including ER staff and admitting/attending physicians H/P's and progress notes to establish a comprehensive understanding of this patient's clinical hospital course, as well as to establish a transition of care appropriately.    Patient is a 77-year-old male who was admitted secondary to cholecystitis, social without perforation with abscess formation.  He underwent open cholecystectomy by general surgery, he is now postop day #5.  He continues to receive IV Rocephin 2 g IV daily as well as 500 mg metronidazole IV every 8 hours.  Infectious disease consultation has been obtained and this was the recommendation.  Patient developed some altered mental status post surgically, felt likely be secondary to acute metabolic encephalopathy as a result of sepsis and his cholecystitis.  He is remained afebrile for the last 24 hours.  Vital signs otherwise hemodynamically stable.  He has had no intolerance to his current antibiotic regimen.  He did tolerate oral intake this morning at the direction of surgery.  Without nausea or emesis.  He is maintaining good urine output, continues to have Ambrocio catheter in place.  No reports of gross hematuria.  He did have a bowel movement this morning, denies any bright red blood or black or tarry qualities.  He denies any active chest pain.  He states  his shortness of breath is improving considerably.  He has continued to use incentive spirometry as directed.  Nursing reports no significant drainage from his abdominal wound.  MISHEL drain in place and has been drained periodically by nursing as per protocol.  Nursing describes serosanguineous drainage at all times.  Patient has not been out of bed essentially through the weekend, with exception of personal care while in bed.    Review of Systems:     General ROS: Patient denies any fevers, chills or loss of consciousness.  Respiratory ROS: Occasional cough, no hemoptysis.  Cardiovascular ROS: Denies chest pain or palpitations. No history of exertional chest pain.  Gastrointestinal ROS: Denies nausea and vomiting.  Mild, generalized abdominal discomfort that is improving daily.. No diarrhea, no bright red blood or black or tarry stools.  Neurological ROS: Generalized weakness. No loss of consciousness. Denies any numbness. Denies neck pain.  Dermatological ROS: No reports of streaking erythema from abdominal wound..    Vital Signs:    Temp:  [98 °F (36.7 °C)-99.1 °F (37.3 °C)] 98.6 °F (37 °C)  Heart Rate:  [68-82] 71  Resp:  [16-22] 18  BP: ()/(51-65) 105/61    Intake and output:    I/O last 3 completed shifts:  In: 4328.3 [P.O.:600; I.V.:3328.3; IV Piggyback:400]  Out: 2245 [Urine:1825; Drains:420]  I/O this shift:  In: 240 [P.O.:240]  Out: 150 [Urine:150]    Physical Examination:    General Appearance:  Alert and cooperative, not in any acute distress.  Moderately ill-appearing gentleman, he is pleasant and interactive during questioning and examination.     Head:  Atraumatic and normocephalic, without obvious abnormality.   Eyes:          PERRLA, conjunctivae and sclerae normal, no Icterus. No pallor. Extra-occular movements are within normal limits.   Neck: Supple, trachea midline, no thyromegaly, no carotid bruit.   Lungs:   Chest shape is normal. Breath sounds heard bilaterally equally.  No crackles or  wheezing. No Pleural rub or bronchial breathing.   Heart:  Normal S1 and S2, no murmur, no gallop, no rub. No JVD   Abdomen:   Normal bowel sounds x 4, no masses, no organomegaly. Soft, mild tenderness generalized in the abdomen, no areas of focal location non-distended, no guarding, no rebound tenderness.  Excellent anatomical restoration of wound edges, well approximated.  Clean dry incision on removal of managing with assistance of nursing.  MISHEL drain shows approximately 25-30 ML's of serosanguineous drainage.     Extremities: No edema, no cyanosis, no clubbing.  Generalized weakness of frail nature, moves all extremities equally.  SCUDs.   Skin: No bleeding.  Clean dry incision.  Expected postsurgical changes noted to the abdomen.     Neurologic: Awake, alert and oriented times 3. Moves all 4 extremities equally.   Laboratory results:      Results from last 7 days  Lab Units 05/21/18  0142 05/20/18  0123 05/19/18  0131   SODIUM mmol/L 142 140 138   POTASSIUM mmol/L 3.9 3.9 4.4   CHLORIDE mmol/L 115* 115* 111   CO2 mmol/L 25.4 24.6 22.6*   BUN mg/dL 18 27* 30*   CREATININE mg/dL 0.60 0.70 0.91   CALCIUM mg/dL 7.3* 7.3* 7.6*   BILIRUBIN mg/dL 0.4 0.4 0.6   ALK PHOS U/L 80 86 113   ALT (SGPT) U/L 36 43 59*   AST (SGOT) U/L 21 23 22   GLUCOSE mg/dL 103 122* 93       Results from last 7 days  Lab Units 05/21/18  0142 05/20/18  0123 05/19/18  0130   WBC 10*3/mm3 14.53* 15.13* 24.91*   HEMOGLOBIN g/dL 9.9* 9.4* 10.4*   HEMATOCRIT % 32.1* 30.8* 32.6*   PLATELETS 10*3/mm3 478* 492* 559*       Results from last 7 days  Lab Units 05/17/18  0345 05/16/18  0757   INR  1.73* 1.38*       Results from last 7 days  Lab Units 05/17/18  0909 05/17/18  0345 05/16/18  2132   CK TOTAL U/L 74  74 70 65   TROPONIN I ng/mL 0.025 0.034 0.026   CK MB INDEX % 2.1 2.8 3.0       Results from last 7 days  Lab Units 05/17/18  0001 05/16/18  0825 05/16/18  0820   BLOODCX   --  No growth at 5 days No growth at 5 days   URINECX  No growth  --    --            I have reviewed the patient's laboratory results.    Radiology results:    Imaging Results (last 24 hours)     ** No results found for the last 24 hours. **          I have reviewed the patient's radiology reports.    Medication Review:     I have reviewed the patients active and prn medications.         Assessment:  Principal Problem:    Cholecystitis  Active Problems:    Sepsis    Acute respiratory failure with hypoxia        Plan:  Continue IV fluid at same rate this morning.  Patient has not required any further IV pressure support.  His by mouth intake is improving.  Continue IV antibiotics, currently on IV Rocephin 2 g daily and metronidazole 500 mg IV every 8 hours.  Continue surveillance labs.  Wound appears to be healing very well.  No significant increase in drainage to the MISHEL drain.  I have encouraged patient to continue his incentive spirometry use at a rate of 6 times per hour while awake.  Continue oxygen supplementation at this time, will maintain the current simple nasal cannula supplementation.  Consult physical therapy/occupational therapy for progressive range of motion and up out of bed as tolerated today.  Given patient's severe physical deconditioning, he may be a candidate for skilled rehabilitation facility to home program at time of discharge.  I discussed plan of care in detail with patient today, he verbalizes understanding and agrees.  I've answered all of his family's questions at bedside additionally.  I discussed the plan of care in detail with his nurse at bedside.    I spent a total of 35 minutes in direct patient care time today.    Ace Horta DO  05/21/18  9:32 AM

## 2018-05-21 NOTE — THERAPY EVALUATION
Acute Care - Physical Therapy Initial Evaluation   Bang     Patient Name: Porsha Soni  : 1940  MRN: [id number]  Today's Date: 2018   Onset of Illness/Injury or Date of Surgery: 18  Date of Referral to PT: 18  Referring Physician: Dr. Horta      Admit Date: 2018    Visit Dx:     ICD-10-CM ICD-9-CM   1. Sepsis, due to unspecified organism A41.9 038.9     995.91   2. Acute pancreatitis, unspecified complication status, unspecified pancreatitis type K85.90 577.0   3. Leukocytosis, unspecified type D72.829 288.60   4. Cholecystitis K81.9 575.10     Patient Active Problem List   Diagnosis   • Insomnia   • Asymptomatic PVCs   • CAD in native artery   • Hypertension   • Dyslipidemia   • Palpitations   • Diarrhea of presumed infectious origin   • Bloating   • Elevated prostate specific antigen (PSA)   • Cholecystitis   • Sepsis   • Acute respiratory failure with hypoxia     Past Medical History:   Diagnosis Date   • Arthritis    • Cervical spine disease     remotely suggested surgical intervention.  Patient deferred.    • Cervical spine disease    • Coronary artery disease    • Dyslipidemia    • Hypertension    • Impotence    • Palpitations    • Vertigo      Past Surgical History:   Procedure Laterality Date   • ARTERIAL BYPASS SURGERY     • CHOLECYSTECTOMY WITH INTRAOPERATIVE CHOLANGIOGRAM N/A 2018    Procedure: OPEN CHOLECYSTECTOMY;  Surgeon: Rosie Montalvo MD;  Location: [facility];  Service: General   • COLONOSCOPY N/A 3/30/2018    Procedure: COLONOSCOPY;  Surgeon: Simone Valderrama MD;  Location: [facility];  Service: Gastroenterology   • CORONARY ARTERY BYPASS GRAFT     • KNEE ARTHROPLASTY, PARTIAL REPLACEMENT      Lt ,    • REPLACEMENT TOTAL KNEE      Rt        PT ASSESSMENT (last 12 hours)      Physical Therapy Evaluation     Row Name 18 1749          PT Evaluation Time/Intention    Subjective Information complains of;weakness;dizziness  -AG     Document Type  evaluation  -AG     Mode of Treatment individual therapy;physical therapy  -AG     Patient Effort adequate  -AG     Symptoms Noted During/After Treatment fatigue  -AG     Comment pt. exhibits significant functional mobility deficit following complex hospital stay and prolonged inactivity.  Mr. Soni will require skilled PT to return to optimum safe function.  -AG     Row Name 05/21/18 7637          General Information    Patient Profile Reviewed? yes  -AG     Onset of Illness/Injury or Date of Surgery 05/16/18  -AG     Referring Physician Dr. Horta  -AG     Patient Observations alert;cooperative;agree to therapy  -AG     Patient/Family Observations pt. in PCU bed with telemetry; scuds, IV, munoz catheter, nc O2, MISHEL drain.   -AG     Prior Level of Function dependent:  -AG     Equipment Currently Used at Home walker, rolling  -AG     Pertinent History of Current Functional Problem pt. presented with cholecystitis, found to be septic; hypotensive, required intubation and sent to CCU.  Spouse reports he had been getting progressively weaker leading up to this and she had been holding to him when he walked.  -AG     Existing Precautions/Restrictions fall;oxygen therapy device and L/min;other (see comments)   bulb drain; decr skin integrity  -AG     Limitations/Impairments safety/cognitive  -AG     Risks Reviewed patient:;spouse/S.O.:;LOB;dizziness;increased discomfort;change in vital signs  -AG     Benefits Reviewed patient:;spouse/S.O.:;improve function;increase independence;increase strength;increase balance;increase knowledge  -AG     Barriers to Rehab medically complex;previous functional deficit  -AG     Row Name 05/21/18 3090          Relationship/Environment    Primary Source of Support/Comfort spouse  -AG     Lives With spouse  -AG     Row Name 05/21/18 8373          Resource/Environmental Concerns    Current Living Arrangements home/apartment/condo  -AG     Row Name 05/21/18 4974          Home Main Entrance     Number of Stairs, Main Entrance one  -AG     Row Name 05/21/18 1749          Cognitive Assessment/Intervention- PT/OT    Orientation Status (Cognition) oriented x 3  -AG     Follows Commands (Cognition) follows one step commands;75-90% accuracy;physical/tactile prompts required;repetition of directions required;verbal cues/prompting required  -AG     Safety Deficit (Cognitive) awareness of need for assistance;safety precautions awareness;safety precautions follow-through/compliance  -AG     Row Name 05/21/18 1749          Safety Issues, Functional Mobility    Safety Issues Affecting Function (Mobility) ability to follow commands;insight into deficits/self awareness;safety precaution awareness;safety precautions follow-through/compliance  -AG     Impairments Affecting Function (Mobility) balance;cognition;coordination;endurance/activity tolerance;range of motion (ROM);strength  -AG     Row Name 05/21/18 1749          Mobility Assessment/Treatment    Extremity Weight-bearing Status left lower extremity;right lower extremity  -AG     Left Lower Extremity (Weight-bearing Status) weight-bearing as tolerated (WBAT)  -AG     Right Lower Extremity (Weight-bearing Status) weight-bearing as tolerated (WBAT)  -AG     Row Name 05/21/18 1749          Bed Mobility Assessment/Treatment    Bed Mobility Assessment/Treatment rolling left;rolling right;scooting/bridging;supine-sit;sit-supine  -AG     Rolling Left [place] (Bed Mobility) verbal cues;nonverbal cues (demo/gesture);maximum assist (25% patient effort);2 person assist  -AG     Rolling Right [place] (Bed Mobility) verbal cues;nonverbal cues (demo/gesture);maximum assist (25% patient effort);2 person assist  -AG     Scooting/Bridging [place] (Bed Mobility) verbal cues;nonverbal cues (demo/gesture);maximum assist (25% patient effort);2 person assist  -AG     Supine-Sit [place] (Bed Mobility) verbal cues;nonverbal cues (demo/gesture);maximum assist (25%  patient effort);2 person assist  -AG     Sit-Supine [place] (Bed Mobility) verbal cues;nonverbal cues (demo/gesture);maximum assist (25% patient effort);2 person assist  -AG     Bed Mobility, Safety Issues decreased use of arms for pushing/pulling;decreased use of legs for bridging/pushing;impaired trunk control for bed mobility  -AG     Row Name 05/21/18 1749          Transfer Assessment/Treatment    Transfer Assessment/Treatment bed-chair transfer;chair-bed transfer;sit-stand transfer;stand-sit transfer;stand pivot/stand step transfer  -     Bed-Chair [place] (Transfers) verbal cues;nonverbal cues (demo/gesture);maximum assist (25% patient effort);dependent (less than 25% patient effort);2 person assist  -AG     Chair-Bed [place] (Transfers) verbal cues;nonverbal cues (demo/gesture);maximum assist (25% patient effort);dependent (less than 25% patient effort);2 person assist  -AG     Assistive Device (Bed-Chair Transfers) walker, front-wheeled  -AG     Sit-Stand [place] (Transfers) verbal cues;nonverbal cues (demo/gesture);maximum assist (25% patient effort);2 person assist  -AG     Stand-Sit [place] (Transfers) verbal cues;nonverbal cues (demo/gesture);maximum assist (25% patient effort);dependent (less than 25% patient effort);2 person assist  -AG     Row Name 05/21/18 1749          Sit-Stand Transfer    Assistive Device (Sit-Stand Transfers) walker, front-wheeled  -AG     Row Name 05/21/18 1749          Stand-Sit Transfer    Assistive Device (Stand-Sit Transfers) walker, front-wheeled  -AG     Row Name 05/21/18 1749          Stand Pivot/Stand Step Transfer    Stand Pivot/Stand Step [place] verbal cues;nonverbal cues (demo/gesture);maximum assist (25% patient effort);dependent (less than 25% patient effort);2 person assist  -AG     Row Name 05/21/18 1749          Gait/Stairs Assessment/Training    [place] Level (Gait) unable to assess  -     Row Name 05/21/18 1749           General ROM    GENERAL ROM COMMENTS B LE AROM impaired  -AG     Row Name 05/21/18 1749          General Assessment (Manual Muscle Testing)    Comment, General Manual Muscle Testing (MMT) Assessment B LE 2+/5  -AG     [facility] Name 05/21/18 1749          Motor Assessment/Intervention    Additional Documentation Balance (Group);Balance Interventions (Group)  -AG     Row Name 05/21/18 1749          Balance    Balance static sitting balance  -[facility] Name 05/21/18 1749          Static Sitting Balance    Level of [place] (Unsupported Sitting, Static Balance) minimal assist, 75% patient effort;moderate assist, 50 to 74% patient effort  -AG     Sitting Position (Unsupported Sitting, Static Balance) sitting on edge of bed  -AG     Time Able to Maintain Position (Unsupported Sitting, Static Balance) 1 to 2 minutes  -[facility] Name 05/21/18 1749          Vision Assessment/Intervention    Visual Impairment/Limitations WFL  -AG     Row Name 05/21/18 1749          Pain Assessment    Additional Documentation Pain Scale: FACES Pre/Post-Treatment (Group)  -AG     Row Name 05/21/18 1749          Pain Scale: FACES Pre/Post-Treatment    Pain: FACES Scale, Pretreatment 0-->no hurt  -AG     Pain: FACES Scale, Post-Treatment 0-->no hurt  -AG     Row Name             Wound 05/16/18 1730 Other (See comments) abdomen incision    Wound - Properties Group Date first assessed: 05/16/18  -CE Time first assessed: 1730  -CE Side: Other (See comments)  -CE Location: abdomen  -CE Type: incision  -CE    Row Name             Wound 05/16/18 2100 Left medial gluteal pressure injury    Wound - Properties Group Date first assessed: 05/16/18  -VC Time first assessed: 2100  -VC Present On Admission : yes;picture taken  -VC Side: Left  -VC Orientation: medial  -VC Location: gluteal  -VC Type: pressure injury  -VC Stage, Pressure Injury: Stage 2  -VC    Row Name             Wound 05/16/18 2100 Right medial gluteal pressure injury    Wound - Properties  Group Date first assessed: 05/16/18  -VC Time first assessed: 2100  -VC Present On Admission : yes;picture taken  -VC Side: Right  -VC Orientation: medial  -VC Location: gluteal  -VC Type: pressure injury  -VC Stage, Pressure Injury: Stage 2  -VC    Row Name 05/21/18 1749          Coping    Observed Emotional State accepting;calm;cooperative  -AG     Verbalized Emotional State acceptance  -AG     Row Name 05/21/18 1749          Plan of Care Review    Plan of Care Reviewed With patient;spouse  -AG     Row Name 05/21/18 1749          Physical Therapy Clinical Impression    Date of Referral to PT 05/21/18  -AG     PT Diagnosis (PT Clinical Impression) decr functional mobility; B LE weakness  -AG     Prognosis (PT Clinical Impression) fair to good  -AG     Functional Level at Time of Evaluation (PT Clinical Impression) dep/ Max A x 2  -AG     Patient/Family Goals Statement (PT Clinical Impression) return to PLOF  -AG     Criteria for Skilled Interventions Met (PT Clinical Impression) yes;treatment indicated  -AG     Pathology/Pathophysiology Noted (Describe Specifically for Each System) musculoskeletal  -AG     Impairments Found (describe specific impairments) aerobic capacity/endurance;ergonomics and body mechanics;gait, locomotion, and balance;joint integrity and mobility  -AG     Functional Limitations in Following Categories (Describe Specific Limitations) self-care;community/leisure  -AG     Rehab Potential (PT Clinical Summary) good, to achieve stated therapy goals  -AG     Predicted Duration of Therapy (PT) hospital LOS  -AG     Care Plan Review (PT) evaluation/treatment results reviewed;care plan/treatment goals reviewed;risks/benefits reviewed;current/potential barriers reviewed;patient/other agree to care plan  -AG     Care Plan Review, Other Participant (PT Clinical Impression) spouse  -AG     Row Name 05/21/18 1749          Vital Signs    Intra Systolic BP Rehab 141  -AG     Intra Treatment Diastolic BP 83   -AG     Intratreatment Heart Rate (beats/min) 87  -AG     Pre SpO2 (%) 98  -AG     O2 Delivery Pre Treatment supplemental O2  -AG     Intra Patient Position Sitting  -AG     Row Name 05/21/18 1749          Physical Therapy Goals    Bed Mobility Goal Selection (PT) bed mobility, PT goal 1  -AG     Transfer Goal Selection (PT) transfer, PT goal 1;transfer, PT goal 2  -AG     Row Name 05/21/18 1749          Bed Mobility Goal 1 (PT)    Activity/Assistive Device (Bed Mobility Goal 1, PT) rolling to left;rolling to right;sit to supine/supine to sit  -AG     [place] Level/Cues Needed (Bed Mobility Goal 1, PT) moderate assist (50-74% patient effort);other (see comments)   x 2  -AG     Time Frame (Bed Mobility Goal 1, PT) by discharge  -AG     Row Name 05/21/18 1749          Transfer Goal 1 (PT)    Activity/Assistive Device (Transfer Goal 1, PT) sit-to-stand/stand-to-sit;walker, rolling  -AG     [place] Level/Cues Needed (Transfer Goal 1, PT) moderate assist (50-74% patient effort);other (see comments)   x 2  -AG     Time Frame (Transfer Goal 1, PT) by discharge  -AG     Row Name 05/21/18 1749          Transfer Goal 2 (PT)    Activity/Assistive Device (Transfer Goal 2, PT) bed-to-chair/chair-to-bed;walker, rolling  -AG     [place] Level/Cues Needed (Transfer Goal 2, PT) moderate assist (50-74% patient effort);maximum assist (25-49% patient effort);other (see comments)   x 2  -AG     Time Frame (Transfer Goal 2, PT) by discharge  -AG     Row Name 05/21/18 1749          Positioning and Restraints    Pre-Treatment Position in bed  -AG     Post Treatment Position chair  -AG     In Chair notified nsg;sitting;call light within reach;encouraged to call for assist;with family/caregiver   following first session; pt. sat up x 45 minutes  -AG     Row Name 05/21/18 1749          Living Environment    Home Accessibility stairs to enter home  -AG       User Key  (r) = Recorded By, (t) = Taken By, (c) = Cosigned By     Initials Name Provider Type    VC Dax Briseyda Díaz, RN Registered Nurse     Cindy Bell, PT Physical Therapist    KEITH Renee, RN Registered Nurse          Physical Therapy Education     Title: PT OT SLP Therapies (Active)     Topic: Physical Therapy (Active)     Point: Mobility training (Active)    Learning Progress Summary     Learner Status Readiness Method Response Comment Documented by    Patient Active Acceptance D NR  AG 05/21/18 1805    Significant Other Done Acceptance E,D VU,NR   05/21/18 1805          Point: Home exercise program (Active)    Learning Progress Summary     Learner Status Readiness Method Response Comment Documented by    Patient Active Acceptance D NR   05/21/18 1805    Significant Other Done Acceptance E,D VU,NR   05/21/18 1805          Point: Body mechanics (Active)    Learning Progress Summary     Learner Status Readiness Method Response Comment Documented by    Patient Active Acceptance D NR   05/21/18 1805    Significant Other Done Acceptance E,D VU,NR  AG 05/21/18 1805          Point: Precautions (Active)    Learning Progress Summary     Learner Status Readiness Method Response Comment Documented by    Patient Active Acceptance D NR  AG 05/21/18 1805    Significant Other Done Acceptance E,D VU,NR   05/21/18 1805                      User Key     Initials Effective Dates Name Provider Type Discipline     04/03/18 -  Cindy Bell, PT Physical Therapist PT                PT Recommendation and Plan  Anticipated Discharge Disposition (PT): inpatient rehab facility  Therapy Frequency (PT Clinical Impression): 3 times/wk (3-5 times/ week per priority policy)  Outcome Summary/Treatment Plan (PT)  Anticipated Equipment Needs at Discharge (PT): wheelchair (TBD)  Anticipated Discharge Disposition (PT): inpatient rehab facility  Plan of Care Reviewed With: patient, spouse          Outcome Measures     Row Name 05/21/18 1800             How much help from another  person do you currently need...    Turning from your back to your side while in flat bed without using bedrails? 2  -AG      Moving from lying on back to sitting on the side of a flat bed without bedrails? 2  -AG      Moving to and from a bed to a chair (including a wheelchair)? 1  -AG      Standing up from a chair using your arms (e.g., wheelchair, bedside chair)? 1  -AG      Climbing 3-5 steps with a railing? 1  -AG      To walk in hospital room? 1  -AG      AM-PAC 6 Clicks Score 8  -AG         Functional Assessment    Outcome Measure Options AM-PAC 6 Clicks Basic Mobility (PT)  -AG        User Key  (r) = Recorded By, (t) = Taken By, (c) = Cosigned By    Initials Name Provider Type     Cindy Bell, PT Physical Therapist           Time Calculation:         PT Charges     Row Name 05/21/18 1806             Time Calculation    PT Received On 05/21/18  -      PT - Next Appointment 05/22/18  -      PT Goal Re-Cert Due Date 06/04/18  -         Time Calculation- PT    TCU Minutes- PT 45 min  -AG        User Key  (r) = Recorded By, (t) = Taken By, (c) = Cosigned By    Initials Name Provider Type     Cindy Bell PT Physical Therapist          Therapy Charges for Today     Code Description Service Date Service Provider Modifiers Qty    58789111054 HC PT MOBILITY CURRENT 5/21/2018 Cindy Bell, PT GP, CM 1    25868226105 HC PT MOBILITY PROJECTED 5/21/2018 Cindy Bell, PT GP, CL 1    34076613471 HC PT EVAL HIGH COMPLEXITY 1 5/21/2018 Cindy Bell, PT GP 1    60717486965 HC PT THERAPEUTIC ACT EA 15 MIN 5/21/2018 Cindy Bell, PT GP 2    24905260720 HC PT THER SUPP EA 15 MIN 5/21/2018 Cindy Bell, PT GP 3          PT G-Codes  Outcome Measure Options: AM-PAC 6 Clicks Basic Mobility (PT)  Score: 8  Functional Limitation: Mobility: Walking and moving around  Mobility: Walking and Moving Around Current Status (): At least 80 percent but less than 100 percent impaired, limited or restricted  Mobility:  Walking and Moving Around Goal Status (): At least 60 percent but less than 80 percent impaired, limited or restricted      Cindy eBll, PT  5/21/2018

## 2018-05-21 NOTE — PROGRESS NOTES
LOS: 5 days   Patient Care Team:  SIMON Reynolds as PCP - General  SIMON Reynolds as PCP - Family Medicine  Dwayne Swartz MD as PCP - Claims Attributed    Post op day # 5, status post open cholecystectomy for perforated GB    Subjective     Interval History:  Patient transferred to PCU.  More awake.  Tolerating diet.  BM+, flatus +.  Has not been out of bed, munoz still in.  History taken nursing, wife.      Objective     Vital Signs  Temp:  [98 °F (36.7 °C)-99.1 °F (37.3 °C)] 98.6 °F (37 °C)  Heart Rate:  [68-82] 71  Resp:  [16-22] 18  BP: ()/(51-65) 126/65    Physical Exam:  This is a well-developed well-nourished  white male in no acute distress  HEENT examination: Sclera are anicteric  Lungs: Clear  Abdomen: Abdomen is obese and distended.  bowel sounds acyive  Skin/incisions: Surgical dressing dry.  MISHEL with serosanguineous fluid, no bile     Results Review:      Results from last 7 days  Lab Units 05/17/18  0909 05/17/18  0345 05/16/18  2132   CK TOTAL U/L 74  74 70 65   CKMB ng/mL 1.52 1.93 1.92   CK MB INDEX % 2.1 2.8 3.0   TROPONIN I ng/mL 0.025 0.034 0.026       Results from last 7 days  Lab Units 05/21/18  0142 05/20/18  0123 05/19/18  0131 05/19/18  0130  05/17/18  0909 05/17/18  0345 05/16/18  2309 05/16/18  1218  05/16/18  0757   CRP mg/dL 6.58* 11.52* 19.59*  --   < >  --  22.97*  --   --   --   --    LACTATE mmol/L  --   --   --   --   --  1.3  --  2.4* 3.0*  < >  --    WBC 10*3/mm3 14.53* 15.13*  --  24.91*  < >  --  17.91*  --   --   --  26.67*   HEMOGLOBIN g/dL 9.9* 9.4*  --  10.4*  < >  --  12.0*  --   --   --  14.4   HEMATOCRIT % 32.1* 30.8*  --  32.6*  < >  --  35.4*  --   --   --  42.9   PLATELETS 10*3/mm3 478* 492*  --  559*  < >  --  459*  --   --   --  567*   INR   --   --   --   --   --   --  1.73*  --   --   --  1.38*   < > = values in this interval not displayed.    Results from last 7 days  Lab Units 05/19/18  0450   PH, ARTERIAL pH units 7.328*   PO2  ART mm Hg 75.1*   PCO2, ARTERIAL mm Hg 43.3   HCO3 ART mmol/L 22.2       Results from last 7 days  Lab Units 05/21/18  0142 05/20/18  0123 05/19/18  0131   SODIUM mmol/L 142 140 138   POTASSIUM mmol/L 3.9 3.9 4.4   MAGNESIUM mg/dL 2.2 2.6 2.8*   CHLORIDE mmol/L 115* 115* 111   CO2 mmol/L 25.4 24.6 22.6*   BUN mg/dL 18 27* 30*   CREATININE mg/dL 0.60 0.70 0.91   EGFR IF NONAFRICN AM mL/min/1.73 130 109 81   CALCIUM mg/dL 7.3* 7.3* 7.6*   GLUCOSE mg/dL 103 122* 93   ALBUMIN g/dL 2.40* 2.30* 2.60*   BILIRUBIN mg/dL 0.4 0.4 0.6   ALK PHOS U/L 80 86 113   AST (SGOT) U/L 21 23 22   ALT (SGPT) U/L 36 43 59*   Estimated Creatinine Clearance: 92.8 mL/min (by C-G formula based on SCr of 0.6 mg/dL).  Ammonia   Date Value Ref Range Status   05/18/2018 22 16 - 60 umol/L Final         Blood Culture   Date Value Ref Range Status   05/16/2018 No growth at 24 hours  Preliminary   05/16/2018 No growth at 24 hours  Preliminary                Imaging:  Imaging Results (last 24 hours)     ** No results found for the last 24 hours. **           Impression:  Status post open cholecystectomy for perforated gallbladder with septic shock - ecoli  Improving    Plan:  Physicial therapy  Stop flagyl based on culture results.    Errors end dictation may reflect use of voice recognition software and not all errors in transcription may have been detected prior to signing.  Rosie Montalvo MD  05/21/18  11:40 AM

## 2018-05-21 NOTE — PROGRESS NOTES
Discharge Planning Assessment   Bang     Patient Name: Porsha Soni  MRN: [id number]  Today's Date: 5/21/2018    Admit Date: 5/16/2018         Discharge Plan     Row Name 05/21/18 1417       Plan    Plan SS spoke with pt's spouse and she states the pt plans to return home at discharge.  However, she would like the pt to be stronger before returning home.  SS discussed swing bed with the pt's spouse and she is agreeable if needed.  SS will continue to follow.          Expected Discharge Date and Time     Expected Discharge Date Expected Discharge Time    May 27, 2018               Cindy Esparza

## 2018-05-21 NOTE — PLAN OF CARE
Problem: Patient Care Overview  Goal: Plan of Care Review  Outcome: Outcome(s) achieved Date Met: 05/21/18 05/21/18 8431   Coping/Psychosocial   Plan of Care Reviewed With patient   Plan of Care Review   Progress no change    F/u for diet tolerance. Pt continues to tolerate least restrictive po diet w/o overt s/s aspiration. No further SLP f/u warranted at this time.

## 2018-05-22 ENCOUNTER — APPOINTMENT (OUTPATIENT)
Dept: GENERAL RADIOLOGY | Facility: HOSPITAL | Age: 78
End: 2018-05-22

## 2018-05-22 PROBLEM — Z74.09 IMPAIRED MOBILITY AND ADLS: Status: ACTIVE | Noted: 2018-05-22

## 2018-05-22 PROBLEM — Z78.9 IMPAIRED MOBILITY AND ADLS: Status: ACTIVE | Noted: 2018-05-22

## 2018-05-22 PROBLEM — R53.81 PHYSICAL DECONDITIONING: Status: ACTIVE | Noted: 2018-05-22

## 2018-05-22 LAB
ALBUMIN SERPL-MCNC: 2.3 G/DL (ref 3.4–4.8)
ALBUMIN/GLOB SERPL: 1.2 G/DL (ref 1.5–2.5)
ALP SERPL-CCNC: 81 U/L (ref 40–129)
ALT SERPL W P-5'-P-CCNC: 30 U/L (ref 10–44)
ANION GAP SERPL CALCULATED.3IONS-SCNC: 3 MMOL/L (ref 3.6–11.2)
AST SERPL-CCNC: 22 U/L (ref 10–34)
BASOPHILS # BLD AUTO: 0.03 10*3/MM3 (ref 0–0.3)
BASOPHILS NFR BLD AUTO: 0.2 % (ref 0–2)
BILIRUB SERPL-MCNC: 0.4 MG/DL (ref 0.2–1.8)
BUN BLD-MCNC: 15 MG/DL (ref 7–21)
BUN/CREAT SERPL: 21.7 (ref 7–25)
CALCIUM SPEC-SCNC: 7.6 MG/DL (ref 7.7–10)
CHLORIDE SERPL-SCNC: 111 MMOL/L (ref 99–112)
CO2 SERPL-SCNC: 29 MMOL/L (ref 24.3–31.9)
CREAT BLD-MCNC: 0.69 MG/DL (ref 0.43–1.29)
CRP SERPL-MCNC: 6.21 MG/DL (ref 0–0.99)
DEPRECATED RDW RBC AUTO: 53.1 FL (ref 37–54)
EOSINOPHIL # BLD AUTO: 0.4 10*3/MM3 (ref 0–0.7)
EOSINOPHIL NFR BLD AUTO: 3.2 % (ref 0–7)
ERYTHROCYTE [DISTWIDTH] IN BLOOD BY AUTOMATED COUNT: 14.3 % (ref 11.5–14.5)
GFR SERPL CREATININE-BSD FRML MDRD: 111 ML/MIN/1.73
GLOBULIN UR ELPH-MCNC: 2 GM/DL
GLUCOSE BLD-MCNC: 110 MG/DL (ref 70–110)
HCT VFR BLD AUTO: 35.2 % (ref 42–52)
HGB BLD-MCNC: 10.7 G/DL (ref 14–18)
IMM GRANULOCYTES # BLD: 0.1 10*3/MM3 (ref 0–0.03)
IMM GRANULOCYTES NFR BLD: 0.8 % (ref 0–0.5)
LAB AP CASE REPORT: NORMAL
LYMPHOCYTES # BLD AUTO: 1.72 10*3/MM3 (ref 1–3)
LYMPHOCYTES NFR BLD AUTO: 13.6 % (ref 16–46)
Lab: NORMAL
MCH RBC QN AUTO: 31.5 PG (ref 27–33)
MCHC RBC AUTO-ENTMCNC: 30.4 G/DL (ref 33–37)
MCV RBC AUTO: 103.5 FL (ref 80–94)
MONOCYTES # BLD AUTO: 1.29 10*3/MM3 (ref 0.1–0.9)
MONOCYTES NFR BLD AUTO: 10.2 % (ref 0–12)
NEUTROPHILS # BLD AUTO: 9.14 10*3/MM3 (ref 1.4–6.5)
NEUTROPHILS NFR BLD AUTO: 72 % (ref 40–75)
OSMOLALITY SERPL CALC.SUM OF ELEC: 286.4 MOSM/KG (ref 273–305)
PATH REPORT.FINAL DX SPEC: NORMAL
PLATELET # BLD AUTO: 459 10*3/MM3 (ref 130–400)
PMV BLD AUTO: 8.6 FL (ref 6–10)
POTASSIUM BLD-SCNC: 4.3 MMOL/L (ref 3.5–5.3)
PROT SERPL-MCNC: 4.3 G/DL (ref 6–8)
RBC # BLD AUTO: 3.4 10*6/MM3 (ref 4.7–6.1)
SODIUM BLD-SCNC: 143 MMOL/L (ref 135–153)
WBC NRBC COR # BLD: 12.68 10*3/MM3 (ref 4.5–12.5)

## 2018-05-22 PROCEDURE — 97530 THERAPEUTIC ACTIVITIES: CPT

## 2018-05-22 PROCEDURE — 25010000002 FUROSEMIDE PER 20 MG

## 2018-05-22 PROCEDURE — 85025 COMPLETE CBC W/AUTO DIFF WBC: CPT | Performed by: FAMILY MEDICINE

## 2018-05-22 PROCEDURE — 71045 X-RAY EXAM CHEST 1 VIEW: CPT | Performed by: RADIOLOGY

## 2018-05-22 PROCEDURE — 25010000002 FUROSEMIDE PER 20 MG: Performed by: FAMILY MEDICINE

## 2018-05-22 PROCEDURE — 94799 UNLISTED PULMONARY SVC/PX: CPT

## 2018-05-22 PROCEDURE — 99233 SBSQ HOSP IP/OBS HIGH 50: CPT | Performed by: FAMILY MEDICINE

## 2018-05-22 PROCEDURE — 71045 X-RAY EXAM CHEST 1 VIEW: CPT

## 2018-05-22 PROCEDURE — 97110 THERAPEUTIC EXERCISES: CPT

## 2018-05-22 PROCEDURE — 25010000002 HEPARIN (PORCINE) PER 1000 UNITS: Performed by: SURGERY

## 2018-05-22 PROCEDURE — 80053 COMPREHEN METABOLIC PANEL: CPT | Performed by: FAMILY MEDICINE

## 2018-05-22 PROCEDURE — 99024 POSTOP FOLLOW-UP VISIT: CPT | Performed by: SURGERY

## 2018-05-22 PROCEDURE — 86140 C-REACTIVE PROTEIN: CPT | Performed by: FAMILY MEDICINE

## 2018-05-22 PROCEDURE — 25010000002 CEFTRIAXONE: Performed by: PHYSICIAN ASSISTANT

## 2018-05-22 RX ORDER — FUROSEMIDE 10 MG/ML
20 INJECTION INTRAMUSCULAR; INTRAVENOUS ONCE
Status: COMPLETED | OUTPATIENT
Start: 2018-05-22 | End: 2018-05-22

## 2018-05-22 RX ORDER — FUROSEMIDE 10 MG/ML
INJECTION INTRAMUSCULAR; INTRAVENOUS
Status: COMPLETED
Start: 2018-05-22 | End: 2018-05-22

## 2018-05-22 RX ORDER — L.ACID,PARA/B.BIFIDUM/S.THERM 8B CELL
1 CAPSULE ORAL DAILY
Status: DISCONTINUED | OUTPATIENT
Start: 2018-05-22 | End: 2018-05-23 | Stop reason: HOSPADM

## 2018-05-22 RX ADMIN — Medication 1 CAPSULE: at 14:32

## 2018-05-22 RX ADMIN — FUROSEMIDE 20 MG: 10 INJECTION, SOLUTION INTRAMUSCULAR; INTRAVENOUS at 10:28

## 2018-05-22 RX ADMIN — DOCUSATE SODIUM 100 MG: 100 CAPSULE, LIQUID FILLED ORAL at 10:16

## 2018-05-22 RX ADMIN — SODIUM CHLORIDE 50 ML/HR: 9 INJECTION, SOLUTION INTRAVENOUS at 14:33

## 2018-05-22 RX ADMIN — Medication 10 ML: at 21:57

## 2018-05-22 RX ADMIN — MEMANTINE HYDROCHLORIDE 10 MG: 10 TABLET, FILM COATED ORAL at 10:16

## 2018-05-22 RX ADMIN — IPRATROPIUM BROMIDE 0.5 MG: 0.5 SOLUTION RESPIRATORY (INHALATION) at 00:18

## 2018-05-22 RX ADMIN — HEPARIN SODIUM 5000 UNITS: 5000 INJECTION, SOLUTION INTRAVENOUS; SUBCUTANEOUS at 10:16

## 2018-05-22 RX ADMIN — HEPARIN SODIUM 5000 UNITS: 5000 INJECTION, SOLUTION INTRAVENOUS; SUBCUTANEOUS at 21:57

## 2018-05-22 RX ADMIN — DOCUSATE SODIUM 100 MG: 100 CAPSULE, LIQUID FILLED ORAL at 21:56

## 2018-05-22 RX ADMIN — PANTOPRAZOLE SODIUM 40 MG: 40 TABLET, DELAYED RELEASE ORAL at 05:43

## 2018-05-22 RX ADMIN — ASPIRIN 81 MG: 81 TABLET ORAL at 10:17

## 2018-05-22 RX ADMIN — IPRATROPIUM BROMIDE 0.5 MG: 0.5 SOLUTION RESPIRATORY (INHALATION) at 14:09

## 2018-05-22 RX ADMIN — DONEPEZIL HYDROCHLORIDE 10 MG: 5 TABLET, FILM COATED ORAL at 21:56

## 2018-05-22 RX ADMIN — MEMANTINE HYDROCHLORIDE 10 MG: 10 TABLET, FILM COATED ORAL at 21:56

## 2018-05-22 RX ADMIN — Medication 10 ML: at 10:17

## 2018-05-22 RX ADMIN — CETIRIZINE HYDROCHLORIDE 10 MG: 10 TABLET ORAL at 10:17

## 2018-05-22 RX ADMIN — SODIUM CHLORIDE 100 ML/HR: 9 INJECTION, SOLUTION INTRAVENOUS at 01:44

## 2018-05-22 RX ADMIN — FINASTERIDE 5 MG: 5 TABLET, FILM COATED ORAL at 10:17

## 2018-05-22 RX ADMIN — FUROSEMIDE 20 MG: 10 INJECTION, SOLUTION INTRAMUSCULAR; INTRAVENOUS at 18:01

## 2018-05-22 RX ADMIN — FUROSEMIDE 20 MG: 10 INJECTION INTRAMUSCULAR; INTRAVENOUS at 10:28

## 2018-05-22 RX ADMIN — CEFTRIAXONE 2 G: 2 INJECTION, POWDER, FOR SOLUTION INTRAMUSCULAR; INTRAVENOUS at 14:32

## 2018-05-22 RX ADMIN — IPRATROPIUM BROMIDE 0.5 MG: 0.5 SOLUTION RESPIRATORY (INHALATION) at 18:50

## 2018-05-22 RX ADMIN — IPRATROPIUM BROMIDE 0.5 MG: 0.5 SOLUTION RESPIRATORY (INHALATION) at 07:20

## 2018-05-22 NOTE — PLAN OF CARE
Problem: Patient Care Overview  Goal: Plan of Care Review  Outcome: Ongoing (interventions implemented as appropriate)      Problem: Fall Risk (Adult)  Goal: Absence of Fall  Outcome: Ongoing (interventions implemented as appropriate)      Problem: Wound (Includes Pressure Injury) (Adult)  Goal: Signs and Symptoms of Listed Potential Problems Will be Absent, Minimized or Managed (Wound)  Outcome: Ongoing (interventions implemented as appropriate)      Problem: Breathing Pattern Ineffective (Adult)  Goal: Effective Oxygenation/Ventilation  Outcome: Ongoing (interventions implemented as appropriate)    Goal: Anxiety/Fear Reduction  Outcome: Ongoing (interventions implemented as appropriate)      Problem: Sepsis/Septic Shock (Adult)  Goal: Signs and Symptoms of Listed Potential Problems Will be Absent, Minimized or Managed (Sepsis/Septic Shock)  Outcome: Ongoing (interventions implemented as appropriate)      Problem: Skin Injury Risk (Adult)  Goal: Skin Health and Integrity  Outcome: Ongoing (interventions implemented as appropriate)      Problem: Cholecystectomy (Adult)  Goal: Signs and Symptoms of Listed Potential Problems Will be Absent, Minimized or Managed (Cholecystectomy)  Outcome: Ongoing (interventions implemented as appropriate)      Problem: Functional Mobility Impairment (IRF) (Adult)  Goal: Optimal/Safe Level of [place] with Mobility  Outcome: Ongoing (interventions implemented as appropriate)

## 2018-05-22 NOTE — PLAN OF CARE
Problem: Patient Care Overview  Goal: Plan of Care Review  Outcome: Ongoing (interventions implemented as appropriate)   05/22/18 0651   Coping/Psychosocial   Plan of Care Reviewed With patient;spouse   Plan of Care Review   Progress improving   OTHER   Outcome Summary Pt agreeable and able to tolerate treatment in supine this date. Completed 3 BUE strengthning/endurance exercises x 20 reps each w/ extended rest breaks as needed. Skilled OT to continue current POC.

## 2018-05-22 NOTE — PROGRESS NOTES
LOS: 6 days   Patient Care Team:  SIMON Reynolds as PCP - General  SIMON Reynolds as PCP - Family Medicine  Dwayne Swartz MD as PCP - [facility]  Evi Vaughn RN as Care Coordinator (Population Health)    Post op day # 6, status post open cholecystectomy for perforated GB    Subjective     Interval History:  Patient was placed back on high flow oxygen last night.  Physical therapy did get him up in the chair briefly but he was extremely weak.  The patient is not talkative today.  According to the wife he is tolerating a diet.  The patient was given Lasix and IV fluids were decreased due to generalized edema  Objective     Vital Signs  Temp:  [98.2 °F (36.8 °C)-99 °F (37.2 °C)] 98.9 °F (37.2 °C)  Heart Rate:  [70-79] 74  Resp:  [14-26] 14  BP: (111-136)/(54-92) 111/63    Physical Exam:  This is a well-developed well-nourished  white male in no acute distress  HEENT examination: Sclera are anicteric  Lungs: Clear  Abdomen: Abdomen is obese and distended.  bowel sounds acyive  Skin/incisions: Surgical dressing dry.  MISHEL with serosanguineous fluid, no bile, output decreased     Results Review:      Results from last 7 days  Lab Units 05/17/18  0909 05/17/18  0345 05/16/18  2132   CK TOTAL U/L 74  74 70 65   CKMB ng/mL 1.52 1.93 1.92   CK MB INDEX % 2.1 2.8 3.0   TROPONIN I ng/mL 0.025 0.034 0.026       Results from last 7 days  Lab Units 05/22/18  0845 05/21/18  0142 05/20/18  0123  05/17/18  0909 05/17/18  0345 05/16/18  2309 05/16/18  1218  05/16/18  0757   CRP mg/dL 6.21* 6.58* 11.52*  < >  --  22.97*  --   --   --   --    LACTATE mmol/L  --   --   --   --  1.3  --  2.4* 3.0*  < >  --    WBC 10*3/mm3 12.68* 14.53* 15.13*  < >  --  17.91*  --   --   --  26.67*   HEMOGLOBIN g/dL 10.7* 9.9* 9.4*  < >  --  12.0*  --   --   --  14.4   HEMATOCRIT % 35.2* 32.1* 30.8*  < >  --  35.4*  --   --   --  42.9   PLATELETS 10*3/mm3 459* 478* 492*  < >  --  459*  --   --   --  567*   INR   --   --    --   --   --  1.73*  --   --   --  1.38*   < > = values in this interval not displayed.    Results from last 7 days  Lab Units 05/19/18  0450   PH, ARTERIAL pH units 7.328*   PO2 ART mm Hg 75.1*   PCO2, ARTERIAL mm Hg 43.3   HCO3 ART mmol/L 22.2       Results from last 7 days  Lab Units 05/22/18  0845 05/21/18  0142 05/20/18  0123 05/19/18  0131   SODIUM mmol/L 143 142 140 138   POTASSIUM mmol/L 4.3 3.9 3.9 4.4   MAGNESIUM mg/dL  --  2.2 2.6 2.8*   CHLORIDE mmol/L 111 115* 115* 111   CO2 mmol/L 29.0 25.4 24.6 22.6*   BUN mg/dL 15 18 27* 30*   CREATININE mg/dL 0.69 0.60 0.70 0.91   EGFR IF NONAFRICN AM mL/min/1.73 111 130 109 81   CALCIUM mg/dL 7.6* 7.3* 7.3* 7.6*   GLUCOSE mg/dL 110 103 122* 93   ALBUMIN g/dL 2.30* 2.40* 2.30* 2.60*   BILIRUBIN mg/dL 0.4 0.4 0.4 0.6   ALK PHOS U/L 81 80 86 113   AST (SGOT) U/L 22 21 23 22   ALT (SGPT) U/L 30 36 43 59*   Estimated Creatinine Clearance: 93.6 mL/min (by C-G formula based on SCr of 0.69 mg/dL).  No results found for: AMMONIA      Blood Culture   Date Value Ref Range Status   05/16/2018 No growth at 24 hours  Preliminary   05/16/2018 No growth at 24 hours  Preliminary                Imaging:  Imaging Results (last 24 hours)     Procedure Component Value Units Date/Time    XR Chest 1 View [[id number]] Collected:  05/22/18 1101     Updated:  05/22/18 1104    Narrative:       Technique: Frontal view the chest.     COMPARISON:  05/19/2018     INDICATION:     hypoxia/Generalized edema; A41.9-Sepsis, unspecified  organism; K85.90-Acute pancreatitis without necrosis or infection,  unspecified; D72.829-Elevated white blood cell count, unspecified;  K81.9-Cholecystitis, unspecified      FINDINGS:    Bibasilar airspace disease. Cardiomegaly is noted.  Prominent interstitial markings are noted. There are small bilateral  pleural effusions.        Impression:       Radiographic changes of congestive failure.     This report was finalized on 5/22/2018 11:02 AM by Dr. Deangelo Soni,  MD.              Impression:  Status post open cholecystectomy for perforated gallbladder with septic shock - ecoli  Improving but still extremely weak    Plan:  Physicial therapy  Stop flagyl based on culture results.  Patient may benefit from a swing bed    Errors end dictation may reflect use of voice recognition software and not all errors in transcription may have been detected prior to signing.  Rosie Montalvo MD  05/22/18  12:40 PM

## 2018-05-22 NOTE — THERAPY TREATMENT NOTE
Acute Care - Physical Therapy Treatment Note   [facility]     Patient Name: Porsha Soni  : 1940  MRN: [id number]  Today's Date: 2018  Onset of Illness/Injury or Date of Surgery: 18  Date of Referral to PT: 18  Referring Physician: Dr. Horta    Admit Date: 2018    Visit Dx:    ICD-10-CM ICD-9-CM   1. Sepsis, due to unspecified organism A41.9 038.9     995.91   2. Acute pancreatitis, unspecified complication status, unspecified pancreatitis type K85.90 577.0   3. Leukocytosis, unspecified type D72.829 288.60   4. Cholecystitis K81.9 575.10     Patient Active Problem List   Diagnosis   • Insomnia   • Asymptomatic PVCs   • CAD in native artery   • Hypertension   • Dyslipidemia   • Palpitations   • Diarrhea of presumed infectious origin   • Bloating   • Elevated prostate specific antigen (PSA)   • Cholecystitis   • Sepsis   • Acute respiratory failure with hypoxia   • Physical deconditioning   • Impaired mobility and ADLs       Therapy Treatment          Rehabilitation Treatment Summary     Row Name 18 1524 18 1200          Treatment Time/Intention    Discipline occupational therapist  -  --     Document Type therapy note (daily note)  - therapy note (daily note)  -BC     Subjective Information complains of;weakness  -  --     Mode of Treatment occupational therapy;individual therapy  - individual therapy;physical therapy  -BC     Patient/Family Observations Pt alert and agreeable to treatment while supine  -  --     Therapy Frequency (PT Clinical Impression)  -- 3 times/wk   3-5 times/ week per priority policy  -BC     Patient Effort adequate  - adequate  -BC     Comment Pt continues to demo significant strength/endurance/mobility deficits  -  --     Existing Precautions/Restrictions fall;oxygen therapy device and L/min  - fall;oxygen therapy device and L/min;other (see comments)   bulb drain; decr skin integrity  -BC     Recorded by [] Marlene Chen,  OT 05/22/18 1533 [BC] Johana Willis, PT 05/22/18 1251     Row Name 05/22/18 1524 05/22/18 1200          Cognitive Assessment/Intervention- PT/OT    Orientation Status (Cognition) oriented x 3  -KH oriented x 3  -BC     Follows Commands (Cognition) follows one step commands;physical/tactile prompts required;repetition of directions required;verbal cues/prompting required  -KH follows one step commands;75-90% accuracy;physical/tactile prompts required;repetition of directions required;verbal cues/prompting required  -BC     Recorded by [KH] Marlene Chen, OT 05/22/18 1533 [BC] Johana Willis, PT 05/22/18 1251     Row Name 05/22/18 1200             Safety Issues, Functional Mobility    Impairments Affecting Function (Mobility) balance;cognition;coordination;endurance/activity tolerance;range of motion (ROM);strength  -BC      Recorded by [BC] Johana Willis, PT 05/22/18 1251      Row Name 05/22/18 1200             Mobility Assessment/Intervention    Extremity Weight-bearing Status left lower extremity;right lower extremity  -BC      Left Lower Extremity (Weight-bearing Status) weight-bearing as tolerated (WBAT)  -BC      Right Lower Extremity (Weight-bearing Status) weight-bearing as tolerated (WBAT)  -BC      Recorded by [BC] Johana Willis, PT 05/22/18 1251      Row Name 05/22/18 1200             Bed Mobility Assessment/Treatment    Bed Mobility Assessment/Treatment rolling left;rolling right;scooting/bridging;supine-sit;sit-supine  -BC      Rolling Left [place] (Bed Mobility) verbal cues;nonverbal cues (demo/gesture);maximum assist (25% patient effort);2 person assist  -BC      Rolling Right [place] (Bed Mobility) verbal cues;nonverbal cues (demo/gesture);maximum assist (25% patient effort);2 person assist  -BC      Scooting/Bridging [place] (Bed Mobility) verbal cues;nonverbal cues (demo/gesture);maximum assist (25% patient effort);2 person assist  -BC      Supine-Sit [place]  (Bed Mobility) verbal cues;nonverbal cues (demo/gesture);maximum assist (25% patient effort);2 person assist  -BC      Sit-Supine [place] (Bed Mobility) verbal cues;nonverbal cues (demo/gesture);maximum assist (25% patient effort);2 person assist  -BC      Bed Mobility, Safety Issues decreased use of arms for pushing/pulling;decreased use of legs for bridging/pushing;impaired trunk control for bed mobility  -BC      Recorded by [BC] Johana Willis, PT 05/22/18 1251      Row Name 05/22/18 1200             Transfer Assessment/Treatment    Transfer Assessment/Treatment bed-chair transfer;chair-bed transfer;sit-stand transfer;stand-sit transfer;stand pivot/stand step transfer  -BC      Bed-Chair [place] (Transfers) verbal cues;nonverbal cues (demo/gesture);maximum assist (25% patient effort);dependent (less than 25% patient effort);2 person assist  -BC      Chair-Bed [place] (Transfers) verbal cues;nonverbal cues (demo/gesture);maximum assist (25% patient effort);dependent (less than 25% patient effort);2 person assist  -BC      Assistive Device (Bed-Chair Transfers) walker, front-wheeled  -BC      Sit-Stand [place] (Transfers) verbal cues;nonverbal cues (demo/gesture);maximum assist (25% patient effort);2 person assist  -BC      Stand-Sit [place] (Transfers) verbal cues;nonverbal cues (demo/gesture);maximum assist (25% patient effort);dependent (less than 25% patient effort);2 person assist  -BC      Recorded by [BC] Johana Willis, PT 05/22/18 1251      Row Name 05/22/18 1200             Sit-Stand Transfer    Assistive Device (Sit-Stand Transfers) walker, front-wheeled  -BC      Recorded by [BC] Johana Willis, PT 05/22/18 1251      Row Name 05/22/18 1200             Stand-Sit Transfer    Assistive Device (Stand-Sit Transfers) walker, front-wheeled  -BC      Recorded by [BC] Johana Willis, PT 05/22/18 1251      Row Name 05/22/18 1200             Stand Pivot/Stand Step Transfer     Stand Pivot/Stand Step [place] verbal cues;nonverbal cues (demo/gesture);maximum assist (25% patient effort);dependent (less than 25% patient effort);2 person assist  -BC      Recorded by [BC] Johana Willis, PT 05/22/18 1251      Row Name 05/22/18 1200             Gait/Stairs Assessment/Training    [place] Level (Gait) moderate assist (50% patient effort);2 person assist  -BC      Assistive Device (Gait) walker, front-wheeled  -BC      Distance in Feet (Gait) 30  -BC      Recorded by [BC] Johana Willis, PT 05/22/18 1542      Row Name 05/22/18 1524             Motor Skills Assessment/Interventions    Additional Documentation Therapeutic Exercise (Group)  -KH      Recorded by [KH] Marlene Chen, OT 05/22/18 1533      Row Name 05/22/18 1524             Therapeutic Exercise    Upper Extremity (Therapeutic Exercise) bicep curl, bilateral;wand exercises  -      Upper Extremity Range of Motion (Therapeutic Exercise) elbow flexion/extension, bilateral;wrist flexion/extension, bilateral;shoulder flexion/extension, bilateral  -      Hand (Therapeutic Exercise) hand , bilateral  -      Weight/Resistance (Therapeutic Exercise) blue   theraband flexbar  -      Exercise Type (Therapeutic Exercise) AROM (active range of motion);PROM (passive range of motion)  -KH      Position (Therapeutic Exercise) supine  -KH      Sets/Reps (Therapeutic Exercise) 3 exercises; 20 total reps  -      Equipment (Therapeutic Exercise) dowel ebony/wand  -KH      Expected Outcome (Therapeutic Exercise) improve functional tolerance, self-care activity;improve performance, BADLs  -KH      Recorded by [KH] Marlene Chen, OT 05/22/18 1533      Row Name 05/22/18 1200             Static Sitting Balance    Level of [place] (Unsupported Sitting, Static Balance) contact guard assist  -BC      Sitting Position (Unsupported Sitting, Static Balance) sitting on edge of bed  -BC      Time Able to Maintain  Position (Unsupported Sitting, Static Balance) more than 5 minutes  -BC      Recorded by [BC] Johana Willis, PT 05/22/18 1251      Row Name 05/22/18 1524 05/22/18 1200          Positioning and Restraints    Pre-Treatment Position in bed  -KH in bed  -BC     Post Treatment Position bed  - bed  -BC     In Bed supine;call light within reach;encouraged to call for assist;with family/caregiver;side rails up x2  -KH notified nsg;call light within reach;encouraged to call for assist;sitting EOB  -BC     Recorded by [KH] Marlene Chen, OT 05/22/18 1533 [BC] Johana Willis, PT 05/22/18 1251     Row Name 05/22/18 1200             Pain Scale: FACES Pre/Post-Treatment    Pain: FACES Scale, Pretreatment 0-->no hurt  -BC      Pain: FACES Scale, Post-Treatment 0-->no hurt  -BC      Recorded by [BC] Johana Willis, PT 05/22/18 1251      Row Name 05/22/18 1200             Vision Assessment/Intervention    Visual Impairment/Limitations WFL  -BC      Recorded by [BC] Johana Willis, PT 05/22/18 1251      Row Name                Wound 05/16/18 1730 Other (See comments) abdomen incision    Wound - Properties Group Date first assessed: 05/16/18 [CE] Time first assessed: 1730 [CE] Side: Other (See comments) [CE] Location: abdomen [CE] Type: incision [CE] Recorded by:  [CE] Kailash Renee RN 05/16/18 1730    Row Name                Wound 05/16/18 2100 Left medial gluteal pressure injury    Wound - Properties Group Date first assessed: 05/16/18 [VC] Time first assessed: 2100 [VC] Present On Admission : yes;picture taken [VC2] Side: Left [VC] Orientation: medial [VC] Location: gluteal [VC] Type: pressure injury [VC] Stage, Pressure Injury: Stage 2 [VC] Recorded by:  [VC] Dax Díaz RN 05/16/18 2119 [VC2] Dax Díaz RN 05/16/18 2121    Row Name                Wound 05/16/18 2100 Right medial gluteal pressure injury    Wound - Properties Group Date first assessed: 05/16/18 [VC] Time first  assessed: 2100 [VC] Present On Admission : yes;picture taken [VC] Side: Right [VC] Orientation: medial [VC] Location: gluteal [VC] Type: pressure injury [VC] Stage, Pressure Injury: Stage 2 [VC] Recorded by:  [VC] Dax Díaz RN 05/16/18 2120    Row Name 05/22/18 1200             Coping    Observed Emotional State accepting;calm;cooperative  -BC      Verbalized Emotional State acceptance  -BC      Recorded by [BC] Johana Willis, PT 05/22/18 1251      Row Name 05/22/18 1200             Outcome Summary/Treatment Plan (PT)    Anticipated Equipment Needs at Discharge (PT) wheelchair   TBD  -BC      Recorded by [BC] Johana Willis, PT 05/22/18 1251        User Key  (r) = Recorded By, (t) = Taken By, (c) = Cosigned By    Initials Name Effective Dates Discipline    VC Dax Díaz, ROQUE 06/16/16 -  Nurse    BC Johana Willis, PT 03/14/16 -  PT    CE Kailash Renee, ROQUE 06/16/16 -  Nurse     Marlene Chen, OT 04/17/18 -  OT          Wound 05/16/18 1730 Other (See comments) abdomen incision (Active)   Dressing Appearance open to air 5/22/2018  2:20 AM   Closure [place] 5/22/2018  2:20 AM   Base clean;dry 5/22/2018  2:20 AM   Periwound dry;intact;pink 5/22/2018  2:20 AM   Periwound Temperature warm 5/22/2018  2:20 AM   Periwound Skin Turgor soft 5/22/2018  2:20 AM   Drainage Amount moderate 5/22/2018  2:20 AM   Dressing Care, Wound open to air 5/22/2018  2:20 AM   Periwound Care, Wound dry periwound area maintained 5/22/2018  2:20 AM       Wound 05/16/18 2100 Left medial gluteal pressure injury (Active)   Dressing Appearance open to air 5/22/2018  2:20 AM   Base red/granulating 5/22/2018  2:20 AM   Periwound dry;excoriated 5/22/2018  2:20 AM   Periwound Temperature warm 5/22/2018  2:20 AM   Periwound Skin Turgor soft 5/22/2018  2:20 AM   Edges irregular 5/22/2018  2:20 AM   Drainage Amount none 5/22/2018  2:20 AM   Dressing Care, Wound open to air 5/22/2018  2:20 AM   Periwound Care,  Wound dry periwound area maintained 5/22/2018  2:20 AM       Wound 05/16/18 2100 Right medial gluteal pressure injury (Active)   Dressing Appearance open to air 5/22/2018  2:20 AM   Base red/granulating 5/22/2018  2:20 AM   Periwound dry;excoriated 5/22/2018  2:20 AM   Drainage Amount none 5/22/2018  2:20 AM   Dressing Care, Wound open to air 5/22/2018  2:20 AM             Physical Therapy Education     Title: PT OT SLP Therapies (Done)     Topic: Physical Therapy (Done)     Point: Mobility training (Done)    Learning Progress Summary     Learner Status Readiness Method Response Comment Documented by    Patient Done Acceptance E VU  BC 05/22/18 1252     Active Acceptance D NR  AG 05/21/18 1805    Significant Other Done Acceptance E,D VU,NR  AG 05/21/18 1805          Point: Home exercise program (Done)    Learning Progress Summary     Learner Status Readiness Method Response Comment Documented by    Patient Done Acceptance E VU  BC 05/22/18 1252     Active Acceptance D NR  AG 05/21/18 1805    Significant Other Done Acceptance E,D VU,NR  AG 05/21/18 1805          Point: Body mechanics (Done)    Learning Progress Summary     Learner Status Readiness Method Response Comment Documented by    Patient Done Acceptance E VU  BC 05/22/18 1252     Active Acceptance D NR  AG 05/21/18 1805    Significant Other Done Acceptance E,D VU,NR  AG 05/21/18 1805          Point: Precautions (Done)    Learning Progress Summary     Learner Status Readiness Method Response Comment Documented by    Patient Done Acceptance E VU  BC 05/22/18 1252     Active Acceptance D NR  AG 05/21/18 1805    Significant Other Done Acceptance E,D VU,NR  AG 05/21/18 1805                      User Key     Initials Effective Dates Name Provider Type Discipline    BC 03/14/16 -  Johana Willis, PT Physical Therapist PT     04/03/18 -  Cindy Bell, PT Physical Therapist PT                    PT Recommendation and Plan  Therapy Frequency (PT Clinical  Impression): 3 times/wk (3-5 times/ week per priority policy)  Outcome Summary/Treatment Plan (PT)  Anticipated Equipment Needs at Discharge (PT): wheelchair (TBD)  Plan of Care Reviewed With: patient  Progress: improving  Outcome Summary: increased  seated time, and performed therex BLE          Outcome Measures     Row Name 05/22/18 1200 05/22/18 0800 05/21/18 1800       How much help from another person do you currently need...    Turning from your back to your side while in flat bed without using bedrails? 2  -BC  -- 2  -AG    Moving from lying on back to sitting on the side of a flat bed without bedrails? 2  -BC  -- 2  -AG    Moving to and from a bed to a chair (including a wheelchair)? 1  -BC  -- 1  -AG    Standing up from a chair using your arms (e.g., wheelchair, bedside chair)? 1  -BC  -- 1  -AG    Climbing 3-5 steps with a railing? 1  -BC  -- 1  -AG    To walk in hospital room? 1  -BC  -- 1  -AG    AM-PAC 6 Clicks Score 8  -BC  -- 8  -AG       Functional Assessment    Outcome Measure Options AM-PAC 6 Clicks Basic Mobility (PT)  -BC AM-PAC 6 Clicks Daily Activity (OT)  -KR AM-PAC 6 Clicks Basic Mobility (PT)  -AG    Row Name 05/21/18 1631             How much help from another is currently needed...    Putting on and taking off regular lower body clothing? 1  -KR      Bathing (including washing, rinsing, and drying) 2  -KR      Toileting (which includes using toilet bed pan or urinal) 1  -KR      Putting on and taking off regular upper body clothing 2  -KR      Taking care of personal grooming (such as brushing teeth) 2  -KR      Eating meals 2  -KR      Score 10  -KR        User Key  (r) = Recorded By, (t) = Taken By, (c) = Cosigned By    Initials Name Provider Type    BC Johana Willis, PT Physical Therapist    AG Cindy Bell, PT Physical Therapist    KR Adebayo Abarca, OT Occupational Therapist           Time Calculation:         PT Charges     Row Name 05/22/18 5036             Time Calculation     Start Time --   30  -BC      PT Received On 05/22/18  -BC         Time Calculation- PT    Total Timed Code Minutes- PT 30 minute(s)  -BC        User Key  (r) = Recorded By, (t) = Taken By, (c) = Cosigned By    Initials Name Provider Type    BC Johana Willis, PT Physical Therapist          Therapy Charges for Today     Code Description Service Date Service Provider Modifiers Qty    98660129367  PT THER SUPP EA 15 MIN 5/22/2018 Johana Willis, PT GP 2    81027388613 HC PT THERAPEUTIC ACT EA 15 MIN 5/22/2018 Johana Willis, PT GP 1    [id number]  PT THER PROC EA 15 MIN 5/22/2018 Johana Willis, PT GP 1          PT G-Codes  Outcome Measure Options: AM-PAC 6 Clicks Basic Mobility (PT)  Score: 8  Functional Limitation: Mobility: Walking and moving around  Mobility: Walking and Moving Around Current Status (): At least 80 percent but less than 100 percent impaired, limited or restricted  Mobility: Walking and Moving Around Goal Status (): At least 60 percent but less than 80 percent impaired, limited or restricted    Johana Willis PT  5/22/2018

## 2018-05-22 NOTE — PLAN OF CARE
Problem: Patient Care Overview  Goal: Plan of Care Review  Outcome: Ongoing (interventions implemented as appropriate)   05/22/18 1252   Coping/Psychosocial   Plan of Care Reviewed With patient   Plan of Care Review   Progress improving   OTHER   Outcome Summary increased seated time, and performed therex BLE

## 2018-05-22 NOTE — PROGRESS NOTES
[facility] HOSPITALIST    PROGRESS NOTE    Name:  Porsha Soni   Age:  77 y.o.  Sex:  male  :  1940  MRN:  [id number]   Visit Number:  [id number]  Admission Date:  2018  Date Of Service:  18  Primary Care Physician:  SIMON Reynolds     LOS: 6 days :  Patient Care Team:  SIMON Reynolds as PCP - General  SIMON Reynolds as PCP - Family Medicine  Dwayne Swartz MD as PCP - Claims Attributed  Evi Vaughn RN as Care Coordinator ([facility]):    Chief Complaint:      F/U of Cholecystitis, POD #6 open cholecystectomy/Sepsis/Acute Resp Failure with Hypoxia    Subjective / Interval History:     I have reviewed labs/imaging/records from this hospitalization, including ER staff and admitting/attending physicians H/P's and progress notes to establish a comprehensive understanding of this patient's clinical hospital course, as well as to establish a transition of care appropriately.    Patient is a 77-year-old male who was admitted secondary to cholecystitis, noted perforation with abscess formation.  He underwent open cholecystectomy by general surgery, he is now postop day #6.  He continues to receive IV Rocephin 2 g IV daily as well as 500 mg metronidazole IV every 8 hours.  Infectious disease consultation has been obtained and this was the recommendation.  Patient developed some altered mental status post surgically, felt to be secondary to acute metabolic encephalopathy as a result of sepsis and his cholecystitis.      He has been afebrile for greater than 48 hours.  Vital signs otherwise hemodynamically stable.  He has had no intolerance to his current antibiotic regimen.  He has tolerated by mouth intake.  Without nausea or emesis.  He is maintaining good urine output, continues to have Ambrocio catheter in place.  No reports of gross hematuria.  He denies any bright red blood or black or tarry qualities.  He denies any active chest pain.  Patient  developed some shortness of breath last night and which she was given high flow nasal oxygen supplementation.  Wife of patient states that he has had significant edematous changes generalized through the course of yesterday evening and this morning.  He has continued to use incentive spirometry as directed.  Nursing reports no significant drainage from his abdominal wound.  MISHEL drain in place and has been drained periodically by nursing as per protocol.  Nursing describes serosanguineous drainage at all times.  Patient spent time out of bed yesterday with physical therapy assistance.  Review of Systems:     General ROS: Patient denies any fevers, chills or loss of consciousness.  Respiratory ROS: Occasional cough, no hemoptysis.  Cardiovascular ROS: Denies chest pain or palpitations. No history of exertional chest pain.  Gastrointestinal ROS: Denies nausea and vomiting.  Mild, generalized abdominal discomfort that is improving daily.. No diarrhea, no bright red blood or black or tarry stools.  Neurological ROS: Generalized weakness. No loss of consciousness. Denies any numbness. Denies neck pain.  Dermatological ROS: No reports of streaking erythema from abdominal wound..    Vital Signs:    Temp:  [98.2 °F (36.8 °C)-99 °F (37.2 °C)] 98.9 °F (37.2 °C)  Heart Rate:  [71-79] 79  Resp:  [14-26] 14  BP: (111-136)/(54-92) 127/92    Intake and output:    I/O last 3 completed shifts:  In: 3060 [P.O.:960; I.V.:2000; IV Piggyback:100]  Out: 2220 [Urine:1985; Drains:235]  No intake/output data recorded.    Physical Examination:    General Appearance:  Alert and cooperative, not in any acute distress.  Moderately ill-appearing gentleman, he is pleasant and interactive during questioning and examination.     Head:  Atraumatic and normocephalic, without obvious abnormality.   Eyes:          PERRLA, conjunctivae and sclerae normal, no Icterus. No pallor. Extra-occular movements are within normal limits.   Neck: Supple, trachea  midline, no thyromegaly, no carotid bruit.   Lungs:   Chest shape is normal. Breath sounds heard bilaterally equally.  No crackles or wheezing. No Pleural rub or bronchial breathing.   Heart:  Normal S1 and S2, no murmur, no gallop, no rub. No JVD   Abdomen:   Normal bowel sounds x 4, no masses, no organomegaly. Soft, mild tenderness generalized in the abdomen, no areas of focal location non-distended, no guarding, no rebound tenderness.  Excellent anatomical restoration of wound edges, well approximated.  Clean dry incision on removal of managing with assistance of nursing.  MISHEL drain shows approximately < 10 ML's of serosanguineous drainage.     Extremities: Generalized edema to bilateral upper extremities, 1+ pitting edema that extends to the distal third of the tibias bilaterally, no cyanosis, no clubbing.  Generalized weakness of frail nature, moves all extremities equally.  SCUDs.   Skin: No bleeding.  Clean dry incision.  Expected postsurgical changes noted to the abdomen.     Neurologic: Awake, alert and oriented times 3. Moves all 4 extremities equally.   Laboratory results:      Results from last 7 days  Lab Units 05/22/18  0845 05/21/18  0142 05/20/18  0123   SODIUM mmol/L 143 142 140   POTASSIUM mmol/L 4.3 3.9 3.9   CHLORIDE mmol/L 111 115* 115*   CO2 mmol/L 29.0 25.4 24.6   BUN mg/dL 15 18 27*   CREATININE mg/dL 0.69 0.60 0.70   CALCIUM mg/dL 7.6* 7.3* 7.3*   BILIRUBIN mg/dL 0.4 0.4 0.4   ALK PHOS U/L 81 80 86   ALT (SGPT) U/L 30 36 43   AST (SGOT) U/L 22 21 23   GLUCOSE mg/dL 110 103 122*       Results from last 7 days  Lab Units 05/21/18  0142 05/20/18  0123 05/19/18  0130   WBC 10*3/mm3 14.53* 15.13* 24.91*   HEMOGLOBIN g/dL 9.9* 9.4* 10.4*   HEMATOCRIT % 32.1* 30.8* 32.6*   PLATELETS 10*3/mm3 478* 492* 559*       Results from last 7 days  Lab Units 05/17/18  0345 05/16/18  0757   INR  1.73* 1.38*       Results from last 7 days  Lab Units 05/17/18  0909 05/17/18  0345 05/16/18  2132   CK TOTAL U/L 74   74 70 65   TROPONIN I ng/mL 0.025 0.034 0.026   CK MB INDEX % 2.1 2.8 3.0       Results from last 7 days  Lab Units 05/17/18  0001 05/16/18  0825 05/16/18  0820   BLOODCX   --  No growth at 5 days No growth at 5 days   URINECX  No growth  --   --            I have reviewed the patient's laboratory results.    Radiology results:    Imaging Results (last 24 hours)     ** No results found for the last 24 hours. **          I have reviewed the patient's radiology reports.    Medication Review:     I have reviewed the patients active and prn medications.         Assessment:  Principal Problem:    Cholecystitis  Active Problems:    Sepsis    Acute respiratory failure with hypoxia    Physical deconditioning    Impaired mobility and ADLs        Plan:  He is tolerating by mouth intake, no reports of emesis.  Plan to continue IV antibiotics, currently on IV Rocephin 2 g daily and metronidazole 500 mg IV every 8 hours, but we'll transition to oral Omnicef upon discharge.  Plan to continue surveillance labs.  Abdominal surgical wound appears to be healing very well.  No significant increase in drainage to the MISHEL drain, in fact reported as less.  I have encouraged patient to continue his incentive spirometry use at a rate of 6 times per hour while awake.  Continue oxygen supplementation at this time.  Stat chest x-ray to evaluate for fluid overload.  Certainly has generalized edema and would benefit from a single dose of IV Lasix 20 mg.  Decrease IV fluid rate to 50 mL per hour.  Continue physical therapy/occupational therapy for progressive range of motion and up out of bed as tolerated again today.  Given patient's severe physical deconditioning, he may be a candidate for skilled rehabilitation facility to home program at time of discharge.  I discussed plan of care in detail with patient and his wife today, with a verbalize understanding and agree.  I've answered all of his family's questions at bedside additionally.    I  spent a total of 35 minutes in direct patient care time today.    Ace Horta DO  05/22/18  9:53 AM

## 2018-05-22 NOTE — PROGRESS NOTES
Discharge Planning Assessment  [facility]     Patient Name: Porsha Soni  MRN: [id number]  Today's Date: 5/22/2018    Admit Date: 5/16/2018          Discharge Plan     Row Name 05/22/18 1554       Plan    Plan SS received consult for Rehab.  SS contacted Tati at [facility] and she is agreeable to evaluate the pt for possible transfer. SS will continue to follow.     Patient/Family in Agreement with Plan yes        Expected Discharge Date and Time     Expected Discharge Date Expected Discharge Time    May 27, 2018               Cindy Esparza

## 2018-05-22 NOTE — THERAPY TREATMENT NOTE
Acute Care - Physical Therapy Treatment Note   [place]     Patient Name: Porsha Soni  : 1940  MRN: [id number]  Today's Date: 2018  Onset of Illness/Injury or Date of Surgery: 18  Date of Referral to PT: 18  Referring Physician: Dr. Horta    Admit Date: 2018    Visit Dx:    ICD-10-CM ICD-9-CM   1. Sepsis, due to unspecified organism A41.9 038.9     995.91   2. Acute pancreatitis, unspecified complication status, unspecified pancreatitis type K85.90 577.0   3. Leukocytosis, unspecified type D72.829 288.60   4. Cholecystitis K81.9 575.10     Patient Active Problem List   Diagnosis   • Insomnia   • Asymptomatic PVCs   • CAD in native artery   • Hypertension   • Dyslipidemia   • Palpitations   • Diarrhea of presumed infectious origin   • Bloating   • Elevated prostate specific antigen (PSA)   • Cholecystitis   • Sepsis   • Acute respiratory failure with hypoxia   • Physical deconditioning   • Impaired mobility and ADLs       Therapy Treatment          Rehabilitation Treatment Summary     Row Name 18 1200             Treatment Time/Intention    Document Type therapy note (daily note)  -BC      Mode of Treatment individual therapy;physical therapy  -BC      Therapy Frequency (PT Clinical Impression) 3 times/wk   3-5 times/ week per priority policy  -BC      Patient Effort adequate  -BC      Existing Precautions/Restrictions fall;oxygen therapy device and L/min;other (see comments)   bulb drain; decr skin integrity  -BC      Recorded by [BC] Johana Willis, PT 18 1251      Row Name 18 1200             Cognitive Assessment/Intervention- PT/OT    Orientation Status (Cognition) oriented x 3  -BC      Follows Commands (Cognition) follows one step commands;75-90% accuracy;physical/tactile prompts required;repetition of directions required;verbal cues/prompting required  -BC      Recorded by [BC] Johana Willis, PT 18 1251      Row Name 18 1200             Safety  Issues, Functional Mobility    Impairments Affecting Function (Mobility) balance;cognition;coordination;endurance/activity tolerance;range of motion (ROM);strength  -BC      Recorded by [BC] Johana Willis, PT 05/22/18 1251      Row Name 05/22/18 1200             Mobility Assessment/Intervention    Extremity Weight-bearing Status left lower extremity;right lower extremity  -BC      Left Lower Extremity (Weight-bearing Status) weight-bearing as tolerated (WBAT)  -BC      Right Lower Extremity (Weight-bearing Status) weight-bearing as tolerated (WBAT)  -BC      Recorded by [BC] Johana Willis, PT 05/22/18 1251      Row Name 05/22/18 1200             Bed Mobility Assessment/Treatment    Bed Mobility Assessment/Treatment rolling left;rolling right;scooting/bridging;supine-sit;sit-supine  -BC      Rolling Left [place] (Bed Mobility) verbal cues;nonverbal cues (demo/gesture);maximum assist (25% patient effort);2 person assist  -BC      Rolling Right [place] (Bed Mobility) verbal cues;nonverbal cues (demo/gesture);maximum assist (25% patient effort);2 person assist  -BC      Scooting/Bridging [place] (Bed Mobility) verbal cues;nonverbal cues (demo/gesture);maximum assist (25% patient effort);2 person assist  -BC      Supine-Sit [place] (Bed Mobility) verbal cues;nonverbal cues (demo/gesture);maximum assist (25% patient effort);2 person assist  -BC      Sit-Supine [place] (Bed Mobility) verbal cues;nonverbal cues (demo/gesture);maximum assist (25% patient effort);2 person assist  -BC      Bed Mobility, Safety Issues decreased use of arms for pushing/pulling;decreased use of legs for bridging/pushing;impaired trunk control for bed mobility  -BC      Recorded by [BC] Johana Willis, PT 05/22/18 1251      Row Name 05/22/18 1200             Transfer Assessment/Treatment    Transfer Assessment/Treatment bed-chair transfer;chair-bed transfer;sit-stand transfer;stand-sit transfer;stand pivot/stand  step transfer  -BC      Bed-Chair [place] (Transfers) verbal cues;nonverbal cues (demo/gesture);maximum assist (25% patient effort);dependent (less than 25% patient effort);2 person assist  -BC      Chair-Bed [place] (Transfers) verbal cues;nonverbal cues (demo/gesture);maximum assist (25% patient effort);dependent (less than 25% patient effort);2 person assist  -BC      Assistive Device (Bed-Chair Transfers) walker, front-wheeled  -BC      Sit-Stand [place] (Transfers) verbal cues;nonverbal cues (demo/gesture);maximum assist (25% patient effort);2 person assist  -BC      Stand-Sit [place] (Transfers) verbal cues;nonverbal cues (demo/gesture);maximum assist (25% patient effort);dependent (less than 25% patient effort);2 person assist  -BC      Recorded by [BC] Johana Willis, PT 05/22/18 1251      Row Name 05/22/18 1200             Sit-Stand Transfer    Assistive Device (Sit-Stand Transfers) walker, front-wheeled  -BC      Recorded by [BC] Johana Willis, PT 05/22/18 1251      Row Name 05/22/18 1200             Stand-Sit Transfer    Assistive Device (Stand-Sit Transfers) walker, front-wheeled  -BC      Recorded by [BC] Johana Willis, PT 05/22/18 1251      Row Name 05/22/18 1200             Stand Pivot/Stand Step Transfer    Stand Pivot/Stand Step [place] verbal cues;nonverbal cues (demo/gesture);maximum assist (25% patient effort);dependent (less than 25% patient effort);2 person assist  -BC      Recorded by [BC] Johana Willis, PT 05/22/18 1251      Row Name 05/22/18 1200             Gait/Stairs Assessment/Training    [place] Level (Gait) unable to assess  -BC      Recorded by [BC] Johana Willis, PT 05/22/18 1251      Row Name 05/22/18 1200             Static Sitting Balance    Level of [place] (Unsupported Sitting, Static Balance) contact guard assist  -BC      Sitting Position (Unsupported Sitting, Static Balance) sitting on edge of bed  -BC      Time Able to  Maintain Position (Unsupported Sitting, Static Balance) more than 5 minutes  -BC      Recorded by [BC] Johana Willis, PT 05/22/18 1251      Row Name 05/22/18 1200             Positioning and Restraints    Pre-Treatment Position in bed  -BC      Post Treatment Position bed  -BC      In Bed notified nsg;call light within reach;encouraged to call for assist;sitting EOB  -BC      Recorded by [BC] Johana Willis, PT 05/22/18 1251      Row Name 05/22/18 1200             Pain Scale: FACES Pre/Post-Treatment    Pain: FACES Scale, Pretreatment 0-->no hurt  -BC      Pain: FACES Scale, Post-Treatment 0-->no hurt  -BC      Recorded by [BC] Johana Willis, PT 05/22/18 1251      Row Name 05/22/18 1200             Vision Assessment/Intervention    Visual Impairment/Limitations WFL  -BC      Recorded by [BC] Johana Willis, PT 05/22/18 1251      Row Name                Wound 05/16/18 1730 Other (See comments) abdomen incision    Wound - Properties Group Date first assessed: 05/16/18 [CE] Time first assessed: 1730 [CE] Side: Other (See comments) [CE] Location: abdomen [CE] Type: incision [CE] Recorded by:  [CE] Kailash Renee RN 05/16/18 1730    Row Name                Wound 05/16/18 2100 Left medial gluteal pressure injury    Wound - Properties Group Date first assessed: 05/16/18 [VC] Time first assessed: 2100 [VC] Present On Admission : yes;picture taken [VC2] Side: Left [VC] Orientation: medial [VC] Location: gluteal [VC] Type: pressure injury [VC] Stage, Pressure Injury: Stage 2 [VC] Recorded by:  [VC] Dax Díaz RN 05/16/18 2119 [VC2] Dax Díaz RN 05/16/18 2121    Row Name                Wound 05/16/18 2100 Right medial gluteal pressure injury    Wound - Properties Group Date first assessed: 05/16/18 [VC] Time first assessed: 2100 [VC] Present On Admission : yes;picture taken [VC] Side: Right [VC] Orientation: medial [VC] Location: gluteal [VC] Type: pressure injury [VC] Stage, Pressure  Injury: Stage 2 [VC] Recorded by:  [VC] Dax Díaz, RN 05/16/18 2120    Row Name 05/22/18 1200             Coping    Observed Emotional State accepting;calm;cooperative  -BC      Verbalized Emotional State acceptance  -BC      Recorded by [BC] Johana Willis, PT 05/22/18 1251      Row Name 05/22/18 1200             Outcome Summary/Treatment Plan (PT)    Anticipated Equipment Needs at Discharge (PT) wheelchair   TBD  -BC      Recorded by [BC] Johana Willis, PT 05/22/18 1251        User Key  (r) = Recorded By, (t) = Taken By, (c) = Cosigned By    Initials Name Effective Dates Discipline    VC Dax Díaz, RN 06/16/16 -  Nurse    BC Johana Willis, PT 03/14/16 -  PT    CE Kailash Renee RN 06/16/16 -  Nurse          Wound 05/16/18 1730 Other (See comments) abdomen incision (Active)   Dressing Appearance open to air 5/22/2018  2:20 AM   Closure Staples 5/22/2018  2:20 AM   Base clean;dry 5/22/2018  2:20 AM   Periwound dry;intact;pink 5/22/2018  2:20 AM   Periwound Temperature warm 5/22/2018  2:20 AM   Periwound Skin Turgor soft 5/22/2018  2:20 AM   Drainage Amount moderate 5/22/2018  2:20 AM   Dressing Care, Wound open to air 5/22/2018  2:20 AM   Periwound Care, Wound dry periwound area maintained 5/22/2018  2:20 AM       Wound 05/16/18 2100 Left medial gluteal pressure injury (Active)   Dressing Appearance open to air 5/22/2018  2:20 AM   Base red/granulating 5/22/2018  2:20 AM   Periwound dry;excoriated 5/22/2018  2:20 AM   Periwound Temperature warm 5/22/2018  2:20 AM   Periwound Skin Turgor soft 5/22/2018  2:20 AM   Edges irregular 5/22/2018  2:20 AM   Drainage Amount none 5/22/2018  2:20 AM   Dressing Care, Wound open to air 5/22/2018  2:20 AM   Periwound Care, Wound dry periwound area maintained 5/22/2018  2:20 AM       Wound 05/16/18 2100 Right medial gluteal pressure injury (Active)   Dressing Appearance open to air 5/22/2018  2:20 AM   Base red/granulating 5/22/2018  2:20 AM    Periwound dry;excoriated 5/22/2018  2:20 AM   Drainage Amount none 5/22/2018  2:20 AM   Dressing Care, Wound open to air 5/22/2018  2:20 AM             Physical Therapy Education     Title: PT OT SLP Therapies (Done)     Topic: Physical Therapy (Done)     Point: Mobility training (Done)    Learning Progress Summary     Learner Status Readiness Method Response Comment Documented by    Patient Done Acceptance E VU  BC 05/22/18 1252     Active Acceptance D NR  AG 05/21/18 1805    Significant Other Done Acceptance E,D VU,NR  AG 05/21/18 1805          Point: Home exercise program (Done)    Learning Progress Summary     Learner Status Readiness Method Response Comment Documented by    Patient Done Acceptance E VU  BC 05/22/18 1252     Active Acceptance D NR  AG 05/21/18 1805    Significant Other Done Acceptance E,D VU,NR  AG 05/21/18 1805          Point: Body mechanics (Done)    Learning Progress Summary     Learner Status Readiness Method Response Comment Documented by    Patient Done Acceptance E VU  BC 05/22/18 1252     Active Acceptance D NR  AG 05/21/18 1805    Significant Other Done Acceptance E,D VU,NR  AG 05/21/18 1805          Point: Precautions (Done)    Learning Progress Summary     Learner Status Readiness Method Response Comment Documented by    Patient Done Acceptance E VU  BC 05/22/18 1252     Active Acceptance D NR  AG 05/21/18 1805    Significant Other Done Acceptance E,D VU,NR  AG 05/21/18 1805                      User Key     Initials Effective Dates Name Provider Type Discipline    BC 03/14/16 -  Johana Willis, PT Physical Therapist PT     04/03/18 -  Cindy Bell, PT Physical Therapist PT                    PT Recommendation and Plan  Therapy Frequency (PT Clinical Impression): 3 times/wk (3-5 times/ week per priority policy)  Outcome Summary/Treatment Plan (PT)  Anticipated Equipment Needs at Discharge (PT): wheelchair (TBD)  Plan of Care Reviewed With: patient  Progress: improving  Outcome  Summary: increased  seated time, and performed therex BLE          Outcome Measures     Row Name 05/22/18 1200 05/22/18 0800 05/21/18 1800       How much help from another person do you currently need...    Turning from your back to your side while in flat bed without using bedrails? 2  -BC  -- 2  -AG    Moving from lying on back to sitting on the side of a flat bed without bedrails? 2  -BC  -- 2  -AG    Moving to and from a bed to a chair (including a wheelchair)? 1  -BC  -- 1  -AG    Standing up from a chair using your arms (e.g., wheelchair, bedside chair)? 1  -BC  -- 1  -AG    Climbing 3-5 steps with a railing? 1  -BC  -- 1  -AG    To walk in hospital room? 1  -BC  -- 1  -AG    AM-PAC 6 Clicks Score 8  -BC  -- 8  -AG       Functional Assessment    Outcome Measure Options AM-PAC 6 Clicks Basic Mobility (PT)  -BC AM-PAC 6 Clicks Daily Activity (OT)  -KR AM-PAC 6 Clicks Basic Mobility (PT)  -AG    Row Name 05/21/18 1631             How much help from another is currently needed...    Putting on and taking off regular lower body clothing? 1  -KR      Bathing (including washing, rinsing, and drying) 2  -KR      Toileting (which includes using toilet bed pan or urinal) 1  -KR      Putting on and taking off regular upper body clothing 2  -KR      Taking care of personal grooming (such as brushing teeth) 2  -KR      Eating meals 2  -KR      Score 10  -KR        User Key  (r) = Recorded By, (t) = Taken By, (c) = Cosigned By    Initials Name Provider Type    BC Johana Willis, PT Physical Therapist    AG Cindy Bell, PT Physical Therapist    KR Adebayo Abarca, OT Occupational Therapist           Time Calculation:         PT Charges     Row Name 05/22/18 3338             Time Calculation    Start Time --   30  -BC      PT Received On 05/22/18  -BC         Time Calculation- PT    Total Timed Code Minutes- PT 30 minute(s)  -BC        User Key  (r) = Recorded By, (t) = Taken By, (c) = Cosigned By    Initials Name Provider  Type    BC Johana Willis, PT Physical Therapist          Therapy Charges for Today     Code Description Service Date Service Provider Modifiers Qty    51311869516 HC PT THER SUPP EA 15 MIN 5/22/2018 Johana Willis, PT GP 2    87412057248 HC PT THERAPEUTIC ACT EA 15 MIN 5/22/2018 Johana Willis, PT GP 1    27132296031 HC PT THER PROC EA 15 MIN 5/22/2018 Johana Willis, PT GP 1          PT G-Codes  Outcome Measure Options: AM-PAC 6 Clicks Basic Mobility (PT)  Score: 8  Functional Limitation: Mobility: Walking and moving around  Mobility: Walking and Moving Around Current Status (): At least 80 percent but less than 100 percent impaired, limited or restricted  Mobility: Walking and Moving Around Goal Status (): At least 60 percent but less than 80 percent impaired, limited or restricted    Johana Willis, PT  5/22/2018

## 2018-05-22 NOTE — THERAPY TREATMENT NOTE
Acute Care - Occupational Therapy Treatment Note  [facility]     Patient Name: Porsha Soni  : 1940  MRN: [id number]  Today's Date: 2018  Onset of Illness/Injury or Date of Surgery: 18     Referring Physician: Dr. Horta    Admit Date: 2018       ICD-10-CM ICD-9-CM   1. Sepsis, due to unspecified organism A41.9 038.9     995.91   2. Acute pancreatitis, unspecified complication status, unspecified pancreatitis type K85.90 577.0   3. Leukocytosis, unspecified type D72.829 288.60   4. Cholecystitis K81.9 575.10     Patient Active Problem List   Diagnosis   • Insomnia   • Asymptomatic PVCs   • CAD in native artery   • Hypertension   • Dyslipidemia   • Palpitations   • Diarrhea of presumed infectious origin   • Bloating   • Elevated prostate specific antigen (PSA)   • Cholecystitis   • Sepsis   • Acute respiratory failure with hypoxia   • Physical deconditioning   • Impaired mobility and ADLs     Past Medical History:   Diagnosis Date   • Arthritis    • Cervical spine disease     remotely suggested surgical intervention.  Patient deferred.    • Cervical spine disease    • Coronary artery disease    • Dyslipidemia    • Hypertension    • Impotence    • Palpitations    • Vertigo      Past Surgical History:   Procedure Laterality Date   • ARTERIAL BYPASS SURGERY     • CHOLECYSTECTOMY WITH INTRAOPERATIVE CHOLANGIOGRAM N/A 2018    Procedure: OPEN CHOLECYSTECTOMY;  Surgeon: Rosie Montalvo MD;  Location: [facility];  Service: General   • COLONOSCOPY N/A 3/30/2018    Procedure: COLONOSCOPY;  Surgeon: Simone Valderrama MD;  Location: [facility];  Service: Gastroenterology   • CORONARY ARTERY BYPASS GRAFT     • KNEE ARTHROPLASTY, PARTIAL REPLACEMENT      Lt ,    • REPLACEMENT TOTAL KNEE      Rt       Therapy Treatment          Rehabilitation Treatment Summary     Row Name 18 1524 18 1200          Treatment Time/Intention    Discipline occupational therapist  -  --     Document Type  therapy note (daily note)  - therapy note (daily note)  -BC     Subjective Information complains of;weakness  -KH  --     Mode of Treatment occupational therapy;individual therapy  -KH individual therapy;physical therapy  -BC     Patient/Family Observations Pt alert and agreeable to treatment while supine  -KH  --     Therapy Frequency (PT Clinical Impression)  -- 3 times/wk   3-5 times/ week per priority policy  -BC     Patient Effort adequate  -KH adequate  -BC     Comment Pt continues to demo significant strength/endurance/mobility deficits  -  --     Existing Precautions/Restrictions fall;oxygen therapy device and L/min  - fall;oxygen therapy device and L/min;other (see comments)   bulb drain; decr skin integrity  -BC     Recorded by [KH] Marlene Chen, OT 05/22/18 1533 [BC] Johana Willis, PT 05/22/18 1251     Row Name 05/22/18 1524 05/22/18 1200          Cognitive Assessment/Intervention- PT/OT    Orientation Status (Cognition) oriented x 3  - oriented x 3  -BC     Follows Commands (Cognition) follows one step commands;physical/tactile prompts required;repetition of directions required;verbal cues/prompting required  - follows one step commands;75-90% accuracy;physical/tactile prompts required;repetition of directions required;verbal cues/prompting required  -BC     Recorded by [] Marlene Chen, OT 05/22/18 1533 [BC] Johana Willis, PT 05/22/18 1251     Row Name 05/22/18 1200             Safety Issues, Functional Mobility    Impairments Affecting Function (Mobility) balance;cognition;coordination;endurance/activity tolerance;range of motion (ROM);strength  -BC      Recorded by [BC] Johana Willis, PT 05/22/18 1251      Row Name 05/22/18 1200             Mobility Assessment/Intervention    Extremity Weight-bearing Status left lower extremity;right lower extremity  -BC      Left Lower Extremity (Weight-bearing Status) weight-bearing as tolerated (WBAT)  -BC      Right  Lower Extremity (Weight-bearing Status) weight-bearing as tolerated (WBAT)  -BC      Recorded by [BC] Johana Willis, PT 05/22/18 1251      Row Name 05/22/18 1200             Bed Mobility Assessment/Treatment    Bed Mobility Assessment/Treatment rolling left;rolling right;scooting/bridging;supine-sit;sit-supine  -BC      Rolling Left [place] (Bed Mobility) verbal cues;nonverbal cues (demo/gesture);maximum assist (25% patient effort);2 person assist  -BC      Rolling Right [place] (Bed Mobility) verbal cues;nonverbal cues (demo/gesture);maximum assist (25% patient effort);2 person assist  -BC      Scooting/Bridging [place] (Bed Mobility) verbal cues;nonverbal cues (demo/gesture);maximum assist (25% patient effort);2 person assist  -BC      Supine-Sit [place] (Bed Mobility) verbal cues;nonverbal cues (demo/gesture);maximum assist (25% patient effort);2 person assist  -BC      Sit-Supine [place] (Bed Mobility) verbal cues;nonverbal cues (demo/gesture);maximum assist (25% patient effort);2 person assist  -BC      Bed Mobility, Safety Issues decreased use of arms for pushing/pulling;decreased use of legs for bridging/pushing;impaired trunk control for bed mobility  -BC      Recorded by [BC] Johana Willis, PT 05/22/18 1251      Row Name 05/22/18 1200             Transfer Assessment/Treatment    Transfer Assessment/Treatment bed-chair transfer;chair-bed transfer;sit-stand transfer;stand-sit transfer;stand pivot/stand step transfer  -BC      Bed-Chair [place] (Transfers) verbal cues;nonverbal cues (demo/gesture);maximum assist (25% patient effort);dependent (less than 25% patient effort);2 person assist  -BC      Chair-Bed [place] (Transfers) verbal cues;nonverbal cues (demo/gesture);maximum assist (25% patient effort);dependent (less than 25% patient effort);2 person assist  -BC      Assistive Device (Bed-Chair Transfers) walker, front-wheeled  -BC      Sit-Stand [place]  (Transfers) verbal cues;nonverbal cues (demo/gesture);maximum assist (25% patient effort);2 person assist  -BC      Stand-Sit [place] (Transfers) verbal cues;nonverbal cues (demo/gesture);maximum assist (25% patient effort);dependent (less than 25% patient effort);2 person assist  -BC      Recorded by [BC] Johana Willis, PT 05/22/18 1251      Row Name 05/22/18 1200             Sit-Stand Transfer    Assistive Device (Sit-Stand Transfers) walker, front-wheeled  -BC      Recorded by [BC] Johana Willis, PT 05/22/18 1251      Row Name 05/22/18 1200             Stand-Sit Transfer    Assistive Device (Stand-Sit Transfers) walker, front-wheeled  -BC      Recorded by [BC] Johana Willis, PT 05/22/18 1251      Row Name 05/22/18 1200             Stand Pivot/Stand Step Transfer    Stand Pivot/Stand Step [place] verbal cues;nonverbal cues (demo/gesture);maximum assist (25% patient effort);dependent (less than 25% patient effort);2 person assist  -BC      Recorded by [BC] Johana Willis, PT 05/22/18 1251      Row Name 05/22/18 1200             Gait/Stairs Assessment/Training    [place] Level (Gait) unable to assess  -BC      Recorded by [BC] Johana Willis, PT 05/22/18 1251      Row Name 05/22/18 1524             Motor Skills Assessment/Interventions    Additional Documentation Therapeutic Exercise (Group)  -KH      Recorded by [] Marlene Chen, OT 05/22/18 1533      Row Name 05/22/18 1524             Therapeutic Exercise    Upper Extremity (Therapeutic Exercise) bicep curl, bilateral;wand exercises  -      Upper Extremity Range of Motion (Therapeutic Exercise) elbow flexion/extension, bilateral;wrist flexion/extension, bilateral;shoulder flexion/extension, bilateral  -KH      Hand (Therapeutic Exercise) hand , bilateral  -KH      Weight/Resistance (Therapeutic Exercise) blue   theraband flexbar  -      Exercise Type (Therapeutic Exercise) AROM (active range of motion);PROM (passive  range of motion)  -KH      Position (Therapeutic Exercise) supine  -      Sets/Reps (Therapeutic Exercise) 3 exercises; 20 total reps  -      Equipment (Therapeutic Exercise) dowel ebony/wand  -KH      Expected Outcome (Therapeutic Exercise) improve functional tolerance, self-care activity;improve performance, BADLs  -KH      Recorded by [KH] Marlene Chen, OT 05/22/18 1533      Row Name 05/22/18 1200             Static Sitting Balance    Level of [place] (Unsupported Sitting, Static Balance) contact guard assist  -BC      Sitting Position (Unsupported Sitting, Static Balance) sitting on edge of bed  -BC      Time Able to Maintain Position (Unsupported Sitting, Static Balance) more than 5 minutes  -BC      Recorded by [BC] Johana Willis, PT 05/22/18 1251      Row Name 05/22/18 1524 05/22/18 1200          Positioning and Restraints    Pre-Treatment Position in bed  - in bed  -BC     Post Treatment Position bed  - bed  -BC     In Bed supine;call light within reach;encouraged to call for assist;with family/caregiver;side rails up x2  -KH notified nsg;call light within reach;encouraged to call for assist;sitting EOB  -BC     Recorded by [KH] Marlene Chen, OT 05/22/18 1533 [BC] Johana Willis, PT 05/22/18 1251     Row Name 05/22/18 1200             Pain Scale: FACES Pre/Post-Treatment    Pain: FACES Scale, Pretreatment 0-->no hurt  -BC      Pain: FACES Scale, Post-Treatment 0-->no hurt  -BC      Recorded by [BC] Johana Willis, PT 05/22/18 1251      Row Name 05/22/18 1200             Vision Assessment/Intervention    Visual Impairment/Limitations WFL  -BC      Recorded by [BC] Johana Willis, PT 05/22/18 1251      Row Name                Wound 05/16/18 1730 Other (See comments) abdomen incision    Wound - Properties Group Date first assessed: 05/16/18 [CE] Time first assessed: 1730 [CE] Side: Other (See comments) [CE] Location: abdomen [CE] Type: incision [CE] Recorded by:  [CE]  Kailash Renee RN 05/16/18 1730    Row Name                Wound 05/16/18 2100 Left medial gluteal pressure injury    Wound - Properties Group Date first assessed: 05/16/18 [VC] Time first assessed: 2100 [VC] Present On Admission : yes;picture taken [VC2] Side: Left [VC] Orientation: medial [VC] Location: gluteal [VC] Type: pressure injury [VC] Stage, Pressure Injury: Stage 2 [VC] Recorded by:  [VC] Dax Díaz RN 05/16/18 2119 [VC2] Dax Díaz RN 05/16/18 2121    Row Name                Wound 05/16/18 2100 Right medial gluteal pressure injury    Wound - Properties Group Date first assessed: 05/16/18 [VC] Time first assessed: 2100 [VC] Present On Admission : yes;picture taken [VC] Side: Right [VC] Orientation: medial [VC] Location: gluteal [VC] Type: pressure injury [VC] Stage, Pressure Injury: Stage 2 [VC] Recorded by:  [VC] Dax Díaz RN 05/16/18 2120    Row Name 05/22/18 1200             Coping    Observed Emotional State accepting;calm;cooperative  -BC      Verbalized Emotional State acceptance  -BC      Recorded by [BC] Johana Willis, PT 05/22/18 1251      Row Name 05/22/18 1200             Outcome Summary/Treatment Plan (PT)    Anticipated Equipment Needs at Discharge (PT) wheelchair   TBD  -BC      Recorded by [BC] Johana Willis, PT 05/22/18 1251        User Key  (r) = Recorded By, (t) = Taken By, (c) = Cosigned By    Initials Name Effective Dates Discipline    VC Dax Díaz RN 06/16/16 -  Nurse    BC Johana Willis, PT 03/14/16 -  PT    CE Kailash Renee RN 06/16/16 -  Nurse    AUTUMN Chen, OT 04/17/18 -  OT        Wound 05/16/18 1730 Other (See comments) abdomen incision (Active)   Dressing Appearance open to air 5/22/2018  2:20 AM   Closure [place] 5/22/2018  2:20 AM   Base clean;dry 5/22/2018  2:20 AM   Periwound dry;intact;pink 5/22/2018  2:20 AM   Periwound Temperature warm 5/22/2018  2:20 AM   Periwound Skin Turgor soft  5/22/2018  2:20 AM   Drainage Amount moderate 5/22/2018  2:20 AM   Dressing Care, Wound open to air 5/22/2018  2:20 AM   Periwound Care, Wound dry periwound area maintained 5/22/2018  2:20 AM       Wound 05/16/18 2100 Left medial gluteal pressure injury (Active)   Dressing Appearance open to air 5/22/2018  2:20 AM   Base red/granulating 5/22/2018  2:20 AM   Periwound dry;excoriated 5/22/2018  2:20 AM   Periwound Temperature warm 5/22/2018  2:20 AM   Periwound Skin Turgor soft 5/22/2018  2:20 AM   Edges irregular 5/22/2018  2:20 AM   Drainage Amount none 5/22/2018  2:20 AM   Dressing Care, Wound open to air 5/22/2018  2:20 AM   Periwound Care, Wound dry periwound area maintained 5/22/2018  2:20 AM       Wound 05/16/18 2100 Right medial gluteal pressure injury (Active)   Dressing Appearance open to air 5/22/2018  2:20 AM   Base red/granulating 5/22/2018  2:20 AM   Periwound dry;excoriated 5/22/2018  2:20 AM   Drainage Amount none 5/22/2018  2:20 AM   Dressing Care, Wound open to air 5/22/2018  2:20 AM             OT Recommendation and Plan     Plan of Care Review  Plan of Care Reviewed With: patient, spouse  Plan of Care Reviewed With: patient, spouse  Outcome Summary: Pt agreeable and able to tolerate treatment in supine this date. Completed 3 BUE strengthning/endurance exercises x 20 reps each w/ extended rest breaks as needed. Skilled OT to continue current POC.        Outcome Measures     Row Name 05/22/18 1200 05/22/18 0800 05/21/18 1800       How much help from another person do you currently need...    Turning from your back to your side while in flat bed without using bedrails? 2  -BC  -- 2  -AG    Moving from lying on back to sitting on the side of a flat bed without bedrails? 2  -BC  -- 2  -AG    Moving to and from a bed to a chair (including a wheelchair)? 1  -BC  -- 1  -AG    Standing up from a chair using your arms (e.g., wheelchair, bedside chair)? 1  -BC  -- 1  -AG    Climbing 3-5 steps with a railing?  1  -BC  -- 1  -AG    To walk in hospital room? 1  -BC  -- 1  -AG    AM-PAC 6 Clicks Score 8  -BC  -- 8  -AG       Functional Assessment    Outcome Measure Options AM-PAC 6 Clicks Basic Mobility (PT)  -BC AM-PAC 6 Clicks Daily Activity (OT)  -KR AM-PAC 6 Clicks Basic Mobility (PT)  -AG    Row Name 05/21/18 1631             How much help from another is currently needed...    Putting on and taking off regular lower body clothing? 1  -KR      Bathing (including washing, rinsing, and drying) 2  -KR      Toileting (which includes using toilet bed pan or urinal) 1  -KR      Putting on and taking off regular upper body clothing 2  -KR      Taking care of personal grooming (such as brushing teeth) 2  -KR      Eating meals 2  -KR      Score 10  -KR        User Key  (r) = Recorded By, (t) = Taken By, (c) = Cosigned By    Initials Name Provider Type    BC Johana Willis, PT Physical Therapist    AG Cindy Bell, PT Physical Therapist    KR Adebayo Abarca, OT Occupational Therapist           Time Calculation:         Time Calculation- OT     Row Name 05/22/18 1534             Time Calculation- OT    Total Timed Code Minutes- OT 15 minute(s)  -        User Key  (r) = Recorded By, (t) = Taken By, (c) = Cosigned By    Initials Name Provider Type     Marlene Chen, OT Occupational Therapist           Therapy Charges for Today     Code Description Service Date Service Provider Modifiers Qty    [id number]  OT THERAPEUTIC ACT EA 15 MIN 5/22/2018 Marlene Chen OT GO 1          OT G-codes  OT Professional Judgement Used?: Yes  Functional Limitation: Self care  Self Care Current Status (): At least 80 percent but less than 100 percent impaired, limited or restricted  Self Care Goal Status (): At least 40 percent but less than 60 percent impaired, limited or restricted    Marlene Chen OT  5/22/2018

## 2018-05-22 NOTE — PROGRESS NOTES
"  I have personally seen and examined the patient today and discussed overnight interval progress and pertinent issues with nursing staff.    Subjective:    No fever or diarrhea reported overnight.  Wife at bedside.  The patient had an episode of hypoxemia overnight and was started on high flow oxygen.  Lung exam showing decreased breath sounds throughout.  Overall the patient has made significant clinical improvement with significant improvement CRP level and leukocytosis.     History taken from: patient chart family RN      Vital Signs    /92   Pulse 79   Temp 98.9 °F (37.2 °C)   Resp 14   Ht 177.8 cm (70\")   Wt 108 kg (237 lb 14.4 oz)   SpO2 95%   BMI 34.14 kg/m²     Temp:  [98.2 °F (36.8 °C)-99 °F (37.2 °C)] 98.9 °F (37.2 °C)      Intake/Output Summary (Last 24 hours) at 05/22/18 0934  Last data filed at 05/22/18 0400   Gross per 24 hour   Intake             1720 ml   Output             1455 ml   Net              265 ml     Intake & Output (last 3 days)       05/19 0701 - 05/20 0700 05/20 0701 - 05/21 0700 05/21 0701 - 05/22 0700 05/22 0701 - 05/23 0700    P.O.  600 960     I.V. (mL/kg) 2991.7 (28.5) 2328.3 (22) 1000 (9.3)     IV Piggyback 300 300      Total Intake(mL/kg) 3291.7 (31.3) 3228.3 (30.5) 1960 (18.1)     Urine (mL/kg/hr) 1125 (0.4) 1275 (0.5) 1460 (0.6)     Drains 170 (0.1) 310 (0.1) 145 (0.1)     Stool 4 (0)       Total Output 1299 1585 1605      Net +1992.7 +1643.3 +355              Unmeasured Stool Occurrence 5 x             Physical Exam:                 General Appearance:   More awake, high flow oxygen, comfortable wife at bedside    Head:    Normocephalic, without obvious abnormality, atraumatic   Eyes:            Lids and lashes normal, conjunctivae and sclerae normal, no   icterus, no pallor, corneas clear, PERRLA   Ears:    Ears appear intact with no abnormalities noted   Throat:   No oral lesions, no thrush, oral mucosa moist   Neck:   No adenopathy, supple, trachea midline, no " thyromegaly, no   carotid bruit, no JVD   Back:     No tenderness to percussion or palpation, range of motion   normal   Lungs:     Clear to auscultation,respirations regular, even and unlabored. No wheezing, no ronchi and no crackles.    Heart:    Regular rhythm and normal rate, normal S1 and S2, no            murmur, no gallop, no rub, no click   Chest Wall:    No abnormalities observed   Abdomen:     Distended, Normal bowel sounds, no masses, no organomegaly, soft, non-tender, no guarding, no rebound tenderness   Rectal:     Deferred   Extremities:   Moves all extremities well, no edema, no cyanosis, no             redness   Pulses:   Pulses palpable and equal bilaterally   Skin:   No bleeding, bruising or rash   Lymph nodes:   No palpable adenopathy   Neurologic:   More awake and alert         Results:      Results from last 7 days  Lab Units 05/21/18  0142 05/20/18  0123 05/19/18  0130 05/18/18  0104 05/17/18  0345 05/16/18  0757   WBC 10*3/mm3 14.53* 15.13* 24.91* 19.50* 17.91* 26.67*     Lab Results   Component Value Date    NEUTROABS 10.48 (H) 05/21/2018         Results from last 7 days  Lab Units 05/22/18  0845   CREATININE mg/dL 0.69         Results from last 7 days  Lab Units 05/22/18  0845 05/21/18  0142 05/20/18  0123   CRP mg/dL 6.21* 6.58* 11.52*     Results Review:    I have personally reviewed laboratory data, culture results, radiology studies and antimicrobial therapy.    Hospital Medications (active)       Dose Frequency Start End    aspirin EC tablet 81 mg 81 mg Daily 5/16/2018     Sig - Route: Take 1 tablet by mouth Daily. - Oral    cetirizine (zyrTEC) tablet 10 mg 10 mg Daily 5/16/2018     Sig - Route: Take 1 tablet by mouth Daily. - Oral    chlorhexidine (PERIDEX) 0.12 % solution 15 mL 15 mL Every 12 Hours Scheduled 5/17/2018     Sig - Route: Apply 15 mL to the mouth or throat Every 12 (Twelve) Hours. - Mouth/Throat    cholecalciferol (VITAMIN D3) capsule 50,000 Units 50,000 Units Weekly  "5/21/2018     Sig - Route: Take 1 capsule by mouth 1 (One) Time Per Week. - Oral    dexmedetomidine HCl (PRECEDEX) 400 mcg in sodium chloride 0.9 % 100 mL (4 mcg/mL) infusion 0.2-1.5 mcg/kg/hr × 105 kg Titrated 5/18/2018     Sig - Route: Infuse .5 mcg/hr into a venous catheter Dose Adjusted By Provider As Needed. - Intravenous    docusate sodium (COLACE) capsule 100 mg 100 mg 2 Times Daily 5/16/2018     Sig - Route: Take 1 capsule by mouth 2 (Two) Times a Day. - Oral    donepezil (ARICEPT) tablet 10 mg 10 mg Nightly 5/16/2018     Sig - Route: Take 2 tablets by mouth Every Night. - Oral    FENTANYL PCA 1500 MCG/30 ML (BHCOR) PCA  Titrated 5/17/2018 5/27/2018    Sig - Route: Infuse  into a venous catheter Dose Adjusted By Provider As Needed. - Intravenous    finasteride (PROSCAR) tablet 5 mg 5 mg Daily 5/16/2018     Sig - Route: Take 1 tablet by mouth Daily. - Oral    heparin (porcine) 5000 UNIT/ML injection 5,000 Units 5,000 Units Every 12 Hours Scheduled 5/17/2018     Sig - Route: Inject 1 mL under the skin Every 12 (Twelve) Hours. - Subcutaneous    HYDROcodone-acetaminophen (NORCO) 7.5-325 MG per tablet 1 tablet 1 tablet Every 4 Hours PRN 5/16/2018 5/26/2018    Sig - Route: Take 1 tablet by mouth Every 4 (Four) Hours As Needed for Moderate Pain . - Oral    ipratropium (ATROVENT) nebulizer solution 0.5 mg 0.5 mg Every 6 Hours - RT 5/17/2018     Sig - Route: Take 2.5 mL by nebulization Every 6 (Six) Hours. - Nebulization    lansoprazole (FIRST) oral suspension 30 mg 30 mg Every Early Morning 5/17/2018     Sig - Route: 10 mL by Per G Tube route Every Morning. - Per G Tube    Magnesium Sulfate 2 gram / 50mL Infusion (GIVE X 3 BAGS TO EQUAL 6GM TOTAL DOSE) - Mg 1.1 - 1/5 mg/dl 2 g As Needed 5/17/2018     Sig - Route: Infuse 50 mL into a venous catheter As Needed (See Administration Instructions). - Intravenous    Linked Group 1:  \"Or\" Linked Group Details        Magnesium Sulfate 2 gram Bolus, followed by 8 gram " "infusion (total Mg dose 10 grams)- Mg less than or equal to 1mg/dL 2 g As Needed 5/17/2018     Sig - Route: Infuse 50 mL into a venous catheter As Needed (See Administration Instructions). - Intravenous    Linked Group 1:  \"Or\" Linked Group Details        Magnesium Sulfate 4 gram infusion- Mg 1.6-1.9 mg/dL 4 g As Needed 5/17/2018     Sig - Route: Infuse 100 mL into a venous catheter As Needed (See Administration Instructions). - Intravenous    Linked Group 1:  \"Or\" Linked Group Details        Magnesium Sulfate 4 gram infusion- Mg 1.6-1.9 mg/dL 4 g Once 5/17/2018 5/17/2018    Sig - Route: Infuse 100 mL into a venous catheter 1 (One) Time. - Intravenous    memantine (NAMENDA) tablet 10 mg 10 mg Every 12 Hours Scheduled 5/16/2018     Sig - Route: Take 1 tablet by mouth Every 12 (Twelve) Hours. - Oral    morphine injection 4 mg 4 mg Every 3 Hours PRN 5/16/2018     Sig - Route: Infuse 1 mL into a venous catheter Every 3 (Three) Hours As Needed for Severe Pain . - Intravenous    norepinephrine (LEVOPHED) 8,000 mcg in sodium chloride 0.9 % 250 mL (32 mcg/mL) infusion 0.02-0.3 mcg/kg/min × 95.3 kg Titrated 5/16/2018     Sig - Route: Infuse 1.906-28.59 mcg/min into a venous catheter Dose Adjusted By Provider As Needed. - Intravenous    ondansetron (ZOFRAN) injection 4 mg 4 mg Every 4 Hours PRN 5/16/2018     Sig - Route: Infuse 2 mL into a venous catheter Every 4 (Four) Hours As Needed for Nausea or Vomiting. - Intravenous    piperacillin-tazobactam (ZOSYN) 3.375 g/100 mL 0.9% NS IVPB (mbp) 3.375 g Every 8 Hours 5/16/2018 5/30/2018    Sig - Route: Infuse 100 mL into a venous catheter Every 8 (Eight) Hours. - Intravenous    polyethylene glycol (MIRALAX) powder 17 g 17 g 2 Times Daily 5/16/2018 5/20/2018    Sig - Route: Take 17 g by mouth 2 (Two) Times a Day. - Oral    potassium chloride (KLOR-CON) packet 40 mEq 40 mEq As Needed 5/17/2018     Sig - Route: Take 40 mEq by mouth As Needed (potassium replacement, see admin " "instructions). - Oral    Linked Group 2:  \"Or\" Linked Group Details        potassium chloride (MICRO-K) CR capsule 40 mEq 40 mEq As Needed 5/17/2018     Sig - Route: Take 4 capsules by mouth As Needed (potassium replacement.  see admin instructions). - Oral    Linked Group 2:  \"Or\" Linked Group Details        potassium chloride 10 mEq in 100 mL IVPB 10 mEq Every 1 Hour PRN 5/17/2018     Sig - Route: Infuse 100 mL into a venous catheter Every 1 (One) Hour As Needed (potassium protocol PERIPHERAL - see admin instructions). - Intravenous    Linked Group 2:  \"Or\" Linked Group Details        potassium phosphate 15 mmol in sodium chloride 0.9 % 100 mL infusion 15 mmol As Needed 5/17/2018     Sig - Route: Infuse 15 mmol into a venous catheter As Needed (Peripheral IV - Phosphorus 1.8 - 2.5). - Intravenous    Linked Group 3:  \"Or\" Linked Group Details        potassium phosphate 30 mmol in sodium chloride 0.9 % 250 mL infusion 30 mmol As Needed 5/17/2018     Sig - Route: Infuse 30 mmol into a venous catheter As Needed (Peripheral IV - Phosphorus 1.3 - 1.7). - Intravenous    Linked Group 3:  \"Or\" Linked Group Details        potassium phosphate 45 mmol in sodium chloride 0.9 % 500 mL infusion 45 mmol As Needed 5/17/2018     Sig - Route: Infuse 45 mmol into a venous catheter As Needed (Peripheral IV - Phosphorus Less Than 1.3). - Intravenous    Linked Group 3:  \"Or\" Linked Group Details        promethazine (PHENERGAN) 12.5 mg in sodium chloride 0.9 % 50 mL 12.5 mg Every 4 Hours PRN 5/16/2018     Sig - Route: Infuse 12.5 mg into a venous catheter Every 4 (Four) Hours As Needed for Nausea or Vomiting. - Intravenous    propofol (DIPRIVAN) infusion 10 mg/mL 100 mL 5-50 mcg/kg/min × 105 kg Titrated 5/17/2018     Sig - Route: Infuse 525-5,250 mcg/min into a venous catheter Dose Adjusted By Provider As Needed. - Intravenous    sodium chloride 0.9 % bolus 1,000 mL 1,000 mL Once 5/16/2018     Sig - Route: Infuse 1,000 mL into a venous " catheter 1 (One) Time. - Intravenous    sodium chloride 0.9 % flush 1-10 mL 1-10 mL As Needed 5/16/2018     Sig - Route: Infuse 1-10 mL into a venous catheter As Needed for Line Care. - Intravenous    Cosign for Ordering: Accepted by Evert Brown DO on 5/17/2018  8:54 AM    sodium chloride 0.9 % flush 1-10 mL 1-10 mL As Needed 5/16/2018     Sig - Route: Infuse 1-10 mL into a venous catheter As Needed for Line Care. - Intravenous    sodium chloride 0.9 % flush 10 mL 10 mL As Needed 5/16/2018     Sig - Route: Infuse 10 mL into a venous catheter As Needed for Line Care. - Intravenous    sodium chloride 0.9 % flush 10 mL 10 mL Every 12 Hours Scheduled 5/17/2018     Sig - Route: 10 mL by Intracatheter route Every 12 (Twelve) Hours. - Intracatheter    sodium chloride 0.9 % flush 10 mL 10 mL As Needed 5/17/2018     Sig - Route: 10 mL by Intracatheter route As Needed for Line Care (After Medication Administration or Blood Draw). - Intracatheter    sodium chloride 0.9 % flush 10 mL 10 mL As Needed 5/17/2018     Sig - Route: 10 mL by Intracatheter route As Needed for Line Care (After Medication Administration or Blood Draw). - Intracatheter    sodium chloride 0.9 % flush 10 mL 10 mL As Needed 5/17/2018     Sig - Route: 10 mL by Intracatheter route As Needed for Line Care (After Medication Administration or Blood Draw). - Intracatheter    sodium chloride 0.9 % flush 10 mL 10 mL As Needed 5/17/2018     Sig - Route: 10 mL by Intracatheter route As Needed for Line Care (After Medication Administration or Blood Draw). - Intracatheter    sodium chloride 0.9 % flush 10 mL 10 mL As Needed 5/17/2018     Sig - Route: 10 mL by Intracatheter route As Needed for Line Care (After Medication Administration or Blood Draw). - Intracatheter    sodium chloride 0.9 % infusion 100 mL/hr Continuous 5/17/2018     Sig - Route: Infuse 100 mL/hr into a venous catheter Continuous. - Intravenous    sodium phosphates 15 mmol in sodium chloride  "0.9 % 250 mL IVPB 15 mmol As Needed 5/17/2018     Sig - Route: Infuse 15 mmol into a venous catheter As Needed (Peripheral IV - Phosphorus 1.8 - 2.5 & Potassium Greater Than 4). - Intravenous    Linked Group 3:  \"Or\" Linked Group Details        sodium phosphates 30 mmol in sodium chloride 0.9 % 250 mL IVPB 30 mmol As Needed 5/17/2018     Sig - Route: Infuse 30 mmol into a venous catheter As Needed (Peripheral IV - Phosphorus 1.3-1.7 & Potassium Greater Than 4). - Intravenous    Linked Group 3:  \"Or\" Linked Group Details        sodium phosphates 45 mmol in sodium chloride 0.9 % 500 mL IVPB 45 mmol As Needed 5/17/2018     Sig - Route: Infuse 45 mmol into a venous catheter As Needed (Peripheral IV - Phosphorus Less Than 1.3 & Potassium Greater Than 4). - Intravenous    Linked Group 3:  \"Or\" Linked Group Details        vancomycin (VANCOCIN) 1,250 mg in sodium chloride 0.9 % 250 mL IVPB (Discontinued) 1,250 mg Every 18 Hours 5/17/2018 5/17/2018    Sig - Route: Infuse 1,250 mg into a venous catheter Every 18 (Eighteen) Hours. - Intravenous            Cultures:    Blood Culture   Date Value Ref Range Status   05/16/2018 No growth at 2 days  Preliminary   05/16/2018 No growth at 2 days  Preliminary           Assessment/Plan     ASSESSMENT:    1.  Septic shock on Levophed  2.  Peritonitis     PLAN:    No fever or diarrhea reported overnight.  Wife at bedside.  The patient had an episode of hypoxemia overnight and was started on high flow oxygen.  Lung exam showing decreased breath sounds throughout.  Overall the patient has made significant clinical improvement with significant improvement CRP level and leukocytosis.  Could de-escalate coverage to oral Omnicef upon discharge.  Repeat chest x-ray might be indicated in the setting of higher oxygen requirement.    Current Antimicrobials:    Rocephin 2 g IV every 24 hours and Flagyl 500 g iv every 8 hours (de-escalated from Zosyn on 5/20/18) Could de-escalate to oral Omnicef on " discharge.       Patient's findings and recommendations were discussed with family and nursing staff    Code Status: Full Code     Scribed for Arlen Lamas MD.     Kerri Fajardo PA-C  05/22/18  9:34 AM    Physician Attestation:    The documentation recorded by the scribe accurately reflects the service I personally performed and the decisions made by me.    Arlen Lamas MD  Infectious Diseases  05/22/18  11:38 AM

## 2018-05-22 NOTE — THERAPY EVALUATION
Acute Care - Occupational Therapy Initial Evaluation   [facility]     Patient Name: Porsha Soni  : 1940  MRN: [id number]  Today's Date: 2018  Onset of Illness/Injury or Date of Surgery: 18     Referring Physician: Dr. Horta    Admit Date: 2018       ICD-10-CM ICD-9-CM   1. Sepsis, due to unspecified organism A41.9 038.9     995.91   2. Acute pancreatitis, unspecified complication status, unspecified pancreatitis type K85.90 577.0   3. Leukocytosis, unspecified type D72.829 288.60   4. Cholecystitis K81.9 575.10     Patient Active Problem List   Diagnosis   • Insomnia   • Asymptomatic PVCs   • CAD in native artery   • Hypertension   • Dyslipidemia   • Palpitations   • Diarrhea of presumed infectious origin   • Bloating   • Elevated prostate specific antigen (PSA)   • Cholecystitis   • Sepsis   • Acute respiratory failure with hypoxia     Past Medical History:   Diagnosis Date   • Arthritis    • Cervical spine disease     remotely suggested surgical intervention.  Patient deferred.    • Cervical spine disease    • Coronary artery disease    • Dyslipidemia    • Hypertension    • Impotence    • Palpitations    • Vertigo      Past Surgical History:   Procedure Laterality Date   • ARTERIAL BYPASS SURGERY     • CHOLECYSTECTOMY WITH INTRAOPERATIVE CHOLANGIOGRAM N/A 2018    Procedure: OPEN CHOLECYSTECTOMY;  Surgeon: Rosie Montalvo MD;  Location: [facility];  Service: General   • COLONOSCOPY N/A 3/30/2018    Procedure: COLONOSCOPY;  Surgeon: Simone Valderrama MD;  Location: [facility];  Service: Gastroenterology   • CORONARY ARTERY BYPASS GRAFT     • KNEE ARTHROPLASTY, PARTIAL REPLACEMENT      Lt ,    • REPLACEMENT TOTAL KNEE      Rt          OT ASSESSMENT FLOWSHEET (last 72 hours)      Occupational Therapy Evaluation     Row Name 18 1416                   OT Evaluation Time/Intention    Document Type evaluation  -KR        Mode of Treatment occupational therapy  -KR        Patient Effort  adequate  -KR           Cognitive Assessment/Intervention- PT/OT    Orientation Status (Cognition) oriented x 3  -KR        Follows Commands (Cognition) follows one step commands;WFL  -KR           ADL Assessment/Intervention    BADL Assessment/Intervention bathing;upper body dressing;lower body dressing;grooming;toileting  -KR           Bathing Assessment/Intervention    Bathing Trigg Level bathing skills;maximum assist (25% patient effort)  -KR           Upper Body Dressing Assessment/Training    Upper Body Dressing Trigg Level upper body dressing skills;maximum assist (25% patient effort)  -KR           Lower Body Dressing Assessment/Training    Lower Body Dressing Trigg Level lower body dressing skills;dependent (less than 25% patient effort)  -KR           Grooming Assessment/Training    Trigg Level (Grooming) grooming skills;maximum assist (25% patient effort)  -KR           Toileting Assessment/Training    Trigg Level (Toileting) toileting skills;dependent (less than 25% patient effort)  -KR           General ROM    GENERAL ROM COMMENTS BUE AROM 3/5  -KR           MMT (Manual Muscle Testing)    Additional Documentation --   BUE 2/5  -KR           Wound 05/16/18 1730 Other (See comments) abdomen incision    Wound - Properties Group Date first assessed: 05/16/18  -CE Time first assessed: 1730  -CE Side: Other (See comments)  -CE Location: abdomen  -CE Type: incision  -CE       Wound 05/16/18 2100 Left medial gluteal pressure injury    Wound - Properties Group Date first assessed: 05/16/18  -VC Time first assessed: 2100  -VC Present On Admission : yes;picture taken  -VC Side: Left  -VC Orientation: medial  -VC Location: gluteal  -VC Type: pressure injury  -VC Stage, Pressure Injury: Stage 2  -VC       Wound 05/16/18 2100 Right medial gluteal pressure injury    Wound - Properties Group Date first assessed: 05/16/18  -VC Time first assessed: 2100  -VC Present On Admission :  yes;picture taken  -VC Side: Right  -VC Orientation: medial  -VC Location: gluteal  -VC Type: pressure injury  -VC Stage, Pressure Injury: Stage 2  -VC       Clinical Impression (OT)    Criteria for Skilled Therapeutic Interventions Met (OT Eval) yes  -KR        Rehab Potential (OT Eval) good, to achieve stated therapy goals  -KR           Planned OT Interventions    Planned Therapy Interventions (OT Eval) activity tolerance training;BADL retraining;ROM/therapeutic exercise;strengthening exercise  -KR           OT Goals    Strength Goal Selection (OT) strength, OT goal 1  -KR        Activity Tolerance Goal Selection (OT) activity tolerance, OT goal 1  -KR        Additional Documentation Strength Goal Selection (OT) (Row);Activity Tolerance Goal Selection (OT) (Row)  -KR           Strength Goal 1 (OT)    Strength Goal 1 (OT) BUE strength increase to 4/5 to enhance self care/mobility skills  -KR        Time Frame (Strength Goal 1, OT) by discharge  -KR            Activity Tolerance Goal 1 (OT)    Activity Tolerance Goal 1 (OT) to enhance self care skills  -KR        Activity Level (Endurance Goal 1, OT) 15 min activity  -KR        Time Frame (Activity Tolerance Goal 1, OT) by discharge  -KR          User Key  (r) = Recorded By, (t) = Taken By, (c) = Cosigned By    Initials Name Effective Dates    VC Montanaa Briseyda Díaz, ROQUE 06/16/16 -     KR Adebayo Abarac, OT 04/03/18 -     CE Kailash Renee RN 06/16/16 -                  OT Recommendation and Plan  Planned Therapy Interventions (OT Eval): activity tolerance training, BADL retraining, ROM/therapeutic exercise, strengthening exercise             Outcome Measures     Row Name 05/22/18 0800 05/21/18 1800 05/21/18 1631       How much help from another person do you currently need...    Turning from your back to your side while in flat bed without using bedrails?  -- 2  -AG  --    Moving from lying on back to sitting on the side of a flat bed without bedrails?  -- 2   -AG  --    Moving to and from a bed to a chair (including a wheelchair)?  -- 1  -AG  --    Standing up from a chair using your arms (e.g., wheelchair, bedside chair)?  -- 1  -AG  --    Climbing 3-5 steps with a railing?  -- 1  -AG  --    To walk in hospital room?  -- 1  -AG  --    AM-PAC 6 Clicks Score  -- 8  -AG  --       How much help from another is currently needed...    Putting on and taking off regular lower body clothing?  --  -- 1  -KR    Bathing (including washing, rinsing, and drying)  --  -- 2  -KR    Toileting (which includes using toilet bed pan or urinal)  --  -- 1  -KR    Putting on and taking off regular upper body clothing  --  -- 2  -KR    Taking care of personal grooming (such as brushing teeth)  --  -- 2  -KR    Eating meals  --  -- 2  -KR    Score  --  -- 10  -KR       Functional Assessment    Outcome Measure Options AM-PAC 6 Clicks Daily Activity (OT)  -KR AM-PAC 6 Clicks Basic Mobility (PT)  -AG  --      User Key  (r) = Recorded By, (t) = Taken By, (c) = Cosigned By    Initials Name Provider Type    AG Cindy Bell, PT Physical Therapist    FELY Abarca OT Occupational Therapist          Time Calculation:   OT Start Time:  (30)    Therapy Charges for Today     Code Description Service Date Service Provider Modifiers Qty    [id number]  OT SELFCARE CURRENT 5/21/2018 BELLE Grajeda CM 1    [id number]  OT SELFCARE PROJECTED 5/21/2018 BELLE Grajeda CK 1    [id number]  OT EVAL HIGH COMPLEXITY 2 5/21/2018 BELLE Grajeda 1          OT G-codes  OT Professional Judgement Used?: Yes  Functional Limitation: Self care  Self Care Current Status (): At least 80 percent but less than 100 percent impaired, limited or restricted  Self Care Goal Status (): At least 40 percent but less than 60 percent impaired, limited or restricted    Adebayo Abarca OT  5/22/2018

## 2018-05-23 ENCOUNTER — APPOINTMENT (OUTPATIENT)
Dept: GENERAL RADIOLOGY | Facility: HOSPITAL | Age: 78
End: 2018-05-23

## 2018-05-23 ENCOUNTER — HOSPITAL ENCOUNTER (INPATIENT)
Facility: HOSPITAL | Age: 78
LOS: 7 days | Discharge: NURSING, REHAB OR LONG-TERM CARE | End: 2018-05-30
Attending: FAMILY MEDICINE | Admitting: INTERNAL MEDICINE

## 2018-05-23 ENCOUNTER — APPOINTMENT (OUTPATIENT)
Dept: CARDIOLOGY | Facility: HOSPITAL | Age: 78
End: 2018-05-23

## 2018-05-23 VITALS
BODY MASS INDEX: 34.79 KG/M2 | DIASTOLIC BLOOD PRESSURE: 68 MMHG | WEIGHT: 243 LBS | SYSTOLIC BLOOD PRESSURE: 140 MMHG | OXYGEN SATURATION: 96 % | TEMPERATURE: 98.4 F | HEART RATE: 71 BPM | HEIGHT: 70 IN | RESPIRATION RATE: 24 BRPM

## 2018-05-23 DIAGNOSIS — J98.11 COLLAPSE OF LEFT LUNG: ICD-10-CM

## 2018-05-23 DIAGNOSIS — Z74.09 IMPAIRED MOBILITY AND ADLS: ICD-10-CM

## 2018-05-23 DIAGNOSIS — Z74.09 IMPAIRED FUNCTIONAL MOBILITY, BALANCE, GAIT, AND ENDURANCE: ICD-10-CM

## 2018-05-23 DIAGNOSIS — J96.01 ACUTE RESPIRATORY FAILURE WITH HYPOXIA AND HYPERCAPNIA (HCC): Primary | ICD-10-CM

## 2018-05-23 DIAGNOSIS — Z78.9 IMPAIRED MOBILITY AND ADLS: ICD-10-CM

## 2018-05-23 DIAGNOSIS — J96.02 ACUTE RESPIRATORY FAILURE WITH HYPOXIA AND HYPERCAPNIA (HCC): Primary | ICD-10-CM

## 2018-05-23 PROBLEM — A41.9 SEPSIS (HCC): Status: RESOLVED | Noted: 2018-05-16 | Resolved: 2018-05-23

## 2018-05-23 PROBLEM — J96.91 RESPIRATORY FAILURE WITH HYPOXIA AND HYPERCAPNIA (HCC): Status: ACTIVE | Noted: 2018-05-23

## 2018-05-23 PROBLEM — R19.7 DIARRHEA OF PRESUMED INFECTIOUS ORIGIN: Status: RESOLVED | Noted: 2018-03-22 | Resolved: 2018-05-23

## 2018-05-23 PROBLEM — J96.92 RESPIRATORY FAILURE WITH HYPOXIA AND HYPERCAPNIA (HCC): Status: ACTIVE | Noted: 2018-05-23

## 2018-05-23 PROBLEM — R14.0 BLOATING: Status: RESOLVED | Noted: 2018-03-22 | Resolved: 2018-05-23

## 2018-05-23 LAB
A-A DO2: 251 MMHG (ref 0–300)
ALBUMIN SERPL-MCNC: 2.2 G/DL (ref 3.5–5)
ALBUMIN/GLOB SERPL: 0.8 G/DL (ref 1–2)
ALP SERPL-CCNC: 78 U/L (ref 38–126)
ALT SERPL W P-5'-P-CCNC: 38 U/L (ref 13–69)
ANION GAP SERPL CALCULATED.3IONS-SCNC: 6.3 MMOL/L (ref 10–20)
ARTERIAL PATENCY WRIST A: ABNORMAL
ARTERIAL PATENCY WRIST A: POSITIVE
ARTERIAL PATENCY WRIST A: POSITIVE
AST SERPL-CCNC: 22 U/L (ref 15–46)
ATMOSPHERIC PRESS: 730 MMHG
BASE EXCESS BLDA CALC-SCNC: 4.8 MMOL/L
BASE EXCESS BLDA CALC-SCNC: 9.8 MMOL/L
BASE EXCESS BLDA CALC-SCNC: 9.9 MMOL/L
BASOPHILS # BLD AUTO: 0.04 10*3/MM3 (ref 0–0.2)
BASOPHILS NFR BLD AUTO: 0.3 % (ref 0–2.5)
BDY SITE: ABNORMAL
BH CV ECHO MEAS - % IVS THICK: -5.1 %
BH CV ECHO MEAS - % LVPW THICK: 50 %
BH CV ECHO MEAS - ACS: 2.3 CM
BH CV ECHO MEAS - AO MAX PG: 10.6 MMHG
BH CV ECHO MEAS - AO MEAN PG: 4.6 MMHG
BH CV ECHO MEAS - AO ROOT AREA (BSA CORRECTED): 1.7
BH CV ECHO MEAS - AO ROOT AREA: 11.3 CM^2
BH CV ECHO MEAS - AO ROOT DIAM: 3.8 CM
BH CV ECHO MEAS - AO V2 MAX: 162.9 CM/SEC
BH CV ECHO MEAS - AO V2 MEAN: 98.6 CM/SEC
BH CV ECHO MEAS - AO V2 VTI: 33 CM
BH CV ECHO MEAS - BSA(HAYCOCK): 2.4 M^2
BH CV ECHO MEAS - BSA: 2.3 M^2
BH CV ECHO MEAS - BZI_BMI: 34.9 KILOGRAMS/M^2
BH CV ECHO MEAS - BZI_METRIC_HEIGHT: 177.8 CM
BH CV ECHO MEAS - BZI_METRIC_WEIGHT: 110.2 KG
BH CV ECHO MEAS - CONTRAST EF 4CH: 69 ML/M^2
BH CV ECHO MEAS - EDV(CUBED): 89.7 ML
BH CV ECHO MEAS - EDV(MOD-SP4): 71 ML
BH CV ECHO MEAS - EDV(TEICH): 91.3 ML
BH CV ECHO MEAS - EF(CUBED): 76.3 %
BH CV ECHO MEAS - EF(MOD-SP4): 69 %
BH CV ECHO MEAS - EF(TEICH): 68.5 %
BH CV ECHO MEAS - ESV(CUBED): 21.3 ML
BH CV ECHO MEAS - ESV(MOD-SP4): 22 ML
BH CV ECHO MEAS - ESV(TEICH): 28.8 ML
BH CV ECHO MEAS - FS: 38.1 %
BH CV ECHO MEAS - IVS/LVPW: 1.1
BH CV ECHO MEAS - IVSD: 1.3 CM
BH CV ECHO MEAS - IVSS: 1.2 CM
BH CV ECHO MEAS - LA DIMENSION: 4.2 CM
BH CV ECHO MEAS - LA/AO: 1.1
BH CV ECHO MEAS - LV DIASTOLIC VOL/BSA (35-75): 31.3 ML/M^2
BH CV ECHO MEAS - LV MASS(C)D: 196.9 GRAMS
BH CV ECHO MEAS - LV MASS(C)DI: 86.8 GRAMS/M^2
BH CV ECHO MEAS - LV MASS(C)S: 134.9 GRAMS
BH CV ECHO MEAS - LV MASS(C)SI: 59.5 GRAMS/M^2
BH CV ECHO MEAS - LV SYSTOLIC VOL/BSA (12-30): 9.7 ML/M^2
BH CV ECHO MEAS - LVIDD: 4.5 CM
BH CV ECHO MEAS - LVIDS: 2.8 CM
BH CV ECHO MEAS - LVLD AP4: 6.8 CM
BH CV ECHO MEAS - LVLS AP4: 5.2 CM
BH CV ECHO MEAS - LVOT AREA (M): 4.2 CM^2
BH CV ECHO MEAS - LVOT AREA: 4.3 CM^2
BH CV ECHO MEAS - LVOT DIAM: 2.3 CM
BH CV ECHO MEAS - LVPWD: 1.1 CM
BH CV ECHO MEAS - LVPWS: 1.7 CM
BH CV ECHO MEAS - MV A MAX VEL: 78.5 CM/SEC
BH CV ECHO MEAS - MV E MAX VEL: 87.4 CM/SEC
BH CV ECHO MEAS - MV E/A: 1.1
BH CV ECHO MEAS - PA ACC SLOPE: 1077 CM/SEC^2
BH CV ECHO MEAS - PA ACC TIME: 0.1 SEC
BH CV ECHO MEAS - PA PR(ACCEL): 33.1 MMHG
BH CV ECHO MEAS - RAP SYSTOLE: 10 MMHG
BH CV ECHO MEAS - RVDD: 0.85 CM
BH CV ECHO MEAS - RVSP: 76.8 MMHG
BH CV ECHO MEAS - SI(AO): 163.7 ML/M^2
BH CV ECHO MEAS - SI(CUBED): 30.2 ML/M^2
BH CV ECHO MEAS - SI(MOD-SP4): 21.6 ML/M^2
BH CV ECHO MEAS - SI(TEICH): 27.6 ML/M^2
BH CV ECHO MEAS - SV(AO): 371.3 ML
BH CV ECHO MEAS - SV(CUBED): 68.4 ML
BH CV ECHO MEAS - SV(MOD-SP4): 49 ML
BH CV ECHO MEAS - SV(TEICH): 62.5 ML
BH CV ECHO MEAS - TR MAX VEL: 408.6 CM/SEC
BILIRUB SERPL-MCNC: 0.4 MG/DL (ref 0.2–1.3)
BODY TEMPERATURE: 98.6 C
BUN BLD-MCNC: 16 MG/DL (ref 7–20)
BUN/CREAT SERPL: 22.9 (ref 6.3–21.9)
CALCIUM SPEC-SCNC: 7.6 MG/DL (ref 8.4–10.2)
CHLORIDE SERPL-SCNC: 104 MMOL/L (ref 98–107)
CO2 SERPL-SCNC: 35 MMOL/L (ref 26–30)
COHGB MFR BLD: 0.6 % (ref 0–5)
COHGB MFR BLD: 1.3 %
COHGB MFR BLD: 1.3 %
CREAT BLD-MCNC: 0.7 MG/DL (ref 0.6–1.3)
DEPRECATED RDW RBC AUTO: 52.2 FL (ref 37–54)
EOSINOPHIL # BLD AUTO: 0.41 10*3/MM3 (ref 0–0.7)
EOSINOPHIL NFR BLD AUTO: 3.6 % (ref 0–7)
ERYTHROCYTE [DISTWIDTH] IN BLOOD BY AUTOMATED COUNT: 14.2 % (ref 11.5–14.5)
GFR SERPL CREATININE-BSD FRML MDRD: 109 ML/MIN/1.73
GLOBULIN UR ELPH-MCNC: 2.6 GM/DL
GLUCOSE BLD-MCNC: 92 MG/DL (ref 74–98)
HCO3 BLDA-SCNC: 30.5 MMOL/L (ref 22–26)
HCO3 BLDA-SCNC: 34.2 MMOL/L (ref 22–28)
HCO3 BLDA-SCNC: 34.2 MMOL/L (ref 22–28)
HCT VFR BLD AUTO: 33.4 % (ref 42–52)
HCT VFR BLD CALC: 33 % (ref 42–52)
HGB BLD-MCNC: 10.6 G/DL (ref 14–18)
HGB BLDA-MCNC: 10.5 G/DL (ref 12–18)
HGB BLDA-MCNC: 10.6 G/DL (ref 12–18)
HGB BLDA-MCNC: 11.1 G/DL (ref 12–16)
HOROWITZ INDEX BLD+IHG-RTO: 40 %
HOROWITZ INDEX BLD+IHG-RTO: 40 %
HOROWITZ INDEX BLD+IHG-RTO: 53 %
IMM GRANULOCYTES # BLD: 0.13 10*3/MM3 (ref 0–0.06)
IMM GRANULOCYTES NFR BLD: 1.1 % (ref 0–0.6)
LYMPHOCYTES # BLD AUTO: 2.05 10*3/MM3 (ref 0.6–3.4)
LYMPHOCYTES NFR BLD AUTO: 17.8 % (ref 10–50)
MAGNESIUM SERPL-MCNC: 1.9 MG/DL (ref 1.7–2.6)
MAGNESIUM SERPL-MCNC: 2.3 MG/DL (ref 1.6–2.3)
MAXIMAL PREDICTED HEART RATE: 142 BPM
MCH RBC QN AUTO: 32.2 PG (ref 27–31)
MCHC RBC AUTO-ENTMCNC: 31.7 G/DL (ref 30–37)
MCV RBC AUTO: 101.5 FL (ref 80–94)
METHGB BLD QL: 0.2 % (ref 0–3)
METHGB BLD QL: 0.9 %
METHGB BLD QL: 1 %
MODALITY: ABNORMAL
MONOCYTES # BLD AUTO: 1.1 10*3/MM3 (ref 0–0.9)
MONOCYTES NFR BLD AUTO: 9.5 % (ref 0–12)
NEUTROPHILS # BLD AUTO: 7.79 10*3/MM3 (ref 2–6.9)
NEUTROPHILS NFR BLD AUTO: 67.7 % (ref 37–80)
NRBC BLD MANUAL-RTO: 0 /100 WBC (ref 0–0)
NT-PROBNP SERPL-MCNC: 2300 PG/ML (ref 0–450)
OXYHGB MFR BLDV: 87.4 % (ref 85–100)
OXYHGB MFR BLDV: 96.5 % (ref 94–99)
OXYHGB MFR BLDV: 96.6 % (ref 94–99)
PCO2 BLDA: 50.4 MM HG (ref 35–45)
PCO2 BLDA: 51.7 MM HG (ref 35–45)
PCO2 BLDA: 52.2 MM HG (ref 35–45)
PCO2 TEMP ADJ BLD: ABNORMAL MM HG (ref 35–48)
PCO2 TEMP ADJ BLD: ABNORMAL MM HG (ref 35–48)
PH BLDA: 7.4 PH UNITS (ref 7.35–7.45)
PH BLDA: 7.42 PH UNITS (ref 7.3–7.5)
PH BLDA: 7.43 PH UNITS (ref 7.3–7.5)
PH, TEMP CORRECTED: ABNORMAL PH UNITS
PH, TEMP CORRECTED: ABNORMAL PH UNITS
PHOSPHATE SERPL-MCNC: 2.4 MG/DL (ref 2.7–4.5)
PLATELET # BLD AUTO: 408 10*3/MM3 (ref 130–400)
PMV BLD AUTO: 8.6 FL (ref 6–12)
PO2 BLDA: 102 MM HG (ref 75–100)
PO2 BLDA: 103 MM HG (ref 75–100)
PO2 BLDA: 54.7 MM HG (ref 80–100)
PO2 TEMP ADJ BLD: ABNORMAL MM HG (ref 83–108)
PO2 TEMP ADJ BLD: ABNORMAL MM HG (ref 83–108)
POTASSIUM BLD-SCNC: 4.3 MMOL/L (ref 3.5–5.1)
PROT SERPL-MCNC: 4.8 G/DL (ref 6.3–8.2)
RBC # BLD AUTO: 3.29 10*6/MM3 (ref 4.7–6.1)
SAO2 % BLDCOA: 88.1 % (ref 90–100)
SAO2 % BLDCOA: 98.7 %
SAO2 % BLDCOA: 98.9 %
SODIUM BLD-SCNC: 141 MMOL/L (ref 137–145)
STRESS TARGET HR: 121 BPM
TROPONIN I SERPL-MCNC: <0.012 NG/ML (ref 0–0.03)
WBC NRBC COR # BLD: 11.52 10*3/MM3 (ref 4.8–10.8)

## 2018-05-23 PROCEDURE — 94799 UNLISTED PULMONARY SVC/PX: CPT

## 2018-05-23 PROCEDURE — 83735 ASSAY OF MAGNESIUM: CPT | Performed by: FAMILY MEDICINE

## 2018-05-23 PROCEDURE — 83050 HGB METHEMOGLOBIN QUAN: CPT | Performed by: FAMILY MEDICINE

## 2018-05-23 PROCEDURE — 25010000002 FUROSEMIDE PER 20 MG: Performed by: FAMILY MEDICINE

## 2018-05-23 PROCEDURE — 71045 X-RAY EXAM CHEST 1 VIEW: CPT

## 2018-05-23 PROCEDURE — 94640 AIRWAY INHALATION TREATMENT: CPT

## 2018-05-23 PROCEDURE — 25010000002 HEPARIN (PORCINE) PER 1000 UNITS: Performed by: FAMILY MEDICINE

## 2018-05-23 PROCEDURE — 84484 ASSAY OF TROPONIN QUANT: CPT | Performed by: FAMILY MEDICINE

## 2018-05-23 PROCEDURE — 25010000002 CEFTRIAXONE PER 250 MG: Performed by: FAMILY MEDICINE

## 2018-05-23 PROCEDURE — 83880 ASSAY OF NATRIURETIC PEPTIDE: CPT | Performed by: FAMILY MEDICINE

## 2018-05-23 PROCEDURE — 71045 X-RAY EXAM CHEST 1 VIEW: CPT | Performed by: RADIOLOGY

## 2018-05-23 PROCEDURE — 82805 BLOOD GASES W/O2 SATURATION: CPT | Performed by: FAMILY MEDICINE

## 2018-05-23 PROCEDURE — 36600 WITHDRAWAL OF ARTERIAL BLOOD: CPT

## 2018-05-23 PROCEDURE — 85025 COMPLETE CBC W/AUTO DIFF WBC: CPT | Performed by: FAMILY MEDICINE

## 2018-05-23 PROCEDURE — 99239 HOSP IP/OBS DSCHRG MGMT >30: CPT | Performed by: FAMILY MEDICINE

## 2018-05-23 PROCEDURE — 94660 CPAP INITIATION&MGMT: CPT

## 2018-05-23 PROCEDURE — 93306 TTE W/DOPPLER COMPLETE: CPT

## 2018-05-23 PROCEDURE — 82375 ASSAY CARBOXYHB QUANT: CPT | Performed by: FAMILY MEDICINE

## 2018-05-23 PROCEDURE — 25010000002 HEPARIN (PORCINE) PER 1000 UNITS: Performed by: SURGERY

## 2018-05-23 PROCEDURE — 83050 HGB METHEMOGLOBIN QUAN: CPT

## 2018-05-23 PROCEDURE — 82805 BLOOD GASES W/O2 SATURATION: CPT

## 2018-05-23 PROCEDURE — 82375 ASSAY CARBOXYHB QUANT: CPT

## 2018-05-23 PROCEDURE — 36600 WITHDRAWAL OF ARTERIAL BLOOD: CPT | Performed by: FAMILY MEDICINE

## 2018-05-23 PROCEDURE — 80053 COMPREHEN METABOLIC PANEL: CPT | Performed by: FAMILY MEDICINE

## 2018-05-23 PROCEDURE — 84100 ASSAY OF PHOSPHORUS: CPT | Performed by: FAMILY MEDICINE

## 2018-05-23 PROCEDURE — 99222 1ST HOSP IP/OBS MODERATE 55: CPT | Performed by: FAMILY MEDICINE

## 2018-05-23 RX ORDER — HEPARIN SODIUM 5000 [USP'U]/ML
5000 INJECTION, SOLUTION INTRAVENOUS; SUBCUTANEOUS EVERY 12 HOURS SCHEDULED
Status: DISCONTINUED | OUTPATIENT
Start: 2018-05-23 | End: 2018-05-30 | Stop reason: HOSPADM

## 2018-05-23 RX ORDER — DOCUSATE SODIUM 100 MG/1
100 CAPSULE, LIQUID FILLED ORAL 2 TIMES DAILY
Status: DISCONTINUED | OUTPATIENT
Start: 2018-05-23 | End: 2018-05-30 | Stop reason: HOSPADM

## 2018-05-23 RX ORDER — FINASTERIDE 5 MG/1
5 TABLET, FILM COATED ORAL DAILY
Status: DISCONTINUED | OUTPATIENT
Start: 2018-05-24 | End: 2018-05-30 | Stop reason: HOSPADM

## 2018-05-23 RX ORDER — MORPHINE SULFATE 2 MG/ML
2 INJECTION, SOLUTION INTRAMUSCULAR; INTRAVENOUS EVERY 4 HOURS PRN
Status: DISCONTINUED | OUTPATIENT
Start: 2018-05-23 | End: 2018-05-25

## 2018-05-23 RX ORDER — FUROSEMIDE 10 MG/ML
40 INJECTION INTRAMUSCULAR; INTRAVENOUS ONCE
Status: COMPLETED | OUTPATIENT
Start: 2018-05-23 | End: 2018-05-23

## 2018-05-23 RX ORDER — IPRATROPIUM BROMIDE AND ALBUTEROL SULFATE 2.5; .5 MG/3ML; MG/3ML
3 SOLUTION RESPIRATORY (INHALATION) EVERY 4 HOURS PRN
Status: DISCONTINUED | OUTPATIENT
Start: 2018-05-23 | End: 2018-05-30 | Stop reason: HOSPADM

## 2018-05-23 RX ORDER — ALBUTEROL SULFATE 2.5 MG/3ML
2.5 SOLUTION RESPIRATORY (INHALATION) ONCE
Status: COMPLETED | OUTPATIENT
Start: 2018-05-23 | End: 2018-05-23

## 2018-05-23 RX ORDER — ASPIRIN 81 MG/1
81 TABLET ORAL DAILY
Status: DISCONTINUED | OUTPATIENT
Start: 2018-05-24 | End: 2018-05-30 | Stop reason: HOSPADM

## 2018-05-23 RX ORDER — HYDROCODONE BITARTRATE AND ACETAMINOPHEN 7.5; 325 MG/1; MG/1
1 TABLET ORAL EVERY 4 HOURS PRN
Status: ACTIVE | OUTPATIENT
Start: 2018-05-23 | End: 2018-05-28

## 2018-05-23 RX ORDER — CETIRIZINE HYDROCHLORIDE 10 MG/1
10 TABLET ORAL DAILY
Status: DISCONTINUED | OUTPATIENT
Start: 2018-05-24 | End: 2018-05-30 | Stop reason: HOSPADM

## 2018-05-23 RX ORDER — SODIUM CHLORIDE 0.9 % (FLUSH) 0.9 %
1-10 SYRINGE (ML) INJECTION AS NEEDED
Status: DISCONTINUED | OUTPATIENT
Start: 2018-05-23 | End: 2018-05-30 | Stop reason: HOSPADM

## 2018-05-23 RX ORDER — DONEPEZIL HYDROCHLORIDE 5 MG/1
10 TABLET, FILM COATED ORAL NIGHTLY
Status: DISCONTINUED | OUTPATIENT
Start: 2018-05-23 | End: 2018-05-30 | Stop reason: HOSPADM

## 2018-05-23 RX ORDER — GUAIFENESIN 600 MG/1
600 TABLET, EXTENDED RELEASE ORAL 2 TIMES DAILY
Status: DISCONTINUED | OUTPATIENT
Start: 2018-05-23 | End: 2018-05-30 | Stop reason: HOSPADM

## 2018-05-23 RX ORDER — PANTOPRAZOLE SODIUM 40 MG/1
40 TABLET, DELAYED RELEASE ORAL
Status: DISCONTINUED | OUTPATIENT
Start: 2018-05-24 | End: 2018-05-30 | Stop reason: HOSPADM

## 2018-05-23 RX ORDER — SODIUM CHLORIDE 9 MG/ML
10 INJECTION, SOLUTION INTRAVENOUS CONTINUOUS
Status: DISCONTINUED | OUTPATIENT
Start: 2018-05-23 | End: 2018-05-30 | Stop reason: HOSPADM

## 2018-05-23 RX ORDER — ACETYLCYSTEINE 200 MG/ML
3 SOLUTION ORAL; RESPIRATORY (INHALATION) ONCE
Status: COMPLETED | OUTPATIENT
Start: 2018-05-23 | End: 2018-05-23

## 2018-05-23 RX ORDER — MAGNESIUM SULFATE HEPTAHYDRATE 40 MG/ML
4 INJECTION, SOLUTION INTRAVENOUS ONCE
Status: COMPLETED | OUTPATIENT
Start: 2018-05-23 | End: 2018-05-23

## 2018-05-23 RX ORDER — ONDANSETRON 2 MG/ML
4 INJECTION INTRAMUSCULAR; INTRAVENOUS EVERY 6 HOURS PRN
Status: DISCONTINUED | OUTPATIENT
Start: 2018-05-23 | End: 2018-05-30 | Stop reason: HOSPADM

## 2018-05-23 RX ORDER — MEMANTINE HYDROCHLORIDE 5 MG/1
10 TABLET ORAL EVERY 12 HOURS SCHEDULED
Status: DISCONTINUED | OUTPATIENT
Start: 2018-05-23 | End: 2018-05-30 | Stop reason: HOSPADM

## 2018-05-23 RX ORDER — L.ACID,PARA/B.BIFIDUM/S.THERM 8B CELL
1 CAPSULE ORAL DAILY
Status: DISCONTINUED | OUTPATIENT
Start: 2018-05-24 | End: 2018-05-30 | Stop reason: HOSPADM

## 2018-05-23 RX ORDER — IPRATROPIUM BROMIDE AND ALBUTEROL SULFATE 2.5; .5 MG/3ML; MG/3ML
3 SOLUTION RESPIRATORY (INHALATION)
Status: DISCONTINUED | OUTPATIENT
Start: 2018-05-23 | End: 2018-05-30 | Stop reason: HOSPADM

## 2018-05-23 RX ADMIN — DONEPEZIL HYDROCHLORIDE 10 MG: 5 TABLET ORAL at 21:02

## 2018-05-23 RX ADMIN — SODIUM CHLORIDE 10 ML/HR: 9 INJECTION, SOLUTION INTRAVENOUS at 18:01

## 2018-05-23 RX ADMIN — FUROSEMIDE 40 MG: 10 INJECTION, SOLUTION INTRAMUSCULAR; INTRAVENOUS at 18:01

## 2018-05-23 RX ADMIN — Medication 10 ML: at 09:24

## 2018-05-23 RX ADMIN — ACETYLCYSTEINE 3 ML: 200 SOLUTION ORAL; RESPIRATORY (INHALATION) at 11:40

## 2018-05-23 RX ADMIN — ALBUTEROL SULFATE 2.5 MG: 2.5 SOLUTION RESPIRATORY (INHALATION) at 11:40

## 2018-05-23 RX ADMIN — IPRATROPIUM BROMIDE 0.5 MG: 0.5 SOLUTION RESPIRATORY (INHALATION) at 07:12

## 2018-05-23 RX ADMIN — DOCUSATE SODIUM 100 MG: 100 CAPSULE, LIQUID FILLED ORAL at 21:02

## 2018-05-23 RX ADMIN — HEPARIN SODIUM 5000 UNITS: 5000 INJECTION, SOLUTION INTRAVENOUS; SUBCUTANEOUS at 21:02

## 2018-05-23 RX ADMIN — MAGNESIUM SULFATE HEPTAHYDRATE 4 G: 40 INJECTION, SOLUTION INTRAVENOUS at 06:32

## 2018-05-23 RX ADMIN — IPRATROPIUM BROMIDE 0.5 MG: 0.5 SOLUTION RESPIRATORY (INHALATION) at 00:25

## 2018-05-23 RX ADMIN — MEMANTINE 10 MG: 5 TABLET ORAL at 21:02

## 2018-05-23 RX ADMIN — HEPARIN SODIUM 5000 UNITS: 5000 INJECTION, SOLUTION INTRAVENOUS; SUBCUTANEOUS at 09:22

## 2018-05-23 RX ADMIN — PANTOPRAZOLE SODIUM 40 MG: 40 TABLET, DELAYED RELEASE ORAL at 06:32

## 2018-05-23 RX ADMIN — GUAIFENESIN 600 MG: 600 TABLET, EXTENDED RELEASE ORAL at 21:02

## 2018-05-23 RX ADMIN — IPRATROPIUM BROMIDE AND ALBUTEROL SULFATE 3 ML: .5; 3 SOLUTION RESPIRATORY (INHALATION) at 19:12

## 2018-05-23 RX ADMIN — CEFTRIAXONE 2 G: 2 INJECTION, SOLUTION INTRAVENOUS at 18:02

## 2018-05-23 NOTE — SIGNIFICANT NOTE
Pt unable to participate in therapy this date secondary to medical status/treatment (bipap). Skilled OT to continue current POC as tolerated.

## 2018-05-23 NOTE — PLAN OF CARE
Problem: Patient Care Overview  Goal: Plan of Care Review  Outcome: Ongoing (interventions implemented as appropriate)   05/22/18 1533 05/22/18 1945   Coping/Psychosocial   Plan of Care Reviewed With --  patient   Plan of Care Review   Progress improving --    OTHER   Outcome Summary Pt agreeable and able to tolerate treatment in supine this date. Completed 3 BUE strengthning/endurance exercises x 20 reps each w/ extended rest breaks as needed. Skilled OT to continue current POC. --      Goal: Discharge Needs Assessment  Outcome: Ongoing (interventions implemented as appropriate)   05/17/18 1427   Discharge Needs Assessment   Concerns to be Addressed no discharge needs identified   Patient/Family Anticipates Transition to home with family   Patient/Family Anticipated Services at Transition    Transportation Concerns car, none   Transportation Anticipated family or friend will provide   Equipment Needed After Discharge cane, straight   Current Discharge Risk chronically ill   Disability   Equipment Currently Used at Home walker, rolling       Problem: Fall Risk (Adult)  Goal: Identify Related Risk Factors and Signs and Symptoms  Outcome: Ongoing (interventions implemented as appropriate)   05/20/18 1711   Fall Risk (Adult)   Related Risk Factors (Fall Risk) age-related changes;confusion/agitation;gait/mobility problems;environment unfamiliar   Signs and Symptoms (Fall Risk) presence of risk factors     Goal: Absence of Fall  Outcome: Ongoing (interventions implemented as appropriate)   05/22/18 0230   Fall Risk (Adult)   Absence of Fall making progress toward outcome       Problem: Wound (Includes Pressure Injury) (Adult)  Goal: Signs and Symptoms of Listed Potential Problems Will be Absent, Minimized or Managed (Wound)  Outcome: Ongoing (interventions implemented as appropriate)   05/22/18 0230   Goal/Outcome Evaluation   Problems Assessed (Wound) all   Problems Present (Wound) none       Problem:  Breathing Pattern Ineffective (Adult)  Goal: Identify Related Risk Factors and Signs and Symptoms  Outcome: Ongoing (interventions implemented as appropriate)   05/19/18 0942   Breathing Pattern Ineffective (Adult)   Related Risk Factors (Breathing Pattern Ineffective) advanced age;immobility   Signs and Symptoms (Breathing Pattern Ineffective) activity intolerance     Goal: Effective Oxygenation/Ventilation  Outcome: Ongoing (interventions implemented as appropriate)   05/22/18 0230   Breathing Pattern Ineffective (Adult)   Effective Oxygenation/Ventilation making progress toward outcome     Goal: Anxiety/Fear Reduction  Outcome: Ongoing (interventions implemented as appropriate)   05/22/18 0230   Breathing Pattern Ineffective (Adult)   Anxiety/Fear Reduction making progress toward outcome       Problem: Sepsis/Septic Shock (Adult)  Goal: Signs and Symptoms of Listed Potential Problems Will be Absent, Minimized or Managed (Sepsis/Septic Shock)  Outcome: Ongoing (interventions implemented as appropriate)   05/22/18 0230   Goal/Outcome Evaluation   Problems Assessed (Sepsis) all   Problems Present (Sepsis) none       Problem: Skin Injury Risk (Adult)  Goal: Skin Health and Integrity  Outcome: Ongoing (interventions implemented as appropriate)   05/22/18 0230   Skin Injury Risk (Adult)   Skin Health and Integrity making progress toward outcome

## 2018-05-23 NOTE — PROGRESS NOTES
ABG demonstrates pH 7.4, PCO2 of 50.4, PO2 54.7, HCO3 30.5.    Chest x-ray demonstrates complete white out of the left hemithorax suggestive of lung collapse.  Mucous plug strongly suspected.  Underlying pleural effusions, right lung remains mostly aerated.    Patient continues to require high flow nasal cannula oxygen supplementation to maintain oxygen saturation 92-93%.  Patient was placed onto his right side, with BiPAP initiated at 14/8 pressure support, continue 5 L oxygen supplementation, titrate as needed.  I will add hypertonic saline neb treatments additionally, q8h as long as no identified bronchspasms; plan to, at the least, utilize NS neb tx.  This hopefully will aid some clearance/thinning of his mucous plug.  Patient will inevitably require bronchoscopy however.  We'll follow patient clinically after initiation of above noninvasive positive pressure ventilatory support, consider addition of other modes of therapy as deemed emergently necessary.  We'll attempt to transport to another facility with pulmonary coverage and ability for bronchoscopy.    I have discussed the plan of care in detail with patient's wife at bedside, as well as his primary nurse.  All verbalize understanding and agree.

## 2018-05-23 NOTE — DISCHARGE SUMMARY
[facility] HOSPITALIST   DISCHARGE SUMMARY      Name:  Porsha Soni   Age:  77 y.o.  Sex:  male  :  1940  MRN:  [id number]   Visit Number:  [id number]  Primary Care Physician:  SIMON Reynolds  Date of Discharge:  2018  Admission Date:  2018      Discharge Diagnosis:         Principal Problem:    Acute respiratory failure with hypoxia and hypercapnia  Active Problems:    Collapse of left lung    Physical deconditioning    Impaired mobility and ADLs    CAD in native artery    Essential hypertension    Dyslipidemia    Cholecystitis      Presenting Problem/History of Present Illness:    Cholecystitis [K81.9]  Sepsis, due to unspecified organism [A41.9]     Consults:     Consults     Date and Time Order Name Status Description    2018 0619 Inpatient Pulmonology Consult Completed     2018 Inpatient Infectious Diseases Consult Completed     2018 Inpatient General Surgery Consult      2018 1121 Surgery (on-call MD unless specified) Completed           Procedures Performed:    Procedure(s):  OPEN CHOLECYSTECTOMY         History of presenting illness:    Patient is a 77 y.o. male who presented to the ED with complaints of chest pain and abdominal pain. While in the ED, he reported mostly epigastric pain.  He stated this had been going on for 3-4 days. He denied nausea, vomiting, and diarrhea.  He reported he may have been told he has gallstones in the past.  He was found to have elevated lactic acid and leukocytosis.  CT of the abdomen revealed concern for cholecystitis with partial rupture and and adjacent gallbladder fossa fluid collection of 6.6 cm and adjacent inflammation of the liver with abscess formation less likely. CT chest revealed LLL pneumonia with left subpulmonic pleural effusion in addition to previously mentioned gallbladder fossa collection. Mr. Soni was discussed with Dr. Montalvo regarding need for admission and possible trip to the OR  on this date.  He reported he would like to discuss with general surgery first.  He denied chest pain, shortness of breath, and palpitations. He denied diarrhea and dysuria.  He reported RUQ pain, LUQ pain, and epigastric pain.  Plans were made to admit the patient to the PCU.      Mr. Soni was previously evaluated in March 2018 by Dr. Valderrama for abdominal pain, nausea, and chills with colonoscopy recommended and performed with reported diverticuli present.       After the patient was evaluated by Dr Montalvo, general surgery, he was taken directly to the OR for surgery. He was found to have perforated gallbladder with abscess. Open cholecystectomy with cholangiogram was performed. Post-operatively, he was initially extubated successfully per surgery report. However, soon after he became hypotensive, requiring initiation of levophed. He also was noted to be hypoxic even with nonrebreather mask. He was difficult to arouse at this time and ABG was obtained which showed severe hypercarbia with respiratory acidosis. Thus, he was re-intubated. He has now been moved to the CCU for further management. His wife is present at the bedside.    Hospital Course:    I have reviewed labs/imaging/records from this hospitalization, including ER staff and admitting/attending physicians H/P's and progress notes to establish a comprehensive understanding of this patient's clinical hospital course, as well as to establish a transition of care appropriately.    Patient is a 77-year-old male who was admitted secondary to cholecystitis, noted perforation with abscess formation.  He underwent open cholecystectomy by general surgery, he is now postop day #7.  He continued to receive IV Rocephin 2 g IV daily as well as 500 mg metronidazole IV every 8 hours until yesterday at which time metronidazole was discontinued.  Infectious disease consultation has been obtained and this was the recommendation.  Patient is to be continued on IV Rocephin  for 2 additional days, with plans for oral Omnicef dosing if needed at that time.  Patient developed some altered mental status post surgically, felt likely be secondary to acute metabolic encephalopathy as a result of sepsis and his cholecystitis.  He has been afebrile for greater than 72 hours.  Vital signs otherwise hemodynamically stable.  He continues to tolerate his current antibiotic regimen.  He has tolerated a small amount by mouth intake until last night.  Without nausea or emesis reported by nursing, no reports of focal aspiration.  He continues to maintain good urine output, continues to have Ambrocio catheter in place.  No reports of gross hematuria.  He denies any bright red blood or black or tarry qualities to bowel movement.  No reports of active chest pain.  Patient continued to have some hypoxia periodically through the night, he continues on high flow nasal oxygen supplementation this morning.  Wife of patient states that he has had improvement to edematous changes generalized through the course of yesterday evening and this morning.  He has continued to use incentive spirometry as directed.  Serosanguineous fluid continues to be noted in the MISHEL drain, more clear in color; no significant increase in volume, and actually slightly decreasing daily.  MISHEL drain in place and has been drained periodically by nursing as per protocol; and discussion with general surgeon, plans were to discontinue in the next 1-2 days.  Patient spent time out of bed over previous 2 days with physical therapy assistance.  Chest x-ray yesterday demonstrated congestive heart changes, patient given 20 mg IV Lasix twice yesterday with improved diuresis.  Echocardiogram was ordered yesterday, and was to be repeated this morning, given most recent was 2016 which demonstrated no findings of congestive heart failure, with preserved normal left ventricular function.  Patient may require additional IV diuresis after transfer to [facility].    Due to patient's continued hypoxia through the course of the evening, repeat chest x-ray and arterial blood gas was ordered this morning.  ABG demonstrates pH 7.4, PCO2 of 50.4, PO2 54.7, HCO3 30.5.     Chest x-ray demonstrates complete white out of the left hemithorax suggestive of lung collapse.  Mucous plug strongly suspected.  Underlying pleural effusions, right lung remains mostly aerated.     Patient continues to require high flow nasal cannula oxygen supplementation to maintain oxygen saturation 92%, with associated increased work of breathing.  Patient was placed onto his right side, with BiPAP initiated at 14/8 pressure support, continue 5 L oxygen supplementation, titrate as needed.  I was unable to add add hypertonic saline neb treatments additionally as his facilities is not utilize that as a service, nor saline neb treatments due to no policy in place.    patient was instead given 20% Mucomyst neb treatment in addition to DuoNeb treatment during BiPAP use.  Failed attempts at suctioning afterwards.  Patient will inevitably require bronchoscopy however.  We'll follow patient clinically after initiation of above noninvasive positive pressure ventilatory support, consider addition of other modes of therapy as deemed emergently necessary.  Planned transfer to [facility] with pulmonary coverage and ability for bronchoscopy.  I have discussed the case in detail with general surgery, Dr. Montalvo, as well as general surgeon at [facility], Dr. Jorge A Ramires.  No surgical issues to address this time acutely.  Continue local wound care to abdomen.  Discontinuation of MISHEL drain in the next day or so.  I have discussed the case in detail with Dr. Dr. Quarles, pulmonology at [facility].  He verbalized understanding of the condition as well as agrees with need for urgent he is planning that today.  Patient will be kept nothing by mouth as he has been so since  before midnight last night.  I also discussed case in detail with accepting provider, Dr. Lilly, at [facility].  Patient will be transported there urgently.    Due to patient's severe physical deconditioning as result of this surgical course and prolonged hospitalization, as well as significantly impaired mobility and ADLs, patient would be an excellent candidate for acute rehabilitation stay.  Upon discharge by December Brito, will discuss with accepting service here at [facility] for that service if patient is approved.  He and his wife both agreed to the need.     I have discussed the plan of care in detail with patient's wife at bedside, as well as his primary nurse.  All verbalize understanding and agree.    Vital Signs:    Temp:  [98.1 °F (36.7 °C)-98.5 °F (36.9 °C)] 98.4 °F (36.9 °C)  Heart Rate:  [71-85] 71  Resp:  [12-33] 22  BP: (101-139)/(48-78) 139/78    Physical Exam:    General Appearance:  Alert and cooperative, not in any acute distress.  Moderately ill-appearing gentleman, he continues to be pleasant and interactive during questioning and examination.  Increased drowsiness but able to be awakened.  Generally speaking, his disposition has markedly worsened.     Head:  Atraumatic and normocephalic, without obvious abnormality.  BiPAP in place.     Eyes:          PERRLA, conjunctivae and sclerae normal, no Icterus. No pallor. Extra-occular movements are within normal limits.   Ears:  Ears appear intact with no abnormalities noted.   Throat: No oral lesions, no thrush, oral mucosa moist.   Neck: Supple, trachea midline, no thyromegaly, no carotid bruit.   Back:   No kyphoscoliosis present. No tenderness to palpation,   range of motion normal.   Lungs:   Chest shape is normal. Audible air exchange noted to right lung, markedly decreased in the left.  Rhonchus referred congestive changes throughout all lung fields.  Mild increased work of breathing earlier this morning,  relieved at the time of discharge will on BiPAP.     Heart:  Normal S1 and S2, no murmur, no gallop, no rub. No JVD.   Abdomen:   Normal bowel sounds x 4, no masses, no organomegaly. Soft, mild tenderness generalized in the abdomen, no areas of focal location non-distended, no guarding, no rebound tenderness.  Excellent anatomical restoration of wound edges, well approximated.  Clean dry incision on removal of managing with assistance of nursing.  MISHEL drain shows approximately < 10 ML's of serosanguineous drainage.    Extremities: Generalized edema to bilateral upper extremities, 1+ pitting edema that extends to the distal third of the tibias bilaterally, no cyanosis, no clubbing.  Generalized weakness of frail nature, moves all extremities equally.  SCUDs.  Chronic neuromuscular deficits to the bilateral lower extremities, particularly to the feet.  Profound weakness to all 4 extremities, markedly decreased core strength and stability.  Unable to self transfer.     Pulses: Pulses palpable and equal bilaterally.   Skin: No bleeding.  Clean dry incision.  Expected postsurgical changes noted to the abdomen.     Lymph nodes: No palpable adenopathy.   Neurologic: Alert and oriented x Person. Moves all four limbs at times. No tremors. No facial asymetry.       Pertinent Lab Results:       Results from last 7 days  Lab Units 05/22/18  0845 05/21/18 0142 05/20/18  0123   SODIUM mmol/L 143 142 140   POTASSIUM mmol/L 4.3 3.9 3.9   CHLORIDE mmol/L 111 115* 115*   CO2 mmol/L 29.0 25.4 24.6   BUN mg/dL 15 18 27*   CREATININE mg/dL 0.69 0.60 0.70   CALCIUM mg/dL 7.6* 7.3* 7.3*   BILIRUBIN mg/dL 0.4 0.4 0.4   ALK PHOS U/L 81 80 86   ALT (SGPT) U/L 30 36 43   AST (SGOT) U/L 22 21 23   GLUCOSE mg/dL 110 103 122*       Results from last 7 days  Lab Units 05/22/18  0845 05/21/18  0142 05/20/18  0123   WBC 10*3/mm3 12.68* 14.53* 15.13*   HEMOGLOBIN g/dL 10.7* 9.9* 9.4*   HEMATOCRIT % 35.2* 32.1* 30.8*   PLATELETS 10*3/mm3 459* 478*  492*       Results from last 7 days  Lab Units 05/17/18  0345   INR  1.73*       Results from last 7 days  Lab Units 05/17/18  0909 05/17/18  0345 05/16/18  2132   CK TOTAL U/L 74  74 70 65   TROPONIN I ng/mL 0.025 0.034 0.026   CK MB INDEX % 2.1 2.8 3.0               Results from last 7 days  Lab Units 05/18/18  0104   LIPASE U/L 50       Results from last 7 days  Lab Units 05/23/18  0929   PH, ARTERIAL pH units 7.400   PO2 ART mm Hg 54.7*   PCO2, ARTERIAL mm Hg 50.4*   HCO3 ART mmol/L 30.5*       Results from last 7 days  Lab Units 05/17/18  0001   COLOR UA  Dark Yellow*   GLUCOSE UA  Negative   KETONES UA  Negative   LEUKOCYTES UA  Trace*   PH, URINE  <=5.0   BILIRUBIN UA  Moderate (2+)*   UROBILINOGEN UA  2.0 E.U./dL*     Pain Management Panel     There is no flowsheet data to display.          Results from last 7 days  Lab Units 05/17/18  0001   URINECX  No growth       Pertinent Radiology Results:    Imaging Results (all)     Procedure Component Value Units Date/Time    XR Chest 1 View [[id number]] Collected:  05/23/18 0844     Updated:  05/23/18 0847    Narrative:       EXAMINATION:  XR CHEST 1 VW-      CLINICAL INDICATION:     pulmonary edema/hypoxia; A41.9-Sepsis,  unspecified organism; K85.90-Acute pancreatitis without necrosis or  infection, unspecified; D72.829-Elevated white blood cell count,  unspecified; K81.9-Cholecystitis, unspecified     TECHNIQUE:  XR CHEST 1 VW-       COMPARISON: 05/22/2018      FINDINGS:   Left central line is noted with tip in SVC. Complete white out of the  left hemithorax suggestive of lung collapse. Mucous plug is suspected  etiology. Underlying pleural effusions. Right lung remains mostly  aerated.       Impression:       Left central line is noted with tip in SVC. Complete white  out of the left hemithorax suggestive of lung collapse. Mucous plug is  suspected etiology. Underlying pleural effusions. Right lung remains  mostly aerated.     This report was finalized on  5/23/2018 8:45 AM by Dr. Deangelo Soni MD.       XR Chest 1 View [[id number]] Collected:  05/22/18 1101     Updated:  05/22/18 1104    Narrative:       Technique: Frontal view the chest.     COMPARISON:  05/19/2018     INDICATION:     hypoxia/Generalized edema; A41.9-Sepsis, unspecified  organism; K85.90-Acute pancreatitis without necrosis or infection,  unspecified; D72.829-Elevated white blood cell count, unspecified;  K81.9-Cholecystitis, unspecified      FINDINGS:    Bibasilar airspace disease. Cardiomegaly is noted.  Prominent interstitial markings are noted. There are small bilateral  pleural effusions.        Impression:       Radiographic changes of congestive failure.     This report was finalized on 5/22/2018 11:02 AM by Dr. Deangelo Soni MD.       Fiberoptic Endo (fees) [[id number]] Resulted:  05/19/18 1136     Updated:  05/19/18 1136    Narrative:       This procedure was auto-finalized with no dictation required.    CT Head Without Contrast [[id number]] Collected:  05/19/18 1042     Updated:  05/19/18 1045    Narrative:       EXAMINATION: CT HEAD WO CONTRAST-      CLINICAL INDICATION:     AMS; A41.9-Sepsis, unspecified organism;  K85.90-Acute pancreatitis without necrosis or infection, unspecified;  D72.829-Elevated white blood cell count, unspecified;  K81.9-Cholecystitis, unspecified     COMPARISON:    None     Technique: Multiple CT axial images were obtained through the level of  the brain without IV contrast administration. Reformatted images in the  coronal and/or sagittal plane(s) were generated from the axial data set  to facilitate diagnostic accuracy and/or surgical planning.     Radiation dose reduction techniques were utilized per ALARA protocol.  Automated exposure control was initiated through either or [employer] or  [employer] software packages by  protocol.       DOSE (DLP mGy-cm): 2393.3 mGy.cm     FINDINGS:     Diffuse cerebral atrophy is noted. Chronic small vessel  ischemic  disease.  No acute intracranial abnormality.  No midline shift or mass effect.  No hydrocephalus or intracranial hemorrhage.  There is no CT evidence of acute vascular territory infarct.  Bone windows show no acute osseous abnormality.       Impression:       Atrophy and chronic small vessel ischemic disease in part  age-related. No CT evidence of acute intracranial abnormality.     This report was finalized on 5/19/2018 10:43 AM by Dr. Adebayo Dodd MD.       XR Chest AP [[id number]] Collected:  05/19/18 1041     Updated:  05/19/18 1044    Narrative:       EXAMINATION: XR CHEST AP-      CLINICAL INDICATION:     Pneumonia; A41.9-Sepsis, unspecified organism;  K85.90-Acute pancreatitis without necrosis or infection, unspecified;  D72.829-Elevated white blood cell count, unspecified;  K81.9-Cholecystitis, unspecified     TECHNIQUE:  XR CHEST AP-      COMPARISON: 05/18/2018      FINDINGS:   Left subclavian deep line noted with tip in the distal SVC.  Left third of the right basilar airspace disease not significant change.     Cardiomegaly stable.   No pneumothorax.   Stable left effusion.   Stable appearance of the bony structures.  Right-sided skin fold is noted.       Impression:       Stable chest.     This report was finalized on 5/19/2018 10:42 AM by Dr. Adebayo Dodd MD.       XR Chest AP [[id number]] Collected:  05/18/18 0814     Updated:  05/18/18 0817    Narrative:       EXAMINATION: XR CHEST AP-      CLINICAL INDICATION:     Respiratory failure, pneumonia; A41.9-Sepsis,  unspecified organism; K85.90-Acute pancreatitis without necrosis or  infection, unspecified; D72.829-Elevated white blood cell count,  unspecified; K81.9-Cholecystitis, unspecified     TECHNIQUE:  XR CHEST AP-      COMPARISON: 05/16/2018      FINDINGS:   Left subclavian deep line with tip in the region of cavoatrial junction.  Left lower lobe airspace disease stable. Right perihilar airspace  disease also stable.   Cardiomegaly  with left pleural effusion again noted.   No pneumothorax.   No right effusion.   No acute osseous findings.  ET tube has been removed.       Impression:       Interval removal of ET tube. Otherwise stable chest.     This report was finalized on 5/18/2018 8:14 AM by Dr. Adebayo Dodd MD.       US Venous Doppler Lower Extremity Bilateral (duplex) [[id number]] Collected:  05/17/18 1258     Updated:  05/17/18 1301    Narrative:       US VENOUS DOPPLER LOWER EXTREMITY BILATERAL (DUPLEX)-     CLINICAL INDICATION: elevated d-dimer; A41.9-Sepsis, unspecified  organism; K85.90-Acute pancreatitis without necrosis or infection,  unspecified; D72.829-Elevated white blood cell count, unspecified;  K81.9-Cholecystitis, unspecified          COMPARISON: Left leg dated 11/20/2017      TECHNIQUE: Color Doppler imaging was used with compression and  augmentation to evaluate the lower extremity deep venous system.     FINDINGS:   There is patent spontaneous flow from the common femoral vein through  the posterior tibial veins.  There was no internal clot or area of noncompressibility.  Normal augmentation was elicited where applicable.       Impression:       No DVT in the lower extremities on today's exam.      This report was finalized on 5/17/2018 12:59 PM by Dr. Cliff Brody MD.       XR Chest 1 View [[id number]] Collected:  05/16/18 2246     Updated:  05/16/18 2249    Narrative:       EXAMINATION: XR CHEST 1 VW-      CLINICAL INDICATION:     intubation; A41.9-Sepsis, unspecified organism;  K85.90-Acute pancreatitis without necrosis or infection, unspecified;  D72.829-Elevated white blood cell count, unspecified;  K81.9-Cholecystitis, unspecified     TECHNIQUE:  XR CHEST 1 VW-      COMPARISON: 05/16/2018 6:04 PM      FINDINGS:   ET tube has been placed with tip at level of clavicles. Left subclavian  deep line stable in appearance and positioning. Improved aeration. Left  greater than right basilar airspace disease improved since  the previous  exam. Small left effusion. Cardiomegaly and prior CABG changes again  noted.       Impression:       1. Support devices as above.  2. Improved aeration with decreasing basilar airspace disease.     This report was finalized on 5/16/2018 10:47 PM by Dr. Adebayo Dodd MD.       XR Chest 1 View [[id number]] Collected:  05/16/18 2246     Updated:  05/16/18 2248    Narrative:       EXAMINATION: XR CHEST 1 VW-      CLINICAL INDICATION:     central line placement; A41.9-Sepsis,  unspecified organism; K85.90-Acute pancreatitis without necrosis or  infection, unspecified; D72.829-Elevated white blood cell count,  unspecified; K81.9-Cholecystitis, unspecified     TECHNIQUE:  XR CHEST 1 VW-      COMPARISON: 05/16/2018      FINDINGS:   Interval placement of left subclavian deep line with tip at the  cavoatrial junction. No pneumothorax. Cardiomegaly and left greater than  right basilar airspace disease stable from previous.       Impression:       Left subclavian deep line placement with tip in satisfactory  position and no pneumothorax. Stable bilateral airspace disease.     This report was finalized on 5/16/2018 10:46 PM by Dr. Adebayo Dodd MD.       FL Surgery Fluoro [[id number]] Collected:  05/16/18 1642     Updated:  05/16/18 1645    Narrative:       EXAMINATION: FL SURGERY FLUORO-      CLINICAL INDICATION:     IOC; A41.9-Sepsis, unspecified organism;  K85.90-Acute pancreatitis without necrosis or infection, unspecified;  D72.829-Elevated white blood cell count, unspecified;  K81.9-Cholecystitis, unspecified     TECHNIQUE:  FL SURGERY FLUORO-      FLUOROSCOPY TIME: 37.5 seconds     FINDINGS:   Intraoperative cholangiogram demonstrates normal caliber common bile  duct. No fixed filling defect or stricture. Passage of contrast into  duodenum.        Impression:       As above.     This report was finalized on 5/16/2018 4:43 PM by Dr. Adebayo Dodd MD.       CT Abdomen Pelvis With Contrast [[id number]]  Collected:  05/16/18 1007     Updated:  05/16/18 1011    Narrative:       EXAM: CT ABDOMEN PELVIS W CONTRAST-              TECHNIQUE: Multiple axial CT images were obtained from lung bases  through pubic symphysis WITH administration of IV contrast. Reformatted  images in the coronal and/or sagittal plane(s) were generated from the  axial data set to facilitate diagnostic accuracy and/or surgical  planning.  Oral Contrast:NONE.     Radiation dose reduction techniques were utilized per ALARA protocol.  Automated exposure control was initiated through either or [employer] or  [employer] software packages by  protocol.       DOSE:         Clinical information  abd pain      Comparison  NONE.     Findings  LOWER THORAX: SMALL LEFT PLEURAL EFFUSION WITH LEFT LOWER LOBE AIRSPACE  DISEASE.     ABDOMEN:        LIVER: Homogeneous. No focal hepatic mass or ductal dilatation.        GALLBLADDER: FINDINGS WHICH ARE MOST CONSISTENT WITH ACUTE  CHOLECYSTITIS GIVEN PRESENCE OF MULTIPLE GALLSTONES AND WORRISOME FOR  PARTIAL RUPTURE WITH AN ADJACENT FLUID COLLECTION SITUATED WITHIN THE  GALLBLADDER FOSSA ABUTTING THE UNDERSURFACE OF SEGMENT 3 LIVER THAT IS  APPROXIMATELY 6.6 CM.        PANCREAS: Unremarkable. No mass or ductal dilatation.        SPLEEN: Homogeneous. No splenomegaly.        ADRENALS: No mass.        KIDNEYS/URETERS: No mass. No obstructive uropathy.  No evidence of  urolithiasis.        GI TRACT: DIVERTICULOSIS OF THE COLON WITHOUT DIVERTICULITIS.        PERITONEUM: No free air. No free fluid or loculated fluid  collections.        MESENTERY: Unremarkable.        LYMPH NODES: No lymphadenopathy.        VASCULATURE: No evidence of aneurysm.        ABDOMINAL WALL: FAT ONLY CONTAINING LEFT INGUINAL HERNIA, INDIRECT  TYPE.        OTHER: None.     PELVIS:        BLADDER: RIGHT POSTERIOR BLADDER WALL DIVERTICULUM.        REPRODUCTIVE: PROSTATE ENLARGEMENT WITH CENTRAL CALCIFICATIONS.        APPENDIX: Nondistended.  No surrounding inflammation.     BONES: No acute bony abnormality.       Impression:       Impression:  1. FINDINGS WHICH ARE MOST SUSPICIOUS FOR CHOLECYSTITIS WITH PARTIAL  RUPTURE AND AN ADJACENT GALLBLADDER FOSSA FLUID COLLECTION THAT IS 6.6  CM. ADJACENT INFLAMMATION OF THE LIVER WITH ABSCESS FORMATION LESS  LIKELY.  2. LEFT LOWER LOBE AIRSPACE DISEASE WITH LEFT PLEURAL EFFUSION.  3. MILD SECONDARY ILEUS.  4. PROSTATE ENLARGEMENT AND OTHER NONACUTE FINDINGS AS ABOVE.        This report was finalized on 5/16/2018 10:09 AM by Dr. Adebayo Dodd MD.       CT Chest Pulmonary Embolism With Contrast [[id number]] Collected:  05/16/18 0943     Updated:  05/16/18 0949    Narrative:       EXAMINATION: CT CHEST PULMONARY EMBOLISM W CONTRAST-      Technique: Multiple CT axial images were obtained through the level of  pulmonary arteries, following IV contrast administration per CT PE  protocol.  Volume Rendered 3D or MIP images performed.     Radiation dose reduction techniques were utilized per ALARA protocol.  Automated exposure control was initiated through either or [employer] or  DoseRight software packages by  protocol.                  CLINICAL INDICATION:    Chest pain     COMPARISON:    Chest x-ray performed on same day.     FINDINGS: Small left pleural effusion subpulmonic in location. This  appears nonloculated. Left lower lobe airspace disease partially  consolidative representing combination of atelectasis and pneumonia.     No pulmonary embolism is identified.     Mild cardiomegaly. Moderate coronary vascular calcifications. No right  pleural effusion. No pericardial effusion. Chronic appearing lung  changes. No suspicious pulmonary nodules or masses. No pneumothorax.     Prior changes of CABG.     Gastroesophageal reflux. Hiatal hernia.     Imaging through the liver demonstrates cholelithiasis with abnormal  inflammatory changes surrounding the gallbladder. Additionally, there is  subtle low-density  involving segment 3 of the liver abutting the fissure  ligament of teres that is approximately 6.3 cm and is new since the  previous exam and thus concerning for a more acute process such as  abscess. Stranding around the liver is also noted.       Impression:          1. No PE.   2. Left lower lobe pneumonia with left subpulmonic pleural effusion  which appears nonloculated.  3. 6.3 cm low-density lesion which is developed in the interim involving  the segment 3 portion of the liver or abutting the liver. Considerations  would include cholecystitis with rupture and adjacent fluid collection  formation versus involvement of the liver with liver abscess formation.  4. Cardiomegaly and chronic appearing lung changes.     This report was finalized on 5/16/2018 9:47 AM by Dr. Adebayo Dodd MD.       XR Chest 1 View [[id number]] Collected:  05/16/18 0835     Updated:  05/16/18 0838    Narrative:       EXAMINATION: XR CHEST 1 VW-      CLINICAL INDICATION:     Chest pain protocol     TECHNIQUE:  XR CHEST 1 VW-      COMPARISON: 10/15/1950      FINDINGS:   Low volumes with bibasilar airspace disease likely atelectasis.   Cardiomegaly with changes of prior CABG.   No pneumothorax.   No pleural effusion.   No acute osseous findings.            Impression:       Decreased lung volumes with bibasilar airspace disease.  Otherwise stable chest.     This report was finalized on 5/16/2018 8:36 AM by Dr. Adebayo Dodd MD.             Condition on Discharge:      Guarded    Code status during the hospital stay:    Full Code    Discharge Disposition:    Short Term Hospital (DC - External)    Discharge Medication:     Porsha Soni   Home Medication Instructions DONTE:[id number]    Printed on:05/23/18 1300   Medication Information                      amLODIPine (NORVASC) 5 MG tablet  Take 5 mg by mouth daily.             aspirin 81 MG EC tablet  Take 81 mg by mouth daily.             cefTRIAXone (ROCEPHIN) 2 g/100 mL 0.9% NS VTB  ([place])  Infuse 100 mL into a venous catheter Daily for 3 doses.             clopidogrel (PLAVIX) 75 MG tablet  Take 75 mg by mouth daily.             finasteride (PROSCAR) 5 MG tablet  Take 1 tablet by mouth Daily.             loratadine (CLARITIN) 10 MG tablet  Take 10 mg by mouth Daily.             losartan (COZAAR) 100 MG tablet  Take 100 mg by mouth daily.             Memantine HCl-Donepezil HCl (NAMZARIC) 28-10 MG capsule sustained-release 24 hr  Take 1 capsule by mouth Every Night.             Omega-3 Fatty Acids (FISH OIL) 1000 MG capsule capsule  Take 1,000 mg by mouth Daily With Breakfast.             vitamin D (ERGOCALCIFEROL) 31456 UNITS capsule capsule  Take 50,000 Units by mouth 1 (One) Time Per Week. Prior to [facility] [place] Admission, Patient was on: takes on mondays                 Discharge Diet:     NPO    Activity at Discharge:     Bedrest    Follow-up Appointments:    Follow-up Information     SIMON Reynolds .    Specialty:  Family Medicine  Contact information:  [address] [facility] [address] [phone]             Dwayne Swartz MD .    Specialty:  Cardiology  Contact information:  [address]  [facility] [address] [phone]                       Test Results Pending at Discharge:           Ace Horta DO  05/23/18  1:00 PM    Time: Discharge 65 min

## 2018-05-23 NOTE — PROGRESS NOTES
[facility] HOSPITALIST    PROGRESS NOTE    Name:  Porsha Soni   Age:  77 y.o.  Sex:  male  :  1940  MRN:  [id number]   Visit Number:  [id number]  Admission Date:  2018  Date Of Service:  18  Primary Care Physician:  SIMON Reynolds     LOS: 7 days :  Patient Care Team:  SIMON Reynolds as PCP - General  SIMON Reynolds as PCP - Family Medicine  Dwayne Swartz MD as PCP - Claims Attributed  Evi Vaughn RN as Care Coordinator ([facility]):    Chief Complaint:      F/U of Cholecystitis, POD # 7 open cholecystectomy/Sepsis/Acute Resp Failure with Hypoxia    Subjective / Interval History:     I have reviewed labs/imaging/records from this hospitalization, including ER staff and admitting/attending physicians H/P's and progress notes to establish a comprehensive understanding of this patient's clinical hospital course, as well as to establish a transition of care appropriately.    Patient is a 77-year-old male who was admitted secondary to cholecystitis, noted perforation with abscess formation.  He underwent open cholecystectomy by general surgery, he is now postop day # 7.  He continues to receive IV Rocephin 2 g IV daily, IV metronidazole has been discontinued.  Infectious disease consultation has been obtained and this was the recommendation.  Patient developed some altered mental status post surgically, felt to be secondary to acute metabolic encephalopathy as a result of sepsis and his cholecystitis.      He has been afebrile for greater than 72 hours.  Vital signs otherwise hemodynamically stable.  He continues to tolerate his current antibiotic regimen.  He has tolerated a small amount by mouth intake.  Without nausea or emesis reported by nursing.  He continues to maintain good urine output, continues to have Ambrocio catheter in place.  No reports of gross hematuria.  He denies any bright red blood or black or tarry qualities to bowel  movement.  No reports of active chest pain.  Patient continues to have some hypoxia periodically and last night, he continues on high flow nasal oxygen supplementation this morning.  Wife of patient states that he has had improvement to edematous changes generalized through the course of yesterday evening and this morning.  He has continued to use incentive spirometry as directed.  Serosanguineous fluid continues to be noted in the MISHEL drain, more clear in color.  MISHEL drain in place and has been drained periodically by nursing as per protocol.  Patient spent time out of bed yesterday with physical therapy assistance.  Chest x-ray yesterday demonstrated congestive heart changes, patient given 20 mg IV Lasix twice yesterday with improved diuresis.      Review of Systems:     General ROS: Patient denies any fevers, chills or loss of consciousness.  Respiratory ROS: Occasional cough, no hemoptysis.  Cardiovascular ROS: Denies chest pain or palpitations. No history of exertional chest pain.  Gastrointestinal ROS: Denies nausea and vomiting.  Mild, generalized abdominal discomfort that is improving daily.. No diarrhea, no bright red blood or black or tarry stools.  Neurological ROS: Generalized weakness. No loss of consciousness. Denies any numbness. Denies neck pain.  Occasional confusion but alert.  Dermatological ROS: No reports of streaking erythema from abdominal wound..    Vital Signs:    Temp:  [98.1 °F (36.7 °C)-98.5 °F (36.9 °C)] 98.5 °F (36.9 °C)  Heart Rate:  [70-85] 79  Resp:  [12-33] 22  BP: (101-137)/(51-71) 118/53    Intake and output:    I/O last 3 completed shifts:  In: 1840 [P.O.:840; I.V.:1000]  Out: 4600 [Urine:4500; Drains:100]  No intake/output data recorded.    Physical Examination:    General Appearance:  Alert and cooperative, not in any acute distress.  Moderately ill-appearing gentleman, he Continues to be pleasant and interactive during questioning and examination.     Head:  Atraumatic and  normocephalic, without obvious abnormality.   Eyes:          PERRLA, conjunctivae and sclerae normal, no Icterus. No pallor. Extra-occular movements are within normal limits.   Neck: Supple, trachea midline, no thyromegaly, no carotid bruit.   Lungs:   Chest shape is normal. Breath sounds heard bilaterally equally, audible air exchange noted all lung fields.  Fine sandpaperlike bibasilar crackles.  Mild referred upper airway congestion that is partially cleared with light cough.  No Pleural rub or bronchial breathing.   Heart:  Normal S1 and S2, no murmur, no gallop, no rub. No JVD   Abdomen:   Normal bowel sounds x 4, no masses, no organomegaly. Soft, mild tenderness generalized in the abdomen, no areas of focal location non-distended, no guarding, no rebound tenderness.  Excellent anatomical restoration of wound edges, well approximated.  Clean dry incision on removal of managing with assistance of nursing.  MISHEL drain shows approximately < 10 ML's of serosanguineous drainage.     Extremities: Generalized edema to bilateral upper extremities, 1+ pitting edema that extends to the distal third of the tibias bilaterally, no cyanosis, no clubbing.  Generalized weakness of frail nature, moves all extremities equally.  SCUDs.   Skin: No bleeding.  Clean dry incision.  Expected postsurgical changes noted to the abdomen.     Neurologic: Awake, alert and oriented times 3. Moves all 4 extremities.   Laboratory results:      Results from last 7 days  Lab Units 05/22/18  0845 05/21/18  0142 05/20/18  0123   SODIUM mmol/L 143 142 140   POTASSIUM mmol/L 4.3 3.9 3.9   CHLORIDE mmol/L 111 115* 115*   CO2 mmol/L 29.0 25.4 24.6   BUN mg/dL 15 18 27*   CREATININE mg/dL 0.69 0.60 0.70   CALCIUM mg/dL 7.6* 7.3* 7.3*   BILIRUBIN mg/dL 0.4 0.4 0.4   ALK PHOS U/L 81 80 86   ALT (SGPT) U/L 30 36 43   AST (SGOT) U/L 22 21 23   GLUCOSE mg/dL 110 103 122*       Results from last 7 days  Lab Units 05/22/18  0845 05/21/18  0142 05/20/18  0123    WBC 10*3/mm3 12.68* 14.53* 15.13*   HEMOGLOBIN g/dL 10.7* 9.9* 9.4*   HEMATOCRIT % 35.2* 32.1* 30.8*   PLATELETS 10*3/mm3 459* 478* 492*       Results from last 7 days  Lab Units 05/17/18  0345   INR  1.73*       Results from last 7 days  Lab Units 05/17/18  0909 05/17/18  0345 05/16/18  2132   CK TOTAL U/L 74  74 70 65   TROPONIN I ng/mL 0.025 0.034 0.026   CK MB INDEX % 2.1 2.8 3.0       Results from last 7 days  Lab Units 05/17/18  0001 05/16/18  0825   BLOODCX   --  No growth at 5 days   URINECX  No growth  --            I have reviewed the patient's laboratory results.    Radiology results:    Imaging Results (last 24 hours)     Procedure Component Value Units Date/Time    XR Chest 1 View [[id number]] Collected:  05/22/18 1101     Updated:  05/22/18 1104    Narrative:       Technique: Frontal view the chest.     COMPARISON:  05/19/2018     INDICATION:     hypoxia/Generalized edema; A41.9-Sepsis, unspecified  organism; K85.90-Acute pancreatitis without necrosis or infection,  unspecified; D72.829-Elevated white blood cell count, unspecified;  K81.9-Cholecystitis, unspecified      FINDINGS:    Bibasilar airspace disease. Cardiomegaly is noted.  Prominent interstitial markings are noted. There are small bilateral  pleural effusions.        Impression:       Radiographic changes of congestive failure.     This report was finalized on 5/22/2018 11:02 AM by Dr. Deangelo Soni MD.             I have reviewed the patient's radiology reports.    Medication Review:     I have reviewed the patients active and prn medications.         Assessment:  Principal Problem:    Cholecystitis  Active Problems:    Sepsis    Acute respiratory failure with hypoxia    Physical deconditioning    Impaired mobility and ADLs        Plan:  Plan to continue IV antibiotics, currently on IV Rocephin 2 g daily, planned transition to oral Omnicef upon discharge.  Plan to continue surveillance labs.  Abdominal surgical wound appears to be healing  very well.  No significant increase in drainage to the MISHEL drain, noted to be more clear in color which could be an issue of volume overload additionally.  I have encouraged patient to continue his incentive spirometry use at a rate of 6 times per hour while awake.  Continue oxygen supplementation at this time.  Stat chest x-ray to evaluate for fluid overload today.  ABG will be ordered now.  Echocardiogram will be repeated given most recent was 2016 which demonstrated no findings of congestive heart failure, with preserved normal left ventricular function.  Consider IV diuresis again today, to utilize Bumex.  Continue physical therapy/occupational therapy for progressive range of motion and up out of bed as tolerated again today.  Given patient's severe physical deconditioning, he is a candidate for skilled rehabilitation facility to home program at time of discharge, versus acute rehabilitation.  I discussed plan of care in detail with patient and his wife today, with a verbalize understanding and agree.  I've answered all of his family's questions at bedside additionally.    I spent a total of 35 minutes in direct patient care time today.    Ace Horta DO  05/23/18  8:20 AM

## 2018-05-23 NOTE — PROGRESS NOTES
Discharge Planning Assessment  HECTOR Cuenca     Patient Name: Porsha Soni  MRN: [id number]  Today's Date: 5/23/2018    Admit Date: 5/16/2018      Discharge Plan     Row Name 05/23/18 1332       Plan    Patient/Family in Agreement with Plan yes    Final Discharge Disposition Code 02 - short term hospital for  care    Final Note Pt is being discharged to [facility] on this date. No other needs identified.     Denita Serna

## 2018-05-23 NOTE — PLAN OF CARE
Problem: Patient Care Overview  Goal: Plan of Care Review  Outcome: Ongoing (interventions implemented as appropriate)    Goal: Discharge Needs Assessment  Outcome: Ongoing (interventions implemented as appropriate)    Goal: Interprofessional Rounds/Family Conf  Outcome: Ongoing (interventions implemented as appropriate)      Problem: Fall Risk (Adult)  Goal: Identify Related Risk Factors and Signs and Symptoms  Outcome: Ongoing (interventions implemented as appropriate)    Goal: Absence of Fall  Outcome: Ongoing (interventions implemented as appropriate)      Problem: Wound (Includes Pressure Injury) (Adult)  Goal: Signs and Symptoms of Listed Potential Problems Will be Absent, Minimized or Managed (Wound)  Outcome: Ongoing (interventions implemented as appropriate)      Problem: Breathing Pattern Ineffective (Adult)  Goal: Identify Related Risk Factors and Signs and Symptoms  Outcome: Ongoing (interventions implemented as appropriate)    Goal: Effective Oxygenation/Ventilation  Outcome: Ongoing (interventions implemented as appropriate)    Goal: Anxiety/Fear Reduction  Outcome: Ongoing (interventions implemented as appropriate)      Problem: Sepsis/Septic Shock (Adult)  Goal: Signs and Symptoms of Listed Potential Problems Will be Absent, Minimized or Managed (Sepsis/Septic Shock)  Outcome: Ongoing (interventions implemented as appropriate)      Problem: Skin Injury Risk (Adult)  Goal: Skin Health and Integrity  Outcome: Ongoing (interventions implemented as appropriate)      Problem: Cholecystectomy (Adult)  Goal: Signs and Symptoms of Listed Potential Problems Will be Absent, Minimized or Managed (Cholecystectomy)  Outcome: Ongoing (interventions implemented as appropriate)    Goal: Anesthesia/Sedation Recovery  Outcome: Ongoing (interventions implemented as appropriate)      Problem: Functional Mobility Impairment (IRF) (Adult)  Goal: Optimal/Safe Level of [place] with Mobility  Outcome: Ongoing  (interventions implemented as appropriate)

## 2018-05-23 NOTE — PROGRESS NOTES
"  I have personally seen and examined the patient today and discussed overnight interval progress and pertinent issues with nursing staff.    Subjective:    Patient is showing worsening clinical status this morning with difficulty breathing.  Chest x-ray performed and shows complete whiteout of the left hemithorax suggestive of lung collapse and mucous plug is suspected etiology.  Right lung remains mostly aerated.  No fever or diarrhea reported overnight.  Possible transfer to facility with pulmonary coverage as patient may need bronchoscopy.  CBC and CRP ordered for a.m.    History taken from: patient chart family RN      Vital Signs    /69   Pulse 73   Temp 98.1 °F (36.7 °C)   Resp 22   Ht 177.8 cm (70\")   Wt 110 kg (243 lb)   SpO2 93%   BMI 34.87 kg/m²     Temp:  [98.1 °F (36.7 °C)-98.5 °F (36.9 °C)] 98.1 °F (36.7 °C)      Intake/Output Summary (Last 24 hours) at 05/23/18 1114  Last data filed at 05/23/18 1002   Gross per 24 hour   Intake              600 ml   Output             3505 ml   Net            -2905 ml     Intake & Output (last 3 days)       05/20 0701 - 05/21 0700 05/21 0701 - 05/22 0700 05/22 0701 - 05/23 0700 05/23 0701 - 05/24 0700    P.O. 600 960 840 0    I.V. (mL/kg) 2328.3 (22) 1000 (9.3)      IV Piggyback 300       Total Intake(mL/kg) 3228.3 (30.5) 1960 (18.1) 840 (7.6) 0 (0)    Urine (mL/kg/hr) 1275 (0.5) 1660 (0.6) 3700 (1.4) 405 (0.9)    Drains 310 (0.1) 145 (0.1) 100 (0)     Stool        Total Output 1585 1805 3800 405    Net +1643.3 +155 -2960 -405                  Physical Exam:                 General Appearance:   More awake, high flow oxygen, comfortable wife at bedside    Head:    Normocephalic, without obvious abnormality, atraumatic   Eyes:            Lids and lashes normal, conjunctivae and sclerae normal, no   icterus, no pallor, corneas clear, PERRLA   Ears:    Ears appear intact with no abnormalities noted   Throat:   No oral lesions, no thrush, oral mucosa moist "   Neck:   No adenopathy, supple, trachea midline, no thyromegaly, no   carotid bruit, no JVD   Back:     No tenderness to percussion or palpation, range of motion   normal   Lungs:     Clear to auscultation,respirations regular, even and unlabored. No wheezing, no ronchi and no crackles.    Heart:    Regular rhythm and normal rate, normal S1 and S2, no            murmur, no gallop, no rub, no click   Chest Wall:    No abnormalities observed   Abdomen:     Distended, Normal bowel sounds, no masses, no organomegaly, soft, non-tender, no guarding, no rebound tenderness   Rectal:     Deferred   Extremities:   Moves all extremities well, no edema, no cyanosis, no             redness   Pulses:   Pulses palpable and equal bilaterally   Skin:   No bleeding, bruising or rash   Lymph nodes:   No palpable adenopathy   Neurologic:   More awake and alert         Results:      Results from last 7 days  Lab Units 05/22/18  0845 05/21/18  0142 05/20/18  0123 05/19/18  0130 05/18/18  0104 05/17/18  0345   WBC 10*3/mm3 12.68* 14.53* 15.13* 24.91* 19.50* 17.91*     Lab Results   Component Value Date    NEUTROABS 9.14 (H) 05/22/2018         Results from last 7 days  Lab Units 05/22/18  0845   CREATININE mg/dL 0.69         Results from last 7 days  Lab Units 05/22/18  0845 05/21/18  0142 05/20/18  0123   CRP mg/dL 6.21* 6.58* 11.52*     Results Review:    I have personally reviewed laboratory data, culture results, radiology studies and antimicrobial therapy.    Hospital Medications (active)       Dose Frequency Start End    aspirin EC tablet 81 mg 81 mg Daily 5/16/2018     Sig - Route: Take 1 tablet by mouth Daily. - Oral    cetirizine (zyrTEC) tablet 10 mg 10 mg Daily 5/16/2018     Sig - Route: Take 1 tablet by mouth Daily. - Oral    chlorhexidine (PERIDEX) 0.12 % solution 15 mL 15 mL Every 12 Hours Scheduled 5/17/2018     Sig - Route: Apply 15 mL to the mouth or throat Every 12 (Twelve) Hours. - Mouth/Throat    cholecalciferol  "(VITAMIN D3) capsule 50,000 Units 50,000 Units Weekly 5/21/2018     Sig - Route: Take 1 capsule by mouth 1 (One) Time Per Week. - Oral    dexmedetomidine HCl (PRECEDEX) 400 mcg in sodium chloride 0.9 % 100 mL (4 mcg/mL) infusion 0.2-1.5 mcg/kg/hr × 105 kg Titrated 5/18/2018     Sig - Route: Infuse .5 mcg/hr into a venous catheter Dose Adjusted By Provider As Needed. - Intravenous    docusate sodium (COLACE) capsule 100 mg 100 mg 2 Times Daily 5/16/2018     Sig - Route: Take 1 capsule by mouth 2 (Two) Times a Day. - Oral    donepezil (ARICEPT) tablet 10 mg 10 mg Nightly 5/16/2018     Sig - Route: Take 2 tablets by mouth Every Night. - Oral    FENTANYL PCA 1500 MCG/30 ML (BHCOR) PCA  Titrated 5/17/2018 5/27/2018    Sig - Route: Infuse  into a venous catheter Dose Adjusted By Provider As Needed. - Intravenous    finasteride (PROSCAR) tablet 5 mg 5 mg Daily 5/16/2018     Sig - Route: Take 1 tablet by mouth Daily. - Oral    heparin (porcine) 5000 UNIT/ML injection 5,000 Units 5,000 Units Every 12 Hours Scheduled 5/17/2018     Sig - Route: Inject 1 mL under the skin Every 12 (Twelve) Hours. - Subcutaneous    HYDROcodone-acetaminophen (NORCO) 7.5-325 MG per tablet 1 tablet 1 tablet Every 4 Hours PRN 5/16/2018 5/26/2018    Sig - Route: Take 1 tablet by mouth Every 4 (Four) Hours As Needed for Moderate Pain . - Oral    ipratropium (ATROVENT) nebulizer solution 0.5 mg 0.5 mg Every 6 Hours - RT 5/17/2018     Sig - Route: Take 2.5 mL by nebulization Every 6 (Six) Hours. - Nebulization    lansoprazole (FIRST) oral suspension 30 mg 30 mg Every Early Morning 5/17/2018     Sig - Route: 10 mL by Per G Tube route Every Morning. - Per G Tube    Magnesium Sulfate 2 gram / 50mL Infusion (GIVE X 3 BAGS TO EQUAL 6GM TOTAL DOSE) - Mg 1.1 - 1/5 mg/dl 2 g As Needed 5/17/2018     Sig - Route: Infuse 50 mL into a venous catheter As Needed (See Administration Instructions). - Intravenous    Linked Group 1:  \"Or\" Linked Group Details     " "   Magnesium Sulfate 2 gram Bolus, followed by 8 gram infusion (total Mg dose 10 grams)- Mg less than or equal to 1mg/dL 2 g As Needed 5/17/2018     Sig - Route: Infuse 50 mL into a venous catheter As Needed (See Administration Instructions). - Intravenous    Linked Group 1:  \"Or\" Linked Group Details        Magnesium Sulfate 4 gram infusion- Mg 1.6-1.9 mg/dL 4 g As Needed 5/17/2018     Sig - Route: Infuse 100 mL into a venous catheter As Needed (See Administration Instructions). - Intravenous    Linked Group 1:  \"Or\" Linked Group Details        Magnesium Sulfate 4 gram infusion- Mg 1.6-1.9 mg/dL 4 g Once 5/17/2018 5/17/2018    Sig - Route: Infuse 100 mL into a venous catheter 1 (One) Time. - Intravenous    memantine (NAMENDA) tablet 10 mg 10 mg Every 12 Hours Scheduled 5/16/2018     Sig - Route: Take 1 tablet by mouth Every 12 (Twelve) Hours. - Oral    morphine injection 4 mg 4 mg Every 3 Hours PRN 5/16/2018     Sig - Route: Infuse 1 mL into a venous catheter Every 3 (Three) Hours As Needed for Severe Pain . - Intravenous    norepinephrine (LEVOPHED) 8,000 mcg in sodium chloride 0.9 % 250 mL (32 mcg/mL) infusion 0.02-0.3 mcg/kg/min × 95.3 kg Titrated 5/16/2018     Sig - Route: Infuse 1.906-28.59 mcg/min into a venous catheter Dose Adjusted By Provider As Needed. - Intravenous    ondansetron (ZOFRAN) injection 4 mg 4 mg Every 4 Hours PRN 5/16/2018     Sig - Route: Infuse 2 mL into a venous catheter Every 4 (Four) Hours As Needed for Nausea or Vomiting. - Intravenous    piperacillin-tazobactam (ZOSYN) 3.375 g/100 mL 0.9% NS IVPB (mbp) 3.375 g Every 8 Hours 5/16/2018 5/30/2018    Sig - Route: Infuse 100 mL into a venous catheter Every 8 (Eight) Hours. - Intravenous    polyethylene glycol (MIRALAX) powder 17 g 17 g 2 Times Daily 5/16/2018 5/20/2018    Sig - Route: Take 17 g by mouth 2 (Two) Times a Day. - Oral    potassium chloride (KLOR-CON) packet 40 mEq 40 mEq As Needed 5/17/2018     Sig - Route: Take 40 mEq by " "mouth As Needed (potassium replacement, see admin instructions). - Oral    Linked Group 2:  \"Or\" Linked Group Details        potassium chloride (MICRO-K) CR capsule 40 mEq 40 mEq As Needed 5/17/2018     Sig - Route: Take 4 capsules by mouth As Needed (potassium replacement.  see admin instructions). - Oral    Linked Group 2:  \"Or\" Linked Group Details        potassium chloride 10 mEq in 100 mL IVPB 10 mEq Every 1 Hour PRN 5/17/2018     Sig - Route: Infuse 100 mL into a venous catheter Every 1 (One) Hour As Needed (potassium protocol PERIPHERAL - see admin instructions). - Intravenous    Linked Group 2:  \"Or\" Linked Group Details        potassium phosphate 15 mmol in sodium chloride 0.9 % 100 mL infusion 15 mmol As Needed 5/17/2018     Sig - Route: Infuse 15 mmol into a venous catheter As Needed (Peripheral IV - Phosphorus 1.8 - 2.5). - Intravenous    Linked Group 3:  \"Or\" Linked Group Details        potassium phosphate 30 mmol in sodium chloride 0.9 % 250 mL infusion 30 mmol As Needed 5/17/2018     Sig - Route: Infuse 30 mmol into a venous catheter As Needed (Peripheral IV - Phosphorus 1.3 - 1.7). - Intravenous    Linked Group 3:  \"Or\" Linked Group Details        potassium phosphate 45 mmol in sodium chloride 0.9 % 500 mL infusion 45 mmol As Needed 5/17/2018     Sig - Route: Infuse 45 mmol into a venous catheter As Needed (Peripheral IV - Phosphorus Less Than 1.3). - Intravenous    Linked Group 3:  \"Or\" Linked Group Details        promethazine (PHENERGAN) 12.5 mg in sodium chloride 0.9 % 50 mL 12.5 mg Every 4 Hours PRN 5/16/2018     Sig - Route: Infuse 12.5 mg into a venous catheter Every 4 (Four) Hours As Needed for Nausea or Vomiting. - Intravenous    propofol (DIPRIVAN) infusion 10 mg/mL 100 mL 5-50 mcg/kg/min × 105 kg Titrated 5/17/2018     Sig - Route: Infuse 525-5,250 mcg/min into a venous catheter Dose Adjusted By Provider As Needed. - Intravenous    sodium chloride 0.9 % bolus 1,000 mL 1,000 mL Once " 5/16/2018     Sig - Route: Infuse 1,000 mL into a venous catheter 1 (One) Time. - Intravenous    sodium chloride 0.9 % flush 1-10 mL 1-10 mL As Needed 5/16/2018     Sig - Route: Infuse 1-10 mL into a venous catheter As Needed for Line Care. - Intravenous    Cosign for Ordering: Accepted by Evert Brown DO on 5/17/2018  8:54 AM    sodium chloride 0.9 % flush 1-10 mL 1-10 mL As Needed 5/16/2018     Sig - Route: Infuse 1-10 mL into a venous catheter As Needed for Line Care. - Intravenous    sodium chloride 0.9 % flush 10 mL 10 mL As Needed 5/16/2018     Sig - Route: Infuse 10 mL into a venous catheter As Needed for Line Care. - Intravenous    sodium chloride 0.9 % flush 10 mL 10 mL Every 12 Hours Scheduled 5/17/2018     Sig - Route: 10 mL by Intracatheter route Every 12 (Twelve) Hours. - Intracatheter    sodium chloride 0.9 % flush 10 mL 10 mL As Needed 5/17/2018     Sig - Route: 10 mL by Intracatheter route As Needed for Line Care (After Medication Administration or Blood Draw). - Intracatheter    sodium chloride 0.9 % flush 10 mL 10 mL As Needed 5/17/2018     Sig - Route: 10 mL by Intracatheter route As Needed for Line Care (After Medication Administration or Blood Draw). - Intracatheter    sodium chloride 0.9 % flush 10 mL 10 mL As Needed 5/17/2018     Sig - Route: 10 mL by Intracatheter route As Needed for Line Care (After Medication Administration or Blood Draw). - Intracatheter    sodium chloride 0.9 % flush 10 mL 10 mL As Needed 5/17/2018     Sig - Route: 10 mL by Intracatheter route As Needed for Line Care (After Medication Administration or Blood Draw). - Intracatheter    sodium chloride 0.9 % flush 10 mL 10 mL As Needed 5/17/2018     Sig - Route: 10 mL by Intracatheter route As Needed for Line Care (After Medication Administration or Blood Draw). - Intracatheter    sodium chloride 0.9 % infusion 100 mL/hr Continuous 5/17/2018     Sig - Route: Infuse 100 mL/hr into a venous catheter Continuous. -  "Intravenous    sodium phosphates 15 mmol in sodium chloride 0.9 % 250 mL IVPB 15 mmol As Needed 5/17/2018     Sig - Route: Infuse 15 mmol into a venous catheter As Needed (Peripheral IV - Phosphorus 1.8 - 2.5 & Potassium Greater Than 4). - Intravenous    Linked Group 3:  \"Or\" Linked Group Details        sodium phosphates 30 mmol in sodium chloride 0.9 % 250 mL IVPB 30 mmol As Needed 5/17/2018     Sig - Route: Infuse 30 mmol into a venous catheter As Needed (Peripheral IV - Phosphorus 1.3-1.7 & Potassium Greater Than 4). - Intravenous    Linked Group 3:  \"Or\" Linked Group Details        sodium phosphates 45 mmol in sodium chloride 0.9 % 500 mL IVPB 45 mmol As Needed 5/17/2018     Sig - Route: Infuse 45 mmol into a venous catheter As Needed (Peripheral IV - Phosphorus Less Than 1.3 & Potassium Greater Than 4). - Intravenous    Linked Group 3:  \"Or\" Linked Group Details        vancomycin (VANCOCIN) 1,250 mg in sodium chloride 0.9 % 250 mL IVPB (Discontinued) 1,250 mg Every 18 Hours 5/17/2018 5/17/2018    Sig - Route: Infuse 1,250 mg into a venous catheter Every 18 (Eighteen) Hours. - Intravenous            Cultures:    Blood Culture   Date Value Ref Range Status   05/16/2018 No growth at 2 days  Preliminary   05/16/2018 No growth at 2 days  Preliminary           Assessment/Plan     ASSESSMENT:    1.  Septic shock on Levophed  2.  Peritonitis     PLAN:    Patient is showing worsening clinical status this morning with difficulty breathing.  Chest x-ray performed and shows complete whiteout of the left hemithorax suggestive of lung collapse and mucous plug is suspected etiology.  Right lung remains mostly aerated.  No fever or diarrhea reported overnight.  Possible transfer to facility with pulmonary coverage as patient may need bronchoscopy.  CBC and CRP ordered for a.m.    Current Antimicrobials:    Rocephin 2 g IV every 24 hours and Flagyl 500 g iv every 8 hours (de-escalated from Zosyn on 5/20/18)       Patient's " findings and recommendations were discussed with family and nursing staff    Code Status: Full Code     Kerri Fajardo PA-C  05/23/18  11:14 AM

## 2018-05-24 ENCOUNTER — APPOINTMENT (OUTPATIENT)
Dept: GENERAL RADIOLOGY | Facility: HOSPITAL | Age: 78
End: 2018-05-24

## 2018-05-24 ENCOUNTER — ANESTHESIA EVENT (OUTPATIENT)
Dept: PERIOP | Facility: HOSPITAL | Age: 78
End: 2018-05-24

## 2018-05-24 ENCOUNTER — ANESTHESIA (OUTPATIENT)
Dept: PERIOP | Facility: HOSPITAL | Age: 78
End: 2018-05-24

## 2018-05-24 LAB
ANION GAP SERPL CALCULATED.3IONS-SCNC: 9.1 MMOL/L (ref 10–20)
BASOPHILS # BLD AUTO: 0.03 10*3/MM3 (ref 0–0.2)
BASOPHILS NFR BLD AUTO: 0.3 % (ref 0–2.5)
BUN BLD-MCNC: 18 MG/DL (ref 7–20)
BUN/CREAT SERPL: 25.7 (ref 6.3–21.9)
CALCIUM SPEC-SCNC: 7.8 MG/DL (ref 8.4–10.2)
CHLORIDE SERPL-SCNC: 99 MMOL/L (ref 98–107)
CO2 SERPL-SCNC: 37 MMOL/L (ref 26–30)
CREAT BLD-MCNC: 0.7 MG/DL (ref 0.6–1.3)
DEPRECATED RDW RBC AUTO: 52.2 FL (ref 37–54)
EOSINOPHIL # BLD AUTO: 0.36 10*3/MM3 (ref 0–0.7)
EOSINOPHIL NFR BLD AUTO: 3.2 % (ref 0–7)
ERYTHROCYTE [DISTWIDTH] IN BLOOD BY AUTOMATED COUNT: 14.2 % (ref 11.5–14.5)
GFR SERPL CREATININE-BSD FRML MDRD: 109 ML/MIN/1.73
GLUCOSE BLD-MCNC: 91 MG/DL (ref 74–98)
HCT VFR BLD AUTO: 31 % (ref 42–52)
HGB BLD-MCNC: 10 G/DL (ref 14–18)
IMM GRANULOCYTES # BLD: 0.13 10*3/MM3 (ref 0–0.06)
IMM GRANULOCYTES NFR BLD: 1.1 % (ref 0–0.6)
INR PPP: 1.46 (ref 0.9–1.1)
LYMPHOCYTES # BLD AUTO: 1.82 10*3/MM3 (ref 0.6–3.4)
LYMPHOCYTES NFR BLD AUTO: 16.1 % (ref 10–50)
MAGNESIUM SERPL-MCNC: 2 MG/DL (ref 1.6–2.3)
MCH RBC QN AUTO: 32.7 PG (ref 27–31)
MCHC RBC AUTO-ENTMCNC: 32.3 G/DL (ref 30–37)
MCV RBC AUTO: 101.3 FL (ref 80–94)
MONOCYTES # BLD AUTO: 1.01 10*3/MM3 (ref 0–0.9)
MONOCYTES NFR BLD AUTO: 8.9 % (ref 0–12)
NEUTROPHILS # BLD AUTO: 7.96 10*3/MM3 (ref 2–6.9)
NEUTROPHILS NFR BLD AUTO: 70.4 % (ref 37–80)
NRBC BLD MANUAL-RTO: 0 /100 WBC (ref 0–0)
PLATELET # BLD AUTO: 372 10*3/MM3 (ref 130–400)
PMV BLD AUTO: 8.5 FL (ref 6–12)
POTASSIUM BLD-SCNC: 4.1 MMOL/L (ref 3.5–5.1)
PROTHROMBIN TIME: 16.2 SECONDS (ref 9.3–12.1)
RBC # BLD AUTO: 3.06 10*6/MM3 (ref 4.7–6.1)
SODIUM BLD-SCNC: 141 MMOL/L (ref 137–145)
TROPONIN I SERPL-MCNC: <0.012 NG/ML (ref 0–0.03)
WBC NRBC COR # BLD: 11.31 10*3/MM3 (ref 4.8–10.8)

## 2018-05-24 PROCEDURE — 25010000002 ALBUMIN HUMAN 25% PER 50 ML: Performed by: INTERNAL MEDICINE

## 2018-05-24 PROCEDURE — 25010000002 MIDAZOLAM PER 1 MG: Performed by: NURSE ANESTHETIST, CERTIFIED REGISTERED

## 2018-05-24 PROCEDURE — 80048 BASIC METABOLIC PNL TOTAL CA: CPT | Performed by: INTERNAL MEDICINE

## 2018-05-24 PROCEDURE — 31624 DX BRONCHOSCOPE/LAVAGE: CPT | Performed by: INTERNAL MEDICINE

## 2018-05-24 PROCEDURE — 87106 FUNGI IDENTIFICATION YEAST: CPT | Performed by: INTERNAL MEDICINE

## 2018-05-24 PROCEDURE — 94799 UNLISTED PULMONARY SVC/PX: CPT

## 2018-05-24 PROCEDURE — 25010000002 FUROSEMIDE PER 20 MG: Performed by: INTERNAL MEDICINE

## 2018-05-24 PROCEDURE — 87070 CULTURE OTHR SPECIMN AEROBIC: CPT | Performed by: INTERNAL MEDICINE

## 2018-05-24 PROCEDURE — 25010000002 HEPARIN (PORCINE) PER 1000 UNITS: Performed by: FAMILY MEDICINE

## 2018-05-24 PROCEDURE — 71045 X-RAY EXAM CHEST 1 VIEW: CPT

## 2018-05-24 PROCEDURE — 85610 PROTHROMBIN TIME: CPT | Performed by: INTERNAL MEDICINE

## 2018-05-24 PROCEDURE — 87205 SMEAR GRAM STAIN: CPT | Performed by: INTERNAL MEDICINE

## 2018-05-24 PROCEDURE — 0B9G8ZX DRAINAGE OF LEFT UPPER LUNG LOBE, VIA NATURAL OR ARTIFICIAL OPENING ENDOSCOPIC, DIAGNOSTIC: ICD-10-PCS | Performed by: INTERNAL MEDICINE

## 2018-05-24 PROCEDURE — 99232 SBSQ HOSP IP/OBS MODERATE 35: CPT | Performed by: FAMILY MEDICINE

## 2018-05-24 PROCEDURE — 84484 ASSAY OF TROPONIN QUANT: CPT | Performed by: FAMILY MEDICINE

## 2018-05-24 PROCEDURE — 25010000002 FENTANYL CITRATE (PF) 100 MCG/2ML SOLUTION: Performed by: NURSE ANESTHETIST, CERTIFIED REGISTERED

## 2018-05-24 PROCEDURE — P9046 ALBUMIN (HUMAN), 25%, 20 ML: HCPCS | Performed by: INTERNAL MEDICINE

## 2018-05-24 PROCEDURE — 0B9J8ZX DRAINAGE OF LEFT LOWER LUNG LOBE, VIA NATURAL OR ARTIFICIAL OPENING ENDOSCOPIC, DIAGNOSTIC: ICD-10-PCS | Performed by: INTERNAL MEDICINE

## 2018-05-24 PROCEDURE — 83735 ASSAY OF MAGNESIUM: CPT | Performed by: FAMILY MEDICINE

## 2018-05-24 PROCEDURE — 99223 1ST HOSP IP/OBS HIGH 75: CPT | Performed by: INTERNAL MEDICINE

## 2018-05-24 PROCEDURE — 85025 COMPLETE CBC W/AUTO DIFF WBC: CPT | Performed by: FAMILY MEDICINE

## 2018-05-24 PROCEDURE — 25010000002 CEFTRIAXONE PER 250 MG: Performed by: FAMILY MEDICINE

## 2018-05-24 RX ORDER — FENTANYL CITRATE 50 UG/ML
INJECTION, SOLUTION INTRAMUSCULAR; INTRAVENOUS AS NEEDED
Status: DISCONTINUED | OUTPATIENT
Start: 2018-05-24 | End: 2018-05-24 | Stop reason: SURG

## 2018-05-24 RX ORDER — ALBUMIN (HUMAN) 12.5 G/50ML
12.5 SOLUTION INTRAVENOUS EVERY 6 HOURS
Status: COMPLETED | OUTPATIENT
Start: 2018-05-24 | End: 2018-05-24

## 2018-05-24 RX ORDER — MIDAZOLAM HYDROCHLORIDE 1 MG/ML
INJECTION INTRAMUSCULAR; INTRAVENOUS AS NEEDED
Status: DISCONTINUED | OUTPATIENT
Start: 2018-05-24 | End: 2018-05-24 | Stop reason: SURG

## 2018-05-24 RX ORDER — LIDOCAINE HYDROCHLORIDE 20 MG/ML
INJECTION, SOLUTION INFILTRATION; PERINEURAL
Status: DISPENSED
Start: 2018-05-24 | End: 2018-05-24

## 2018-05-24 RX ORDER — SODIUM CHLORIDE, SODIUM LACTATE, POTASSIUM CHLORIDE, CALCIUM CHLORIDE 600; 310; 30; 20 MG/100ML; MG/100ML; MG/100ML; MG/100ML
INJECTION, SOLUTION INTRAVENOUS CONTINUOUS PRN
Status: DISCONTINUED | OUTPATIENT
Start: 2018-05-24 | End: 2018-05-24 | Stop reason: SURG

## 2018-05-24 RX ORDER — LIDOCAINE HYDROCHLORIDE 20 MG/ML
INJECTION, SOLUTION INFILTRATION; PERINEURAL AS NEEDED
Status: DISCONTINUED | OUTPATIENT
Start: 2018-05-24 | End: 2018-05-24 | Stop reason: HOSPADM

## 2018-05-24 RX ADMIN — PANTOPRAZOLE SODIUM 40 MG: 40 TABLET, DELAYED RELEASE ORAL at 06:22

## 2018-05-24 RX ADMIN — DONEPEZIL HYDROCHLORIDE 10 MG: 5 TABLET ORAL at 22:30

## 2018-05-24 RX ADMIN — ALBUMIN HUMAN 12.5 G: 0.25 SOLUTION INTRAVENOUS at 23:09

## 2018-05-24 RX ADMIN — MEMANTINE 10 MG: 5 TABLET ORAL at 22:30

## 2018-05-24 RX ADMIN — IPRATROPIUM BROMIDE AND ALBUTEROL SULFATE 3 ML: .5; 3 SOLUTION RESPIRATORY (INHALATION) at 00:24

## 2018-05-24 RX ADMIN — IPRATROPIUM BROMIDE AND ALBUTEROL SULFATE 3 ML: .5; 3 SOLUTION RESPIRATORY (INHALATION) at 19:25

## 2018-05-24 RX ADMIN — IPRATROPIUM BROMIDE AND ALBUTEROL SULFATE 3 ML: .5; 3 SOLUTION RESPIRATORY (INHALATION) at 07:29

## 2018-05-24 RX ADMIN — SODIUM CHLORIDE, POTASSIUM CHLORIDE, SODIUM LACTATE AND CALCIUM CHLORIDE: 600; 310; 30; 20 INJECTION, SOLUTION INTRAVENOUS at 12:04

## 2018-05-24 RX ADMIN — MIDAZOLAM HYDROCHLORIDE 1 MG: 1 INJECTION, SOLUTION INTRAMUSCULAR; INTRAVENOUS at 12:08

## 2018-05-24 RX ADMIN — DOCUSATE SODIUM 100 MG: 100 CAPSULE, LIQUID FILLED ORAL at 22:30

## 2018-05-24 RX ADMIN — FENTANYL CITRATE 25 MCG: 50 INJECTION, SOLUTION INTRAMUSCULAR; INTRAVENOUS at 12:08

## 2018-05-24 RX ADMIN — GUAIFENESIN 600 MG: 600 TABLET, EXTENDED RELEASE ORAL at 22:30

## 2018-05-24 RX ADMIN — HEPARIN SODIUM 5000 UNITS: 5000 INJECTION, SOLUTION INTRAVENOUS; SUBCUTANEOUS at 22:30

## 2018-05-24 RX ADMIN — CEFTRIAXONE 2 G: 2 INJECTION, SOLUTION INTRAVENOUS at 17:03

## 2018-05-24 RX ADMIN — FUROSEMIDE 100 MG: 10 INJECTION, SOLUTION INTRAMUSCULAR; INTRAVENOUS at 09:07

## 2018-05-24 RX ADMIN — ALBUMIN HUMAN 12.5 G: 0.25 SOLUTION INTRAVENOUS at 15:07

## 2018-05-24 NOTE — ANESTHESIA PREPROCEDURE EVALUATION
Anesthesia Evaluation     Patient summary reviewed and Nursing notes reviewed   NPO Solid Status: > 8 hours  NPO Liquid Status: > 8 hours           Airway   Mallampati: III  TM distance: >3 FB  Neck ROM: limited  Possible difficult intubation  Dental    (+) upper dentures and lower dentures    Pulmonary - normal exam    breath sounds clear to auscultation  (+) pleural effusion, a smoker Former, shortness of breath,   Cardiovascular - normal exam  Exercise tolerance: poor (<4 METS)    ECG reviewed  PT is on anticoagulation therapy  Patient on routine beta blocker  Rhythm: regular  Rate: normal    (+) hypertension, CAD, CABG,       Neuro/Psych  (+) dizziness/light headedness,     GI/Hepatic/Renal/Endo    (+) obesity,       Musculoskeletal     Abdominal  - normal exam   Substance History - negative use     OB/GYN negative ob/gyn ROS         Other   (+) arthritis     ROS/Med Hx Other: Pulmonary HTN                Anesthesia Plan    ASA 4     MAC     intravenous induction   Anesthetic plan and risks discussed with patient.

## 2018-05-24 NOTE — ANESTHESIA POSTPROCEDURE EVALUATION
Patient: Porsha Soni    Procedure Summary     Date:  05/24/18 Room / Location:  [facility] /  [facility] OR    Anesthesia Start:  1204 Anesthesia Stop:  1238    Procedure:  BRONCHOSCOPY WITH (N/A Bronchus) Diagnosis:       Collapse of left lung      Acute respiratory failure with hypoxia and hypercapnia      (Collapse of left lung [J98.11])      (Acute respiratory failure with hypoxia and hypercapnia [J96.01, J96.02])    Surgeon:  Leonides Quarles MD Provider:  Andrés Palacio CRNA    Anesthesia Type:  MAC ASA Status:  4          Anesthesia Type: MAC  Last vitals  BP   126/57 (05/24/18 1247)   Temp   98.1 °F (36.7 °C) (05/24/18 1200)   Pulse   85 (05/24/18 1247)   Resp   18 (05/24/18 1200)     SpO2   93 % (05/24/18 1247)     Post Anesthesia Care and Evaluation    Patient location during evaluation: ICU  Patient participation: complete - patient participated  Level of consciousness: awake and alert  Pain score: 0  Pain management: satisfactory to patient  Airway patency: patent  Anesthetic complications: No anesthetic complications  PONV Status: none  Cardiovascular status: acceptable and hemodynamically stable  Respiratory status: acceptable and nasal cannula  Hydration status: acceptable

## 2018-05-25 ENCOUNTER — APPOINTMENT (OUTPATIENT)
Dept: GENERAL RADIOLOGY | Facility: HOSPITAL | Age: 78
End: 2018-05-25

## 2018-05-25 LAB
ANION GAP SERPL CALCULATED.3IONS-SCNC: 9.6 MMOL/L (ref 10–20)
ARTERIAL PATENCY WRIST A: POSITIVE
ATMOSPHERIC PRESS: 735 MMHG
BASE EXCESS BLDA CALC-SCNC: 20.6 MMOL/L
BASOPHILS # BLD AUTO: 0.04 10*3/MM3 (ref 0–0.2)
BASOPHILS NFR BLD AUTO: 0.2 % (ref 0–2.5)
BDY SITE: ABNORMAL
BUN BLD-MCNC: 17 MG/DL (ref 7–20)
BUN/CREAT SERPL: 18.9 (ref 6.3–21.9)
CALCIUM SPEC-SCNC: 8 MG/DL (ref 8.4–10.2)
CHLORIDE SERPL-SCNC: 92 MMOL/L (ref 98–107)
CO2 SERPL-SCNC: 41 MMOL/L (ref 26–30)
CREAT BLD-MCNC: 0.9 MG/DL (ref 0.6–1.3)
DEPRECATED RDW RBC AUTO: 52 FL (ref 37–54)
EOSINOPHIL # BLD AUTO: 0.25 10*3/MM3 (ref 0–0.7)
EOSINOPHIL NFR BLD AUTO: 1.5 % (ref 0–7)
ERYTHROCYTE [DISTWIDTH] IN BLOOD BY AUTOMATED COUNT: 14.6 % (ref 11.5–14.5)
GFR SERPL CREATININE-BSD FRML MDRD: 82 ML/MIN/1.73
GLUCOSE BLD-MCNC: 117 MG/DL (ref 74–98)
HCO3 BLDA-SCNC: 43.7 MMOL/L (ref 22–28)
HCT VFR BLD AUTO: 30.7 % (ref 42–52)
HGB BLD-MCNC: 9.9 G/DL (ref 14–18)
HGB BLDA-MCNC: 10.2 G/DL (ref 12–18)
HOROWITZ INDEX BLD+IHG-RTO: 30 %
IMM GRANULOCYTES # BLD: 0.15 10*3/MM3 (ref 0–0.06)
IMM GRANULOCYTES NFR BLD: 0.9 % (ref 0–0.6)
LYMPHOCYTES # BLD AUTO: 2.13 10*3/MM3 (ref 0.6–3.4)
LYMPHOCYTES NFR BLD AUTO: 12.6 % (ref 10–50)
MCH RBC QN AUTO: 32 PG (ref 27–31)
MCHC RBC AUTO-ENTMCNC: 32.2 G/DL (ref 30–37)
MCV RBC AUTO: 99.4 FL (ref 80–94)
MODALITY: ABNORMAL
MONOCYTES # BLD AUTO: 1.44 10*3/MM3 (ref 0–0.9)
MONOCYTES NFR BLD AUTO: 8.5 % (ref 0–12)
NEUTROPHILS # BLD AUTO: 12.88 10*3/MM3 (ref 2–6.9)
NEUTROPHILS NFR BLD AUTO: 76.3 % (ref 37–80)
NRBC BLD MANUAL-RTO: 0 /100 WBC (ref 0–0)
PCO2 BLDA: 55.7 MM HG (ref 35–45)
PH BLDA: 7.5 PH UNITS (ref 7.3–7.5)
PLATELET # BLD AUTO: 376 10*3/MM3 (ref 130–400)
PMV BLD AUTO: 8.5 FL (ref 6–12)
PO2 BLDA: 69.9 MM HG (ref 75–100)
POTASSIUM BLD-SCNC: 3.6 MMOL/L (ref 3.5–5.1)
RBC # BLD AUTO: 3.09 10*6/MM3 (ref 4.7–6.1)
SAO2 % BLDCOA: 95.2 %
SODIUM BLD-SCNC: 139 MMOL/L (ref 137–145)
WBC NRBC COR # BLD: 16.89 10*3/MM3 (ref 4.8–10.8)

## 2018-05-25 PROCEDURE — 94799 UNLISTED PULMONARY SVC/PX: CPT

## 2018-05-25 PROCEDURE — 97166 OT EVAL MOD COMPLEX 45 MIN: CPT

## 2018-05-25 PROCEDURE — 25010000002 CEFTRIAXONE PER 250 MG: Performed by: FAMILY MEDICINE

## 2018-05-25 PROCEDURE — 85025 COMPLETE CBC W/AUTO DIFF WBC: CPT | Performed by: FAMILY MEDICINE

## 2018-05-25 PROCEDURE — 99232 SBSQ HOSP IP/OBS MODERATE 35: CPT | Performed by: INTERNAL MEDICINE

## 2018-05-25 PROCEDURE — 97162 PT EVAL MOD COMPLEX 30 MIN: CPT

## 2018-05-25 PROCEDURE — 82805 BLOOD GASES W/O2 SATURATION: CPT

## 2018-05-25 PROCEDURE — 80048 BASIC METABOLIC PNL TOTAL CA: CPT | Performed by: FAMILY MEDICINE

## 2018-05-25 PROCEDURE — 36600 WITHDRAWAL OF ARTERIAL BLOOD: CPT

## 2018-05-25 PROCEDURE — 99232 SBSQ HOSP IP/OBS MODERATE 35: CPT | Performed by: FAMILY MEDICINE

## 2018-05-25 PROCEDURE — 71045 X-RAY EXAM CHEST 1 VIEW: CPT

## 2018-05-25 PROCEDURE — 25010000002 HEPARIN (PORCINE) PER 1000 UNITS: Performed by: FAMILY MEDICINE

## 2018-05-25 RX ORDER — HEPARIN SODIUM 5000 [USP'U]/ML
5000 INJECTION, SOLUTION INTRAVENOUS; SUBCUTANEOUS EVERY 12 HOURS SCHEDULED
Start: 2018-05-25 | End: 2018-05-30 | Stop reason: HOSPADM

## 2018-05-25 RX ORDER — IPRATROPIUM BROMIDE AND ALBUTEROL SULFATE 2.5; .5 MG/3ML; MG/3ML
3 SOLUTION RESPIRATORY (INHALATION) EVERY 4 HOURS PRN
Qty: 360 ML
Start: 2018-05-25 | End: 2018-09-19 | Stop reason: ALTCHOICE

## 2018-05-25 RX ORDER — MEMANTINE HYDROCHLORIDE 10 MG/1
10 TABLET ORAL EVERY 12 HOURS SCHEDULED
Start: 2018-05-25 | End: 2018-05-30 | Stop reason: HOSPADM

## 2018-05-25 RX ORDER — PANTOPRAZOLE SODIUM 40 MG/1
40 TABLET, DELAYED RELEASE ORAL DAILY
Start: 2018-05-25 | End: 2018-09-19 | Stop reason: ALTCHOICE

## 2018-05-25 RX ORDER — HYDROCODONE BITARTRATE AND ACETAMINOPHEN 7.5; 325 MG/1; MG/1
1 TABLET ORAL EVERY 4 HOURS PRN
Qty: 12 TABLET | Refills: 0 | Status: SHIPPED | OUTPATIENT
Start: 2018-05-25 | End: 2018-05-30

## 2018-05-25 RX ORDER — IPRATROPIUM BROMIDE AND ALBUTEROL SULFATE 2.5; .5 MG/3ML; MG/3ML
3 SOLUTION RESPIRATORY (INHALATION)
Qty: 360 ML
Start: 2018-05-25 | End: 2018-05-30 | Stop reason: HOSPADM

## 2018-05-25 RX ORDER — GUAIFENESIN 600 MG/1
600 TABLET, EXTENDED RELEASE ORAL 2 TIMES DAILY
Start: 2018-05-25 | End: 2018-09-19

## 2018-05-25 RX ORDER — L.ACID,PARA/B.BIFIDUM/S.THERM 8B CELL
1 CAPSULE ORAL DAILY
Start: 2018-05-26 | End: 2019-09-23

## 2018-05-25 RX ORDER — DONEPEZIL HYDROCHLORIDE 10 MG/1
10 TABLET, FILM COATED ORAL NIGHTLY
Start: 2018-05-25 | End: 2018-05-30 | Stop reason: HOSPADM

## 2018-05-25 RX ADMIN — DOCUSATE SODIUM 100 MG: 100 CAPSULE, LIQUID FILLED ORAL at 10:00

## 2018-05-25 RX ADMIN — IPRATROPIUM BROMIDE AND ALBUTEROL SULFATE 3 ML: .5; 3 SOLUTION RESPIRATORY (INHALATION) at 12:32

## 2018-05-25 RX ADMIN — IPRATROPIUM BROMIDE AND ALBUTEROL SULFATE 3 ML: .5; 3 SOLUTION RESPIRATORY (INHALATION) at 07:05

## 2018-05-25 RX ADMIN — IPRATROPIUM BROMIDE AND ALBUTEROL SULFATE 3 ML: .5; 3 SOLUTION RESPIRATORY (INHALATION) at 01:36

## 2018-05-25 RX ADMIN — IPRATROPIUM BROMIDE AND ALBUTEROL SULFATE 3 ML: .5; 3 SOLUTION RESPIRATORY (INHALATION) at 19:24

## 2018-05-25 RX ADMIN — CETIRIZINE HYDROCHLORIDE 10 MG: 10 TABLET, FILM COATED ORAL at 10:00

## 2018-05-25 RX ADMIN — ASPIRIN 81 MG: 81 TABLET, COATED ORAL at 10:00

## 2018-05-25 RX ADMIN — MEMANTINE 10 MG: 5 TABLET ORAL at 21:06

## 2018-05-25 RX ADMIN — CEFTRIAXONE 2 G: 2 INJECTION, SOLUTION INTRAVENOUS at 18:08

## 2018-05-25 RX ADMIN — Medication 1 CAPSULE: at 10:00

## 2018-05-25 RX ADMIN — HEPARIN SODIUM 5000 UNITS: 5000 INJECTION, SOLUTION INTRAVENOUS; SUBCUTANEOUS at 21:06

## 2018-05-25 RX ADMIN — GUAIFENESIN 600 MG: 600 TABLET, EXTENDED RELEASE ORAL at 21:06

## 2018-05-25 RX ADMIN — DONEPEZIL HYDROCHLORIDE 10 MG: 5 TABLET ORAL at 21:06

## 2018-05-25 RX ADMIN — HEPARIN SODIUM 5000 UNITS: 5000 INJECTION, SOLUTION INTRAVENOUS; SUBCUTANEOUS at 10:00

## 2018-05-25 RX ADMIN — FINASTERIDE 5 MG: 5 TABLET, FILM COATED ORAL at 10:00

## 2018-05-25 RX ADMIN — GUAIFENESIN 600 MG: 600 TABLET, EXTENDED RELEASE ORAL at 10:00

## 2018-05-25 RX ADMIN — PANTOPRAZOLE SODIUM 40 MG: 40 TABLET, DELAYED RELEASE ORAL at 06:41

## 2018-05-25 RX ADMIN — MEMANTINE 10 MG: 5 TABLET ORAL at 10:00

## 2018-05-25 RX ADMIN — DOCUSATE SODIUM 100 MG: 100 CAPSULE, LIQUID FILLED ORAL at 21:06

## 2018-05-26 LAB
ANION GAP SERPL CALCULATED.3IONS-SCNC: 7.6 MMOL/L (ref 10–20)
BACTERIA SPEC RESP CULT: ABNORMAL
BASOPHILS # BLD AUTO: 0.04 10*3/MM3 (ref 0–0.2)
BASOPHILS NFR BLD AUTO: 0.3 % (ref 0–2.5)
BUN BLD-MCNC: 24 MG/DL (ref 7–20)
BUN/CREAT SERPL: 30 (ref 6.3–21.9)
CALCIUM SPEC-SCNC: 7.9 MG/DL (ref 8.4–10.2)
CHLORIDE SERPL-SCNC: 93 MMOL/L (ref 98–107)
CO2 SERPL-SCNC: 41 MMOL/L (ref 26–30)
CREAT BLD-MCNC: 0.8 MG/DL (ref 0.6–1.3)
DEPRECATED RDW RBC AUTO: 52.6 FL (ref 37–54)
EOSINOPHIL # BLD AUTO: 0.55 10*3/MM3 (ref 0–0.7)
EOSINOPHIL NFR BLD AUTO: 3.7 % (ref 0–7)
ERYTHROCYTE [DISTWIDTH] IN BLOOD BY AUTOMATED COUNT: 14.5 % (ref 11.5–14.5)
GFR SERPL CREATININE-BSD FRML MDRD: 93 ML/MIN/1.73
GLUCOSE BLD-MCNC: 135 MG/DL (ref 74–98)
GLUCOSE BLDC GLUCOMTR-MCNC: 121 MG/DL (ref 70–130)
GRAM STN SPEC: ABNORMAL
HCT VFR BLD AUTO: 29.7 % (ref 42–52)
HGB BLD-MCNC: 9.6 G/DL (ref 14–18)
IMM GRANULOCYTES # BLD: 0.13 10*3/MM3 (ref 0–0.06)
IMM GRANULOCYTES NFR BLD: 0.9 % (ref 0–0.6)
LYMPHOCYTES # BLD AUTO: 2.23 10*3/MM3 (ref 0.6–3.4)
LYMPHOCYTES NFR BLD AUTO: 15 % (ref 10–50)
MAGNESIUM SERPL-MCNC: 2.1 MG/DL (ref 1.6–2.3)
MCH RBC QN AUTO: 32.2 PG (ref 27–31)
MCHC RBC AUTO-ENTMCNC: 32.3 G/DL (ref 30–37)
MCV RBC AUTO: 99.7 FL (ref 80–94)
MONOCYTES # BLD AUTO: 1.45 10*3/MM3 (ref 0–0.9)
MONOCYTES NFR BLD AUTO: 9.7 % (ref 0–12)
NEUTROPHILS # BLD AUTO: 10.5 10*3/MM3 (ref 2–6.9)
NEUTROPHILS NFR BLD AUTO: 70.4 % (ref 37–80)
NRBC BLD MANUAL-RTO: 0 /100 WBC (ref 0–0)
PLATELET # BLD AUTO: 351 10*3/MM3 (ref 130–400)
PMV BLD AUTO: 8.6 FL (ref 6–12)
POTASSIUM BLD-SCNC: 3.6 MMOL/L (ref 3.5–5.1)
RBC # BLD AUTO: 2.98 10*6/MM3 (ref 4.7–6.1)
SODIUM BLD-SCNC: 138 MMOL/L (ref 137–145)
WBC NRBC COR # BLD: 14.9 10*3/MM3 (ref 4.8–10.8)

## 2018-05-26 PROCEDURE — 99232 SBSQ HOSP IP/OBS MODERATE 35: CPT | Performed by: FAMILY MEDICINE

## 2018-05-26 PROCEDURE — 94799 UNLISTED PULMONARY SVC/PX: CPT

## 2018-05-26 PROCEDURE — 82962 GLUCOSE BLOOD TEST: CPT

## 2018-05-26 PROCEDURE — 80048 BASIC METABOLIC PNL TOTAL CA: CPT | Performed by: FAMILY MEDICINE

## 2018-05-26 PROCEDURE — 85025 COMPLETE CBC W/AUTO DIFF WBC: CPT | Performed by: FAMILY MEDICINE

## 2018-05-26 PROCEDURE — 25010000002 HEPARIN (PORCINE) PER 1000 UNITS: Performed by: FAMILY MEDICINE

## 2018-05-26 PROCEDURE — 25010000002 FLUCONAZOLE PER 200 MG: Performed by: FAMILY MEDICINE

## 2018-05-26 PROCEDURE — 83735 ASSAY OF MAGNESIUM: CPT | Performed by: FAMILY MEDICINE

## 2018-05-26 PROCEDURE — 97110 THERAPEUTIC EXERCISES: CPT

## 2018-05-26 RX ORDER — FUROSEMIDE 40 MG/1
40 TABLET ORAL ONCE
Status: COMPLETED | OUTPATIENT
Start: 2018-05-27 | End: 2018-05-27

## 2018-05-26 RX ORDER — ACETAMINOPHEN 325 MG/1
650 TABLET ORAL EVERY 6 HOURS PRN
Status: DISCONTINUED | OUTPATIENT
Start: 2018-05-26 | End: 2018-05-30 | Stop reason: HOSPADM

## 2018-05-26 RX ORDER — FLUCONAZOLE 2 MG/ML
400 INJECTION, SOLUTION INTRAVENOUS EVERY 24 HOURS
Status: DISCONTINUED | OUTPATIENT
Start: 2018-05-26 | End: 2018-05-29

## 2018-05-26 RX ORDER — POTASSIUM CHLORIDE 750 MG/1
40 CAPSULE, EXTENDED RELEASE ORAL ONCE
Status: COMPLETED | OUTPATIENT
Start: 2018-05-27 | End: 2018-05-27

## 2018-05-26 RX ADMIN — MEMANTINE 10 MG: 5 TABLET ORAL at 20:14

## 2018-05-26 RX ADMIN — IPRATROPIUM BROMIDE AND ALBUTEROL SULFATE 3 ML: .5; 3 SOLUTION RESPIRATORY (INHALATION) at 13:03

## 2018-05-26 RX ADMIN — DOCUSATE SODIUM 100 MG: 100 CAPSULE, LIQUID FILLED ORAL at 10:34

## 2018-05-26 RX ADMIN — HEPARIN SODIUM 5000 UNITS: 5000 INJECTION, SOLUTION INTRAVENOUS; SUBCUTANEOUS at 20:18

## 2018-05-26 RX ADMIN — DOCUSATE SODIUM 100 MG: 100 CAPSULE, LIQUID FILLED ORAL at 20:14

## 2018-05-26 RX ADMIN — IPRATROPIUM BROMIDE AND ALBUTEROL SULFATE 3 ML: .5; 3 SOLUTION RESPIRATORY (INHALATION) at 00:39

## 2018-05-26 RX ADMIN — FLUCONAZOLE 400 MG: 2 INJECTION INTRAVENOUS at 16:46

## 2018-05-26 RX ADMIN — PANTOPRAZOLE SODIUM 40 MG: 40 TABLET, DELAYED RELEASE ORAL at 07:00

## 2018-05-26 RX ADMIN — IPRATROPIUM BROMIDE AND ALBUTEROL SULFATE 3 ML: .5; 3 SOLUTION RESPIRATORY (INHALATION) at 18:37

## 2018-05-26 RX ADMIN — ASPIRIN 81 MG: 81 TABLET, COATED ORAL at 10:33

## 2018-05-26 RX ADMIN — HEPARIN SODIUM 5000 UNITS: 5000 INJECTION, SOLUTION INTRAVENOUS; SUBCUTANEOUS at 10:33

## 2018-05-26 RX ADMIN — ACETAMINOPHEN 650 MG: 325 TABLET, FILM COATED ORAL at 17:29

## 2018-05-26 RX ADMIN — MEMANTINE 10 MG: 5 TABLET ORAL at 10:34

## 2018-05-26 RX ADMIN — GUAIFENESIN 600 MG: 600 TABLET, EXTENDED RELEASE ORAL at 10:33

## 2018-05-26 RX ADMIN — IPRATROPIUM BROMIDE AND ALBUTEROL SULFATE 3 ML: .5; 3 SOLUTION RESPIRATORY (INHALATION) at 06:57

## 2018-05-26 RX ADMIN — GUAIFENESIN 600 MG: 600 TABLET, EXTENDED RELEASE ORAL at 20:14

## 2018-05-26 RX ADMIN — DONEPEZIL HYDROCHLORIDE 10 MG: 5 TABLET ORAL at 20:14

## 2018-05-26 RX ADMIN — Medication 1 CAPSULE: at 10:34

## 2018-05-26 RX ADMIN — FINASTERIDE 5 MG: 5 TABLET, FILM COATED ORAL at 10:33

## 2018-05-26 RX ADMIN — CETIRIZINE HYDROCHLORIDE 10 MG: 10 TABLET, FILM COATED ORAL at 10:34

## 2018-05-27 PROCEDURE — 99232 SBSQ HOSP IP/OBS MODERATE 35: CPT | Performed by: FAMILY MEDICINE

## 2018-05-27 PROCEDURE — 94799 UNLISTED PULMONARY SVC/PX: CPT

## 2018-05-27 PROCEDURE — 25010000002 HEPARIN (PORCINE) PER 1000 UNITS: Performed by: FAMILY MEDICINE

## 2018-05-27 PROCEDURE — 94660 CPAP INITIATION&MGMT: CPT

## 2018-05-27 PROCEDURE — 94760 N-INVAS EAR/PLS OXIMETRY 1: CPT

## 2018-05-27 PROCEDURE — 25010000002 FLUCONAZOLE PER 200 MG: Performed by: FAMILY MEDICINE

## 2018-05-27 RX ORDER — FUROSEMIDE 40 MG/1
40 TABLET ORAL 3 TIMES WEEKLY
Status: DISCONTINUED | OUTPATIENT
Start: 2018-05-28 | End: 2018-05-30 | Stop reason: HOSPADM

## 2018-05-27 RX ORDER — POTASSIUM CHLORIDE 750 MG/1
40 CAPSULE, EXTENDED RELEASE ORAL 3 TIMES WEEKLY
Status: DISCONTINUED | OUTPATIENT
Start: 2018-05-28 | End: 2018-05-30 | Stop reason: HOSPADM

## 2018-05-27 RX ADMIN — IPRATROPIUM BROMIDE AND ALBUTEROL SULFATE 3 ML: .5; 3 SOLUTION RESPIRATORY (INHALATION) at 12:43

## 2018-05-27 RX ADMIN — DONEPEZIL HYDROCHLORIDE 10 MG: 5 TABLET ORAL at 20:46

## 2018-05-27 RX ADMIN — HEPARIN SODIUM 5000 UNITS: 5000 INJECTION, SOLUTION INTRAVENOUS; SUBCUTANEOUS at 08:33

## 2018-05-27 RX ADMIN — GUAIFENESIN 600 MG: 600 TABLET, EXTENDED RELEASE ORAL at 08:32

## 2018-05-27 RX ADMIN — FINASTERIDE 5 MG: 5 TABLET, FILM COATED ORAL at 08:33

## 2018-05-27 RX ADMIN — ASPIRIN 81 MG: 81 TABLET, COATED ORAL at 08:33

## 2018-05-27 RX ADMIN — HEPARIN SODIUM 5000 UNITS: 5000 INJECTION, SOLUTION INTRAVENOUS; SUBCUTANEOUS at 20:46

## 2018-05-27 RX ADMIN — Medication 1 CAPSULE: at 08:33

## 2018-05-27 RX ADMIN — IPRATROPIUM BROMIDE AND ALBUTEROL SULFATE 3 ML: .5; 3 SOLUTION RESPIRATORY (INHALATION) at 06:38

## 2018-05-27 RX ADMIN — GUAIFENESIN 600 MG: 600 TABLET, EXTENDED RELEASE ORAL at 20:46

## 2018-05-27 RX ADMIN — FLUCONAZOLE 400 MG: 2 INJECTION INTRAVENOUS at 17:21

## 2018-05-27 RX ADMIN — IPRATROPIUM BROMIDE AND ALBUTEROL SULFATE 3 ML: .5; 3 SOLUTION RESPIRATORY (INHALATION) at 00:40

## 2018-05-27 RX ADMIN — MEMANTINE 10 MG: 5 TABLET ORAL at 08:33

## 2018-05-27 RX ADMIN — MEMANTINE 10 MG: 5 TABLET ORAL at 20:46

## 2018-05-27 RX ADMIN — FUROSEMIDE 40 MG: 40 TABLET ORAL at 08:33

## 2018-05-27 RX ADMIN — CETIRIZINE HYDROCHLORIDE 10 MG: 10 TABLET, FILM COATED ORAL at 08:33

## 2018-05-27 RX ADMIN — IPRATROPIUM BROMIDE AND ALBUTEROL SULFATE 3 ML: .5; 3 SOLUTION RESPIRATORY (INHALATION) at 19:30

## 2018-05-27 RX ADMIN — DOCUSATE SODIUM 100 MG: 100 CAPSULE, LIQUID FILLED ORAL at 08:32

## 2018-05-27 RX ADMIN — PANTOPRAZOLE SODIUM 40 MG: 40 TABLET, DELAYED RELEASE ORAL at 06:14

## 2018-05-27 RX ADMIN — DOCUSATE SODIUM 100 MG: 100 CAPSULE, LIQUID FILLED ORAL at 20:46

## 2018-05-27 RX ADMIN — POTASSIUM CHLORIDE 40 MEQ: 750 CAPSULE, EXTENDED RELEASE ORAL at 08:32

## 2018-05-28 LAB
ANION GAP SERPL CALCULATED.3IONS-SCNC: 9.3 MMOL/L (ref 10–20)
BASOPHILS # BLD AUTO: 0.04 10*3/MM3 (ref 0–0.2)
BASOPHILS NFR BLD AUTO: 0.3 % (ref 0–2.5)
BUN BLD-MCNC: 25 MG/DL (ref 7–20)
BUN/CREAT SERPL: 31.3 (ref 6.3–21.9)
CALCIUM SPEC-SCNC: 7.9 MG/DL (ref 8.4–10.2)
CHLORIDE SERPL-SCNC: 95 MMOL/L (ref 98–107)
CO2 SERPL-SCNC: 40 MMOL/L (ref 26–30)
CREAT BLD-MCNC: 0.8 MG/DL (ref 0.6–1.3)
DEPRECATED RDW RBC AUTO: 53.7 FL (ref 37–54)
EOSINOPHIL # BLD AUTO: 0.56 10*3/MM3 (ref 0–0.7)
EOSINOPHIL NFR BLD AUTO: 4.5 % (ref 0–7)
ERYTHROCYTE [DISTWIDTH] IN BLOOD BY AUTOMATED COUNT: 14.7 % (ref 11.5–14.5)
FERRITIN SERPL-MCNC: 382 NG/ML (ref 17.9–464)
GFR SERPL CREATININE-BSD FRML MDRD: 93 ML/MIN/1.73
GLUCOSE BLD-MCNC: 105 MG/DL (ref 74–98)
HCT VFR BLD AUTO: 30.5 % (ref 42–52)
HGB BLD-MCNC: 9.5 G/DL (ref 14–18)
IMM GRANULOCYTES # BLD: 0.09 10*3/MM3 (ref 0–0.06)
IMM GRANULOCYTES NFR BLD: 0.7 % (ref 0–0.6)
IRON 24H UR-MRATE: 37 MCG/DL (ref 37–181)
IRON SATN MFR SERPL: 21 % (ref 11–46)
LYMPHOCYTES # BLD AUTO: 2.35 10*3/MM3 (ref 0.6–3.4)
LYMPHOCYTES NFR BLD AUTO: 18.7 % (ref 10–50)
MCH RBC QN AUTO: 31.5 PG (ref 27–31)
MCHC RBC AUTO-ENTMCNC: 31.1 G/DL (ref 30–37)
MCV RBC AUTO: 101 FL (ref 80–94)
MONOCYTES # BLD AUTO: 1.41 10*3/MM3 (ref 0–0.9)
MONOCYTES NFR BLD AUTO: 11.2 % (ref 0–12)
NEUTROPHILS # BLD AUTO: 8.1 10*3/MM3 (ref 2–6.9)
NEUTROPHILS NFR BLD AUTO: 64.6 % (ref 37–80)
NRBC BLD MANUAL-RTO: 0 /100 WBC (ref 0–0)
PLATELET # BLD AUTO: 407 10*3/MM3 (ref 130–400)
PMV BLD AUTO: 8.9 FL (ref 6–12)
POTASSIUM BLD-SCNC: 4.3 MMOL/L (ref 3.5–5.1)
RBC # BLD AUTO: 3.02 10*6/MM3 (ref 4.7–6.1)
SODIUM BLD-SCNC: 140 MMOL/L (ref 137–145)
TIBC SERPL-MCNC: 174 MCG/DL (ref 261–497)
VIT B12 BLD-MCNC: 710 PG/ML (ref 239–931)
WBC NRBC COR # BLD: 12.55 10*3/MM3 (ref 4.8–10.8)

## 2018-05-28 PROCEDURE — 83540 ASSAY OF IRON: CPT | Performed by: INTERNAL MEDICINE

## 2018-05-28 PROCEDURE — 82607 VITAMIN B-12: CPT | Performed by: INTERNAL MEDICINE

## 2018-05-28 PROCEDURE — 85025 COMPLETE CBC W/AUTO DIFF WBC: CPT | Performed by: FAMILY MEDICINE

## 2018-05-28 PROCEDURE — 97530 THERAPEUTIC ACTIVITIES: CPT

## 2018-05-28 PROCEDURE — 80048 BASIC METABOLIC PNL TOTAL CA: CPT | Performed by: FAMILY MEDICINE

## 2018-05-28 PROCEDURE — 94799 UNLISTED PULMONARY SVC/PX: CPT

## 2018-05-28 PROCEDURE — 25010000002 FLUCONAZOLE PER 200 MG: Performed by: FAMILY MEDICINE

## 2018-05-28 PROCEDURE — 94660 CPAP INITIATION&MGMT: CPT

## 2018-05-28 PROCEDURE — 82728 ASSAY OF FERRITIN: CPT | Performed by: INTERNAL MEDICINE

## 2018-05-28 PROCEDURE — 25010000002 HEPARIN (PORCINE) PER 1000 UNITS: Performed by: FAMILY MEDICINE

## 2018-05-28 PROCEDURE — 99232 SBSQ HOSP IP/OBS MODERATE 35: CPT | Performed by: INTERNAL MEDICINE

## 2018-05-28 PROCEDURE — 97110 THERAPEUTIC EXERCISES: CPT

## 2018-05-28 PROCEDURE — 83550 IRON BINDING TEST: CPT | Performed by: INTERNAL MEDICINE

## 2018-05-28 RX ORDER — BISACODYL 10 MG
10 SUPPOSITORY, RECTAL RECTAL DAILY PRN
Status: DISCONTINUED | OUTPATIENT
Start: 2018-05-28 | End: 2018-05-30 | Stop reason: HOSPADM

## 2018-05-28 RX ADMIN — HEPARIN SODIUM 5000 UNITS: 5000 INJECTION, SOLUTION INTRAVENOUS; SUBCUTANEOUS at 09:41

## 2018-05-28 RX ADMIN — PANTOPRAZOLE SODIUM 40 MG: 40 TABLET, DELAYED RELEASE ORAL at 06:46

## 2018-05-28 RX ADMIN — IPRATROPIUM BROMIDE AND ALBUTEROL SULFATE 3 ML: .5; 3 SOLUTION RESPIRATORY (INHALATION) at 06:56

## 2018-05-28 RX ADMIN — HEPARIN SODIUM 5000 UNITS: 5000 INJECTION, SOLUTION INTRAVENOUS; SUBCUTANEOUS at 20:19

## 2018-05-28 RX ADMIN — POTASSIUM CHLORIDE 40 MEQ: 750 CAPSULE, EXTENDED RELEASE ORAL at 09:41

## 2018-05-28 RX ADMIN — ASPIRIN 81 MG: 81 TABLET, COATED ORAL at 09:42

## 2018-05-28 RX ADMIN — MEMANTINE 10 MG: 5 TABLET ORAL at 20:20

## 2018-05-28 RX ADMIN — FLUCONAZOLE 400 MG: 2 INJECTION INTRAVENOUS at 15:46

## 2018-05-28 RX ADMIN — Medication 1 CAPSULE: at 09:41

## 2018-05-28 RX ADMIN — GUAIFENESIN 600 MG: 600 TABLET, EXTENDED RELEASE ORAL at 09:42

## 2018-05-28 RX ADMIN — FINASTERIDE 5 MG: 5 TABLET, FILM COATED ORAL at 09:42

## 2018-05-28 RX ADMIN — DOCUSATE SODIUM 100 MG: 100 CAPSULE, LIQUID FILLED ORAL at 20:19

## 2018-05-28 RX ADMIN — CETIRIZINE HYDROCHLORIDE 10 MG: 10 TABLET, FILM COATED ORAL at 09:42

## 2018-05-28 RX ADMIN — GUAIFENESIN 600 MG: 600 TABLET, EXTENDED RELEASE ORAL at 20:19

## 2018-05-28 RX ADMIN — MEMANTINE 10 MG: 5 TABLET ORAL at 09:41

## 2018-05-28 RX ADMIN — IPRATROPIUM BROMIDE AND ALBUTEROL SULFATE 3 ML: .5; 3 SOLUTION RESPIRATORY (INHALATION) at 01:15

## 2018-05-28 RX ADMIN — DOCUSATE SODIUM 100 MG: 100 CAPSULE, LIQUID FILLED ORAL at 09:41

## 2018-05-28 RX ADMIN — FUROSEMIDE 40 MG: 40 TABLET ORAL at 09:42

## 2018-05-28 RX ADMIN — IPRATROPIUM BROMIDE AND ALBUTEROL SULFATE 3 ML: .5; 3 SOLUTION RESPIRATORY (INHALATION) at 19:04

## 2018-05-28 RX ADMIN — POLYETHYLENE GLYCOL 3350 17 G: 17 POWDER, FOR SOLUTION ORAL at 09:55

## 2018-05-28 RX ADMIN — DONEPEZIL HYDROCHLORIDE 10 MG: 5 TABLET ORAL at 20:19

## 2018-05-28 RX ADMIN — IPRATROPIUM BROMIDE AND ALBUTEROL SULFATE 3 ML: .5; 3 SOLUTION RESPIRATORY (INHALATION) at 12:21

## 2018-05-29 LAB — FOLATE SERPL-MCNC: 6.91 NG/ML

## 2018-05-29 PROCEDURE — 94799 UNLISTED PULMONARY SVC/PX: CPT

## 2018-05-29 PROCEDURE — 97530 THERAPEUTIC ACTIVITIES: CPT

## 2018-05-29 PROCEDURE — 99232 SBSQ HOSP IP/OBS MODERATE 35: CPT | Performed by: INTERNAL MEDICINE

## 2018-05-29 PROCEDURE — 25010000002 HEPARIN (PORCINE) PER 1000 UNITS: Performed by: FAMILY MEDICINE

## 2018-05-29 PROCEDURE — 82746 ASSAY OF FOLIC ACID SERUM: CPT | Performed by: INTERNAL MEDICINE

## 2018-05-29 RX ADMIN — FINASTERIDE 5 MG: 5 TABLET, FILM COATED ORAL at 08:33

## 2018-05-29 RX ADMIN — HEPARIN SODIUM 5000 UNITS: 5000 INJECTION, SOLUTION INTRAVENOUS; SUBCUTANEOUS at 08:33

## 2018-05-29 RX ADMIN — BISACODYL 10 MG: 10 SUPPOSITORY RECTAL at 08:33

## 2018-05-29 RX ADMIN — MEMANTINE 10 MG: 5 TABLET ORAL at 08:32

## 2018-05-29 RX ADMIN — CETIRIZINE HYDROCHLORIDE 10 MG: 10 TABLET, FILM COATED ORAL at 08:33

## 2018-05-29 RX ADMIN — ASPIRIN 81 MG: 81 TABLET, COATED ORAL at 08:32

## 2018-05-29 RX ADMIN — GUAIFENESIN 600 MG: 600 TABLET, EXTENDED RELEASE ORAL at 08:32

## 2018-05-29 RX ADMIN — Medication 1 CAPSULE: at 08:33

## 2018-05-29 RX ADMIN — DOCUSATE SODIUM 100 MG: 100 CAPSULE, LIQUID FILLED ORAL at 08:32

## 2018-05-29 RX ADMIN — POLYETHYLENE GLYCOL 3350 17 G: 17 POWDER, FOR SOLUTION ORAL at 08:31

## 2018-05-29 RX ADMIN — IPRATROPIUM BROMIDE AND ALBUTEROL SULFATE 3 ML: .5; 3 SOLUTION RESPIRATORY (INHALATION) at 06:49

## 2018-05-29 RX ADMIN — PANTOPRAZOLE SODIUM 40 MG: 40 TABLET, DELAYED RELEASE ORAL at 06:24

## 2018-05-29 RX ADMIN — HEPARIN SODIUM 5000 UNITS: 5000 INJECTION, SOLUTION INTRAVENOUS; SUBCUTANEOUS at 20:25

## 2018-05-29 RX ADMIN — MEMANTINE 10 MG: 5 TABLET ORAL at 20:25

## 2018-05-29 RX ADMIN — DOCUSATE SODIUM 100 MG: 100 CAPSULE, LIQUID FILLED ORAL at 20:25

## 2018-05-29 RX ADMIN — IPRATROPIUM BROMIDE AND ALBUTEROL SULFATE 3 ML: .5; 3 SOLUTION RESPIRATORY (INHALATION) at 19:06

## 2018-05-29 RX ADMIN — IPRATROPIUM BROMIDE AND ALBUTEROL SULFATE 3 ML: .5; 3 SOLUTION RESPIRATORY (INHALATION) at 00:52

## 2018-05-29 RX ADMIN — DONEPEZIL HYDROCHLORIDE 10 MG: 5 TABLET ORAL at 20:25

## 2018-05-29 RX ADMIN — IPRATROPIUM BROMIDE AND ALBUTEROL SULFATE 3 ML: .5; 3 SOLUTION RESPIRATORY (INHALATION) at 12:34

## 2018-05-29 RX ADMIN — GUAIFENESIN 600 MG: 600 TABLET, EXTENDED RELEASE ORAL at 20:25

## 2018-05-30 VITALS
DIASTOLIC BLOOD PRESSURE: 55 MMHG | OXYGEN SATURATION: 97 % | WEIGHT: 224.6 LBS | RESPIRATION RATE: 20 BRPM | SYSTOLIC BLOOD PRESSURE: 120 MMHG | BODY MASS INDEX: 32.16 KG/M2 | TEMPERATURE: 99.4 F | HEART RATE: 78 BPM | HEIGHT: 70 IN

## 2018-05-30 LAB
ANION GAP SERPL CALCULATED.3IONS-SCNC: 11 MMOL/L (ref 10–20)
BASOPHILS # BLD AUTO: 0.05 10*3/MM3 (ref 0–0.2)
BASOPHILS NFR BLD AUTO: 0.4 % (ref 0–2.5)
BUN BLD-MCNC: 27 MG/DL (ref 7–20)
BUN/CREAT SERPL: 38.6 (ref 6.3–21.9)
CALCIUM SPEC-SCNC: 8.2 MG/DL (ref 8.4–10.2)
CHLORIDE SERPL-SCNC: 96 MMOL/L (ref 98–107)
CO2 SERPL-SCNC: 35 MMOL/L (ref 26–30)
CREAT BLD-MCNC: 0.7 MG/DL (ref 0.6–1.3)
DEPRECATED RDW RBC AUTO: 55.1 FL (ref 37–54)
EOSINOPHIL # BLD AUTO: 0.54 10*3/MM3 (ref 0–0.7)
EOSINOPHIL NFR BLD AUTO: 4.2 % (ref 0–7)
ERYTHROCYTE [DISTWIDTH] IN BLOOD BY AUTOMATED COUNT: 15.1 % (ref 11.5–14.5)
GFR SERPL CREATININE-BSD FRML MDRD: 109 ML/MIN/1.73
GLUCOSE BLD-MCNC: 102 MG/DL (ref 74–98)
HCT VFR BLD AUTO: 29.8 % (ref 42–52)
HGB BLD-MCNC: 9.6 G/DL (ref 14–18)
IMM GRANULOCYTES # BLD: 0.08 10*3/MM3 (ref 0–0.06)
IMM GRANULOCYTES NFR BLD: 0.6 % (ref 0–0.6)
LYMPHOCYTES # BLD AUTO: 2.63 10*3/MM3 (ref 0.6–3.4)
LYMPHOCYTES NFR BLD AUTO: 20.7 % (ref 10–50)
MCH RBC QN AUTO: 32.4 PG (ref 27–31)
MCHC RBC AUTO-ENTMCNC: 32.2 G/DL (ref 30–37)
MCV RBC AUTO: 100.7 FL (ref 80–94)
MONOCYTES # BLD AUTO: 1.43 10*3/MM3 (ref 0–0.9)
MONOCYTES NFR BLD AUTO: 11.2 % (ref 0–12)
NEUTROPHILS # BLD AUTO: 8 10*3/MM3 (ref 2–6.9)
NEUTROPHILS NFR BLD AUTO: 62.9 % (ref 37–80)
NRBC BLD MANUAL-RTO: 0 /100 WBC (ref 0–0)
PLATELET # BLD AUTO: 441 10*3/MM3 (ref 130–400)
PMV BLD AUTO: 8.6 FL (ref 6–12)
POTASSIUM BLD-SCNC: 5 MMOL/L (ref 3.5–5.1)
RBC # BLD AUTO: 2.96 10*6/MM3 (ref 4.7–6.1)
SODIUM BLD-SCNC: 137 MMOL/L (ref 137–145)
WBC NRBC COR # BLD: 12.73 10*3/MM3 (ref 4.8–10.8)

## 2018-05-30 PROCEDURE — 85025 COMPLETE CBC W/AUTO DIFF WBC: CPT | Performed by: INTERNAL MEDICINE

## 2018-05-30 PROCEDURE — 94660 CPAP INITIATION&MGMT: CPT

## 2018-05-30 PROCEDURE — 99239 HOSP IP/OBS DSCHRG MGMT >30: CPT | Performed by: INTERNAL MEDICINE

## 2018-05-30 PROCEDURE — 25010000002 HEPARIN (PORCINE) PER 1000 UNITS: Performed by: FAMILY MEDICINE

## 2018-05-30 PROCEDURE — 94799 UNLISTED PULMONARY SVC/PX: CPT

## 2018-05-30 PROCEDURE — 80048 BASIC METABOLIC PNL TOTAL CA: CPT | Performed by: INTERNAL MEDICINE

## 2018-05-30 RX ORDER — HYDROCODONE BITARTRATE AND ACETAMINOPHEN 7.5; 325 MG/1; MG/1
1 TABLET ORAL EVERY 4 HOURS PRN
Qty: 12 TABLET | Refills: 0 | Status: SHIPPED | OUTPATIENT
Start: 2018-05-30 | End: 2018-09-19 | Stop reason: ALTCHOICE

## 2018-05-30 RX ORDER — BISACODYL 10 MG
10 SUPPOSITORY, RECTAL RECTAL DAILY PRN
Start: 2018-05-30 | End: 2019-11-15

## 2018-05-30 RX ORDER — FUROSEMIDE 40 MG/1
40 TABLET ORAL 3 TIMES WEEKLY
Qty: 1 TABLET | Refills: 0
Start: 2018-06-01 | End: 2018-06-02

## 2018-05-30 RX ORDER — POTASSIUM CHLORIDE 750 MG/1
40 CAPSULE, EXTENDED RELEASE ORAL 3 TIMES WEEKLY
Qty: 4 CAPSULE | Refills: 0
Start: 2018-06-01 | End: 2018-06-02

## 2018-05-30 RX ADMIN — FUROSEMIDE 40 MG: 40 TABLET ORAL at 08:01

## 2018-05-30 RX ADMIN — POTASSIUM CHLORIDE 40 MEQ: 750 CAPSULE, EXTENDED RELEASE ORAL at 08:01

## 2018-05-30 RX ADMIN — CETIRIZINE HYDROCHLORIDE 10 MG: 10 TABLET, FILM COATED ORAL at 08:04

## 2018-05-30 RX ADMIN — Medication 1 CAPSULE: at 08:02

## 2018-05-30 RX ADMIN — HEPARIN SODIUM 5000 UNITS: 5000 INJECTION, SOLUTION INTRAVENOUS; SUBCUTANEOUS at 08:05

## 2018-05-30 RX ADMIN — ASPIRIN 81 MG: 81 TABLET, COATED ORAL at 08:04

## 2018-05-30 RX ADMIN — DOCUSATE SODIUM 100 MG: 100 CAPSULE, LIQUID FILLED ORAL at 08:04

## 2018-05-30 RX ADMIN — IPRATROPIUM BROMIDE AND ALBUTEROL SULFATE 3 ML: .5; 3 SOLUTION RESPIRATORY (INHALATION) at 00:23

## 2018-05-30 RX ADMIN — IPRATROPIUM BROMIDE AND ALBUTEROL SULFATE 3 ML: .5; 3 SOLUTION RESPIRATORY (INHALATION) at 06:46

## 2018-05-30 RX ADMIN — GUAIFENESIN 600 MG: 600 TABLET, EXTENDED RELEASE ORAL at 08:04

## 2018-05-30 RX ADMIN — FINASTERIDE 5 MG: 5 TABLET, FILM COATED ORAL at 08:04

## 2018-05-30 RX ADMIN — POLYETHYLENE GLYCOL 3350 17 G: 17 POWDER, FOR SOLUTION ORAL at 08:01

## 2018-05-30 RX ADMIN — PANTOPRAZOLE SODIUM 40 MG: 40 TABLET, DELAYED RELEASE ORAL at 06:08

## 2018-05-30 RX ADMIN — MEMANTINE 10 MG: 5 TABLET ORAL at 08:03

## 2018-06-04 ENCOUNTER — OFFICE VISIT (OUTPATIENT)
Dept: SURGERY | Facility: CLINIC | Age: 78
End: 2018-06-04

## 2018-06-04 VITALS
WEIGHT: 210 LBS | HEART RATE: 72 BPM | HEIGHT: 70 IN | SYSTOLIC BLOOD PRESSURE: 127 MMHG | DIASTOLIC BLOOD PRESSURE: 69 MMHG | BODY MASS INDEX: 30.06 KG/M2

## 2018-06-04 DIAGNOSIS — Z09 POSTOP CHECK: ICD-10-CM

## 2018-06-04 DIAGNOSIS — K82.9 GALLBLADDER DISEASE: Primary | ICD-10-CM

## 2018-06-04 PROCEDURE — 99024 POSTOP FOLLOW-UP VISIT: CPT | Performed by: SURGERY

## 2018-06-04 NOTE — PROGRESS NOTES
Subjective   Porsha Soni is a 77 y.o. male here today for post-op and pathology report.    History of Present Illness  Mr. Soni was seen in the office today for his first post op visit since discharge for a perforated gallbladder complicated by respiratory failure.  The patient was transferred to the [facility] for bronchoscopy for mucus plugging and he was discharged to [facility] on 5/30/18.  The patient's drain was removed prior to his discharge.  According to the patient's wife he has been eating.  He has been voiding.  Essentially the patient is just a week and needs to regain his strength.  The patient is brought in today by stretcher  No Known Allergies      Current Outpatient Prescriptions   Medication Sig Dispense Refill   • aspirin 81 MG EC tablet Take 81 mg by mouth daily.     • bisacodyl (DULCOLAX) 10 MG suppository Insert 1 suppository into the rectum Daily As Needed for Constipation.     • clopidogrel (PLAVIX) 75 MG tablet Take 75 mg by mouth daily.     • finasteride (PROSCAR) 5 MG tablet Take 1 tablet by mouth Daily. 30 tablet 3   • guaiFENesin (MUCINEX) 600 MG 12 hr tablet Take 1 tablet by mouth 2 (Two) Times a Day.     • HYDROcodone-acetaminophen (NORCO) 7.5-325 MG per tablet Take 1 tablet by mouth Every 4 (Four) Hours As Needed for Moderate Pain  for up to 12 doses. 12 tablet 0   • ipratropium-albuterol (DUONEB) 0.5-2.5 mg/3 ml nebulizer Take 3 mL by nebulization Every 4 (Four) Hours As Needed for Wheezing or Shortness of Air. 360 mL    • lactobacillus acidophilus (RISAQUAD) capsule capsule Take 1 capsule by mouth Daily.     • loratadine (CLARITIN) 10 MG tablet Take 10 mg by mouth Daily.     • Memantine HCl-Donepezil HCl (NAMZARIC) 28-10 MG capsule sustained-release 24 hr Take 1 capsule by mouth Every Night.     • Omega-3 Fatty Acids (FISH OIL) 1000 MG capsule capsule Take 1,000 mg by mouth Daily With Breakfast.     • pantoprazole (PROTONIX) 40 MG EC tablet Take 1  "tablet by mouth Daily.     • polyethylene glycol (MIRALAX) pack packet Take 17 g by mouth Daily.     • vitamin D (ERGOCALCIFEROL) 85841 UNITS capsule capsule Take 50,000 Units by mouth 1 (One) Time Per Week. Prior to [facility] Admission, Patient was on: takes on mondays       No current facility-administered medications for this visit.      Objective   /69 (BP Location: Left arm, Patient Position: Sitting)   Pulse 72   Ht 177.8 cm (70\")   Wt 95.3 kg (210 lb)   BMI 30.13 kg/m²    Physical Exam  This is a well-developed well-nourished white male in no acute distress  HEENT examination: Sclera are anicteric  Abdomen: Bowel sounds present  Skin/incisions: Incision sites were inspected and demonstrate no drainage or erythema.  Staples were removed without complication  Results/Data  Pathology report was reviewed    Procedures     Assessment/Plan   Stable course, status post open cholecystectomy for perforated gallbladder with sepsis some postoperative respiratory failure    Follow-up in 1 month, at which time it is anticipated that the patient will be back home from the rehabilitation facility.       Discussion/Summary    Errors in dictation may reflect use of voice recognition software and not all errors in transcription may have been detected prior to signing.    Future Appointments  Date Time Provider Department Center   9/19/2018 10:15 AM MD LAVERNE Sawyer [facility] None   4/16/2019 8:40 AM MD LAVERNE Abreu [facility] None       "

## 2018-06-05 PROBLEM — K82.9 GALLBLADDER DISEASE: Status: ACTIVE | Noted: 2018-05-16

## 2018-09-17 NOTE — PROGRESS NOTES
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    Chief Complaint   Patient presents with   • Coronary Artery Disease       PROBLEM LIST:   1. CAD:  a. February 2006: Off-pump CABG x4, sequential LIMA to first diagonal and LAD, saphenous graft to OM1, and PL of the circumflex, Kody Jeronimo MD.    b. February 2012: MPS per [facility], normal perfusion 51%.   c. 5/23/18 echo: EF 60-65%, diastolic dysfunction, mild to moderate TR, severe pulmonary HTN  2. HTN  a. 6/16 echo, [facility]  3. Dyslipidemia:   a. August 2015: Total cholesterol 102, triglycerides 92, HDL 30, and LDL 54.   4. Palpitations:  a. 2012, Holter with greater than 3200 PVCs  5. Impotence.    6. Cervical spine disease, remotely suggested surgical intervention. Patient deferred.    7. Vertigo.    8. Prior surgeries:   a. Right knee replacement December 2007.    b.  left knee replacement per Dr. Ruiz 2016    Allergies  No Known Allergies    Current Medications    Current Outpatient Prescriptions:   •  aspirin 81 MG EC tablet, Take 81 mg by mouth daily., Disp: , Rfl:   •  bisacodyl (DULCOLAX) 10 MG suppository, Insert 1 suppository into the rectum Daily As Needed for Constipation., Disp: , Rfl:   •  clopidogrel (PLAVIX) 75 MG tablet, Take 75 mg by mouth daily., Disp: , Rfl:   •  finasteride (PROSCAR) 5 MG tablet, Take 1 tablet by mouth Daily., Disp: 30 tablet, Rfl: 3  •  lactobacillus acidophilus (RISAQUAD) capsule capsule, Take 1 capsule by mouth Daily., Disp: , Rfl:   •  loratadine (CLARITIN) 10 MG tablet, Take 10 mg by mouth Daily., Disp: , Rfl:   •  Memantine HCl-Donepezil HCl (NAMZARIC) 28-10 MG capsule sustained-release 24 hr, Take 1 capsule by mouth Every Night., Disp: , Rfl:   •  Omega-3 Fatty Acids (FISH OIL)  "1000 MG capsule capsule, Take 1,000 mg by mouth Daily With Breakfast., Disp: , Rfl:   •  polyethylene glycol (MIRALAX) pack packet, Take 17 g by mouth Daily. (Patient taking differently: Take 17 g by mouth As Needed.), Disp: , Rfl:   •  vitamin D (ERGOCALCIFEROL) 41246 UNITS capsule capsule, Take 50,000 Units by mouth 1 (One) Time Per Week. Prior to [facility] Admission, Patient was on: takes on mondays, Disp: , Rfl:       History of Present Illness     Pt denies any chest pain, dyspnea, dyspnea on exertion, orthopnea, PND, palpitations, lower extremity edema, or claudication.  Some diffuse minor bruises during yardwork.    ROS:  All systems have been reviewed and are negative with the exception of those mentioned in the HPI.    OBJECTIVE:  Vitals:    09/19/18 1012   BP: 122/82   BP Location: Right arm   Patient Position: Sitting   Pulse: 60   Weight: 91.4 kg (201 lb 9.6 oz)   Height: 179.1 cm (70.5\")     Physical Exam   Constitutional: He appears well-developed and well-nourished.   Neck: Normal range of motion. Neck supple. No hepatojugular reflux and no JVD present. Carotid bruit is not present. No tracheal deviation present. No thyromegaly present.   Cardiovascular: Normal rate, regular rhythm, S1 normal, S2 normal, intact distal pulses and normal pulses.  PMI is not displaced.  Exam reveals no gallop, no distant heart sounds, no friction rub, no midsystolic click and no opening snap.    No murmur heard.  Pulses:       Radial pulses are 2+ on the right side, and 2+ on the left side.        Dorsalis pedis pulses are 2+ on the right side, and 2+ on the left side.        Posterior tibial pulses are 2+ on the right side, and 2+ on the left side.   Pulmonary/Chest: Effort normal and breath sounds normal. He has no wheezes. He has no rales.   Abdominal: Soft. Bowel sounds are normal. He exhibits no mass. There is no tenderness. There is no guarding.       Diagnostic Data:  Procedures      ASSESSMENT:   Diagnosis Plan "   1. Coronary artery disease involving native coronary artery of native heart without angina pectoris     2. Mixed hyperlipidemia     3. Essential hypertension       PLAN:  Cad- stable post surgical revasc now 12 yrs prior.  Cont med RX.     Hl statin tolerant     Htn controlled on arb, ccb    Padmini Terrazas, SIMON, thank you for referring Mr. Soni for evaluation.  I have forwarded my electronically generated recommendations to you for review.  Please do not hesitate to call with any questions.    Scribed for Dwayne Swartz MD by Sarah Abraham PA-C. 9/19/2018  11:13 AM   I, Dwayne Swartz MD, personally performed the services described in this documentation as scribed by the above named individual in my presence, and it is both accurate and complete.  9/19/2018  11:13 AM    Dwayne Swartz MD, FACC

## 2018-09-19 ENCOUNTER — OFFICE VISIT (OUTPATIENT)
Dept: CARDIOLOGY | Facility: CLINIC | Age: 78
End: 2018-09-19

## 2018-09-19 VITALS
SYSTOLIC BLOOD PRESSURE: 122 MMHG | HEART RATE: 60 BPM | DIASTOLIC BLOOD PRESSURE: 82 MMHG | BODY MASS INDEX: 28.22 KG/M2 | HEIGHT: 71 IN | WEIGHT: 201.6 LBS

## 2018-09-19 DIAGNOSIS — E78.2 MIXED HYPERLIPIDEMIA: ICD-10-CM

## 2018-09-19 DIAGNOSIS — I10 ESSENTIAL HYPERTENSION: ICD-10-CM

## 2018-09-19 DIAGNOSIS — I25.10 CORONARY ARTERY DISEASE INVOLVING NATIVE CORONARY ARTERY OF NATIVE HEART WITHOUT ANGINA PECTORIS: Primary | ICD-10-CM

## 2018-09-19 PROCEDURE — 99213 OFFICE O/P EST LOW 20 MIN: CPT | Performed by: INTERNAL MEDICINE

## 2018-09-26 DIAGNOSIS — N13.8 BPH WITH URINARY OBSTRUCTION: ICD-10-CM

## 2018-09-26 DIAGNOSIS — N40.1 BPH WITH URINARY OBSTRUCTION: ICD-10-CM

## 2018-09-26 RX ORDER — FINASTERIDE 5 MG/1
5 TABLET, FILM COATED ORAL DAILY
Qty: 30 TABLET | Refills: 3 | Status: SHIPPED | OUTPATIENT
Start: 2018-09-26 | End: 2019-09-23

## 2018-09-26 NOTE — TELEPHONE ENCOUNTER
The patient called the Triage line and needed a refill on his Proscar I called it in and called the patient to let him know.

## 2018-11-15 ENCOUNTER — OFFICE VISIT (OUTPATIENT)
Dept: UROLOGY | Facility: CLINIC | Age: 78
End: 2018-11-15

## 2018-11-15 VITALS — BODY MASS INDEX: 28.77 KG/M2 | HEIGHT: 70 IN | WEIGHT: 201 LBS

## 2018-11-15 DIAGNOSIS — N40.1 BPH WITH URINARY OBSTRUCTION: Primary | ICD-10-CM

## 2018-11-15 DIAGNOSIS — R97.20 ELEVATED PROSTATE SPECIFIC ANTIGEN (PSA): ICD-10-CM

## 2018-11-15 DIAGNOSIS — N13.8 BPH WITH URINARY OBSTRUCTION: Primary | ICD-10-CM

## 2018-11-15 LAB — PSA SERPL-MCNC: 1.64 NG/ML (ref 0–4)

## 2018-11-15 PROCEDURE — 36415 COLL VENOUS BLD VENIPUNCTURE: CPT | Performed by: UROLOGY

## 2018-11-15 PROCEDURE — 99214 OFFICE O/P EST MOD 30 MIN: CPT | Performed by: UROLOGY

## 2018-11-15 PROCEDURE — 84153 ASSAY OF PSA TOTAL: CPT | Performed by: UROLOGY

## 2018-11-15 NOTE — PROGRESS NOTES
Chief Complaint:          Chief Complaint   Patient presents with   • Elevated PSA     Follow up    • Benign Prostatic Hypertrophy       HPI:   77 y.o. male.  77-year-old white male with his wife who gets up 3-4 times per night and has been taking herbal supplement.  He took Flomax over year ago for urinary didn't think it worked.  His urinalysis was not available because he could not give a specimen, his postvoid residuals 103 cc.  He has a number of diagnoses but also has early Alzheimer's disease.  His CT scan done on March 16 showed significant cholelithiasis, and BPH with a thick wall bladder and some prostatic calcification.  After review of his symptomatology and examination which was unremarkable and a placement finasteride initially, if unsuccessful he will need a lower tract investigation , with concentric bladder wall thickening consistent with BPH and no obvious tumors or filling defects.  He returns his repeat PSA is 3.9 with a free PSA of 25%.  He has no discharge or good stream and no lower urinary tract symptomatology I'm comfortable to watch him I'll recheck him in 6 months.  He returns today.  He doesn't think the finasteride helping, he's never been Flomax.  He gets up 2 times at night has frequency , but he wants to try something different for him I we will make the addition of Flomax to his current regimen check a PSA as part of his follow-up because of his elevated PSA and see him back in one year pending the labs he's to call should he not think this is substantially good treatment for him    Past Medical History:        Past Medical History:   Diagnosis Date   • Arthritis    • Cervical spine disease     remotely suggested surgical intervention.  Patient deferred.    • Cervical spine disease    • Coronary artery disease    • Dyslipidemia    • Hypertension    • Impotence    • Palpitations    • Vertigo          Current Meds:     Current Outpatient Medications   Medication Sig Dispense Refill   •  aspirin 81 MG EC tablet Take 81 mg by mouth daily.     • bisacodyl (DULCOLAX) 10 MG suppository Insert 1 suppository into the rectum Daily As Needed for Constipation.     • clopidogrel (PLAVIX) 75 MG tablet Take 75 mg by mouth daily.     • finasteride (PROSCAR) 5 MG tablet Take 1 tablet by mouth Daily. 30 tablet 3   • finasteride (PROSCAR) 5 MG tablet Take 1 tablet by mouth Daily. 30 tablet 3   • lactobacillus acidophilus (RISAQUAD) capsule capsule Take 1 capsule by mouth Daily.     • loratadine (CLARITIN) 10 MG tablet Take 10 mg by mouth Daily.     • Memantine HCl-Donepezil HCl (NAMZARIC) 28-10 MG capsule sustained-release 24 hr Take 1 capsule by mouth Every Night.     • Omega-3 Fatty Acids (FISH OIL) 1000 MG capsule capsule Take 1,000 mg by mouth Daily With Breakfast.     • polyethylene glycol (MIRALAX) pack packet Take 17 g by mouth Daily. (Patient taking differently: Take 17 g by mouth As Needed.)     • vitamin D (ERGOCALCIFEROL) 78281 UNITS capsule capsule Take 50,000 Units by mouth 1 (One) Time Per Week. Prior to [facility] Admission, Patient was on: takes on mondays       No current facility-administered medications for this visit.         Allergies:      No Known Allergies     Past Surgical History:     Past Surgical History:   Procedure Laterality Date   • ARTERIAL BYPASS SURGERY     • CORONARY ARTERY BYPASS GRAFT     • KNEE ARTHROPLASTY, PARTIAL REPLACEMENT      Lt , 2015   • REPLACEMENT TOTAL KNEE      Rt         Social History:     Social History     Socioeconomic History   • Marital status:      Spouse name: Not on file   • Number of children: Not on file   • Years of education: Not on file   • Highest education level: Not on file   Social Needs   • Financial resource strain: Not on file   • Food insecurity - worry: Not on file   • Food insecurity - inability: Not on file   • Transportation needs - medical: Not on file   • Transportation needs - non-medical: Not on file   Occupational History   •  Not on file   Tobacco Use   • Smoking status: Former Smoker     Packs/day: 1.50     Years: 7.00     Pack years: 10.50     Types: Cigarettes, Pipe, Cigars     Last attempt to quit: 1986     Years since quittin.4   • Smokeless tobacco: Never Used   Substance and Sexual Activity   • Alcohol use: Yes     Comment: occassionally    • Drug use: No   • Sexual activity: Defer   Other Topics Concern   • Not on file   Social History Narrative   • Not on file       Family History:     Family History   Problem Relation Age of Onset   • No Known Problems Mother    • No Known Problems Father    • No Known Problems Sister    • No Known Problems Brother        Review of Systems:     Review of Systems   Constitutional: Negative.    HENT: Negative.    Eyes: Negative.    Respiratory: Negative.    Cardiovascular: Negative.    Gastrointestinal: Negative.    Endocrine: Negative.    Musculoskeletal: Negative.    Allergic/Immunologic: Negative.    Neurological: Negative.    Hematological: Negative.    Psychiatric/Behavioral: Negative.        Physical Exam:     Physical Exam   Constitutional: He is oriented to person, place, and time. He appears well-developed and well-nourished.   HENT:   Head: Normocephalic and atraumatic.   Eyes: Conjunctivae and EOM are normal. Pupils are equal, round, and reactive to light.   Neck: Normal range of motion.   Cardiovascular: Normal rate, regular rhythm, normal heart sounds and intact distal pulses.   Pulmonary/Chest: Effort normal and breath sounds normal.   Abdominal: Soft. Bowel sounds are normal.   Genitourinary: Rectum normal, prostate normal and penis normal.   Musculoskeletal: Normal range of motion.   Neurological: He is alert and oriented to person, place, and time. He has normal reflexes.   Skin: Skin is warm and dry.   Psychiatric: He has a normal mood and affect. His behavior is normal. Judgment and thought content normal.   Nursing note and vitals reviewed.      I have reviewed the  following portions of the patient's history: allergies, current medications, past family history, past medical history, past social history, past surgical history, problem list and ROS and confirm it's accurate.      Procedure:       Assessment/Plan:   BPH: Discussed the pathophysiology of BPH and obstruction.  We discussed the static and dynamic effect effects of BPH as well as using 5 alpha reductase inhibitors versus alpha blockade.  We discussed the indications for transurethral surgery as well.  And/ or other therapeutic options available including all of the newer techniques.  Continue on the finasteride and add Flomax     Patient's There is no height or weight on file to calculate BMI. BMI is above normal parameters. Recommendations include: educational material.          This document has been electronically signed by DAVI HANDLEY MD November 15, 2018 7:58 AM

## 2018-11-17 PROBLEM — R35.1 BENIGN PROSTATIC HYPERPLASIA WITH NOCTURIA: Status: ACTIVE | Noted: 2018-11-17

## 2018-11-17 PROBLEM — N40.1 BENIGN PROSTATIC HYPERPLASIA WITH NOCTURIA: Status: ACTIVE | Noted: 2018-11-17

## 2018-11-17 PROBLEM — N13.8 BPH WITH URINARY OBSTRUCTION: Status: ACTIVE | Noted: 2018-11-17

## 2019-02-02 ENCOUNTER — APPOINTMENT (OUTPATIENT)
Dept: CT IMAGING | Facility: HOSPITAL | Age: 79
End: 2019-02-02

## 2019-02-02 ENCOUNTER — APPOINTMENT (OUTPATIENT)
Dept: GENERAL RADIOLOGY | Facility: HOSPITAL | Age: 79
End: 2019-02-02

## 2019-02-02 ENCOUNTER — HOSPITAL ENCOUNTER (EMERGENCY)
Facility: HOSPITAL | Age: 79
Discharge: HOME | End: 2019-02-02
Attending: FAMILY MEDICINE | Admitting: FAMILY MEDICINE

## 2019-02-02 VITALS
HEIGHT: 70 IN | WEIGHT: 200 LBS | TEMPERATURE: 98 F | RESPIRATION RATE: 18 BRPM | BODY MASS INDEX: 28.63 KG/M2 | OXYGEN SATURATION: 99 % | HEART RATE: 58 BPM | DIASTOLIC BLOOD PRESSURE: 90 MMHG | SYSTOLIC BLOOD PRESSURE: 179 MMHG

## 2019-02-02 DIAGNOSIS — G45.9 TIA (TRANSIENT ISCHEMIC ATTACK): Primary | ICD-10-CM

## 2019-02-02 LAB
6-ACETYL MORPHINE: NEGATIVE
ALBUMIN SERPL-MCNC: 4.1 G/DL (ref 3.4–4.8)
ALBUMIN/GLOB SERPL: 1.6 G/DL (ref 1.5–2.5)
ALP SERPL-CCNC: 77 U/L (ref 40–129)
ALT SERPL W P-5'-P-CCNC: 11 U/L (ref 10–44)
AMPHET+METHAMPHET UR QL: NEGATIVE
ANION GAP SERPL CALCULATED.3IONS-SCNC: 4.2 MMOL/L (ref 3.6–11.2)
APTT PPP: 37.1 SECONDS (ref 23.8–36.1)
AST SERPL-CCNC: 13 U/L (ref 10–34)
BARBITURATES UR QL SCN: NEGATIVE
BASOPHILS # BLD AUTO: 0.05 10*3/MM3 (ref 0–0.3)
BASOPHILS NFR BLD AUTO: 0.5 % (ref 0–2)
BENZODIAZ UR QL SCN: NEGATIVE
BILIRUB SERPL-MCNC: 1.1 MG/DL (ref 0.2–1.8)
BILIRUB UR QL STRIP: NEGATIVE
BNP SERPL-MCNC: 161 PG/ML (ref 0–100)
BUN BLD-MCNC: 17 MG/DL (ref 7–21)
BUN/CREAT SERPL: 15.5 (ref 7–25)
BUPRENORPHINE SERPL-MCNC: NEGATIVE NG/ML
CALCIUM SPEC-SCNC: 9.1 MG/DL (ref 7.7–10)
CANNABINOIDS SERPL QL: NEGATIVE
CHLORIDE SERPL-SCNC: 102 MMOL/L (ref 99–112)
CLARITY UR: CLEAR
CO2 SERPL-SCNC: 30.8 MMOL/L (ref 24.3–31.9)
COCAINE UR QL: NEGATIVE
COLOR UR: ABNORMAL
CREAT BLD-MCNC: 1.1 MG/DL (ref 0.43–1.29)
CRP SERPL-MCNC: <0.5 MG/DL (ref 0–0.99)
D-LACTATE SERPL-SCNC: 1.1 MMOL/L (ref 0.5–2)
DEPRECATED RDW RBC AUTO: 47.5 FL (ref 37–54)
EOSINOPHIL # BLD AUTO: 1.66 10*3/MM3 (ref 0–0.7)
EOSINOPHIL NFR BLD AUTO: 16.9 % (ref 0–7)
ERYTHROCYTE [DISTWIDTH] IN BLOOD BY AUTOMATED COUNT: 13.6 % (ref 11.5–14.5)
GFR SERPL CREATININE-BSD FRML MDRD: 65 ML/MIN/1.73
GLOBULIN UR ELPH-MCNC: 2.6 GM/DL
GLUCOSE BLD-MCNC: 91 MG/DL (ref 70–110)
GLUCOSE UR STRIP-MCNC: NEGATIVE MG/DL
HCT VFR BLD AUTO: 45.9 % (ref 42–52)
HGB BLD-MCNC: 14.6 G/DL (ref 14–18)
HGB UR QL STRIP.AUTO: NEGATIVE
IMM GRANULOCYTES # BLD AUTO: 0.03 10*3/MM3 (ref 0–0.03)
IMM GRANULOCYTES NFR BLD AUTO: 0.3 % (ref 0–0.5)
INR PPP: 1.13 (ref 0.9–1.1)
KETONES UR QL STRIP: ABNORMAL
LEUKOCYTE ESTERASE UR QL STRIP.AUTO: NEGATIVE
LIPASE SERPL-CCNC: 21 U/L (ref 13–60)
LYMPHOCYTES # BLD AUTO: 2.93 10*3/MM3 (ref 1–3)
LYMPHOCYTES NFR BLD AUTO: 29.8 % (ref 16–46)
MCH RBC QN AUTO: 30.7 PG (ref 27–33)
MCHC RBC AUTO-ENTMCNC: 31.8 G/DL (ref 33–37)
MCV RBC AUTO: 96.4 FL (ref 80–94)
METHADONE UR QL SCN: NEGATIVE
MONOCYTES # BLD AUTO: 0.93 10*3/MM3 (ref 0.1–0.9)
MONOCYTES NFR BLD AUTO: 9.5 % (ref 0–12)
NEUTROPHILS # BLD AUTO: 4.22 10*3/MM3 (ref 1.4–6.5)
NEUTROPHILS NFR BLD AUTO: 43 % (ref 40–75)
NITRITE UR QL STRIP: NEGATIVE
OPIATES UR QL: POSITIVE
OSMOLALITY SERPL CALC.SUM OF ELEC: 274.9 MOSM/KG (ref 273–305)
OXYCODONE UR QL SCN: NEGATIVE
PCP UR QL SCN: NEGATIVE
PH UR STRIP.AUTO: 5.5 [PH] (ref 5–8)
PLATELET # BLD AUTO: 239 10*3/MM3 (ref 130–400)
PMV BLD AUTO: 8.9 FL (ref 6–10)
POTASSIUM BLD-SCNC: 4.3 MMOL/L (ref 3.5–5.3)
PROT SERPL-MCNC: 6.7 G/DL (ref 6–8)
PROT UR QL STRIP: NEGATIVE
PROTHROMBIN TIME: 14.7 SECONDS (ref 11–15.4)
RBC # BLD AUTO: 4.76 10*6/MM3 (ref 4.7–6.1)
SODIUM BLD-SCNC: 137 MMOL/L (ref 135–153)
SP GR UR STRIP: 1.02 (ref 1–1.03)
TROPONIN I SERPL-MCNC: 0.01 NG/ML
TROPONIN I SERPL-MCNC: 0.01 NG/ML
UROBILINOGEN UR QL STRIP: ABNORMAL
WBC NRBC COR # BLD: 9.82 10*3/MM3 (ref 4.5–12.5)

## 2019-02-02 PROCEDURE — 83605 ASSAY OF LACTIC ACID: CPT | Performed by: FAMILY MEDICINE

## 2019-02-02 PROCEDURE — 83880 ASSAY OF NATRIURETIC PEPTIDE: CPT | Performed by: FAMILY MEDICINE

## 2019-02-02 PROCEDURE — 80307 DRUG TEST PRSMV CHEM ANLYZR: CPT | Performed by: FAMILY MEDICINE

## 2019-02-02 PROCEDURE — 0 IOPAMIDOL PER 1 ML: Performed by: FAMILY MEDICINE

## 2019-02-02 PROCEDURE — 84484 ASSAY OF TROPONIN QUANT: CPT | Performed by: FAMILY MEDICINE

## 2019-02-02 PROCEDURE — 70498 CT ANGIOGRAPHY NECK: CPT

## 2019-02-02 PROCEDURE — 93005 ELECTROCARDIOGRAM TRACING: CPT | Performed by: FAMILY MEDICINE

## 2019-02-02 PROCEDURE — 87040 BLOOD CULTURE FOR BACTERIA: CPT | Performed by: FAMILY MEDICINE

## 2019-02-02 PROCEDURE — 70450 CT HEAD/BRAIN W/O DYE: CPT

## 2019-02-02 PROCEDURE — 70498 CT ANGIOGRAPHY NECK: CPT | Performed by: RADIOLOGY

## 2019-02-02 PROCEDURE — 70496 CT ANGIOGRAPHY HEAD: CPT | Performed by: RADIOLOGY

## 2019-02-02 PROCEDURE — 36415 COLL VENOUS BLD VENIPUNCTURE: CPT

## 2019-02-02 PROCEDURE — 81003 URINALYSIS AUTO W/O SCOPE: CPT | Performed by: FAMILY MEDICINE

## 2019-02-02 PROCEDURE — 93010 ELECTROCARDIOGRAM REPORT: CPT | Performed by: INTERNAL MEDICINE

## 2019-02-02 PROCEDURE — 85025 COMPLETE CBC W/AUTO DIFF WBC: CPT | Performed by: FAMILY MEDICINE

## 2019-02-02 PROCEDURE — 70450 CT HEAD/BRAIN W/O DYE: CPT | Performed by: RADIOLOGY

## 2019-02-02 PROCEDURE — 99285 EMERGENCY DEPT VISIT HI MDM: CPT

## 2019-02-02 PROCEDURE — 85610 PROTHROMBIN TIME: CPT | Performed by: FAMILY MEDICINE

## 2019-02-02 PROCEDURE — 71045 X-RAY EXAM CHEST 1 VIEW: CPT | Performed by: RADIOLOGY

## 2019-02-02 PROCEDURE — 86140 C-REACTIVE PROTEIN: CPT | Performed by: FAMILY MEDICINE

## 2019-02-02 PROCEDURE — 70496 CT ANGIOGRAPHY HEAD: CPT

## 2019-02-02 PROCEDURE — 85730 THROMBOPLASTIN TIME PARTIAL: CPT | Performed by: FAMILY MEDICINE

## 2019-02-02 PROCEDURE — 71045 X-RAY EXAM CHEST 1 VIEW: CPT

## 2019-02-02 PROCEDURE — 83690 ASSAY OF LIPASE: CPT | Performed by: FAMILY MEDICINE

## 2019-02-02 PROCEDURE — 80053 COMPREHEN METABOLIC PANEL: CPT | Performed by: FAMILY MEDICINE

## 2019-02-02 RX ORDER — SODIUM CHLORIDE 0.9 % (FLUSH) 0.9 %
10 SYRINGE (ML) INJECTION AS NEEDED
Status: DISCONTINUED | OUTPATIENT
Start: 2019-02-02 | End: 2019-02-02 | Stop reason: HOSPADM

## 2019-02-02 RX ADMIN — IOPAMIDOL 85 ML: 755 INJECTION, SOLUTION INTRAVENOUS at 16:39

## 2019-02-02 RX ADMIN — SODIUM CHLORIDE 1000 ML: 9 INJECTION, SOLUTION INTRAVENOUS at 12:15

## 2019-02-02 NOTE — ED PROVIDER NOTES
Subjective   Pt woke up at 730 with slurred speech and left facial droop per wife; she gave him aspirin and plavix and pt wanted to go to bed so laid back down; woke up and was better but she wanted him to come in to be evaluated. Pt currently reports no symptoms- but is hard of hearing-- EMS reproted that they were called because when pt wokeup he had right sided weakness; pt and wife both state that since his nap he has been fine with no symptoms        Stroke   Presenting symptoms: language symptoms    Onset quality:  Sudden  Last known well:  0730  Timing:  Constant  Progression:  Resolved  Similar to previous episodes: no    Associated symptoms: no chest pain and no fever        Review of Systems   Constitutional: Negative.  Negative for fever.   HENT: Negative.    Respiratory: Negative.    Cardiovascular: Negative.  Negative for chest pain.   Gastrointestinal: Negative.  Negative for abdominal pain.   Endocrine: Negative.    Genitourinary: Negative.  Negative for dysuria.   Skin: Negative.    Neurological: Negative.    Psychiatric/Behavioral: Negative.    All other systems reviewed and are negative.      Past Medical History:   Diagnosis Date   • Arthritis    • Cervical spine disease     remotely suggested surgical intervention.  Patient deferred.    • Cervical spine disease    • Coronary artery disease    • Dyslipidemia    • Hypertension    • Impotence    • Palpitations    • Vertigo        No Known Allergies    Past Surgical History:   Procedure Laterality Date   • ARTERIAL BYPASS SURGERY     • BRONCHOSCOPY N/A 5/24/2018    Procedure: BRONCHOSCOPY WITH WASHINGS;  Surgeon: Leonides Quarles MD;  Location: [facility] OR;  Service: Pulmonary   • CHOLECYSTECTOMY WITH INTRAOPERATIVE CHOLANGIOGRAM N/A 5/16/2018    Procedure: OPEN CHOLECYSTECTOMY;  Surgeon: Rosie Montavlo MD;  Location: [facility] OR;  Service: General   • COLONOSCOPY N/A 3/30/2018    Procedure: COLONOSCOPY;  Surgeon: Simone Valderrama MD;  Location:   [facility] OR;  Service: Gastroenterology   • CORONARY ARTERY BYPASS GRAFT     • KNEE ARTHROPLASTY, PARTIAL REPLACEMENT      Lt , 2015   • REPLACEMENT TOTAL KNEE      Rt       Family History   Problem Relation Age of Onset   • No Known Problems Mother    • No Known Problems Father    • No Known Problems Sister    • No Known Problems Brother        Social History     Socioeconomic History   • Marital status:      Spouse name: Not on file   • Number of children: Not on file   • Years of education: Not on file   • Highest education level: Not on file   Tobacco Use   • Smoking status: Former Smoker     Packs/day: 1.50     Years: 7.00     Pack years: 10.50     Types: Cigarettes, Pipe, Cigars     Last attempt to quit: 1986     Years since quittin.6   • Smokeless tobacco: Never Used   Substance and Sexual Activity   • Alcohol use: Yes     Comment: occassionally    • Drug use: No   • Sexual activity: Defer           Objective   Physical Exam   Constitutional: He is oriented to person, place, and time. He appears well-developed and well-nourished.   HENT:   Head: Normocephalic and atraumatic.   Right Ear: External ear normal.   Left Ear: External ear normal.   Mouth/Throat: Oropharynx is clear and moist.   Eyes: EOM are normal. Pupils are equal, round, and reactive to light.   Neck: Neck supple.   Cardiovascular: Normal rate and regular rhythm.   Pulmonary/Chest: Effort normal and breath sounds normal.   Abdominal: Soft. Bowel sounds are normal.   Musculoskeletal: Normal range of motion.   Neurological: He is alert and oriented to person, place, and time. No cranial nerve deficit or sensory deficit. He exhibits normal muscle tone.   Skin: Skin is warm and dry. Capillary refill takes less than 2 seconds.   Psychiatric: He has a normal mood and affect. His behavior is normal. Judgment and thought content normal.   Nursing note and vitals reviewed.      Procedures           ED Course  ED Course as of 759   Sat  Feb 02, 2019   1530 Discussed case with Dr Nichols ; reviewed case; pt has no symptoms since this am NIH 0- says that outpt follow up on Monday for MRI . Continue aspirin and plavix   []   1537 DIscussed with wife and patient and both agree to follow up with MRI and Dr Nichols on Monday and will take aspirin and plavix and both verbalize understanding. Wife has dental appointment at 9 and will take / pt after  []   1712 CTA head and neck- negative per [facility]; Proximal left ICA is <50% stenosis; cxr shows left basilar consolidation that looks stable or improved compared to previous cxr- no respiratory symptoms- also informed PCP of pt visit  []      ED Course User Index  [] Nayana Mackey DO                  MDM  Number of Diagnoses or Management Options  TIA (transient ischemic attack): new and requires workup     Amount and/or Complexity of Data Reviewed  Clinical lab tests: ordered and reviewed  Tests in the radiology section of CPT®: reviewed and ordered  Tests in the medicine section of CPT®: reviewed and ordered  Decide to obtain previous medical records or to obtain history from someone other than the patient: yes  Discuss the patient with other providers: yes  Independent visualization of images, tracings, or specimens: yes    Risk of Complications, Morbidity, and/or Mortality  Presenting problems: high  Diagnostic procedures: moderate  Management options: moderate    Patient Progress  Patient progress: stable        Final diagnoses:   TIA (transient ischemic attack)            Naynaa Mackey DO  02/03/19 0759

## 2019-02-04 ENCOUNTER — PATIENT OUTREACH (OUTPATIENT)
Dept: CASE MANAGEMENT | Facility: OTHER | Age: 79
End: 2019-02-04

## 2019-02-04 NOTE — OUTREACH NOTE
Patient returned CC call, stated he was feeling OK today, denies weakness or facial droop. Pt denies seeing Dr. Nichols today but states he has an appointment with his PCP, Padmini Terrazas, tomorrow.

## 2019-02-06 ENCOUNTER — TRANSCRIBE ORDERS (OUTPATIENT)
Dept: ADMINISTRATIVE | Facility: HOSPITAL | Age: 79
End: 2019-02-06

## 2019-02-06 DIAGNOSIS — R26.81 UNSTEADY GAIT: ICD-10-CM

## 2019-02-06 DIAGNOSIS — R51.9 HEADACHE DISORDER: Primary | ICD-10-CM

## 2019-02-07 LAB
BACTERIA SPEC AEROBE CULT: NORMAL
BACTERIA SPEC AEROBE CULT: NORMAL

## 2019-02-11 ENCOUNTER — HOSPITAL ENCOUNTER (OUTPATIENT)
Dept: CARDIOLOGY | Facility: HOSPITAL | Age: 79
Discharge: HOME | End: 2019-02-11

## 2019-02-11 ENCOUNTER — HOSPITAL ENCOUNTER (OUTPATIENT)
Dept: MRI IMAGING | Facility: HOSPITAL | Age: 79
Discharge: HOME | End: 2019-02-11
Admitting: NURSE PRACTITIONER

## 2019-02-11 DIAGNOSIS — R26.81 UNSTEADY GAIT: ICD-10-CM

## 2019-02-11 DIAGNOSIS — R51.9 HEADACHE DISORDER: ICD-10-CM

## 2019-02-11 PROCEDURE — 93880 EXTRACRANIAL BILAT STUDY: CPT | Performed by: RADIOLOGY

## 2019-02-11 PROCEDURE — 93880 EXTRACRANIAL BILAT STUDY: CPT

## 2019-02-11 PROCEDURE — 70551 MRI BRAIN STEM W/O DYE: CPT

## 2019-02-11 PROCEDURE — 70551 MRI BRAIN STEM W/O DYE: CPT | Performed by: RADIOLOGY

## 2019-09-23 ENCOUNTER — OFFICE VISIT (OUTPATIENT)
Dept: CARDIOLOGY | Facility: CLINIC | Age: 79
End: 2019-09-23

## 2019-09-23 VITALS
WEIGHT: 215 LBS | SYSTOLIC BLOOD PRESSURE: 140 MMHG | DIASTOLIC BLOOD PRESSURE: 74 MMHG | HEART RATE: 55 BPM | BODY MASS INDEX: 30.1 KG/M2 | OXYGEN SATURATION: 97 % | HEIGHT: 71 IN

## 2019-09-23 DIAGNOSIS — I10 ESSENTIAL HYPERTENSION: ICD-10-CM

## 2019-09-23 DIAGNOSIS — E78.2 MIXED HYPERLIPIDEMIA: ICD-10-CM

## 2019-09-23 DIAGNOSIS — I25.10 CORONARY ARTERY DISEASE INVOLVING NATIVE CORONARY ARTERY OF NATIVE HEART WITHOUT ANGINA PECTORIS: Primary | ICD-10-CM

## 2019-09-23 PROCEDURE — 99213 OFFICE O/P EST LOW 20 MIN: CPT | Performed by: INTERNAL MEDICINE

## 2019-09-23 RX ORDER — DONEPEZIL HYDROCHLORIDE 10 MG/1
10 TABLET, FILM COATED ORAL NIGHTLY
Refills: 3 | COMMUNITY
Start: 2019-09-21

## 2019-09-23 RX ORDER — MEMANTINE HYDROCHLORIDE 10 MG/1
10 TABLET ORAL 2 TIMES DAILY
Refills: 3 | Status: ON HOLD | COMMUNITY
Start: 2019-09-21 | End: 2020-10-23

## 2019-09-23 RX ORDER — ATORVASTATIN CALCIUM 20 MG/1
1 TABLET, FILM COATED ORAL 3 TIMES WEEKLY
COMMUNITY
Start: 2016-05-19 | End: 2020-10-23

## 2019-09-23 NOTE — PROGRESS NOTES
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    Chief Complaint   Patient presents with   • Coronary Artery Disease       PROBLEM LIST:   1. CAD:  a. February 2006: Off-pump CABG x4, sequential LIMA to first diagonal and LAD, saphenous graft to OM1, and PL of the circumflex, Kody Jeronimo MD.    b. February 2012: MPS per [facility], normal perfusion 51%.   c. 5/23/18 echo: EF 60-65%, diastolic dysfunction, mild to moderate TR, severe pulmonary HTN  2. HTN  a. 6/16 echo, [facility]  3. Dyslipidemia:   a. August 2015: Total cholesterol 102, triglycerides 92, HDL 30, and LDL 54.   4. Palpitations:  a. 2012, Holter with greater than 3200 PVCs  5. Impotence.    6. Cervical spine disease, remotely suggested surgical intervention. Patient deferred.    7. Vertigo.    8. Prior surgeries:   a. Right knee replacement December 2007.    b.  left knee replacement per Dr. Ruiz 2016    Allergies  No Known Allergies    Current Medications    Current Outpatient Medications:   •  aspirin 81 MG EC tablet, Take 81 mg by mouth daily., Disp: , Rfl:   •  atorvastatin (LIPITOR) 20 MG tablet, Take 1 tablet by mouth., Disp: , Rfl:   •  bisacodyl (DULCOLAX) 10 MG suppository, Insert 1 suppository into the rectum Daily As Needed for Constipation., Disp: , Rfl:   •  clopidogrel (PLAVIX) 75 MG tablet, Take 75 mg by mouth daily., Disp: , Rfl:   •  donepezil (ARICEPT) 10 MG tablet, Take 10 mg by mouth Daily., Disp: , Rfl: 3  •  loratadine (CLARITIN) 10 MG tablet, Take 10 mg by mouth Daily., Disp: , Rfl:   •  memantine (NAMENDA) 10 MG tablet, Take 10 mg by mouth 2 (Two) Times a Day., Disp: , Rfl: 3  •  Memantine HCl-Donepezil HCl (NAMZARIC) 28-10 MG capsule sustained-release 24 hr, Take 1 capsule by mouth Every  "Night., Disp: , Rfl:   •  Omega-3 Fatty Acids (FISH OIL) 1000 MG capsule capsule, Take 1,000 mg by mouth Daily With Breakfast., Disp: , Rfl:   •  polyethylene glycol (MIRALAX) pack packet, Take 17 g by mouth Daily. (Patient taking differently: Take 17 g by mouth As Needed.), Disp: , Rfl:   •  vitamin D (ERGOCALCIFEROL) 15774 UNITS capsule capsule, Take 50,000 Units by mouth 1 (One) Time Per Week. Prior to [facility] Admission, Patient was on: takes on mondays, Disp: , Rfl:       History of Present Illness   No recent chest issues with some progressive memory impairment however.  Follows his blood pressures regularly at home without escalation his PRN dosing of amlodipine for escalation of which she is only had to take 1 time monthly      OBJECTIVE:  Vitals:    09/23/19 1016   BP: 140/74   BP Location: Right arm   Patient Position: Sitting   Pulse: 55   SpO2: 97%   Weight: 97.5 kg (215 lb)   Height: 179.1 cm (70.5\")     Physical Exam   Constitutional: He appears well-developed and well-nourished.   Neck: Normal range of motion. Neck supple. No hepatojugular reflux and no JVD present. Carotid bruit is not present. No tracheal deviation present. No thyromegaly present.   Cardiovascular: Normal rate, regular rhythm, S1 normal, S2 normal, intact distal pulses and normal pulses. PMI is not displaced. Exam reveals no gallop, no distant heart sounds, no friction rub, no midsystolic click and no opening snap.   No murmur heard.  Pulses:       Radial pulses are 2+ on the right side, and 2+ on the left side.        Dorsalis pedis pulses are 2+ on the right side, and 2+ on the left side.        Posterior tibial pulses are 2+ on the right side, and 2+ on the left side.   Pulmonary/Chest: Effort normal and breath sounds normal. He has no wheezes. He has no rales.   Abdominal: Soft. Bowel sounds are normal. He exhibits no mass. There is no tenderness. There is no guarding.       Diagnostic Data:  Procedures      ASSESSMENT:   " Diagnosis Plan   1. Coronary artery disease involving native coronary artery of native heart without angina pectoris     2. Mixed hyperlipidemia     3. Essential hypertension       PLAN:  Cad- stable post surgical revasc now 12 yrs prior.  Cont med RX.     Hl statin tolerant     Htn controlled on as needed  Ccb    Padmini Terrazas APRN, thank you for referring Mr. Soni for evaluation.  I have forwarded my electronically generated recommendations to you for review.  Please do not hesitate to call with any questions.      Dwayne Swartz MD, FACC

## 2019-11-15 ENCOUNTER — OFFICE VISIT (OUTPATIENT)
Dept: UROLOGY | Facility: CLINIC | Age: 79
End: 2019-11-15

## 2019-11-15 VITALS — WEIGHT: 227.2 LBS | BODY MASS INDEX: 31.81 KG/M2 | HEIGHT: 71 IN

## 2019-11-15 DIAGNOSIS — R97.20 ELEVATED PROSTATE SPECIFIC ANTIGEN (PSA): Primary | ICD-10-CM

## 2019-11-15 DIAGNOSIS — N13.8 BPH WITH URINARY OBSTRUCTION: ICD-10-CM

## 2019-11-15 DIAGNOSIS — N40.1 BPH WITH URINARY OBSTRUCTION: ICD-10-CM

## 2019-11-15 PROCEDURE — 99213 OFFICE O/P EST LOW 20 MIN: CPT | Performed by: UROLOGY

## 2019-11-15 PROCEDURE — 36415 COLL VENOUS BLD VENIPUNCTURE: CPT | Performed by: UROLOGY

## 2019-11-15 PROCEDURE — 84153 ASSAY OF PSA TOTAL: CPT | Performed by: UROLOGY

## 2019-11-15 RX ORDER — AMLODIPINE BESYLATE 5 MG/1
5 TABLET ORAL AS NEEDED
COMMUNITY
Start: 2016-05-19 | End: 2020-10-23

## 2019-11-15 RX ORDER — PYRAZINAMIDE 500 MG/1
1 TABLET ORAL 2 TIMES DAILY PRN
COMMUNITY
Start: 2019-10-23 | End: 2020-10-30 | Stop reason: HOSPADM

## 2019-11-15 NOTE — PROGRESS NOTES
Chief Complaint:          Chief Complaint   Patient presents with   • Benign Prostatic Hypertrophy   • Elevated PSA       HPI:   78 y.o. male with his wife who gets up 3-4 times per night and has been taking herbal supplement.  He took Flomax over year ago for urinary didn't think it worked.  His urinalysis was not available because he could not give a specimen, his postvoid residuals 103 cc.  He has a number of diagnoses but also has early Alzheimer's disease.  His CT scan done on March 16 showed significant cholelithiasis, and BPH with a thick wall bladder and some prostatic calcification.  After review of his symptomatology and examination which was unremarkable and a placement finasteride initially, if unsuccessful he will need a lower tract investigation , with concentric bladder wall thickening consistent with BPH and no obvious tumors or filling defects.  He returns his repeat PSA is 3.9 with a free PSA of 25%.  He has no discharge or good stream and no lower urinary tract symptomatology I'm comfortable to watch him I'll recheck him in 6 months.  He returns today.  He doesn't think the finasteride helping, he's never been Flomax.  He gets up 2 times at night has frequency , but he wants to try something different for him I we will make the addition of Flomax to his current regimen check a PSA as part of his follow-up because of his elevated PSA and see him back in one year pending the labs he's to call should he not think this is substantially good treatment for him  .  He returns today he is doing great he gets up twice at night no symptoms no burning no blood no discharge no fevers no chills PSA is pending up to see him back on an as-needed basis      Past Medical History:        Past Medical History:   Diagnosis Date   • Arthritis    • Cervical spine disease     remotely suggested surgical intervention.  Patient deferred.    • Cervical spine disease    • Coronary artery disease    • Dyslipidemia    •  Hypertension    • Impotence    • Palpitations    • Vertigo          Current Meds:     Current Outpatient Medications   Medication Sig Dispense Refill   • acetaminophen-codeine (TYLENOL/CODEINE #3) 300-30 MG per tablet Take  by mouth Every 8 (Eight) Hours.     • amLODIPine (NORVASC) 5 MG tablet Take  by mouth.     • aspirin 81 MG EC tablet Take 81 mg by mouth daily.     • atorvastatin (LIPITOR) 20 MG tablet Take 1 tablet by mouth.     • clopidogrel (PLAVIX) 75 MG tablet Take 75 mg by mouth daily.     • donepezil (ARICEPT) 10 MG tablet Take 10 mg by mouth Daily.  3   • FISH OIL-CANOLA OIL-VIT D3 PO FISH OIL (Oil)   1 bid  Active     • loratadine (CLARITIN) 10 MG tablet Take 10 mg by mouth Daily.     • memantine (NAMENDA) 10 MG tablet Take 10 mg by mouth 2 (Two) Times a Day.  3   • Omega-3 Fatty Acids (FISH OIL) 1000 MG capsule capsule Take 1,000 mg by mouth Daily With Breakfast.     • polyethylene glycol (MIRALAX) pack packet Take 17 g by mouth Daily. (Patient taking differently: Take 17 g by mouth As Needed.)     • vitamin D (ERGOCALCIFEROL) 85834 UNITS capsule capsule Take 50,000 Units by mouth 1 (One) Time Per Week. Prior to [facility], Patient was on: takes on mondays       No current facility-administered medications for this visit.         Allergies:      No Known Allergies     Past Surgical History:     Past Surgical History:   Procedure Laterality Date   • ARTERIAL BYPASS SURGERY     • BRONCHOSCOPY N/A 5/24/2018    Procedure: BRONCHOSCOPY WITH WASHINGS;  Surgeon: Leonides Quarles MD;  Location: [facility] OR;  Service: Pulmonary   • CHOLECYSTECTOMY WITH INTRAOPERATIVE CHOLANGIOGRAM N/A 5/16/2018    Procedure: OPEN CHOLECYSTECTOMY;  Surgeon: Rosie Montalvo MD;  Location: [facility] OR;  Service: General   • COLONOSCOPY N/A 3/30/2018    Procedure: COLONOSCOPY;  Surgeon: Simone Valderrama MD;  Location: [facility] OR;  Service: Gastroenterology   • CORONARY ARTERY BYPASS GRAFT     • KNEE ARTHROPLASTY, PARTIAL  REPLACEMENT      Lt , 2015   • REPLACEMENT TOTAL KNEE      Rt         Social History:     Social History     Socioeconomic History   • Marital status:      Spouse name: Not on file   • Number of children: Not on file   • Years of education: Not on file   • Highest education level: Not on file   Tobacco Use   • Smoking status: Former Smoker     Packs/day: 1.50     Years: 7.00     Pack years: 10.50     Types: Cigarettes, Pipe, Cigars     Last attempt to quit: 1986     Years since quittin.4   • Smokeless tobacco: Never Used   Substance and Sexual Activity   • Alcohol use: Yes     Comment: occassionally    • Drug use: No   • Sexual activity: Defer       Family History:     Family History   Problem Relation Age of Onset   • No Known Problems Mother    • No Known Problems Father    • No Known Problems Sister    • No Known Problems Brother        Review of Systems:     Review of Systems   Constitutional: Negative.    HENT: Negative.    Eyes: Negative.    Respiratory: Negative.    Cardiovascular: Negative.    Gastrointestinal: Negative.    Endocrine: Negative.    Musculoskeletal: Negative.    Allergic/Immunologic: Negative.    Neurological: Negative.    Hematological: Negative.    Psychiatric/Behavioral: Negative.        Physical Exam:     Physical Exam   Constitutional: He is oriented to person, place, and time. He appears well-developed and well-nourished.   HENT:   Head: Normocephalic and atraumatic.   Eyes: Conjunctivae and EOM are normal. Pupils are equal, round, and reactive to light.   Neck: Normal range of motion.   Cardiovascular: Normal rate, regular rhythm, normal heart sounds and intact distal pulses.   Pulmonary/Chest: Effort normal and breath sounds normal.   Abdominal: Soft. Bowel sounds are normal.   Musculoskeletal: Normal range of motion.   Neurological: He is alert and oriented to person, place, and time. He has normal reflexes.   Skin: Skin is warm and dry.   Psychiatric: He has a normal  mood and affect. His behavior is normal. Judgment and thought content normal.   Nursing note and vitals reviewed.      I have reviewed the following portions of the patient's history: allergies, current medications, past family history, past medical history, past social history, past surgical history, problem list and ROS and confirm it's accurate.      Procedure:       Assessment/Plan:   BPH: Discussed the pathophysiology of BPH and obstruction.  We discussed the static and dynamic effect effects of BPH as well as using 5 alpha reductase inhibitors versus alpha blockade.  We discussed the indications for transurethral surgery as well.  And/ or other therapeutic options available including all of the newer techniques.  PSA testing-I am recommending a PSA blood test that stands for prostate specific antigen.  I discussed the pathophysiology of PSA testing indicating its use in the diagnosis and management of prostate cancer.  I discussed the normal range being 0-4 but more appropriately being much closer to 0-2 in a normal male.  I discussed the fact that after certain age we don't recommend PSA testing especially in view of numerous comorbidities.  That this will not be a useful test.  I discussed many of the things that can artificially raised PSA including a recent infection, urinary tract infection, recent sexual intercourse.  Where even the type of movement such as manipulation of the prostate  riding a bicycle.  After all this is taken into account when the test is reviewed  the most important use of PSA which is the velocity measurement in other words the change of PSA with time as a very important factor in the use and that we look for greater than 20% rise over a year to help us make the prediction of prostate cancer.  I also discussed that the use with prostate cancer indicating that after a radical prostatectomy the PSA should be 0 and any rise indicates an early biochemical recurrence            Patient's  Body mass index is 32.14 kg/m². BMI is above normal parameters. Recommendations include: educational material.              This document has been electronically signed by DAVI HANDLEY MD November 15, 2019 2:53 PM

## 2019-11-16 LAB — PSA SERPL-MCNC: 3.94 NG/ML (ref 0–4)

## 2019-12-28 ENCOUNTER — APPOINTMENT (OUTPATIENT)
Dept: ULTRASOUND IMAGING | Facility: HOSPITAL | Age: 79
End: 2019-12-28

## 2019-12-28 ENCOUNTER — APPOINTMENT (OUTPATIENT)
Dept: CT IMAGING | Facility: HOSPITAL | Age: 79
End: 2019-12-28

## 2019-12-28 ENCOUNTER — HOSPITAL ENCOUNTER (EMERGENCY)
Facility: HOSPITAL | Age: 79
Discharge: ANOTHER HOSPITAL | End: 2019-12-28
Attending: EMERGENCY MEDICINE | Admitting: EMERGENCY MEDICINE

## 2019-12-28 ENCOUNTER — APPOINTMENT (OUTPATIENT)
Dept: GENERAL RADIOLOGY | Facility: HOSPITAL | Age: 79
End: 2019-12-28

## 2019-12-28 VITALS
TEMPERATURE: 98.3 F | DIASTOLIC BLOOD PRESSURE: 82 MMHG | HEIGHT: 71 IN | RESPIRATION RATE: 18 BRPM | WEIGHT: 210 LBS | OXYGEN SATURATION: 95 % | HEART RATE: 63 BPM | SYSTOLIC BLOOD PRESSURE: 146 MMHG | BODY MASS INDEX: 29.4 KG/M2

## 2019-12-28 DIAGNOSIS — K83.1 OBSTRUCTIVE JAUNDICE: Primary | ICD-10-CM

## 2019-12-28 LAB
6-ACETYL MORPHINE: NEGATIVE
ALBUMIN SERPL-MCNC: 3.14 G/DL (ref 3.5–5.2)
ALBUMIN/GLOB SERPL: 0.9 G/DL
ALP SERPL-CCNC: 249 U/L (ref 39–117)
ALT SERPL W P-5'-P-CCNC: 171 U/L (ref 1–41)
AMPHET+METHAMPHET UR QL: NEGATIVE
AMYLASE SERPL-CCNC: 21 U/L (ref 28–100)
ANION GAP SERPL CALCULATED.3IONS-SCNC: 13.7 MMOL/L (ref 5–15)
APAP SERPL-MCNC: <5 MCG/ML (ref 10–30)
APTT PPP: 34.5 SECONDS (ref 23.8–36.1)
AST SERPL-CCNC: 98 U/L (ref 1–40)
BARBITURATES UR QL SCN: NEGATIVE
BASOPHILS # BLD AUTO: 0.05 10*3/MM3 (ref 0–0.2)
BASOPHILS NFR BLD AUTO: 0.3 % (ref 0–1.5)
BENZODIAZ UR QL SCN: NEGATIVE
BILIRUB SERPL-MCNC: 6.1 MG/DL (ref 0.2–1.2)
BILIRUB UR QL STRIP: ABNORMAL
BUN BLD-MCNC: 20 MG/DL (ref 8–23)
BUN/CREAT SERPL: 16.9 (ref 7–25)
BUPRENORPHINE SERPL-MCNC: NEGATIVE NG/ML
CALCIUM SPEC-SCNC: 9 MG/DL (ref 8.6–10.5)
CANNABINOIDS SERPL QL: NEGATIVE
CHLORIDE SERPL-SCNC: 95 MMOL/L (ref 98–107)
CLARITY UR: CLEAR
CO2 SERPL-SCNC: 23.3 MMOL/L (ref 22–29)
COCAINE UR QL: NEGATIVE
COLOR UR: ABNORMAL
CREAT BLD-MCNC: 1.18 MG/DL (ref 0.76–1.27)
CRP SERPL-MCNC: 12.75 MG/DL (ref 0–0.5)
D-LACTATE SERPL-SCNC: 1.2 MMOL/L (ref 0.5–2)
DEPRECATED RDW RBC AUTO: 47.8 FL (ref 37–54)
EOSINOPHIL # BLD AUTO: 0.07 10*3/MM3 (ref 0–0.4)
EOSINOPHIL NFR BLD AUTO: 0.4 % (ref 0.3–6.2)
ERYTHROCYTE [DISTWIDTH] IN BLOOD BY AUTOMATED COUNT: 13.9 % (ref 12.3–15.4)
ETHANOL BLD-MCNC: <10 MG/DL (ref 0–10)
ETHANOL UR QL: <0.01 %
GFR SERPL CREATININE-BSD FRML MDRD: 59 ML/MIN/1.73
GLOBULIN UR ELPH-MCNC: 3.5 GM/DL
GLUCOSE BLD-MCNC: 97 MG/DL (ref 65–99)
GLUCOSE UR STRIP-MCNC: NEGATIVE MG/DL
HAV IGM SERPL QL IA: NORMAL
HBV CORE IGM SERPL QL IA: NORMAL
HBV SURFACE AG SERPL QL IA: NORMAL
HCT VFR BLD AUTO: 42.5 % (ref 37.5–51)
HCV AB SER DONR QL: NORMAL
HGB BLD-MCNC: 13.9 G/DL (ref 13–17.7)
HGB UR QL STRIP.AUTO: NEGATIVE
HOLD SPECIMEN: NORMAL
IMM GRANULOCYTES # BLD AUTO: 0.12 10*3/MM3 (ref 0–0.05)
IMM GRANULOCYTES NFR BLD AUTO: 0.7 % (ref 0–0.5)
INR PPP: 1.25 (ref 0.9–1.1)
KETONES UR QL STRIP: NEGATIVE
LEUKOCYTE ESTERASE UR QL STRIP.AUTO: NEGATIVE
LIPASE SERPL-CCNC: 9 U/L (ref 13–60)
LYMPHOCYTES # BLD AUTO: 1.92 10*3/MM3 (ref 0.7–3.1)
LYMPHOCYTES NFR BLD AUTO: 11.3 % (ref 19.6–45.3)
MAGNESIUM SERPL-MCNC: 2.1 MG/DL (ref 1.6–2.4)
MCH RBC QN AUTO: 30.5 PG (ref 26.6–33)
MCHC RBC AUTO-ENTMCNC: 32.7 G/DL (ref 31.5–35.7)
MCV RBC AUTO: 93.2 FL (ref 79–97)
METHADONE UR QL SCN: NEGATIVE
MONOCYTES # BLD AUTO: 1.46 10*3/MM3 (ref 0.1–0.9)
MONOCYTES NFR BLD AUTO: 8.6 % (ref 5–12)
NEUTROPHILS # BLD AUTO: 13.4 10*3/MM3 (ref 1.7–7)
NEUTROPHILS NFR BLD AUTO: 78.7 % (ref 42.7–76)
NITRITE UR QL STRIP: NEGATIVE
NRBC BLD AUTO-RTO: 0 /100 WBC (ref 0–0.2)
OPIATES UR QL: NEGATIVE
OXYCODONE UR QL SCN: NEGATIVE
PCP UR QL SCN: NEGATIVE
PH UR STRIP.AUTO: 6 [PH] (ref 5–8)
PLATELET # BLD AUTO: 272 10*3/MM3 (ref 140–450)
PMV BLD AUTO: 8.9 FL (ref 6–12)
POTASSIUM BLD-SCNC: 4.2 MMOL/L (ref 3.5–5.2)
PROT SERPL-MCNC: 6.6 G/DL (ref 6–8.5)
PROT UR QL STRIP: NEGATIVE
PROTHROMBIN TIME: 16.3 SECONDS (ref 11–15.4)
RBC # BLD AUTO: 4.56 10*6/MM3 (ref 4.14–5.8)
SALICYLATES SERPL-MCNC: <0.3 MG/DL
SODIUM BLD-SCNC: 132 MMOL/L (ref 136–145)
SP GR UR STRIP: 1.01 (ref 1–1.03)
TROPONIN T SERPL-MCNC: <0.01 NG/ML (ref 0–0.03)
TSH SERPL DL<=0.05 MIU/L-ACNC: 1.45 UIU/ML (ref 0.27–4.2)
UROBILINOGEN UR QL STRIP: ABNORMAL
WBC NRBC COR # BLD: 17.02 10*3/MM3 (ref 3.4–10.8)
WHOLE BLOOD HOLD SPECIMEN: NORMAL
WHOLE BLOOD HOLD SPECIMEN: NORMAL

## 2019-12-28 PROCEDURE — 85730 THROMBOPLASTIN TIME PARTIAL: CPT | Performed by: PHYSICIAN ASSISTANT

## 2019-12-28 PROCEDURE — 81003 URINALYSIS AUTO W/O SCOPE: CPT | Performed by: PHYSICIAN ASSISTANT

## 2019-12-28 PROCEDURE — 80307 DRUG TEST PRSMV CHEM ANLYZR: CPT | Performed by: PHYSICIAN ASSISTANT

## 2019-12-28 PROCEDURE — 93005 ELECTROCARDIOGRAM TRACING: CPT | Performed by: PHYSICIAN ASSISTANT

## 2019-12-28 PROCEDURE — 96365 THER/PROPH/DIAG IV INF INIT: CPT

## 2019-12-28 PROCEDURE — 87040 BLOOD CULTURE FOR BACTERIA: CPT | Performed by: PHYSICIAN ASSISTANT

## 2019-12-28 PROCEDURE — 51702 INSERT TEMP BLADDER CATH: CPT

## 2019-12-28 PROCEDURE — 80053 COMPREHEN METABOLIC PANEL: CPT | Performed by: PHYSICIAN ASSISTANT

## 2019-12-28 PROCEDURE — 85025 COMPLETE CBC W/AUTO DIFF WBC: CPT | Performed by: PHYSICIAN ASSISTANT

## 2019-12-28 PROCEDURE — 25010000002 PIPERACILLIN-TAZOBACTAM: Performed by: PHYSICIAN ASSISTANT

## 2019-12-28 PROCEDURE — 74177 CT ABD & PELVIS W/CONTRAST: CPT | Performed by: RADIOLOGY

## 2019-12-28 PROCEDURE — 0 IOVERSOL 68 % SOLUTION: Performed by: EMERGENCY MEDICINE

## 2019-12-28 PROCEDURE — 99283 EMERGENCY DEPT VISIT LOW MDM: CPT

## 2019-12-28 PROCEDURE — 71045 X-RAY EXAM CHEST 1 VIEW: CPT | Performed by: RADIOLOGY

## 2019-12-28 PROCEDURE — 76705 ECHO EXAM OF ABDOMEN: CPT

## 2019-12-28 PROCEDURE — 74177 CT ABD & PELVIS W/CONTRAST: CPT

## 2019-12-28 PROCEDURE — 80074 ACUTE HEPATITIS PANEL: CPT | Performed by: PHYSICIAN ASSISTANT

## 2019-12-28 PROCEDURE — 83735 ASSAY OF MAGNESIUM: CPT | Performed by: PHYSICIAN ASSISTANT

## 2019-12-28 PROCEDURE — 71045 X-RAY EXAM CHEST 1 VIEW: CPT

## 2019-12-28 PROCEDURE — 82150 ASSAY OF AMYLASE: CPT | Performed by: PHYSICIAN ASSISTANT

## 2019-12-28 PROCEDURE — 84443 ASSAY THYROID STIM HORMONE: CPT | Performed by: PHYSICIAN ASSISTANT

## 2019-12-28 PROCEDURE — 76705 ECHO EXAM OF ABDOMEN: CPT | Performed by: RADIOLOGY

## 2019-12-28 PROCEDURE — 86140 C-REACTIVE PROTEIN: CPT | Performed by: PHYSICIAN ASSISTANT

## 2019-12-28 PROCEDURE — 85610 PROTHROMBIN TIME: CPT | Performed by: PHYSICIAN ASSISTANT

## 2019-12-28 PROCEDURE — 51798 US URINE CAPACITY MEASURE: CPT

## 2019-12-28 PROCEDURE — 36415 COLL VENOUS BLD VENIPUNCTURE: CPT

## 2019-12-28 PROCEDURE — 83605 ASSAY OF LACTIC ACID: CPT | Performed by: PHYSICIAN ASSISTANT

## 2019-12-28 PROCEDURE — 83690 ASSAY OF LIPASE: CPT | Performed by: PHYSICIAN ASSISTANT

## 2019-12-28 PROCEDURE — 84484 ASSAY OF TROPONIN QUANT: CPT | Performed by: PHYSICIAN ASSISTANT

## 2019-12-28 RX ORDER — SODIUM CHLORIDE 9 MG/ML
125 INJECTION, SOLUTION INTRAVENOUS CONTINUOUS
Status: DISCONTINUED | OUTPATIENT
Start: 2019-12-28 | End: 2019-12-28 | Stop reason: HOSPADM

## 2019-12-28 RX ORDER — SODIUM CHLORIDE 0.9 % (FLUSH) 0.9 %
10 SYRINGE (ML) INJECTION AS NEEDED
Status: DISCONTINUED | OUTPATIENT
Start: 2019-12-28 | End: 2019-12-28 | Stop reason: HOSPADM

## 2019-12-28 RX ADMIN — PIPERACILLIN SODIUM,TAZOBACTAM SODIUM 3.38 G: 3; .375 INJECTION, POWDER, FOR SOLUTION INTRAVENOUS at 16:33

## 2019-12-28 RX ADMIN — IOVERSOL 100 ML: 678 INJECTION INTRA-ARTERIAL; INTRAVENOUS at 12:39

## 2019-12-28 RX ADMIN — SODIUM CHLORIDE 125 ML/HR: 9 INJECTION, SOLUTION INTRAVENOUS at 11:13

## 2020-01-01 ENCOUNTER — PATIENT OUTREACH (OUTPATIENT)
Dept: CASE MANAGEMENT | Facility: OTHER | Age: 80
End: 2020-01-01

## 2020-01-01 ENCOUNTER — LAB REQUISITION (OUTPATIENT)
Dept: LAB | Facility: HOSPITAL | Age: 80
End: 2020-01-01

## 2020-01-01 VITALS
WEIGHT: 191.14 LBS | RESPIRATION RATE: 16 BRPM | OXYGEN SATURATION: 98 % | HEART RATE: 58 BPM | BODY MASS INDEX: 27.36 KG/M2 | SYSTOLIC BLOOD PRESSURE: 141 MMHG | DIASTOLIC BLOOD PRESSURE: 81 MMHG | TEMPERATURE: 98.1 F | HEIGHT: 70 IN

## 2020-01-01 DIAGNOSIS — I10 ESSENTIAL (PRIMARY) HYPERTENSION: ICD-10-CM

## 2020-01-01 LAB
ANION GAP SERPL CALCULATED.3IONS-SCNC: 8.4 MMOL/L (ref 5–15)
BASOPHILS # BLD AUTO: 0.03 10*3/MM3 (ref 0–0.2)
BASOPHILS NFR BLD AUTO: 0.2 % (ref 0–1.5)
BUN SERPL-MCNC: 39 MG/DL (ref 8–23)
BUN/CREAT SERPL: 32.8 (ref 7–25)
CALCIUM SPEC-SCNC: 8.9 MG/DL (ref 8.6–10.5)
CHLORIDE SERPL-SCNC: 113 MMOL/L (ref 98–107)
CO2 SERPL-SCNC: 27.6 MMOL/L (ref 22–29)
CREAT SERPL-MCNC: 1.19 MG/DL (ref 0.76–1.27)
DEPRECATED RDW RBC AUTO: 44 FL (ref 37–54)
EOSINOPHIL # BLD AUTO: 0.68 10*3/MM3 (ref 0–0.4)
EOSINOPHIL NFR BLD AUTO: 4.8 % (ref 0.3–6.2)
ERYTHROCYTE [DISTWIDTH] IN BLOOD BY AUTOMATED COUNT: 12.2 % (ref 12.3–15.4)
GFR SERPL CREATININE-BSD FRML MDRD: 59 ML/MIN/1.73
GLUCOSE SERPL-MCNC: 107 MG/DL (ref 65–99)
HCT VFR BLD AUTO: 40 % (ref 37.5–51)
HGB BLD-MCNC: 12.7 G/DL (ref 13–17.7)
IMM GRANULOCYTES # BLD AUTO: 0.04 10*3/MM3 (ref 0–0.05)
IMM GRANULOCYTES NFR BLD AUTO: 0.3 % (ref 0–0.5)
LYMPHOCYTES # BLD AUTO: 2.19 10*3/MM3 (ref 0.7–3.1)
LYMPHOCYTES NFR BLD AUTO: 15.6 % (ref 19.6–45.3)
MCH RBC QN AUTO: 31.1 PG (ref 26.6–33)
MCHC RBC AUTO-ENTMCNC: 31.8 G/DL (ref 31.5–35.7)
MCV RBC AUTO: 97.8 FL (ref 79–97)
MONOCYTES # BLD AUTO: 1.18 10*3/MM3 (ref 0.1–0.9)
MONOCYTES NFR BLD AUTO: 8.4 % (ref 5–12)
NEUTROPHILS NFR BLD AUTO: 70.7 % (ref 42.7–76)
NEUTROPHILS NFR BLD AUTO: 9.91 10*3/MM3 (ref 1.7–7)
NRBC BLD AUTO-RTO: 0 /100 WBC (ref 0–0.2)
PLATELET # BLD AUTO: 304 10*3/MM3 (ref 140–450)
PMV BLD AUTO: 9.3 FL (ref 6–12)
POTASSIUM SERPL-SCNC: 3.7 MMOL/L (ref 3.5–5.2)
PROCALCITONIN SERPL-MCNC: 0.05 NG/ML (ref 0–0.25)
RBC # BLD AUTO: 4.09 10*6/MM3 (ref 4.14–5.8)
SODIUM SERPL-SCNC: 149 MMOL/L (ref 136–145)
WBC # BLD AUTO: 14.03 10*3/MM3 (ref 3.4–10.8)

## 2020-01-01 PROCEDURE — 80048 BASIC METABOLIC PNL TOTAL CA: CPT | Performed by: INTERNAL MEDICINE

## 2020-01-01 PROCEDURE — 85025 COMPLETE CBC W/AUTO DIFF WBC: CPT | Performed by: INTERNAL MEDICINE

## 2020-01-02 LAB
BACTERIA SPEC AEROBE CULT: NORMAL
BACTERIA SPEC AEROBE CULT: NORMAL

## 2020-01-06 ENCOUNTER — APPOINTMENT (OUTPATIENT)
Dept: GENERAL RADIOLOGY | Facility: HOSPITAL | Age: 80
End: 2020-01-06

## 2020-01-06 ENCOUNTER — APPOINTMENT (OUTPATIENT)
Dept: CT IMAGING | Facility: HOSPITAL | Age: 80
End: 2020-01-06

## 2020-01-06 ENCOUNTER — HOSPITAL ENCOUNTER (EMERGENCY)
Facility: HOSPITAL | Age: 80
Discharge: ANOTHER HOSPITAL | End: 2020-01-06
Attending: EMERGENCY MEDICINE | Admitting: EMERGENCY MEDICINE

## 2020-01-06 VITALS
SYSTOLIC BLOOD PRESSURE: 117 MMHG | HEART RATE: 73 BPM | WEIGHT: 196 LBS | RESPIRATION RATE: 18 BRPM | TEMPERATURE: 97.9 F | HEIGHT: 71 IN | OXYGEN SATURATION: 96 % | DIASTOLIC BLOOD PRESSURE: 62 MMHG | BODY MASS INDEX: 27.44 KG/M2

## 2020-01-06 DIAGNOSIS — R74.8 ELEVATED LIVER ENZYMES: ICD-10-CM

## 2020-01-06 DIAGNOSIS — K85.90 ACUTE PANCREATITIS, UNSPECIFIED COMPLICATION STATUS, UNSPECIFIED PANCREATITIS TYPE: Primary | ICD-10-CM

## 2020-01-06 LAB
6-ACETYL MORPHINE: NEGATIVE
A-A DO2: 32.9 MMHG (ref 0–300)
ALBUMIN SERPL-MCNC: 2.56 G/DL (ref 3.5–5.2)
ALBUMIN/GLOB SERPL: 0.7 G/DL
ALP SERPL-CCNC: 344 U/L (ref 39–117)
ALT SERPL W P-5'-P-CCNC: 164 U/L (ref 1–41)
AMMONIA BLD-SCNC: 68 UMOL/L (ref 16–60)
AMPHET+METHAMPHET UR QL: NEGATIVE
AMYLASE SERPL-CCNC: 277 U/L (ref 28–100)
ANION GAP SERPL CALCULATED.3IONS-SCNC: 15 MMOL/L (ref 5–15)
APAP SERPL-MCNC: <5 MCG/ML (ref 10–30)
ARTERIAL PATENCY WRIST A: ABNORMAL
AST SERPL-CCNC: 111 U/L (ref 1–40)
ATMOSPHERIC PRESS: 732 MMHG
BACTERIA UR QL AUTO: ABNORMAL /HPF
BARBITURATES UR QL SCN: NEGATIVE
BASE EXCESS BLDA CALC-SCNC: 0.2 MMOL/L (ref 0–2)
BASOPHILS # BLD AUTO: 0.04 10*3/MM3 (ref 0–0.2)
BASOPHILS NFR BLD AUTO: 0.2 % (ref 0–1.5)
BDY SITE: ABNORMAL
BENZODIAZ UR QL SCN: NEGATIVE
BILIRUB CONJ SERPL-MCNC: 5 MG/DL (ref 0.2–0.3)
BILIRUB SERPL-MCNC: 6 MG/DL (ref 0.2–1.2)
BILIRUB UR QL STRIP: ABNORMAL
BODY TEMPERATURE: 0 C
BUN BLD-MCNC: 70 MG/DL (ref 8–23)
BUN/CREAT SERPL: 23.6 (ref 7–25)
BUPRENORPHINE SERPL-MCNC: NEGATIVE NG/ML
CALCIUM SPEC-SCNC: 8.1 MG/DL (ref 8.6–10.5)
CANNABINOIDS SERPL QL: NEGATIVE
CHLORIDE SERPL-SCNC: 99 MMOL/L (ref 98–107)
CK SERPL-CCNC: 691 U/L (ref 20–200)
CLARITY UR: ABNORMAL
CO2 BLDA-SCNC: 25.2 MMOL/L (ref 22–33)
CO2 SERPL-SCNC: 22 MMOL/L (ref 22–29)
COCAINE UR QL: NEGATIVE
COHGB MFR BLD: 1.1 % (ref 0–5)
COLOR UR: ABNORMAL
CREAT BLD-MCNC: 2.96 MG/DL (ref 0.76–1.27)
CRP SERPL-MCNC: 20.97 MG/DL (ref 0–0.5)
D-LACTATE SERPL-SCNC: 1.7 MMOL/L (ref 0.5–2)
DEPRECATED RDW RBC AUTO: 51.7 FL (ref 37–54)
EOSINOPHIL # BLD AUTO: 0.08 10*3/MM3 (ref 0–0.4)
EOSINOPHIL NFR BLD AUTO: 0.4 % (ref 0.3–6.2)
ERYTHROCYTE [DISTWIDTH] IN BLOOD BY AUTOMATED COUNT: 15.5 % (ref 12.3–15.4)
ETHANOL BLD-MCNC: <10 MG/DL (ref 0–10)
ETHANOL UR QL: <0.01 %
FLUAV AG NPH QL: NEGATIVE
FLUBV AG NPH QL IA: NEGATIVE
GFR SERPL CREATININE-BSD FRML MDRD: 21 ML/MIN/1.73
GLOBULIN UR ELPH-MCNC: 3.6 GM/DL
GLUCOSE BLD-MCNC: 141 MG/DL (ref 65–99)
GLUCOSE UR STRIP-MCNC: NEGATIVE MG/DL
GRAN CASTS URNS QL MICRO: ABNORMAL /LPF
HCO3 BLDA-SCNC: 24.1 MMOL/L (ref 20–26)
HCT VFR BLD AUTO: 40.7 % (ref 37.5–51)
HCT VFR BLD CALC: 41.7 % (ref 38–51)
HGB BLD-MCNC: 13.3 G/DL (ref 13–17.7)
HGB BLDA-MCNC: 13.6 G/DL (ref 14–18)
HGB UR QL STRIP.AUTO: ABNORMAL
HOLD SPECIMEN: NORMAL
HOLD SPECIMEN: NORMAL
HOROWITZ INDEX BLD+IHG-RTO: 21 %
HYALINE CASTS UR QL AUTO: ABNORMAL /LPF
IMM GRANULOCYTES # BLD AUTO: 0.15 10*3/MM3 (ref 0–0.05)
IMM GRANULOCYTES NFR BLD AUTO: 0.7 % (ref 0–0.5)
KETONES UR QL STRIP: NEGATIVE
LEUKOCYTE ESTERASE UR QL STRIP.AUTO: ABNORMAL
LIPASE SERPL-CCNC: 214 U/L (ref 13–60)
LYMPHOCYTES # BLD AUTO: 1.57 10*3/MM3 (ref 0.7–3.1)
LYMPHOCYTES NFR BLD AUTO: 7.6 % (ref 19.6–45.3)
Lab: ABNORMAL
MAGNESIUM SERPL-MCNC: 2.4 MG/DL (ref 1.6–2.4)
MCH RBC QN AUTO: 29.9 PG (ref 26.6–33)
MCHC RBC AUTO-ENTMCNC: 32.7 G/DL (ref 31.5–35.7)
MCV RBC AUTO: 91.5 FL (ref 79–97)
METHADONE UR QL SCN: NEGATIVE
METHGB BLD QL: 0.2 % (ref 0–3)
MODALITY: ABNORMAL
MONOCYTES # BLD AUTO: 1.79 10*3/MM3 (ref 0.1–0.9)
MONOCYTES NFR BLD AUTO: 8.7 % (ref 5–12)
NEUTROPHILS # BLD AUTO: 17.05 10*3/MM3 (ref 1.7–7)
NEUTROPHILS NFR BLD AUTO: 82.4 % (ref 42.7–76)
NITRITE UR QL STRIP: POSITIVE
NOTE: ABNORMAL
NRBC BLD AUTO-RTO: 0 /100 WBC (ref 0–0.2)
NT-PROBNP SERPL-MCNC: 4356 PG/ML (ref 5–1800)
OPIATES UR QL: NEGATIVE
OXYCODONE UR QL SCN: NEGATIVE
OXYHGB MFR BLDV: 93.3 % (ref 94–99)
PCO2 BLDA: 36.2 MM HG (ref 35–45)
PCO2 TEMP ADJ BLD: ABNORMAL MM[HG]
PCP UR QL SCN: NEGATIVE
PH BLDA: 7.43 PH UNITS (ref 7.35–7.45)
PH UR STRIP.AUTO: <=5 [PH] (ref 5–8)
PH, TEMP CORRECTED: ABNORMAL
PLATELET # BLD AUTO: 216 10*3/MM3 (ref 140–450)
PMV BLD AUTO: 9.8 FL (ref 6–12)
PO2 BLDA: 70 MM HG (ref 83–108)
PO2 TEMP ADJ BLD: ABNORMAL MM[HG]
POTASSIUM BLD-SCNC: 3.8 MMOL/L (ref 3.5–5.2)
PROT SERPL-MCNC: 6.2 G/DL (ref 6–8.5)
PROT UR QL STRIP: ABNORMAL
RBC # BLD AUTO: 4.45 10*6/MM3 (ref 4.14–5.8)
RBC # UR: ABNORMAL /HPF
REF LAB TEST METHOD: ABNORMAL
S PYO AG THROAT QL: NEGATIVE
SALICYLATES SERPL-MCNC: 0.5 MG/DL
SAO2 % BLDCOA: 94.5 % (ref 94–99)
SODIUM BLD-SCNC: 136 MMOL/L (ref 136–145)
SP GR UR STRIP: 1.02 (ref 1–1.03)
SQUAMOUS #/AREA URNS HPF: ABNORMAL /HPF
T4 FREE SERPL-MCNC: 0.98 NG/DL (ref 0.93–1.7)
TROPONIN T SERPL-MCNC: 0.01 NG/ML (ref 0–0.03)
TSH SERPL DL<=0.05 MIU/L-ACNC: 1.93 UIU/ML (ref 0.27–4.2)
UROBILINOGEN UR QL STRIP: ABNORMAL
VENTILATOR MODE: ABNORMAL
WBC NRBC COR # BLD: 20.68 10*3/MM3 (ref 3.4–10.8)
WBC UR QL AUTO: ABNORMAL /HPF
WHOLE BLOOD HOLD SPECIMEN: NORMAL
WHOLE BLOOD HOLD SPECIMEN: NORMAL

## 2020-01-06 PROCEDURE — 93005 ELECTROCARDIOGRAM TRACING: CPT | Performed by: EMERGENCY MEDICINE

## 2020-01-06 PROCEDURE — 87040 BLOOD CULTURE FOR BACTERIA: CPT | Performed by: EMERGENCY MEDICINE

## 2020-01-06 PROCEDURE — 87081 CULTURE SCREEN ONLY: CPT | Performed by: EMERGENCY MEDICINE

## 2020-01-06 PROCEDURE — 80307 DRUG TEST PRSMV CHEM ANLYZR: CPT | Performed by: EMERGENCY MEDICINE

## 2020-01-06 PROCEDURE — 83605 ASSAY OF LACTIC ACID: CPT | Performed by: EMERGENCY MEDICINE

## 2020-01-06 PROCEDURE — 99285 EMERGENCY DEPT VISIT HI MDM: CPT

## 2020-01-06 PROCEDURE — 80053 COMPREHEN METABOLIC PANEL: CPT | Performed by: EMERGENCY MEDICINE

## 2020-01-06 PROCEDURE — 74176 CT ABD & PELVIS W/O CONTRAST: CPT | Performed by: RADIOLOGY

## 2020-01-06 PROCEDURE — 70450 CT HEAD/BRAIN W/O DYE: CPT

## 2020-01-06 PROCEDURE — 71250 CT THORAX DX C-: CPT | Performed by: RADIOLOGY

## 2020-01-06 PROCEDURE — 83690 ASSAY OF LIPASE: CPT | Performed by: EMERGENCY MEDICINE

## 2020-01-06 PROCEDURE — 87150 DNA/RNA AMPLIFIED PROBE: CPT | Performed by: EMERGENCY MEDICINE

## 2020-01-06 PROCEDURE — 25010000002 VANCOMYCIN 5 G RECONSTITUTED SOLUTION 5,000 MG VIAL: Performed by: EMERGENCY MEDICINE

## 2020-01-06 PROCEDURE — 25010000002 PIPERACILLIN-TAZOBACTAM: Performed by: EMERGENCY MEDICINE

## 2020-01-06 PROCEDURE — 84439 ASSAY OF FREE THYROXINE: CPT | Performed by: EMERGENCY MEDICINE

## 2020-01-06 PROCEDURE — 87186 SC STD MICRODIL/AGAR DIL: CPT | Performed by: EMERGENCY MEDICINE

## 2020-01-06 PROCEDURE — 71045 X-RAY EXAM CHEST 1 VIEW: CPT

## 2020-01-06 PROCEDURE — 86140 C-REACTIVE PROTEIN: CPT | Performed by: EMERGENCY MEDICINE

## 2020-01-06 PROCEDURE — 81001 URINALYSIS AUTO W/SCOPE: CPT | Performed by: EMERGENCY MEDICINE

## 2020-01-06 PROCEDURE — 82375 ASSAY CARBOXYHB QUANT: CPT

## 2020-01-06 PROCEDURE — 84443 ASSAY THYROID STIM HORMONE: CPT | Performed by: EMERGENCY MEDICINE

## 2020-01-06 PROCEDURE — 83735 ASSAY OF MAGNESIUM: CPT | Performed by: EMERGENCY MEDICINE

## 2020-01-06 PROCEDURE — 83880 ASSAY OF NATRIURETIC PEPTIDE: CPT | Performed by: EMERGENCY MEDICINE

## 2020-01-06 PROCEDURE — 84484 ASSAY OF TROPONIN QUANT: CPT | Performed by: EMERGENCY MEDICINE

## 2020-01-06 PROCEDURE — 96366 THER/PROPH/DIAG IV INF ADDON: CPT

## 2020-01-06 PROCEDURE — 74176 CT ABD & PELVIS W/O CONTRAST: CPT

## 2020-01-06 PROCEDURE — 85025 COMPLETE CBC W/AUTO DIFF WBC: CPT | Performed by: EMERGENCY MEDICINE

## 2020-01-06 PROCEDURE — 71250 CT THORAX DX C-: CPT

## 2020-01-06 PROCEDURE — 83050 HGB METHEMOGLOBIN QUAN: CPT

## 2020-01-06 PROCEDURE — 82140 ASSAY OF AMMONIA: CPT | Performed by: EMERGENCY MEDICINE

## 2020-01-06 PROCEDURE — 36600 WITHDRAWAL OF ARTERIAL BLOOD: CPT

## 2020-01-06 PROCEDURE — 82805 BLOOD GASES W/O2 SATURATION: CPT

## 2020-01-06 PROCEDURE — 71045 X-RAY EXAM CHEST 1 VIEW: CPT | Performed by: RADIOLOGY

## 2020-01-06 PROCEDURE — 82150 ASSAY OF AMYLASE: CPT | Performed by: EMERGENCY MEDICINE

## 2020-01-06 PROCEDURE — 70450 CT HEAD/BRAIN W/O DYE: CPT | Performed by: RADIOLOGY

## 2020-01-06 PROCEDURE — 87880 STREP A ASSAY W/OPTIC: CPT | Performed by: EMERGENCY MEDICINE

## 2020-01-06 PROCEDURE — 82248 BILIRUBIN DIRECT: CPT | Performed by: EMERGENCY MEDICINE

## 2020-01-06 PROCEDURE — 96367 TX/PROPH/DG ADDL SEQ IV INF: CPT

## 2020-01-06 PROCEDURE — 82550 ASSAY OF CK (CPK): CPT | Performed by: EMERGENCY MEDICINE

## 2020-01-06 PROCEDURE — 96365 THER/PROPH/DIAG IV INF INIT: CPT

## 2020-01-06 PROCEDURE — 87804 INFLUENZA ASSAY W/OPTIC: CPT | Performed by: EMERGENCY MEDICINE

## 2020-01-06 RX ORDER — SODIUM CHLORIDE 0.9 % (FLUSH) 0.9 %
10 SYRINGE (ML) INJECTION AS NEEDED
Status: DISCONTINUED | OUTPATIENT
Start: 2020-01-06 | End: 2020-01-06 | Stop reason: HOSPADM

## 2020-01-06 RX ORDER — SODIUM CHLORIDE 9 MG/ML
125 INJECTION, SOLUTION INTRAVENOUS CONTINUOUS
Status: DISCONTINUED | OUTPATIENT
Start: 2020-01-06 | End: 2020-01-06 | Stop reason: HOSPADM

## 2020-01-06 RX ADMIN — PIPERACILLIN SODIUM,TAZOBACTAM SODIUM 3.38 G: 3; .375 INJECTION, POWDER, FOR SOLUTION INTRAVENOUS at 12:08

## 2020-01-06 RX ADMIN — SODIUM CHLORIDE 125 ML/HR: 9 INJECTION, SOLUTION INTRAVENOUS at 13:02

## 2020-01-06 RX ADMIN — VANCOMYCIN HYDROCHLORIDE 1750 MG: 5 INJECTION, POWDER, LYOPHILIZED, FOR SOLUTION INTRAVENOUS at 12:51

## 2020-01-06 NOTE — ED NOTES
callled [facility] to see if they could transport patient to [facility]. Stated they would call back.     Kendra Robles  01/06/20 6214

## 2020-01-06 NOTE — ED PROVIDER NOTES
HPI Comments: :***   History provided by:  Patient   used: No    Illness   Location:  General  Quality:  Unknown  Severity:  :***  Onset quality: :***  Timing:  :***  Progression:  :***  Chronicity:  :***  Associated symptoms: :***    Review of Systems   Constitutional:***  HENT::***    Eyes: :***  Respiratory: Negative for cough, shortness of breath, wheezing and stridor.    Cardiovascular: Negative for chest pain and leg swelling.   Gastrointestinal: Negative for abdominal distention, abdominal pain, diarrhea, nausea and vomiting.   Musculoskeletal: Negative for arthralgias, myalgias, neck pain and neck stiffness.   Skin: Negative for color change and rash.   Neurological: Negative for dizziness, seizures, loss of consciousness, syncope and headaches.   Psychiatric/Behavioral:  :***  All other systems reviewed and are negative.    Labs Reviewed   COMPREHENSIVE METABOLIC PANEL - Abnormal; Notable for the following components:       Result Value    Glucose 141 (*)     BUN 70 (*)     Creatinine 2.96 (*)     Calcium 8.1 (*)     Albumin 2.56 (*)     ALT (SGPT) 164 (*)     AST (SGOT) 111 (*)     Alkaline Phosphatase 344 (*)     Total Bilirubin 6.0 (*)     eGFR Non  Amer 21 (*)     All other components within normal limits    Narrative:     GFR Normal >60  Chronic Kidney Disease <60  Kidney Failure <15     LIPASE - Abnormal; Notable for the following components:    Lipase 214 (*)     All other components within normal limits   AMYLASE - Abnormal; Notable for the following components:    Amylase 277 (*)     All other components within normal limits   URINALYSIS W/ MICROSCOPIC IF INDICATED (NO CULTURE) - Abnormal; Notable for the following components:    Color, UA Orange (*)     Appearance, UA Turbid (*)     Bilirubin, UA Large (3+) (*)     Blood, UA Large (3+) (*)     Protein, UA 30 mg/dL (1+) (*)     Leuk Esterase, UA Small (1+) (*)     Nitrite, UA Positive (*)     All other components within  normal limits   BNP (IN-HOUSE) - Abnormal; Notable for the following components:    proBNP 4,356.0 (*)     All other components within normal limits    Narrative:     Among patients with dyspnea, NT-proBNP is highly sensitive for the detection of acute congestive heart failure. In addition NT-proBNP of <300 pg/ml effectively rules out acute congestive heart failure with 99% negative predictive value.     C-REACTIVE PROTEIN - Abnormal; Notable for the following components:    C-Reactive Protein 20.97 (*)     All other components within normal limits   ACETAMINOPHEN LEVEL - Abnormal; Notable for the following components:    Acetaminophen <5.0 (*)     All other components within normal limits   CK - Abnormal; Notable for the following components:    Creatine Kinase 691 (*)     All other components within normal limits   AMMONIA - Abnormal; Notable for the following components:    Ammonia 68 (*)     All other components within normal limits   CBC WITH AUTO DIFFERENTIAL - Abnormal; Notable for the following components:    WBC 20.68 (*)     RDW 15.5 (*)     Neutrophil % 82.4 (*)     Lymphocyte % 7.6 (*)     Immature Grans % 0.7 (*)     Neutrophils, Absolute 17.05 (*)     Monocytes, Absolute 1.79 (*)     Immature Grans, Absolute 0.15 (*)     All other components within normal limits   BLOOD GAS, ARTERIAL W/CO-OXIMETRY - Abnormal; Notable for the following components:    pO2, Arterial 70.0 (*)     Hemoglobin, Blood Gas 13.6 (*)     Oxyhemoglobin 93.3 (*)     All other components within normal limits   URINALYSIS, MICROSCOPIC ONLY - Abnormal; Notable for the following components:    RBC, UA 13-20 (*)     WBC, UA 6-12 (*)     Bacteria, UA 1+ (*)     Squamous Epithelial Cells, UA 7-12 (*)     All other components within normal limits   BILIRUBIN, DIRECT - Abnormal; Notable for the following components:    Bilirubin, Direct 5.0 (*)     All other components within normal limits   INFLUENZA ANTIGEN, RAPID - Normal   RAPID STREP A  SCREEN - Normal   TROPONIN (IN-HOUSE) - Normal    Narrative:     Troponin T Reference Range:  <= 0.03 ng/mL-   Negative for AMI  >0.03 ng/mL-     Abnormal for myocardial necrosis.  Clinicians would have to utilize clinical acumen, EKG, Troponin and serial changes to determine if it is an Acute Myocardial Infarction or myocardial injury due to an underlying chronic condition.    LACTIC ACID, PLASMA - Normal   URINE DRUG SCREEN - Normal    Narrative:     Negative Thresholds For Drugs Screened:                  Amphetamines              1000 ng/ml               Barbiturates               200 ng/ml               Benzodiazepines            200 ng/ml              Cocaine                    300 ng/ml              Methadone                  300 ng/ml              Opiates                    300 ng/ml               Phencyclidine               25 ng/ml               THC                         50 ng/ml              6-Acetyl Morphine           10 ng/ml              Buprenorphine                5 ng/ml              Oxycodone                  300 ng/ml    The reference range for all drugs tested is negative. This report includes final unconfirmed qualitative results to be used for medical treatment purposes only. Unconfirmed results must not be used for non-medical purposes such as employment or legal testing. Clinical consideration should be applied to any drug of abuse test, especially when unconfirmed quantitative results are used.       SALICYLATE LEVEL - Normal   TSH - Normal   MAGNESIUM - Normal   T4, FREE - Normal   BLOOD CULTURE   BLOOD CULTURE   BETA HEMOLYTIC STREP CULTURE, THROAT   RAINBOW DRAW    Narrative:     The following orders were created for panel order [facility] Draw.  Procedure                               Abnormality         Status                     ---------                               -----------         ------                     Light Blue Top[[id number]]                                   Final result                Green Top (Gel)[[id number]]                                  Final result               Lavender Top[[id number]]                                     Final result               Gold Top - SST[[id number]]                                   Final result                 Please view results for these tests on the individual orders.   BLOOD GAS, ARTERIAL   ETHANOL    Narrative:     >/= 80.0 legally intoxicated   CBC AND DIFFERENTIAL    Narrative:     The following orders were created for panel order CBC & Differential.  Procedure                               Abnormality         Status                     ---------                               -----------         ------                     CBC Auto Differential[[id number]]        Abnormal            Final result                 Please view results for these tests on the individual orders.   LIGHT BLUE TOP   GREEN TOP   LAVENDER TOP   GOLD TOP - SST     XR Chest 1 View   Final Result   CHF, trace left effusion and bibasilar airspace disease       This report was finalized on 1/6/2020 12:13 PM by Dr. Cliff Brody MD.          CT Abdomen Pelvis Without Contrast   Final Result   : Mild hazy appearance around the pancreas probably physiologic but   could represent a mild degree of pancreatitis. Recommend correlation   with amylase and lipase       Other findings as above                   This report was finalized on 1/6/2020 10:50 AM by Dr. Cliff Brody MD.          CT Chest Without Contrast   Final Result   Small left effusion and left basilar consolidation   Coronary artery calcifications        This report was finalized on 1/6/2020 10:53 AM by Dr. Cliff Brody MD.          CT Head Without Contrast   Final Result   No acute intracranial pathology. Nothing is seen on this exam to   specifically account for the patient's symptoms.       This report was finalized on 1/6/2020 10:36 AM by Dr. Cliff Brody MD.            Past Medical History:   Diagnosis Date   •  Arthritis    • Cervical spine disease     remotely suggested surgical intervention.  Patient deferred.    • Cervical spine disease    • Coronary artery disease    • Dyslipidemia    • Hypertension    • Impotence    • Palpitations    • Vertigo      Past Surgical History:   Procedure Laterality Date   • ARTERIAL BYPASS SURGERY     • BRONCHOSCOPY N/A 2018    Procedure: BRONCHOSCOPY WITH WASHINGS;  Surgeon: Leonides Quarles MD;  Location:  [facility] OR;  Service: Pulmonary   • CHOLECYSTECTOMY WITH INTRAOPERATIVE CHOLANGIOGRAM N/A 2018    Procedure: OPEN CHOLECYSTECTOMY;  Surgeon: Rosie Montalvo MD;  Location:  [facility] OR;  Service: General   • COLONOSCOPY N/A 3/30/2018    Procedure: COLONOSCOPY;  Surgeon: Simone Valderrama MD;  Location:  [facility] OR;  Service: Gastroenterology   • CORONARY ARTERY BYPASS GRAFT     • KNEE ARTHROPLASTY, PARTIAL REPLACEMENT      Lt ,    • REPLACEMENT TOTAL KNEE      Rt     Social History     Socioeconomic History   • Marital status:      Spouse name: Not on file   • Number of children: Not on file   • Years of education: Not on file   • Highest education level: Not on file   Tobacco Use   • Smoking status: Former Smoker     Packs/day: 1.50     Years: 7.00     Pack years: 10.50     Types: Cigarettes, Pipe, Cigars     Last attempt to quit: 1986     Years since quittin.5   • Smokeless tobacco: Never Used   Substance and Sexual Activity   • Alcohol use: Yes     Comment: occassionally    • Drug use: No   • Sexual activity: Defer         Physical Exam   Constitutional: is oriented to person, place, and time.appears well-developed and well-nourished. No distress.   HENT:   Head: Normocephalic and atraumatic.   Eyes: EOM are normal. Pupils are equal, round, and reactive to light.   Neck: Normal range of motion. Neck supple. No tracheal deviation present.   Cardiovascular: Normal rate and regular rhythm.    Pulmonary/Chest: Effort normal and breath sounds normal. No  respiratory distress.   Neurological: alert and oriented to person, place, and time.  exhibits normal muscle tone.   Skin: Skin is warm and dry.  not diaphoretic.   Vitals reviewed.    ED Course as of Jan 06 1444   Mon Jan 06, 2020   1246 Case discussed with Dr. Bruce.  He is going to discuss the case with the hospitalist team and call me back.    [CM]   1417 Patient hospitalist at [facility] in [place].    [CM]   1421 [place] has a waiting list, currently with 6 patients holding for admission.  Calling [facility].    [CM]   1425 [facility] has a waiting list as well.    [CM]      ED Course User Index  [CM] Win Neely MD     MDM

## 2020-01-06 NOTE — ED NOTES
Called [facility] to see if they could transport patient to [facility]. Stated they would call back.      Kendra Robles  01/06/20 2303

## 2020-01-06 NOTE — ED NOTES
Sepsis time out cultures obtained      Amy Pascual RN  01/06/20 1207       Amy Pascual RN  01/06/20 1203

## 2020-01-06 NOTE — ED NOTES
[facility] called wanting to advice they are working on a bed for the patient. Advised it would be just a little bit before they get the bed ready.     Kendra Robles  01/06/20 0733

## 2020-01-07 LAB — BACTERIA BLD CULT: ABNORMAL

## 2020-01-08 LAB
BACTERIA SPEC AEROBE CULT: ABNORMAL
BACTERIA SPEC AEROBE CULT: NORMAL
GRAM STN SPEC: ABNORMAL
ISOLATED FROM: ABNORMAL

## 2020-01-08 NOTE — ED PROVIDER NOTES
Subjective   Patient is an 79-year-old male whom I saw on the morning of January 6.  This chart was sent to me in the epic system for completion.  There has been some sort of malfunction with the system in the chart, I was unable to reenter the original chart.  I have opted to redo the chart.  Patient arrives by EMS, history initially is from EMS, patient's wife did arrive later to provide additional history.  Patient had recently been seen here, felt to have obstructive jaundice, was transferred to [facility], went home 2 days later.  I obtained a discharge summary from that visit, which indicated that the patient rapidly improved and he was discharged home to continue outpatient work-up.  Patient's wife states that he began to deteriorate on Thursday, has had progressively worsening generalized weakness to the point that today he is unable to stand or ambulate.  She reports intermittent subjective fevers, has not measured his temperature.  She states that he has had markedly diminished appetite and po intake.  Patient does have a history of dementia, wife states that his mental status has slowly declined over these past few days as well.  Patient is awake and alert, he is confused.  He reports a feeling of general illness, but is unable to provide me with any specific symptoms or any other meaningful history.          Review of Systems   Unable to perform ROS: Mental status change       Past Medical History:   Diagnosis Date   • Arthritis    • Cervical spine disease     remotely suggested surgical intervention.  Patient deferred.    • Cervical spine disease    • Coronary artery disease    • Dyslipidemia    • Hypertension    • Impotence    • Palpitations    • Vertigo        No Known Allergies    Past Surgical History:   Procedure Laterality Date   • ARTERIAL BYPASS SURGERY     • BRONCHOSCOPY N/A 5/24/2018    Procedure: BRONCHOSCOPY WITH WASHINGS;  Surgeon: Leonides Quarles MD;  Location: [facility] OR;   Service: Pulmonary   • CHOLECYSTECTOMY WITH INTRAOPERATIVE CHOLANGIOGRAM N/A 2018    Procedure: OPEN CHOLECYSTECTOMY;  Surgeon: Rosie Montalvo MD;  [facility] OR;  Service: General   • COLONOSCOPY N/A 3/30/2018    Procedure: COLONOSCOPY;  Surgeon: Simone Valderrama MD;  [facility]:  [facility] OR;  Service: Gastroenterology   • CORONARY ARTERY BYPASS GRAFT     • KNEE ARTHROPLASTY, PARTIAL REPLACEMENT      Lt , 2015   • REPLACEMENT TOTAL KNEE      Rt       Family History   Problem Relation Age of Onset   • No Known Problems Mother    • No Known Problems Father    • No Known Problems Sister    • No Known Problems Brother        Social History     Socioeconomic History   • Marital status:      Spouse name: Not on file   • Number of children: Not on file   • Years of education: Not on file   • Highest education level: Not on file   Tobacco Use   • Smoking status: Former Smoker     Packs/day: 1.50     Years: 7.00     Pack years: 10.50     Types: Cigarettes, Pipe, Cigars     Last attempt to quit: 1986     Years since quittin.5   • Smokeless tobacco: Never Used   Substance and Sexual Activity   • Alcohol use: Yes     Comment: occassionally    • Drug use: No   • Sexual activity: Defer           Objective   Physical Exam   Constitutional: He appears well-developed and well-nourished.   Pleasant elderly male who appears comfortable.  He is not in any apparent distress.   HENT:   Head: Normocephalic and atraumatic.   Eyes: Pupils are equal, round, and reactive to light. EOM are normal. Scleral icterus (Mild) is present.   Neck: Normal range of motion. Neck supple. No neck rigidity. No tracheal deviation present.   Cardiovascular: Normal rate, regular rhythm and intact distal pulses.   Pulmonary/Chest: Effort normal and breath sounds normal. No respiratory distress. He exhibits no tenderness.   Abdominal: Soft. Bowel sounds are normal. There is no tenderness. There is no rebound and no guarding.    Musculoskeletal: Normal range of motion. He exhibits no tenderness.   Neurological: He is alert.   Patient is oriented to person and to hospital, he is otherwise confused.  No focal deficits are noted.   Skin: Skin is warm and dry. He is not diaphoretic. No cyanosis. No pallor.   Psychiatric: He has a normal mood and affect. His behavior is normal.   Nursing note and vitals reviewed.      Procedures             ED Course  ED Course as of Jan 08 0836   Mon Jan 06, 2020   1246 Case discussed with Dr. Bruce.  He is going to discuss the case with the hospitalist team and call me back.    [CM]   1417 Patient hospitalist at [facility] in [place].    [CM]   1421 [place] has a waiting list, currently with 6 patients holding for admission.  Calling [facility].    [CM]   1425 [facility] has a waiting list as well.    [CM]   Wed Jan 08, 2020   0828 The bed situation at this facility and and facilities throughout our region has been very difficult.  We were able to find a bed for the patient at [facility].  I spoke with Dr. Rai, gastroenterology, and Dr. Garcia, hospitalist, there.  Patient was accepted by Dr. Garcia with Dr. Rai consulting.  Patient's wife agrees with this plan, [facility] was her choice of hospital if we were unable to admit him here.    [CM]      ED Course User Index  [CM] Win Neely MD                                               [facility]    Final diagnoses:   Acute pancreatitis, unspecified complication status, unspecified pancreatitis type   Elevated liver enzymes             Please note that portions of this note were completed with a voice recognition program.        Win Neely MD  01/08/20 0833

## 2020-01-11 LAB — BACTERIA SPEC AEROBE CULT: NORMAL

## 2020-05-21 ENCOUNTER — TRANSCRIBE ORDERS (OUTPATIENT)
Dept: OTHER | Facility: OTHER | Age: 80
End: 2020-05-21

## 2020-05-21 ENCOUNTER — HOSPITAL ENCOUNTER (OUTPATIENT)
Dept: GENERAL RADIOLOGY | Facility: HOSPITAL | Age: 80
Discharge: HOME | End: 2020-05-21
Admitting: NURSE PRACTITIONER

## 2020-05-21 DIAGNOSIS — R10.9 STOMACH ACHE: Primary | ICD-10-CM

## 2020-05-21 DIAGNOSIS — R10.9 STOMACH ACHE: ICD-10-CM

## 2020-05-21 PROCEDURE — 74019 RADEX ABDOMEN 2 VIEWS: CPT | Performed by: RADIOLOGY

## 2020-05-21 PROCEDURE — 74019 RADEX ABDOMEN 2 VIEWS: CPT

## 2020-06-25 ENCOUNTER — OFFICE VISIT (OUTPATIENT)
Dept: UROLOGY | Facility: CLINIC | Age: 80
End: 2020-06-25

## 2020-06-25 VITALS — WEIGHT: 196 LBS | HEIGHT: 71 IN | TEMPERATURE: 98.4 F | BODY MASS INDEX: 27.44 KG/M2

## 2020-06-25 DIAGNOSIS — R97.20 ELEVATED PROSTATE SPECIFIC ANTIGEN (PSA): Primary | ICD-10-CM

## 2020-06-25 DIAGNOSIS — N40.1 BPH WITH URINARY OBSTRUCTION: ICD-10-CM

## 2020-06-25 DIAGNOSIS — N13.8 BPH WITH URINARY OBSTRUCTION: ICD-10-CM

## 2020-06-25 LAB — PSA SERPL-MCNC: 4.18 NG/ML (ref 0–4)

## 2020-06-25 PROCEDURE — 99213 OFFICE O/P EST LOW 20 MIN: CPT | Performed by: UROLOGY

## 2020-06-25 PROCEDURE — 84153 ASSAY OF PSA TOTAL: CPT | Performed by: UROLOGY

## 2020-06-25 PROCEDURE — 36415 COLL VENOUS BLD VENIPUNCTURE: CPT | Performed by: UROLOGY

## 2020-06-25 NOTE — PROGRESS NOTES
Chief Complaint:          Chief Complaint   Patient presents with   • Elevated PSA       HPI:   79 y.o. male with his wife who gets up 3-4 times per night and has been taking herbal supplement.  He took Flomax over year ago for urinary didn't think it worked.  His urinalysis was not available because he could not give a specimen, his postvoid residuals 103 cc.  He has a number of diagnoses but also has early Alzheimer's disease.  His CT scan done on March 16 showed significant cholelithiasis, and BPH with a thick wall bladder and some prostatic calcification.  After review of his symptomatology and examination which was unremarkable and a placement finasteride initially, if unsuccessful he will need a lower tract investigation , with concentric bladder wall thickening consistent with BPH and no obvious tumors or filling defects.  He returns his repeat PSA is 3.9 with a free PSA of 25%.  He has no discharge or good stream and no lower urinary tract symptomatology I'm comfortable to watch him I'll recheck him in 6 months.  He returns today.  He doesn't think the finasteride helping, he's never been Flomax.  He gets up 2 times at night has frequency , but he wants to try something different for him I we will make the addition of Flomax to his current regimen check a PSA as part of his follow-up because of his elevated PSA and see him back in one year pending the labs he's to call should he not think this is substantially good treatment for him  .  He returns today he is doing great he gets up twice at night no symptoms no burning no blood no discharge no fevers no chills PSA is pending up to see him back on an as-needed basis  He returns today he is doing great he has occasional sensations he sprays he gets up twice a night but given his degree of Alzheimer's I would recommend observation I will check a PSA per the request other than medical pictures on the else to offer from a urologic standpoint      Past Medical  History:        Past Medical History:   Diagnosis Date   • Arthritis    • Cervical spine disease     remotely suggested surgical intervention.  Patient deferred.    • Cervical spine disease    • Coronary artery disease    • Dyslipidemia    • Hypertension    • Impotence    • Palpitations    • Vertigo          Current Meds:     Current Outpatient Medications   Medication Sig Dispense Refill   • acetaminophen-codeine (TYLENOL/CODEINE #3) 300-30 MG per tablet Take  by mouth Every 8 (Eight) Hours.     • amLODIPine (NORVASC) 5 MG tablet Take  by mouth.     • aspirin 81 MG EC tablet Take 81 mg by mouth daily.     • atorvastatin (LIPITOR) 20 MG tablet Take 1 tablet by mouth.     • clopidogrel (PLAVIX) 75 MG tablet Take 75 mg by mouth daily.     • donepezil (ARICEPT) 10 MG tablet Take 10 mg by mouth Daily.  3   • FISH OIL-CANOLA OIL-VIT D3 PO FISH OIL (Oil)   1 bid  Active     • loratadine (CLARITIN) 10 MG tablet Take 10 mg by mouth Daily.     • memantine (NAMENDA) 10 MG tablet Take 10 mg by mouth 2 (Two) Times a Day.  3   • Omega-3 Fatty Acids (FISH OIL) 1000 MG capsule capsule Take 1,000 mg by mouth Daily With Breakfast.     • polyethylene glycol (MIRALAX) pack packet Take 17 g by mouth Daily. (Patient taking differently: Take 17 g by mouth As Needed.)     • vitamin D (ERGOCALCIFEROL) 86879 UNITS capsule capsule Take 50,000 Units by mouth 1 (One) Time Per Week. Prior to [facility] Admission, Patient was on: takes on mondays       No current facility-administered medications for this visit.         Allergies:      No Known Allergies     Past Surgical History:     Past Surgical History:   Procedure Laterality Date   • ARTERIAL BYPASS SURGERY     • BRONCHOSCOPY N/A 5/24/2018    Procedure: BRONCHOSCOPY WITH WASHINGS;  Surgeon: Leonides Quarles MD;  Location: [facility] [place];  Service: Pulmonary   • CHOLECYSTECTOMY WITH INTRAOPERATIVE CHOLANGIOGRAM N/A 5/16/2018    Procedure: OPEN CHOLECYSTECTOMY;  Surgeon: Rosie Montalvo MD;   [facility] OR;  Service: General   • COLONOSCOPY N/A 3/30/2018    Procedure: COLONOSCOPY;  Surgeon: Simone Valderrama MD;  [facility] OR;  Service: Gastroenterology   • CORONARY ARTERY BYPASS GRAFT     • KNEE ARTHROPLASTY, PARTIAL REPLACEMENT      Lt ,    • REPLACEMENT TOTAL KNEE      Rt         Social History:     Social History     Socioeconomic History   • Marital status:      Spouse name: Not on file   • Number of children: Not on file   • Years of education: Not on file   • Highest education level: Not on file   Tobacco Use   • Smoking status: Former Smoker     Packs/day: 1.50     Years: 7.00     Pack years: 10.50     Types: Cigarettes, Pipe, Cigars     Last attempt to quit: 1986     Years since quittin.0   • Smokeless tobacco: Never Used   Substance and Sexual Activity   • Alcohol use: Yes     Comment: occassionally    • Drug use: No   • Sexual activity: Defer       Family History:     Family History   Problem Relation Age of Onset   • No Known Problems Mother    • No Known Problems Father    • No Known Problems Sister    • No Known Problems Brother        Review of Systems:     Review of Systems   Constitutional: Negative.    HENT: Negative.    Eyes: Negative.    Respiratory: Negative.    Cardiovascular: Negative.    Gastrointestinal: Negative.    Endocrine: Negative.    Musculoskeletal: Negative.    Allergic/Immunologic: Negative.    Neurological: Negative.    Hematological: Negative.    Psychiatric/Behavioral: Negative.        Physical Exam:     Physical Exam   Constitutional: He is oriented to person, place, and time. He appears well-developed and well-nourished.   HENT:   Head: Normocephalic and atraumatic.   Eyes: Pupils are equal, round, and reactive to light. Conjunctivae and EOM are normal.   Neck: Normal range of motion.   Cardiovascular: Normal rate, regular rhythm, normal heart sounds and intact distal pulses.   Pulmonary/Chest: Effort normal and breath sounds normal.    Abdominal: Soft. Bowel sounds are normal.   Musculoskeletal: Normal range of motion.   Neurological: He is alert and oriented to person, place, and time. He has normal reflexes.   Skin: Skin is warm and dry.   Psychiatric: He has a normal mood and affect. His behavior is normal. Judgment and thought content normal.   Nursing note and vitals reviewed.      I have reviewed the following portions of the patient's history: allergies, current medications, past family history, past medical history, past social history, past surgical history, problem list and ROS and confirm it's accurate.      Procedure:       Assessment/Plan:   BPH: Discussed the pathophysiology of BPH and obstruction.  We discussed the static and dynamic effect effects of BPH as well as using 5 alpha reductase inhibitors versus alpha blockade.  We discussed the indications for transurethral surgery as well.  And/ or other therapeutic options available including all of the newer techniques.  Currently stable   PSA testing-I am recommending a PSA blood test that stands for prostate specific antigen.  I discussed the pathophysiology of PSA testing indicating its use in the diagnosis and management of prostate cancer.  I discussed the normal range being 0-4 but more appropriately being much closer to 0-2 in a normal male.  I discussed the fact that after certain age we don't recommend PSA testing especially in view of numerous comorbidities.  That this will not be a useful test.  I discussed many of the things that can artificially raised PSA including a recent infection, urinary tract infection, recent sexual intercourse.  Where even the type of movement such as manipulation of the prostate  riding a bicycle.  After all this is taken into account when the test is reviewed  the most important use of PSA which is the velocity measurement in other words the change of PSA with time as a very important factor in the use and that we look for greater than 20%  rise over a year to help us make the prediction of prostate cancer.  I also discussed that the use with prostate cancer indicating that after a radical prostatectomy the PSA should be 0 and any rise indicates an early biochemical recurrence  Alzheimer's-stable voiding recommend observation            Patient's Body mass index is 27.35 kg/m². BMI is above normal parameters. Recommendations include: educational material.              This document has been electronically signed by DAVI HANDLEY MD June 25, 2020 08:01

## 2020-07-01 ENCOUNTER — OFFICE VISIT (OUTPATIENT)
Dept: CARDIOLOGY | Facility: CLINIC | Age: 80
End: 2020-07-01

## 2020-07-01 VITALS
TEMPERATURE: 97.8 F | HEART RATE: 60 BPM | SYSTOLIC BLOOD PRESSURE: 118 MMHG | WEIGHT: 209.2 LBS | OXYGEN SATURATION: 98 % | DIASTOLIC BLOOD PRESSURE: 72 MMHG | BODY MASS INDEX: 29.29 KG/M2 | HEIGHT: 71 IN

## 2020-07-01 DIAGNOSIS — E78.2 MIXED HYPERLIPIDEMIA: ICD-10-CM

## 2020-07-01 DIAGNOSIS — I10 ESSENTIAL HYPERTENSION: ICD-10-CM

## 2020-07-01 DIAGNOSIS — I25.10 CORONARY ARTERY DISEASE INVOLVING NATIVE CORONARY ARTERY OF NATIVE HEART WITHOUT ANGINA PECTORIS: Primary | ICD-10-CM

## 2020-07-01 PROCEDURE — 99214 OFFICE O/P EST MOD 30 MIN: CPT | Performed by: INTERNAL MEDICINE

## 2020-07-01 RX ORDER — LANOLIN ALCOHOL/MO/W.PET/CERES
5000 CREAM (GRAM) TOPICAL DAILY
Status: ON HOLD | COMMUNITY
End: 2020-10-23

## 2020-07-01 NOTE — PROGRESS NOTES
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    Chief Complaint   Patient presents with   • Coronary Artery Disease       PROBLEM LIST:   1. CAD:  a. February 2006: Off-pump CABG x4, sequential LIMA to first diagonal and LAD, saphenous graft to OM1, and PL of the circumflex, Kody Jeronimo MD.    b. February 2012: MPS per [facility], normal perfusion 51%.   c. 5/23/18 echo: EF 60-65%, diastolic dysfunction, mild to moderate TR, severe pulmonary HTN  2. HTN  a. 6/16 echo, [facility]  3. Dyslipidemia:   a. August 2015: Total cholesterol 102, triglycerides 92, HDL 30, and LDL 54.   4. Palpitations:  a. 2012, Holter with greater than 3200 PVCs  5. Impotence.    6. Cervical spine disease, remotely suggested surgical intervention. Patient deferred.    7. Vertigo.    8. Prior surgeries:   a. Right knee replacement December 2007.    b.  left knee replacement per Dr. Ruiz 2016    Allergies  No Known Allergies    Current Medications    Current Outpatient Medications:   •  acetaminophen-codeine (TYLENOL/CODEINE #3) 300-30 MG per tablet, Take  by mouth Every 8 (Eight) Hours., Disp: , Rfl:   •  amLODIPine (NORVASC) 5 MG tablet, Take 5 mg by mouth As Needed., Disp: , Rfl:   •  aspirin 81 MG EC tablet, Take 81 mg by mouth daily., Disp: , Rfl:   •  atorvastatin (LIPITOR) 20 MG tablet, Take 1 tablet by mouth 3 (Three) Times a Week., Disp: , Rfl:   •  clopidogrel (PLAVIX) 75 MG tablet, Take 75 mg by mouth daily., Disp: , Rfl:   •  donepezil (ARICEPT) 10 MG tablet, Take 10 mg by mouth Daily., Disp: , Rfl: 3  •  FISH OIL-CANOLA OIL-VIT D3 PO, FISH OIL (Oil)  1 bid Active, Disp: , Rfl:   •  loratadine (CLARITIN) 10 MG tablet, Take 10 mg by mouth As Needed., Disp: , Rfl:   •  memantine (NAMENDA) 10 MG tablet,  "Take 10 mg by mouth 2 (Two) Times a Day., Disp: , Rfl: 3  •  Omega-3 Fatty Acids (FISH OIL) 1000 MG capsule capsule, Take 1,200 mg by mouth Daily With Breakfast., Disp: , Rfl:   •  polyethylene glycol (MIRALAX) pack packet, Take 17 g by mouth Daily. (Patient taking differently: Take 17 g by mouth As Needed.), Disp: , Rfl:   •  vitamin B-12 (CYANOCOBALAMIN) 1000 MCG tablet, Take 5,000 mcg by mouth Daily., Disp: , Rfl:   •  vitamin D (ERGOCALCIFEROL) 12090 UNITS capsule capsule, Take 50,000 Units by mouth 1 (One) Time Per Week. Prior to [facility] Admission, Patient was on: takes on mondays, Disp: , Rfl:       History of Present Illness   No recent chest issues with some progressive memory impairment however.  Market delay in cognition today and his gait is slowing progressively since last visit  No new chest symptoms but he was seen outside hospital due to pancreatitis and questionable jaundice    OBJECTIVE:  Vitals:    07/01/20 1015   BP: 118/72   BP Location: Left arm   Patient Position: Sitting   Pulse: 60   Temp: 97.8 °F (36.6 °C)   SpO2: 98%   Weight: 94.9 kg (209 lb 3.2 oz)   Height: 179.1 cm (70.5\")     Physical Exam   Constitutional: He appears well-developed and well-nourished.   Neck: Normal range of motion. Neck supple. No hepatojugular reflux and no JVD present. Carotid bruit is not present. No tracheal deviation present. No thyromegaly present.   Cardiovascular: Normal rate, regular rhythm, S1 normal, S2 normal, intact distal pulses and normal pulses. PMI is not displaced. Exam reveals no gallop, no distant heart sounds, no friction rub, no midsystolic click and no opening snap.   No murmur heard.  Pulses:       Radial pulses are 2+ on the right side, and 2+ on the left side.        Dorsalis pedis pulses are 2+ on the right side, and 2+ on the left side.        Posterior tibial pulses are 2+ on the right side, and 2+ on the left side.   Pulmonary/Chest: Effort normal and breath sounds normal. He has no " wheezes. He has no rales.   Abdominal: Soft. Bowel sounds are normal. He exhibits no mass. There is no tenderness. There is no guarding.       Diagnostic Data:  Procedures      ASSESSMENT:   Diagnosis Plan   1. Coronary artery disease involving native coronary artery of native heart without angina pectoris     2. Essential hypertension     3. Mixed hyperlipidemia       PLAN:  Cad- stable post surgical revasc now 12 yrs prior.  Cont med RX.     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 Soni for evaluation.  I have forwarded my electronically generated recommendations to you for review.  Please do not hesitate to call with any questions.      Dwayne Swartz MD, FACC

## 2020-07-24 ENCOUNTER — TRANSCRIBE ORDERS (OUTPATIENT)
Dept: ADMINISTRATIVE | Facility: HOSPITAL | Age: 80
End: 2020-07-24

## 2020-07-24 DIAGNOSIS — Z01.818 PRE-OPERATIVE CLEARANCE: Primary | ICD-10-CM

## 2020-07-28 ENCOUNTER — LAB (OUTPATIENT)
Dept: LAB | Facility: HOSPITAL | Age: 80
End: 2020-07-28

## 2020-07-28 ENCOUNTER — HOSPITAL ENCOUNTER (OUTPATIENT)
Dept: RESPIRATORY THERAPY | Facility: HOSPITAL | Age: 80
Discharge: HOME | End: 2020-07-28
Admitting: SURGERY

## 2020-07-28 ENCOUNTER — HOSPITAL ENCOUNTER (OUTPATIENT)
Dept: GENERAL RADIOLOGY | Facility: HOSPITAL | Age: 80
Discharge: HOME | End: 2020-07-28

## 2020-07-28 ENCOUNTER — TRANSCRIBE ORDERS (OUTPATIENT)
Dept: ADMINISTRATIVE | Facility: HOSPITAL | Age: 80
End: 2020-07-28

## 2020-07-28 DIAGNOSIS — C43.62 MALIGNANT MELANOMA OF LEFT UPPER EXTREMITY INCLUDING SHOULDER (HCC): Primary | ICD-10-CM

## 2020-07-28 DIAGNOSIS — C43.62 MALIGNANT MELANOMA OF LEFT UPPER EXTREMITY INCLUDING SHOULDER (HCC): ICD-10-CM

## 2020-07-28 DIAGNOSIS — Z01.818 PRE-OPERATIVE CLEARANCE: ICD-10-CM

## 2020-07-28 LAB
ALBUMIN SERPL-MCNC: 4.08 G/DL (ref 3.5–5.2)
ALBUMIN/GLOB SERPL: 1.5 G/DL
ALP SERPL-CCNC: 84 U/L (ref 39–117)
ALT SERPL W P-5'-P-CCNC: 12 U/L (ref 1–41)
ANION GAP SERPL CALCULATED.3IONS-SCNC: 12 MMOL/L (ref 5–15)
AST SERPL-CCNC: 14 U/L (ref 1–40)
BASOPHILS # BLD AUTO: 0.09 10*3/MM3 (ref 0–0.2)
BASOPHILS NFR BLD AUTO: 0.8 % (ref 0–1.5)
BILIRUB SERPL-MCNC: 0.7 MG/DL (ref 0–1.2)
BUN SERPL-MCNC: 21 MG/DL (ref 8–23)
BUN/CREAT SERPL: 12.7 (ref 7–25)
CALCIUM SPEC-SCNC: 8.8 MG/DL (ref 8.6–10.5)
CHLORIDE SERPL-SCNC: 101 MMOL/L (ref 98–107)
CO2 SERPL-SCNC: 26 MMOL/L (ref 22–29)
CREAT SERPL-MCNC: 1.66 MG/DL (ref 0.76–1.27)
DEPRECATED RDW RBC AUTO: 43.3 FL (ref 37–54)
EOSINOPHIL # BLD AUTO: 2 10*3/MM3 (ref 0–0.4)
EOSINOPHIL NFR BLD AUTO: 17.8 % (ref 0.3–6.2)
ERYTHROCYTE [DISTWIDTH] IN BLOOD BY AUTOMATED COUNT: 11.9 % (ref 12.3–15.4)
GFR SERPL CREATININE-BSD FRML MDRD: 40 ML/MIN/1.73
GLOBULIN UR ELPH-MCNC: 2.7 GM/DL
GLUCOSE SERPL-MCNC: 116 MG/DL (ref 65–99)
HCT VFR BLD AUTO: 39 % (ref 37.5–51)
HGB BLD-MCNC: 12.8 G/DL (ref 13–17.7)
IMM GRANULOCYTES # BLD AUTO: 0.04 10*3/MM3 (ref 0–0.05)
IMM GRANULOCYTES NFR BLD AUTO: 0.4 % (ref 0–0.5)
LYMPHOCYTES # BLD AUTO: 2.44 10*3/MM3 (ref 0.7–3.1)
LYMPHOCYTES NFR BLD AUTO: 21.8 % (ref 19.6–45.3)
MCH RBC QN AUTO: 32.8 PG (ref 26.6–33)
MCHC RBC AUTO-ENTMCNC: 32.8 G/DL (ref 31.5–35.7)
MCV RBC AUTO: 100 FL (ref 79–97)
MONOCYTES # BLD AUTO: 0.82 10*3/MM3 (ref 0.1–0.9)
MONOCYTES NFR BLD AUTO: 7.3 % (ref 5–12)
NEUTROPHILS NFR BLD AUTO: 5.82 10*3/MM3 (ref 1.7–7)
NEUTROPHILS NFR BLD AUTO: 51.9 % (ref 42.7–76)
NRBC BLD AUTO-RTO: 0 /100 WBC (ref 0–0.2)
PLATELET # BLD AUTO: 245 10*3/MM3 (ref 140–450)
PMV BLD AUTO: 8.8 FL (ref 6–12)
POTASSIUM SERPL-SCNC: 4.5 MMOL/L (ref 3.5–5.2)
PROT SERPL-MCNC: 6.8 G/DL (ref 6–8.5)
RBC # BLD AUTO: 3.9 10*6/MM3 (ref 4.14–5.8)
REF LAB TEST METHOD: NORMAL
SARS-COV-2 RNA RESP QL NAA+PROBE: NOT DETECTED
SODIUM SERPL-SCNC: 139 MMOL/L (ref 136–145)
WBC # BLD AUTO: 11.21 10*3/MM3 (ref 3.4–10.8)

## 2020-07-28 PROCEDURE — 93005 ELECTROCARDIOGRAM TRACING: CPT

## 2020-07-28 PROCEDURE — 80053 COMPREHEN METABOLIC PANEL: CPT

## 2020-07-28 PROCEDURE — 71046 X-RAY EXAM CHEST 2 VIEWS: CPT

## 2020-07-28 PROCEDURE — C9803 HOPD COVID-19 SPEC COLLECT: HCPCS

## 2020-07-28 PROCEDURE — 71046 X-RAY EXAM CHEST 2 VIEWS: CPT | Performed by: RADIOLOGY

## 2020-07-28 PROCEDURE — 85025 COMPLETE CBC W/AUTO DIFF WBC: CPT

## 2020-07-28 PROCEDURE — 36415 COLL VENOUS BLD VENIPUNCTURE: CPT

## 2020-07-28 PROCEDURE — U0004 COV-19 TEST NON-CDC HGH THRU: HCPCS

## 2020-07-28 PROCEDURE — U0002 COVID-19 LAB TEST NON-CDC: HCPCS

## 2020-07-29 ENCOUNTER — TRANSCRIBE ORDERS (OUTPATIENT)
Dept: ADMINISTRATIVE | Facility: HOSPITAL | Age: 80
End: 2020-07-29

## 2020-07-29 ENCOUNTER — ANESTHESIA EVENT (OUTPATIENT)
Dept: PERIOP | Facility: HOSPITAL | Age: 80
End: 2020-07-29

## 2020-07-29 DIAGNOSIS — C43.62 MALIGNANT MELANOMA OF LEFT UPPER EXTREMITY INCLUDING SHOULDER (HCC): Primary | ICD-10-CM

## 2020-07-29 RX ORDER — FAMOTIDINE 10 MG/ML
20 INJECTION, SOLUTION INTRAVENOUS ONCE
Status: CANCELLED | OUTPATIENT
Start: 2020-07-29 | End: 2020-07-29

## 2020-07-30 ENCOUNTER — HOSPITAL ENCOUNTER (OUTPATIENT)
Facility: HOSPITAL | Age: 80
Discharge: HOME | End: 2020-07-30
Attending: SURGERY | Admitting: SURGERY

## 2020-07-30 ENCOUNTER — HOSPITAL ENCOUNTER (OUTPATIENT)
Dept: NUCLEAR MEDICINE | Facility: HOSPITAL | Age: 80
Discharge: HOME | End: 2020-07-30

## 2020-07-30 ENCOUNTER — ANESTHESIA (OUTPATIENT)
Dept: PERIOP | Facility: HOSPITAL | Age: 80
End: 2020-07-30

## 2020-07-30 VITALS
HEART RATE: 58 BPM | WEIGHT: 209.2 LBS | OXYGEN SATURATION: 96 % | BODY MASS INDEX: 29.29 KG/M2 | HEIGHT: 71 IN | DIASTOLIC BLOOD PRESSURE: 84 MMHG | RESPIRATION RATE: 14 BRPM | TEMPERATURE: 98 F | SYSTOLIC BLOOD PRESSURE: 148 MMHG

## 2020-07-30 DIAGNOSIS — C43.62 MALIGNANT MELANOMA OF LEFT UPPER EXTREMITY INCLUDING SHOULDER (HCC): ICD-10-CM

## 2020-07-30 DIAGNOSIS — C43.9 MELANOMA (HCC): ICD-10-CM

## 2020-07-30 PROCEDURE — A9541 TC99M SULFUR COLLOID: HCPCS | Performed by: SURGERY

## 2020-07-30 PROCEDURE — 88307 TISSUE EXAM BY PATHOLOGIST: CPT | Performed by: SURGERY

## 2020-07-30 PROCEDURE — A9270 NON-COVERED ITEM OR SERVICE: HCPCS | Performed by: SURGERY

## 2020-07-30 PROCEDURE — 25010000002 HYDROMORPHONE PER 4 MG: Performed by: NURSE ANESTHETIST, CERTIFIED REGISTERED

## 2020-07-30 PROCEDURE — 0 TECHNETIUM FILTERED SULFUR COLLOID: Performed by: SURGERY

## 2020-07-30 PROCEDURE — 63710000001 AMLODIPINE 5 MG TABLET: Performed by: SURGERY

## 2020-07-30 PROCEDURE — 25010000002 FENTANYL CITRATE (PF) 100 MCG/2ML SOLUTION: Performed by: NURSE ANESTHETIST, CERTIFIED REGISTERED

## 2020-07-30 PROCEDURE — 25010000003 CEFAZOLIN IN DEXTROSE 2-4 GM/100ML-% SOLUTION: Performed by: SURGERY

## 2020-07-30 PROCEDURE — 88305 TISSUE EXAM BY PATHOLOGIST: CPT | Performed by: SURGERY

## 2020-07-30 PROCEDURE — 38792 RA TRACER ID OF SENTINL NODE: CPT

## 2020-07-30 RX ORDER — SODIUM CHLORIDE 9 MG/ML
9 INJECTION, SOLUTION INTRAVENOUS ONCE
Status: COMPLETED | OUTPATIENT
Start: 2020-07-30 | End: 2020-07-30

## 2020-07-30 RX ORDER — SODIUM CHLORIDE 0.9 % (FLUSH) 0.9 %
10 SYRINGE (ML) INJECTION AS NEEDED
Status: DISCONTINUED | OUTPATIENT
Start: 2020-07-30 | End: 2020-07-30 | Stop reason: HOSPADM

## 2020-07-30 RX ORDER — ONDANSETRON 2 MG/ML
4 INJECTION INTRAMUSCULAR; INTRAVENOUS ONCE AS NEEDED
Status: DISCONTINUED | OUTPATIENT
Start: 2020-07-30 | End: 2020-07-30 | Stop reason: HOSPADM

## 2020-07-30 RX ORDER — AMLODIPINE BESYLATE 5 MG/1
5 TABLET ORAL ONCE
Status: COMPLETED | OUTPATIENT
Start: 2020-07-30 | End: 2020-07-30

## 2020-07-30 RX ORDER — CEFAZOLIN SODIUM 2 G/100ML
2 INJECTION, SOLUTION INTRAVENOUS ONCE
Status: COMPLETED | OUTPATIENT
Start: 2020-07-30 | End: 2020-07-30

## 2020-07-30 RX ORDER — FENTANYL CITRATE 50 UG/ML
50 INJECTION, SOLUTION INTRAMUSCULAR; INTRAVENOUS
Status: DISCONTINUED | OUTPATIENT
Start: 2020-07-30 | End: 2020-07-30 | Stop reason: HOSPADM

## 2020-07-30 RX ORDER — MAGNESIUM HYDROXIDE 1200 MG/15ML
LIQUID ORAL AS NEEDED
Status: DISCONTINUED | OUTPATIENT
Start: 2020-07-30 | End: 2020-07-30 | Stop reason: HOSPADM

## 2020-07-30 RX ORDER — LIDOCAINE HYDROCHLORIDE 10 MG/ML
0.5 INJECTION, SOLUTION EPIDURAL; INFILTRATION; INTRACAUDAL; PERINEURAL ONCE AS NEEDED
Status: COMPLETED | OUTPATIENT
Start: 2020-07-30 | End: 2020-07-30

## 2020-07-30 RX ORDER — HYDROCODONE BITARTRATE AND ACETAMINOPHEN 7.5; 325 MG/1; MG/1
1-2 TABLET ORAL EVERY 4 HOURS PRN
Qty: 10 TABLET | Refills: 0 | Status: SHIPPED | OUTPATIENT
Start: 2020-07-30 | End: 2020-10-23

## 2020-07-30 RX ORDER — HYDROMORPHONE HYDROCHLORIDE 1 MG/ML
0.5 INJECTION, SOLUTION INTRAMUSCULAR; INTRAVENOUS; SUBCUTANEOUS
Status: DISCONTINUED | OUTPATIENT
Start: 2020-07-30 | End: 2020-07-30 | Stop reason: HOSPADM

## 2020-07-30 RX ORDER — DEXAMETHASONE SODIUM PHOSPHATE 4 MG/ML
8 INJECTION, SOLUTION INTRA-ARTICULAR; INTRALESIONAL; INTRAMUSCULAR; INTRAVENOUS; SOFT TISSUE ONCE AS NEEDED
Status: DISCONTINUED | OUTPATIENT
Start: 2020-07-30 | End: 2020-07-30 | Stop reason: HOSPADM

## 2020-07-30 RX ORDER — FENTANYL CITRATE 50 UG/ML
INJECTION, SOLUTION INTRAMUSCULAR; INTRAVENOUS AS NEEDED
Status: DISCONTINUED | OUTPATIENT
Start: 2020-07-30 | End: 2020-07-30 | Stop reason: SURG

## 2020-07-30 RX ORDER — FAMOTIDINE 20 MG/1
20 TABLET, FILM COATED ORAL ONCE
Status: COMPLETED | OUTPATIENT
Start: 2020-07-30 | End: 2020-07-30

## 2020-07-30 RX ORDER — SODIUM CHLORIDE, SODIUM LACTATE, POTASSIUM CHLORIDE, CALCIUM CHLORIDE 600; 310; 30; 20 MG/100ML; MG/100ML; MG/100ML; MG/100ML
9 INJECTION, SOLUTION INTRAVENOUS CONTINUOUS
Status: DISCONTINUED | OUTPATIENT
Start: 2020-07-30 | End: 2020-07-30

## 2020-07-30 RX ORDER — HYDROCODONE BITARTRATE AND ACETAMINOPHEN 5; 325 MG/1; MG/1
1 TABLET ORAL ONCE AS NEEDED
Status: COMPLETED | OUTPATIENT
Start: 2020-07-30 | End: 2020-07-30

## 2020-07-30 RX ORDER — SODIUM CHLORIDE 0.9 % (FLUSH) 0.9 %
10 SYRINGE (ML) INJECTION EVERY 12 HOURS SCHEDULED
Status: DISCONTINUED | OUTPATIENT
Start: 2020-07-30 | End: 2020-07-30 | Stop reason: HOSPADM

## 2020-07-30 RX ADMIN — FENTANYL CITRATE 50 MCG: 0.05 INJECTION, SOLUTION INTRAMUSCULAR; INTRAVENOUS at 11:12

## 2020-07-30 RX ADMIN — CEFAZOLIN SODIUM 2 G: 2 INJECTION, SOLUTION INTRAVENOUS at 09:47

## 2020-07-30 RX ADMIN — HYDROMORPHONE HYDROCHLORIDE 0.5 MG: 1 INJECTION, SOLUTION INTRAMUSCULAR; INTRAVENOUS; SUBCUTANEOUS at 11:27

## 2020-07-30 RX ADMIN — FENTANYL CITRATE 25 MCG: 50 INJECTION, SOLUTION INTRAMUSCULAR; INTRAVENOUS at 09:45

## 2020-07-30 RX ADMIN — LIDOCAINE HYDROCHLORIDE 0.2 ML: 10 INJECTION, SOLUTION EPIDURAL; INFILTRATION; INTRACAUDAL; PERINEURAL at 08:26

## 2020-07-30 RX ADMIN — FENTANYL CITRATE 25 MCG: 50 INJECTION, SOLUTION INTRAMUSCULAR; INTRAVENOUS at 09:46

## 2020-07-30 RX ADMIN — AMLODIPINE BESYLATE 5 MG: 5 TABLET ORAL at 14:00

## 2020-07-30 RX ADMIN — TECHNETIUM TC 99M SULFUR COLLOID 1 DOSE: KIT at 09:35

## 2020-07-30 RX ADMIN — FENTANYL CITRATE 25 MCG: 50 INJECTION, SOLUTION INTRAMUSCULAR; INTRAVENOUS at 10:28

## 2020-07-30 RX ADMIN — FENTANYL CITRATE 50 MCG: 0.05 INJECTION, SOLUTION INTRAMUSCULAR; INTRAVENOUS at 11:19

## 2020-07-30 RX ADMIN — HYDROCODONE BITARTRATE AND ACETAMINOPHEN 1 TABLET: 5; 325 TABLET ORAL at 11:58

## 2020-07-30 RX ADMIN — SODIUM CHLORIDE 9 ML/HR: 9 INJECTION, SOLUTION INTRAVENOUS at 08:26

## 2020-07-30 RX ADMIN — FENTANYL CITRATE 25 MCG: 50 INJECTION, SOLUTION INTRAMUSCULAR; INTRAVENOUS at 10:26

## 2020-07-30 RX ADMIN — FAMOTIDINE 20 MG: 20 TABLET, FILM COATED ORAL at 08:39

## 2020-07-31 LAB
CYTO UR: NORMAL
LAB AP CASE REPORT: NORMAL
LAB AP CLINICAL INFORMATION: NORMAL
LAB AP DIAGNOSIS COMMENT: NORMAL
PATH REPORT.FINAL DX SPEC: NORMAL
PATH REPORT.GROSS SPEC: NORMAL

## 2020-08-22 ENCOUNTER — TRANSCRIBE ORDERS (OUTPATIENT)
Dept: ADMINISTRATIVE | Facility: HOSPITAL | Age: 80
End: 2020-08-22

## 2020-08-22 DIAGNOSIS — Z01.818 PRE-OPERATIVE CLEARANCE: Primary | ICD-10-CM

## 2020-10-19 ENCOUNTER — LAB (OUTPATIENT)
Dept: LAB | Facility: HOSPITAL | Age: 80
End: 2020-10-19

## 2020-10-19 ENCOUNTER — TRANSCRIBE ORDERS (OUTPATIENT)
Dept: ADMINISTRATIVE | Facility: HOSPITAL | Age: 80
End: 2020-10-19

## 2020-10-19 DIAGNOSIS — I13.10 MALIGNANT HYPERTENSIVE HEART AND CHRONIC KIDNEY DISEASE STAGE III (HCC): Primary | ICD-10-CM

## 2020-10-19 DIAGNOSIS — E11.22 DIABETES MELLITUS WITH STAGE 3 CHRONIC KIDNEY DISEASE (HCC): ICD-10-CM

## 2020-10-19 DIAGNOSIS — N18.30 MALIGNANT HYPERTENSIVE HEART AND CHRONIC KIDNEY DISEASE STAGE III (HCC): Primary | ICD-10-CM

## 2020-10-19 DIAGNOSIS — N18.30 DIABETES MELLITUS WITH STAGE 3 CHRONIC KIDNEY DISEASE (HCC): ICD-10-CM

## 2020-10-19 PROCEDURE — 82043 UR ALBUMIN QUANTITATIVE: CPT

## 2020-10-19 PROCEDURE — 81001 URINALYSIS AUTO W/SCOPE: CPT

## 2020-10-19 PROCEDURE — 82570 ASSAY OF URINE CREATININE: CPT

## 2020-10-19 PROCEDURE — 84156 ASSAY OF PROTEIN URINE: CPT

## 2020-10-20 LAB
ALBUMIN UR-MCNC: 2.8 MG/DL
BACTERIA UR QL AUTO: NORMAL /HPF
BILIRUB UR QL STRIP: NEGATIVE
CLARITY UR: ABNORMAL
COLOR UR: ABNORMAL
CREAT UR-MCNC: 220.5 MG/DL
CREAT UR-MCNC: 220.5 MG/DL
GLUCOSE UR STRIP-MCNC: NEGATIVE MG/DL
HGB UR QL STRIP.AUTO: NEGATIVE
HYALINE CASTS UR QL AUTO: NORMAL /LPF
KETONES UR QL STRIP: NEGATIVE
LEUKOCYTE ESTERASE UR QL STRIP.AUTO: NEGATIVE
MICROALBUMIN/CREAT UR: 12.7 MG/G
NITRITE UR QL STRIP: NEGATIVE
PH UR STRIP.AUTO: 5.5 [PH] (ref 5–8)
PROT UR QL STRIP: ABNORMAL
PROT UR-MCNC: 29 MG/DL
PROT/CREAT UR: 131.5 MG/G CREA (ref 0–200)
RBC # UR: NORMAL /HPF
REF LAB TEST METHOD: NORMAL
SP GR UR STRIP: 1.03 (ref 1–1.03)
SQUAMOUS #/AREA URNS HPF: NORMAL /HPF
UROBILINOGEN UR QL STRIP: ABNORMAL
WBC UR QL AUTO: NORMAL /HPF

## 2020-10-23 ENCOUNTER — APPOINTMENT (OUTPATIENT)
Dept: CT IMAGING | Facility: HOSPITAL | Age: 80
End: 2020-10-23

## 2020-10-23 ENCOUNTER — HOSPITAL ENCOUNTER (INPATIENT)
Facility: HOSPITAL | Age: 80
LOS: 7 days | Discharge: NURSING, REHAB OR LONG-TERM CARE | End: 2020-10-30
Attending: FAMILY MEDICINE | Admitting: INTERNAL MEDICINE

## 2020-10-23 ENCOUNTER — APPOINTMENT (OUTPATIENT)
Dept: GENERAL RADIOLOGY | Facility: HOSPITAL | Age: 80
End: 2020-10-23

## 2020-10-23 DIAGNOSIS — A41.9 SEPSIS, DUE TO UNSPECIFIED ORGANISM, UNSPECIFIED WHETHER ACUTE ORGAN DYSFUNCTION PRESENT (HCC): Primary | ICD-10-CM

## 2020-10-23 DIAGNOSIS — J18.9 PNEUMONIA OF RIGHT LOWER LOBE DUE TO INFECTIOUS ORGANISM: ICD-10-CM

## 2020-10-23 DIAGNOSIS — G62.89 OTHER POLYNEUROPATHY: ICD-10-CM

## 2020-10-23 DIAGNOSIS — R41.82 ALTERED MENTAL STATUS, UNSPECIFIED ALTERED MENTAL STATUS TYPE: ICD-10-CM

## 2020-10-23 LAB
6-ACETYL MORPHINE: NEGATIVE
A-A DO2: 41.2 MMHG (ref 0–300)
ALBUMIN SERPL-MCNC: 4.04 G/DL (ref 3.5–5.2)
ALBUMIN/GLOB SERPL: 1.4 G/DL
ALP SERPL-CCNC: 78 U/L (ref 39–117)
ALT SERPL W P-5'-P-CCNC: 12 U/L (ref 1–41)
AMORPH URATE CRY URNS QL MICRO: ABNORMAL /HPF
AMPHET+METHAMPHET UR QL: NEGATIVE
ANION GAP SERPL CALCULATED.3IONS-SCNC: 11.5 MMOL/L (ref 5–15)
ARTERIAL PATENCY WRIST A: POSITIVE
AST SERPL-CCNC: 12 U/L (ref 1–40)
ATMOSPHERIC PRESS: 730 MMHG
BACTERIA UR QL AUTO: ABNORMAL /HPF
BARBITURATES UR QL SCN: NEGATIVE
BASE EXCESS BLDA CALC-SCNC: 1.2 MMOL/L (ref 0–2)
BASOPHILS # BLD AUTO: 0.06 10*3/MM3 (ref 0–0.2)
BASOPHILS NFR BLD AUTO: 0.3 % (ref 0–1.5)
BDY SITE: ABNORMAL
BENZODIAZ UR QL SCN: NEGATIVE
BILIRUB SERPL-MCNC: 1.6 MG/DL (ref 0–1.2)
BILIRUB UR QL STRIP: NEGATIVE
BODY TEMPERATURE: 0 C
BUN SERPL-MCNC: 58 MG/DL (ref 8–23)
BUN/CREAT SERPL: 29.9 (ref 7–25)
BUPRENORPHINE SERPL-MCNC: NEGATIVE NG/ML
CALCIUM SPEC-SCNC: 9.4 MG/DL (ref 8.6–10.5)
CANNABINOIDS SERPL QL: NEGATIVE
CHLORIDE SERPL-SCNC: 109 MMOL/L (ref 98–107)
CK SERPL-CCNC: 100 U/L (ref 20–200)
CLARITY UR: CLEAR
CO2 BLDA-SCNC: 26.4 MMOL/L (ref 22–33)
CO2 SERPL-SCNC: 24.5 MMOL/L (ref 22–29)
COCAINE UR QL: NEGATIVE
COHGB MFR BLD: 1.7 % (ref 0–5)
COLOR UR: ABNORMAL
CREAT SERPL-MCNC: 1.94 MG/DL (ref 0.76–1.27)
CRP SERPL-MCNC: 10.29 MG/DL (ref 0–0.5)
D-LACTATE SERPL-SCNC: 1.9 MMOL/L (ref 0.5–2)
DEPRECATED RDW RBC AUTO: 42.7 FL (ref 37–54)
EOSINOPHIL # BLD AUTO: 0 10*3/MM3 (ref 0–0.4)
EOSINOPHIL NFR BLD AUTO: 0 % (ref 0.3–6.2)
ERYTHROCYTE [DISTWIDTH] IN BLOOD BY AUTOMATED COUNT: 11.8 % (ref 12.3–15.4)
ETHANOL BLD-MCNC: <10 MG/DL (ref 0–10)
ETHANOL UR QL: <0.01 %
FLUAV AG NPH QL: NEGATIVE
FLUBV AG NPH QL IA: NEGATIVE
GFR SERPL CREATININE-BSD FRML MDRD: 33 ML/MIN/1.73
GLOBULIN UR ELPH-MCNC: 2.9 GM/DL
GLUCOSE SERPL-MCNC: 126 MG/DL (ref 65–99)
GLUCOSE UR STRIP-MCNC: NEGATIVE MG/DL
HCO3 BLDA-SCNC: 25.2 MMOL/L (ref 20–26)
HCT VFR BLD AUTO: 45.3 % (ref 37.5–51)
HCT VFR BLD CALC: 45.1 % (ref 38–51)
HGB BLD-MCNC: 14.6 G/DL (ref 13–17.7)
HGB BLDA-MCNC: 14.7 G/DL (ref 14–18)
HGB UR QL STRIP.AUTO: NEGATIVE
HYALINE CASTS UR QL AUTO: ABNORMAL /LPF
IMM GRANULOCYTES # BLD AUTO: 0.08 10*3/MM3 (ref 0–0.05)
IMM GRANULOCYTES NFR BLD AUTO: 0.4 % (ref 0–0.5)
INHALED O2 CONCENTRATION: 21 %
KETONES UR QL STRIP: ABNORMAL
LEUKOCYTE ESTERASE UR QL STRIP.AUTO: NEGATIVE
LYMPHOCYTES # BLD AUTO: 2.04 10*3/MM3 (ref 0.7–3.1)
LYMPHOCYTES NFR BLD AUTO: 10.7 % (ref 19.6–45.3)
Lab: ABNORMAL
MAGNESIUM SERPL-MCNC: 2.3 MG/DL (ref 1.6–2.4)
MCH RBC QN AUTO: 31.9 PG (ref 26.6–33)
MCHC RBC AUTO-ENTMCNC: 32.2 G/DL (ref 31.5–35.7)
MCV RBC AUTO: 99.1 FL (ref 79–97)
METHADONE UR QL SCN: NEGATIVE
METHGB BLD QL: 0.2 % (ref 0–3)
MODALITY: ABNORMAL
MONOCYTES # BLD AUTO: 0.98 10*3/MM3 (ref 0.1–0.9)
MONOCYTES NFR BLD AUTO: 5.1 % (ref 5–12)
NEUTROPHILS NFR BLD AUTO: 15.95 10*3/MM3 (ref 1.7–7)
NEUTROPHILS NFR BLD AUTO: 83.5 % (ref 42.7–76)
NITRITE UR QL STRIP: NEGATIVE
NOTE: ABNORMAL
NRBC BLD AUTO-RTO: 0 /100 WBC (ref 0–0.2)
NT-PROBNP SERPL-MCNC: 2365 PG/ML (ref 0–1800)
OPIATES UR QL: POSITIVE
OXYCODONE UR QL SCN: NEGATIVE
OXYHGB MFR BLDV: 90.8 % (ref 94–99)
PCO2 BLDA: 37.6 MM HG (ref 35–45)
PCO2 TEMP ADJ BLD: ABNORMAL MM[HG]
PCP UR QL SCN: NEGATIVE
PH BLDA: 7.44 PH UNITS (ref 7.35–7.45)
PH UR STRIP.AUTO: <=5 [PH] (ref 5–8)
PH, TEMP CORRECTED: ABNORMAL
PLATELET # BLD AUTO: 264 10*3/MM3 (ref 140–450)
PMV BLD AUTO: 9.3 FL (ref 6–12)
PO2 BLDA: 59.9 MM HG (ref 83–108)
PO2 TEMP ADJ BLD: ABNORMAL MM[HG]
POTASSIUM SERPL-SCNC: 4.3 MMOL/L (ref 3.5–5.2)
PROT SERPL-MCNC: 6.9 G/DL (ref 6–8.5)
PROT UR QL STRIP: ABNORMAL
RBC # BLD AUTO: 4.57 10*6/MM3 (ref 4.14–5.8)
RBC # UR: ABNORMAL /HPF
REF LAB TEST METHOD: ABNORMAL
SAO2 % BLDCOA: 92.6 % (ref 94–99)
SARS-COV-2 RDRP RESP QL NAA+PROBE: NOT DETECTED
SODIUM SERPL-SCNC: 145 MMOL/L (ref 136–145)
SP GR UR STRIP: 1.02 (ref 1–1.03)
SQUAMOUS #/AREA URNS HPF: ABNORMAL /HPF
T4 FREE SERPL-MCNC: 1.27 NG/DL (ref 0.93–1.7)
TROPONIN T SERPL-MCNC: <0.01 NG/ML (ref 0–0.03)
TROPONIN T SERPL-MCNC: <0.01 NG/ML (ref 0–0.03)
TSH SERPL DL<=0.05 MIU/L-ACNC: 1.38 UIU/ML (ref 0.27–4.2)
UROBILINOGEN UR QL STRIP: ABNORMAL
VENTILATOR MODE: ABNORMAL
WBC # BLD AUTO: 19.11 10*3/MM3 (ref 3.4–10.8)
WBC UR QL AUTO: ABNORMAL /HPF

## 2020-10-23 PROCEDURE — 25010000002 HEPARIN (PORCINE) PER 1000 UNITS: Performed by: INTERNAL MEDICINE

## 2020-10-23 PROCEDURE — 71045 X-RAY EXAM CHEST 1 VIEW: CPT

## 2020-10-23 PROCEDURE — 84439 ASSAY OF FREE THYROXINE: CPT | Performed by: FAMILY MEDICINE

## 2020-10-23 PROCEDURE — 83880 ASSAY OF NATRIURETIC PEPTIDE: CPT | Performed by: FAMILY MEDICINE

## 2020-10-23 PROCEDURE — 99285 EMERGENCY DEPT VISIT HI MDM: CPT

## 2020-10-23 PROCEDURE — G0008 ADMIN INFLUENZA VIRUS VAC: HCPCS | Performed by: INTERNAL MEDICINE

## 2020-10-23 PROCEDURE — 80307 DRUG TEST PRSMV CHEM ANLYZR: CPT | Performed by: FAMILY MEDICINE

## 2020-10-23 PROCEDURE — 25010000002 VANCOMYCIN 5 G RECONSTITUTED SOLUTION: Performed by: FAMILY MEDICINE

## 2020-10-23 PROCEDURE — 93005 ELECTROCARDIOGRAM TRACING: CPT | Performed by: FAMILY MEDICINE

## 2020-10-23 PROCEDURE — 87040 BLOOD CULTURE FOR BACTERIA: CPT | Performed by: FAMILY MEDICINE

## 2020-10-23 PROCEDURE — 25010000002 INFLUENZA VAC SPLIT QUAD 0.5 ML SUSPENSION PREFILLED SYRINGE: Performed by: INTERNAL MEDICINE

## 2020-10-23 PROCEDURE — 70450 CT HEAD/BRAIN W/O DYE: CPT | Performed by: RADIOLOGY

## 2020-10-23 PROCEDURE — 83050 HGB METHEMOGLOBIN QUAN: CPT

## 2020-10-23 PROCEDURE — 83605 ASSAY OF LACTIC ACID: CPT | Performed by: FAMILY MEDICINE

## 2020-10-23 PROCEDURE — 71250 CT THORAX DX C-: CPT | Performed by: RADIOLOGY

## 2020-10-23 PROCEDURE — 84484 ASSAY OF TROPONIN QUANT: CPT | Performed by: PHYSICIAN ASSISTANT

## 2020-10-23 PROCEDURE — 70450 CT HEAD/BRAIN W/O DYE: CPT

## 2020-10-23 PROCEDURE — 93010 ELECTROCARDIOGRAM REPORT: CPT | Performed by: INTERNAL MEDICINE

## 2020-10-23 PROCEDURE — P9612 CATHETERIZE FOR URINE SPEC: HCPCS

## 2020-10-23 PROCEDURE — 85025 COMPLETE CBC W/AUTO DIFF WBC: CPT | Performed by: FAMILY MEDICINE

## 2020-10-23 PROCEDURE — 25010000002 PIPERACILLIN SOD-TAZOBACTAM PER 1 G: Performed by: FAMILY MEDICINE

## 2020-10-23 PROCEDURE — 99223 1ST HOSP IP/OBS HIGH 75: CPT | Performed by: PHYSICIAN ASSISTANT

## 2020-10-23 PROCEDURE — 71250 CT THORAX DX C-: CPT

## 2020-10-23 PROCEDURE — 71045 X-RAY EXAM CHEST 1 VIEW: CPT | Performed by: RADIOLOGY

## 2020-10-23 PROCEDURE — 84484 ASSAY OF TROPONIN QUANT: CPT | Performed by: FAMILY MEDICINE

## 2020-10-23 PROCEDURE — 87635 SARS-COV-2 COVID-19 AMP PRB: CPT | Performed by: FAMILY MEDICINE

## 2020-10-23 PROCEDURE — 90686 IIV4 VACC NO PRSV 0.5 ML IM: CPT | Performed by: INTERNAL MEDICINE

## 2020-10-23 PROCEDURE — 87502 INFLUENZA DNA AMP PROBE: CPT | Performed by: INTERNAL MEDICINE

## 2020-10-23 PROCEDURE — 82805 BLOOD GASES W/O2 SATURATION: CPT

## 2020-10-23 PROCEDURE — 74176 CT ABD & PELVIS W/O CONTRAST: CPT | Performed by: RADIOLOGY

## 2020-10-23 PROCEDURE — 94799 UNLISTED PULMONARY SVC/PX: CPT

## 2020-10-23 PROCEDURE — 80053 COMPREHEN METABOLIC PANEL: CPT | Performed by: FAMILY MEDICINE

## 2020-10-23 PROCEDURE — 82375 ASSAY CARBOXYHB QUANT: CPT

## 2020-10-23 PROCEDURE — 87804 INFLUENZA ASSAY W/OPTIC: CPT | Performed by: INTERNAL MEDICINE

## 2020-10-23 PROCEDURE — 82550 ASSAY OF CK (CPK): CPT | Performed by: FAMILY MEDICINE

## 2020-10-23 PROCEDURE — 93005 ELECTROCARDIOGRAM TRACING: CPT | Performed by: PHYSICIAN ASSISTANT

## 2020-10-23 PROCEDURE — 36600 WITHDRAWAL OF ARTERIAL BLOOD: CPT

## 2020-10-23 PROCEDURE — 25010000002 CEFTRIAXONE PER 250 MG: Performed by: INTERNAL MEDICINE

## 2020-10-23 PROCEDURE — 74176 CT ABD & PELVIS W/O CONTRAST: CPT

## 2020-10-23 PROCEDURE — 81001 URINALYSIS AUTO W/SCOPE: CPT | Performed by: FAMILY MEDICINE

## 2020-10-23 PROCEDURE — 83735 ASSAY OF MAGNESIUM: CPT | Performed by: FAMILY MEDICINE

## 2020-10-23 PROCEDURE — 86140 C-REACTIVE PROTEIN: CPT | Performed by: FAMILY MEDICINE

## 2020-10-23 PROCEDURE — 84443 ASSAY THYROID STIM HORMONE: CPT | Performed by: FAMILY MEDICINE

## 2020-10-23 RX ORDER — POLYETHYLENE GLYCOL 3350 17 G/17G
17 POWDER, FOR SOLUTION ORAL DAILY
Status: DISCONTINUED | OUTPATIENT
Start: 2020-10-23 | End: 2020-10-30 | Stop reason: HOSPADM

## 2020-10-23 RX ORDER — GABAPENTIN 100 MG/1
100 CAPSULE ORAL 3 TIMES DAILY
COMMUNITY
End: 2020-10-30 | Stop reason: HOSPADM

## 2020-10-23 RX ORDER — ATORVASTATIN CALCIUM 20 MG/1
20 TABLET, FILM COATED ORAL DAILY
Status: DISCONTINUED | OUTPATIENT
Start: 2020-10-23 | End: 2020-10-30 | Stop reason: HOSPADM

## 2020-10-23 RX ORDER — ASPIRIN 81 MG/1
81 TABLET ORAL DAILY
Status: DISCONTINUED | OUTPATIENT
Start: 2020-10-23 | End: 2020-10-23 | Stop reason: ALTCHOICE

## 2020-10-23 RX ORDER — ASCORBIC ACID 125 MG
5000 TABLET,CHEWABLE ORAL DAILY
Status: ON HOLD | COMMUNITY
End: 2021-01-01

## 2020-10-23 RX ORDER — TIZANIDINE 4 MG/1
4 TABLET ORAL NIGHTLY PRN
Status: ON HOLD | COMMUNITY
End: 2021-01-01

## 2020-10-23 RX ORDER — METHYLPREDNISOLONE 4 MG/1
1 TABLET ORAL TAKE AS DIRECTED
COMMUNITY
End: 2020-10-30 | Stop reason: HOSPADM

## 2020-10-23 RX ORDER — LANOLIN ALCOHOL/MO/W.PET/CERES
1000 CREAM (GRAM) TOPICAL DAILY
Status: DISCONTINUED | OUTPATIENT
Start: 2020-10-23 | End: 2020-10-30 | Stop reason: HOSPADM

## 2020-10-23 RX ORDER — ATORVASTATIN CALCIUM 20 MG/1
20 TABLET, FILM COATED ORAL DAILY
COMMUNITY

## 2020-10-23 RX ORDER — TIZANIDINE 4 MG/1
4 TABLET ORAL NIGHTLY PRN
Status: DISCONTINUED | OUTPATIENT
Start: 2020-10-23 | End: 2020-10-26

## 2020-10-23 RX ORDER — GABAPENTIN 100 MG/1
100 CAPSULE ORAL EVERY 8 HOURS SCHEDULED
Status: DISCONTINUED | OUTPATIENT
Start: 2020-10-23 | End: 2020-10-28

## 2020-10-23 RX ORDER — SODIUM CHLORIDE 450 MG/100ML
75 INJECTION, SOLUTION INTRAVENOUS CONTINUOUS
Status: DISCONTINUED | OUTPATIENT
Start: 2020-10-23 | End: 2020-10-25

## 2020-10-23 RX ORDER — SODIUM CHLORIDE 0.9 % (FLUSH) 0.9 %
10 SYRINGE (ML) INJECTION AS NEEDED
Status: DISCONTINUED | OUTPATIENT
Start: 2020-10-23 | End: 2020-10-30 | Stop reason: HOSPADM

## 2020-10-23 RX ORDER — ASPIRIN 81 MG/1
81 TABLET ORAL DAILY
Status: DISCONTINUED | OUTPATIENT
Start: 2020-10-23 | End: 2020-10-30 | Stop reason: HOSPADM

## 2020-10-23 RX ORDER — AMOXICILLIN 250 MG
2 CAPSULE ORAL NIGHTLY
Status: DISCONTINUED | OUTPATIENT
Start: 2020-10-23 | End: 2020-10-30 | Stop reason: HOSPADM

## 2020-10-23 RX ORDER — DONEPEZIL HYDROCHLORIDE 5 MG/1
10 TABLET, FILM COATED ORAL EVERY EVENING
Status: DISCONTINUED | OUTPATIENT
Start: 2020-10-23 | End: 2020-10-30 | Stop reason: HOSPADM

## 2020-10-23 RX ORDER — SODIUM CHLORIDE 0.9 % (FLUSH) 0.9 %
10 SYRINGE (ML) INJECTION EVERY 12 HOURS SCHEDULED
Status: DISCONTINUED | OUTPATIENT
Start: 2020-10-23 | End: 2020-10-30 | Stop reason: HOSPADM

## 2020-10-23 RX ORDER — ACETAMINOPHEN AND CODEINE PHOSPHATE 300; 30 MG/1; MG/1
1 TABLET ORAL 2 TIMES DAILY PRN
Status: DISCONTINUED | OUTPATIENT
Start: 2020-10-23 | End: 2020-10-26

## 2020-10-23 RX ORDER — SODIUM CHLORIDE 9 MG/ML
75 INJECTION, SOLUTION INTRAVENOUS CONTINUOUS
Status: CANCELLED | OUTPATIENT
Start: 2020-10-23

## 2020-10-23 RX ORDER — CLOPIDOGREL BISULFATE 75 MG/1
75 TABLET ORAL DAILY
Status: DISCONTINUED | OUTPATIENT
Start: 2020-10-23 | End: 2020-10-30 | Stop reason: HOSPADM

## 2020-10-23 RX ORDER — HEPARIN SODIUM 5000 [USP'U]/ML
5000 INJECTION, SOLUTION INTRAVENOUS; SUBCUTANEOUS EVERY 8 HOURS SCHEDULED
Status: DISCONTINUED | OUTPATIENT
Start: 2020-10-23 | End: 2020-10-30 | Stop reason: HOSPADM

## 2020-10-23 RX ADMIN — DOCUSATE SODIUM 50 MG AND SENNOSIDES 8.6 MG 2 TABLET: 8.6; 5 TABLET, FILM COATED ORAL at 21:56

## 2020-10-23 RX ADMIN — PIPERACILLIN SODIUM AND TAZOBACTAM SODIUM 3.38 G: 3; .375 INJECTION, POWDER, LYOPHILIZED, FOR SOLUTION INTRAVENOUS at 10:06

## 2020-10-23 RX ADMIN — SODIUM CHLORIDE 75 ML/HR: 4.5 INJECTION, SOLUTION INTRAVENOUS at 17:38

## 2020-10-23 RX ADMIN — CLOPIDOGREL 75 MG: 75 TABLET, FILM COATED ORAL at 21:55

## 2020-10-23 RX ADMIN — GABAPENTIN 100 MG: 100 CAPSULE ORAL at 21:55

## 2020-10-23 RX ADMIN — CEFTRIAXONE 1 G: 1 INJECTION, POWDER, FOR SOLUTION INTRAMUSCULAR; INTRAVENOUS at 18:10

## 2020-10-23 RX ADMIN — ATORVASTATIN CALCIUM 20 MG: 20 TABLET, FILM COATED ORAL at 21:56

## 2020-10-23 RX ADMIN — METRONIDAZOLE 500 MG: 500 INJECTION, SOLUTION INTRAVENOUS at 18:47

## 2020-10-23 RX ADMIN — DONEPEZIL HYDROCHLORIDE 10 MG: 5 TABLET, FILM COATED ORAL at 21:56

## 2020-10-23 RX ADMIN — DOXYCYCLINE 100 MG: 100 INJECTION, POWDER, LYOPHILIZED, FOR SOLUTION INTRAVENOUS at 20:30

## 2020-10-23 RX ADMIN — HEPARIN SODIUM 5000 UNITS: 5000 INJECTION INTRAVENOUS; SUBCUTANEOUS at 21:55

## 2020-10-23 RX ADMIN — VANCOMYCIN HYDROCHLORIDE 2000 MG: 5 INJECTION, POWDER, LYOPHILIZED, FOR SOLUTION INTRAVENOUS at 10:52

## 2020-10-23 RX ADMIN — SODIUM CHLORIDE 1000 ML: 9 INJECTION, SOLUTION INTRAVENOUS at 10:11

## 2020-10-23 RX ADMIN — Medication 1000 MCG: at 21:56

## 2020-10-23 RX ADMIN — INFLUENZA VIRUS VACCINE 0.5 ML: 15; 15; 15; 15 SUSPENSION INTRAMUSCULAR at 17:38

## 2020-10-24 LAB
ANION GAP SERPL CALCULATED.3IONS-SCNC: 7.4 MMOL/L (ref 5–15)
ANION GAP SERPL CALCULATED.3IONS-SCNC: 8.9 MMOL/L (ref 5–15)
BASOPHILS # BLD AUTO: 0.06 10*3/MM3 (ref 0–0.2)
BASOPHILS NFR BLD AUTO: 0.4 % (ref 0–1.5)
BUN SERPL-MCNC: 50 MG/DL (ref 8–23)
BUN SERPL-MCNC: 52 MG/DL (ref 8–23)
BUN/CREAT SERPL: 36 (ref 7–25)
BUN/CREAT SERPL: 37.4 (ref 7–25)
CALCIUM SPEC-SCNC: 8.9 MG/DL (ref 8.6–10.5)
CALCIUM SPEC-SCNC: 9 MG/DL (ref 8.6–10.5)
CHLORIDE SERPL-SCNC: 112 MMOL/L (ref 98–107)
CHLORIDE SERPL-SCNC: 113 MMOL/L (ref 98–107)
CHOLEST SERPL-MCNC: 73 MG/DL (ref 0–200)
CO2 SERPL-SCNC: 24.6 MMOL/L (ref 22–29)
CO2 SERPL-SCNC: 25.1 MMOL/L (ref 22–29)
CREAT SERPL-MCNC: 1.39 MG/DL (ref 0.76–1.27)
CREAT SERPL-MCNC: 1.39 MG/DL (ref 0.76–1.27)
DEPRECATED RDW RBC AUTO: 43.9 FL (ref 37–54)
EOSINOPHIL # BLD AUTO: 0.23 10*3/MM3 (ref 0–0.4)
EOSINOPHIL NFR BLD AUTO: 1.6 % (ref 0.3–6.2)
ERYTHROCYTE [DISTWIDTH] IN BLOOD BY AUTOMATED COUNT: 11.6 % (ref 12.3–15.4)
GFR SERPL CREATININE-BSD FRML MDRD: 49 ML/MIN/1.73
GFR SERPL CREATININE-BSD FRML MDRD: 49 ML/MIN/1.73
GLUCOSE SERPL-MCNC: 105 MG/DL (ref 65–99)
GLUCOSE SERPL-MCNC: 94 MG/DL (ref 65–99)
HCT VFR BLD AUTO: 41.1 % (ref 37.5–51)
HDLC SERPL-MCNC: 26 MG/DL (ref 40–60)
HGB BLD-MCNC: 12.8 G/DL (ref 13–17.7)
IMM GRANULOCYTES # BLD AUTO: 0.07 10*3/MM3 (ref 0–0.05)
IMM GRANULOCYTES NFR BLD AUTO: 0.5 % (ref 0–0.5)
LDLC SERPL CALC-MCNC: 29 MG/DL (ref 0–100)
LDLC/HDLC SERPL: 1.13 {RATIO}
LYMPHOCYTES # BLD AUTO: 2.13 10*3/MM3 (ref 0.7–3.1)
LYMPHOCYTES NFR BLD AUTO: 14.4 % (ref 19.6–45.3)
MCH RBC QN AUTO: 32.1 PG (ref 26.6–33)
MCHC RBC AUTO-ENTMCNC: 31.1 G/DL (ref 31.5–35.7)
MCV RBC AUTO: 103 FL (ref 79–97)
MONOCYTES # BLD AUTO: 1.01 10*3/MM3 (ref 0.1–0.9)
MONOCYTES NFR BLD AUTO: 6.8 % (ref 5–12)
NEUTROPHILS NFR BLD AUTO: 11.32 10*3/MM3 (ref 1.7–7)
NEUTROPHILS NFR BLD AUTO: 76.3 % (ref 42.7–76)
NRBC BLD AUTO-RTO: 0 /100 WBC (ref 0–0.2)
PLATELET # BLD AUTO: 222 10*3/MM3 (ref 140–450)
PMV BLD AUTO: 9.4 FL (ref 6–12)
POTASSIUM SERPL-SCNC: 3.8 MMOL/L (ref 3.5–5.2)
POTASSIUM SERPL-SCNC: 3.8 MMOL/L (ref 3.5–5.2)
RBC # BLD AUTO: 3.99 10*6/MM3 (ref 4.14–5.8)
SODIUM SERPL-SCNC: 145 MMOL/L (ref 136–145)
SODIUM SERPL-SCNC: 146 MMOL/L (ref 136–145)
TRIGL SERPL-MCNC: 88 MG/DL (ref 0–150)
VLDLC SERPL-MCNC: 18 MG/DL (ref 5–40)
WBC # BLD AUTO: 14.82 10*3/MM3 (ref 3.4–10.8)

## 2020-10-24 PROCEDURE — 25010000002 CEFTRIAXONE PER 250 MG: Performed by: INTERNAL MEDICINE

## 2020-10-24 PROCEDURE — 25010000002 HEPARIN (PORCINE) PER 1000 UNITS: Performed by: INTERNAL MEDICINE

## 2020-10-24 PROCEDURE — 85025 COMPLETE CBC W/AUTO DIFF WBC: CPT | Performed by: INTERNAL MEDICINE

## 2020-10-24 PROCEDURE — 80061 LIPID PANEL: CPT | Performed by: PHYSICIAN ASSISTANT

## 2020-10-24 PROCEDURE — 80048 BASIC METABOLIC PNL TOTAL CA: CPT | Performed by: PHYSICIAN ASSISTANT

## 2020-10-24 PROCEDURE — 80048 BASIC METABOLIC PNL TOTAL CA: CPT | Performed by: INTERNAL MEDICINE

## 2020-10-24 PROCEDURE — 92610 EVALUATE SWALLOWING FUNCTION: CPT

## 2020-10-24 PROCEDURE — 99232 SBSQ HOSP IP/OBS MODERATE 35: CPT | Performed by: PHYSICIAN ASSISTANT

## 2020-10-24 RX ORDER — NITROGLYCERIN 0.4 MG/1
0.4 TABLET SUBLINGUAL
Status: DISCONTINUED | OUTPATIENT
Start: 2020-10-24 | End: 2020-10-30 | Stop reason: HOSPADM

## 2020-10-24 RX ADMIN — SODIUM CHLORIDE 75 ML/HR: 4.5 INJECTION, SOLUTION INTRAVENOUS at 11:38

## 2020-10-24 RX ADMIN — GABAPENTIN 100 MG: 100 CAPSULE ORAL at 21:48

## 2020-10-24 RX ADMIN — METRONIDAZOLE 500 MG: 500 INJECTION, SOLUTION INTRAVENOUS at 18:01

## 2020-10-24 RX ADMIN — CEFTRIAXONE 1 G: 1 INJECTION, POWDER, FOR SOLUTION INTRAMUSCULAR; INTRAVENOUS at 17:09

## 2020-10-24 RX ADMIN — DOXYCYCLINE 100 MG: 100 INJECTION, POWDER, LYOPHILIZED, FOR SOLUTION INTRAVENOUS at 05:16

## 2020-10-24 RX ADMIN — HEPARIN SODIUM 5000 UNITS: 5000 INJECTION INTRAVENOUS; SUBCUTANEOUS at 14:51

## 2020-10-24 RX ADMIN — DONEPEZIL HYDROCHLORIDE 10 MG: 5 TABLET, FILM COATED ORAL at 17:09

## 2020-10-24 RX ADMIN — DOXYCYCLINE 100 MG: 100 INJECTION, POWDER, LYOPHILIZED, FOR SOLUTION INTRAVENOUS at 20:09

## 2020-10-24 RX ADMIN — HEPARIN SODIUM 5000 UNITS: 5000 INJECTION INTRAVENOUS; SUBCUTANEOUS at 21:48

## 2020-10-24 RX ADMIN — GABAPENTIN 100 MG: 100 CAPSULE ORAL at 05:16

## 2020-10-24 RX ADMIN — METRONIDAZOLE 500 MG: 500 INJECTION, SOLUTION INTRAVENOUS at 11:38

## 2020-10-24 RX ADMIN — ACETAMINOPHEN AND CODEINE PHOSPHATE 1 TABLET: 300; 30 TABLET ORAL at 21:48

## 2020-10-24 RX ADMIN — DOCUSATE SODIUM 50 MG AND SENNOSIDES 8.6 MG 2 TABLET: 8.6; 5 TABLET, FILM COATED ORAL at 21:48

## 2020-10-24 RX ADMIN — SODIUM CHLORIDE, PRESERVATIVE FREE 10 ML: 5 INJECTION INTRAVENOUS at 09:00

## 2020-10-24 RX ADMIN — HEPARIN SODIUM 5000 UNITS: 5000 INJECTION INTRAVENOUS; SUBCUTANEOUS at 05:16

## 2020-10-24 RX ADMIN — SODIUM CHLORIDE, PRESERVATIVE FREE 10 ML: 5 INJECTION INTRAVENOUS at 21:49

## 2020-10-24 RX ADMIN — METRONIDAZOLE 500 MG: 500 INJECTION, SOLUTION INTRAVENOUS at 03:19

## 2020-10-25 LAB
ALBUMIN SERPL-MCNC: 3.3 G/DL (ref 3.5–5.2)
ALBUMIN/GLOB SERPL: 1.4 G/DL
ALP SERPL-CCNC: 61 U/L (ref 39–117)
ALT SERPL W P-5'-P-CCNC: 12 U/L (ref 1–41)
ANION GAP SERPL CALCULATED.3IONS-SCNC: 9.4 MMOL/L (ref 5–15)
AST SERPL-CCNC: 18 U/L (ref 1–40)
BASOPHILS # BLD AUTO: 0.07 10*3/MM3 (ref 0–0.2)
BASOPHILS NFR BLD AUTO: 0.6 % (ref 0–1.5)
BILIRUB SERPL-MCNC: 0.7 MG/DL (ref 0–1.2)
BUN SERPL-MCNC: 47 MG/DL (ref 8–23)
BUN/CREAT SERPL: 36.4 (ref 7–25)
CALCIUM SPEC-SCNC: 8.7 MG/DL (ref 8.6–10.5)
CHLORIDE SERPL-SCNC: 109 MMOL/L (ref 98–107)
CO2 SERPL-SCNC: 22.6 MMOL/L (ref 22–29)
CREAT SERPL-MCNC: 1.29 MG/DL (ref 0.76–1.27)
DEPRECATED RDW RBC AUTO: 43.8 FL (ref 37–54)
EOSINOPHIL # BLD AUTO: 0.99 10*3/MM3 (ref 0–0.4)
EOSINOPHIL NFR BLD AUTO: 8.4 % (ref 0.3–6.2)
ERYTHROCYTE [DISTWIDTH] IN BLOOD BY AUTOMATED COUNT: 11.7 % (ref 12.3–15.4)
FOLATE SERPL-MCNC: 4.42 NG/ML (ref 4.78–24.2)
GFR SERPL CREATININE-BSD FRML MDRD: 54 ML/MIN/1.73
GLOBULIN UR ELPH-MCNC: 2.3 GM/DL
GLUCOSE SERPL-MCNC: 95 MG/DL (ref 65–99)
HCT VFR BLD AUTO: 38.9 % (ref 37.5–51)
HGB BLD-MCNC: 12.1 G/DL (ref 13–17.7)
IMM GRANULOCYTES # BLD AUTO: 0.04 10*3/MM3 (ref 0–0.05)
IMM GRANULOCYTES NFR BLD AUTO: 0.3 % (ref 0–0.5)
LYMPHOCYTES # BLD AUTO: 2.2 10*3/MM3 (ref 0.7–3.1)
LYMPHOCYTES NFR BLD AUTO: 18.6 % (ref 19.6–45.3)
MCH RBC QN AUTO: 32 PG (ref 26.6–33)
MCHC RBC AUTO-ENTMCNC: 31.1 G/DL (ref 31.5–35.7)
MCV RBC AUTO: 102.9 FL (ref 79–97)
MONOCYTES # BLD AUTO: 0.91 10*3/MM3 (ref 0.1–0.9)
MONOCYTES NFR BLD AUTO: 7.7 % (ref 5–12)
NEUTROPHILS NFR BLD AUTO: 64.4 % (ref 42.7–76)
NEUTROPHILS NFR BLD AUTO: 7.64 10*3/MM3 (ref 1.7–7)
NRBC BLD AUTO-RTO: 0 /100 WBC (ref 0–0.2)
PLATELET # BLD AUTO: 235 10*3/MM3 (ref 140–450)
PMV BLD AUTO: 9.6 FL (ref 6–12)
POTASSIUM SERPL-SCNC: 4.1 MMOL/L (ref 3.5–5.2)
PROT SERPL-MCNC: 5.6 G/DL (ref 6–8.5)
RBC # BLD AUTO: 3.78 10*6/MM3 (ref 4.14–5.8)
SODIUM SERPL-SCNC: 141 MMOL/L (ref 136–145)
VIT B12 BLD-MCNC: >2000 PG/ML (ref 211–946)
WBC # BLD AUTO: 11.85 10*3/MM3 (ref 3.4–10.8)

## 2020-10-25 PROCEDURE — 25010000002 CEFTRIAXONE PER 250 MG: Performed by: INTERNAL MEDICINE

## 2020-10-25 PROCEDURE — 25010000002 HYDRALAZINE PER 20 MG: Performed by: PHYSICIAN ASSISTANT

## 2020-10-25 PROCEDURE — 80053 COMPREHEN METABOLIC PANEL: CPT | Performed by: PHYSICIAN ASSISTANT

## 2020-10-25 PROCEDURE — 85025 COMPLETE CBC W/AUTO DIFF WBC: CPT | Performed by: PHYSICIAN ASSISTANT

## 2020-10-25 PROCEDURE — 82607 VITAMIN B-12: CPT | Performed by: PHYSICIAN ASSISTANT

## 2020-10-25 PROCEDURE — 94799 UNLISTED PULMONARY SVC/PX: CPT

## 2020-10-25 PROCEDURE — 25010000002 HEPARIN (PORCINE) PER 1000 UNITS: Performed by: INTERNAL MEDICINE

## 2020-10-25 PROCEDURE — 82746 ASSAY OF FOLIC ACID SERUM: CPT | Performed by: PHYSICIAN ASSISTANT

## 2020-10-25 PROCEDURE — 99232 SBSQ HOSP IP/OBS MODERATE 35: CPT | Performed by: PHYSICIAN ASSISTANT

## 2020-10-25 RX ORDER — AMLODIPINE BESYLATE 5 MG/1
5 TABLET ORAL
Status: DISCONTINUED | OUTPATIENT
Start: 2020-10-25 | End: 2020-10-26

## 2020-10-25 RX ORDER — HYDRALAZINE HYDROCHLORIDE 20 MG/ML
10 INJECTION INTRAMUSCULAR; INTRAVENOUS ONCE
Status: COMPLETED | OUTPATIENT
Start: 2020-10-25 | End: 2020-10-25

## 2020-10-25 RX ADMIN — HEPARIN SODIUM 5000 UNITS: 5000 INJECTION INTRAVENOUS; SUBCUTANEOUS at 06:03

## 2020-10-25 RX ADMIN — Medication 1000 MCG: at 09:23

## 2020-10-25 RX ADMIN — DONEPEZIL HYDROCHLORIDE 10 MG: 5 TABLET, FILM COATED ORAL at 17:44

## 2020-10-25 RX ADMIN — SODIUM CHLORIDE, PRESERVATIVE FREE 10 ML: 5 INJECTION INTRAVENOUS at 20:10

## 2020-10-25 RX ADMIN — DOCUSATE SODIUM 50 MG AND SENNOSIDES 8.6 MG 2 TABLET: 8.6; 5 TABLET, FILM COATED ORAL at 20:07

## 2020-10-25 RX ADMIN — HEPARIN SODIUM 5000 UNITS: 5000 INJECTION INTRAVENOUS; SUBCUTANEOUS at 20:07

## 2020-10-25 RX ADMIN — METRONIDAZOLE 500 MG: 500 INJECTION, SOLUTION INTRAVENOUS at 10:56

## 2020-10-25 RX ADMIN — METRONIDAZOLE 500 MG: 500 INJECTION, SOLUTION INTRAVENOUS at 18:38

## 2020-10-25 RX ADMIN — METRONIDAZOLE 500 MG: 500 INJECTION, SOLUTION INTRAVENOUS at 03:10

## 2020-10-25 RX ADMIN — CLOPIDOGREL 75 MG: 75 TABLET, FILM COATED ORAL at 09:23

## 2020-10-25 RX ADMIN — POLYETHYLENE GLYCOL (3350) 17 G: 17 POWDER, FOR SOLUTION ORAL at 09:24

## 2020-10-25 RX ADMIN — GABAPENTIN 100 MG: 100 CAPSULE ORAL at 20:07

## 2020-10-25 RX ADMIN — DOXYCYCLINE 100 MG: 100 INJECTION, POWDER, LYOPHILIZED, FOR SOLUTION INTRAVENOUS at 18:37

## 2020-10-25 RX ADMIN — ASPIRIN 81 MG: 81 TABLET, COATED ORAL at 09:23

## 2020-10-25 RX ADMIN — GABAPENTIN 100 MG: 100 CAPSULE ORAL at 14:26

## 2020-10-25 RX ADMIN — CEFTRIAXONE 1 G: 1 INJECTION, POWDER, FOR SOLUTION INTRAMUSCULAR; INTRAVENOUS at 17:45

## 2020-10-25 RX ADMIN — ATORVASTATIN CALCIUM 20 MG: 20 TABLET, FILM COATED ORAL at 09:24

## 2020-10-25 RX ADMIN — AMLODIPINE BESYLATE 5 MG: 5 TABLET ORAL at 09:23

## 2020-10-25 RX ADMIN — GABAPENTIN 100 MG: 100 CAPSULE ORAL at 06:06

## 2020-10-25 RX ADMIN — HEPARIN SODIUM 5000 UNITS: 5000 INJECTION INTRAVENOUS; SUBCUTANEOUS at 14:26

## 2020-10-25 RX ADMIN — DOXYCYCLINE 100 MG: 100 INJECTION, POWDER, LYOPHILIZED, FOR SOLUTION INTRAVENOUS at 06:03

## 2020-10-25 RX ADMIN — HYDRALAZINE HYDROCHLORIDE 10 MG: 20 INJECTION, SOLUTION INTRAMUSCULAR; INTRAVENOUS at 05:09

## 2020-10-26 ENCOUNTER — APPOINTMENT (OUTPATIENT)
Dept: GENERAL RADIOLOGY | Facility: HOSPITAL | Age: 80
End: 2020-10-26

## 2020-10-26 LAB
AMMONIA BLD-SCNC: 33 UMOL/L (ref 16–60)
ANION GAP SERPL CALCULATED.3IONS-SCNC: 10.1 MMOL/L (ref 5–15)
BASOPHILS # BLD AUTO: 0.05 10*3/MM3 (ref 0–0.2)
BASOPHILS NFR BLD AUTO: 0.5 % (ref 0–1.5)
BUN SERPL-MCNC: 35 MG/DL (ref 8–23)
BUN/CREAT SERPL: 28.9 (ref 7–25)
CALCIUM SPEC-SCNC: 9 MG/DL (ref 8.6–10.5)
CHLORIDE SERPL-SCNC: 109 MMOL/L (ref 98–107)
CO2 SERPL-SCNC: 21.9 MMOL/L (ref 22–29)
CREAT SERPL-MCNC: 1.21 MG/DL (ref 0.76–1.27)
CRP SERPL-MCNC: 1.53 MG/DL (ref 0–0.5)
DEPRECATED RDW RBC AUTO: 42.8 FL (ref 37–54)
EOSINOPHIL # BLD AUTO: 1 10*3/MM3 (ref 0–0.4)
EOSINOPHIL NFR BLD AUTO: 9.8 % (ref 0.3–6.2)
ERYTHROCYTE [DISTWIDTH] IN BLOOD BY AUTOMATED COUNT: 11.7 % (ref 12.3–15.4)
FOLATE SERPL-MCNC: 4.22 NG/ML (ref 4.78–24.2)
GFR SERPL CREATININE-BSD FRML MDRD: 58 ML/MIN/1.73
GLUCOSE SERPL-MCNC: 103 MG/DL (ref 65–99)
HCT VFR BLD AUTO: 42.3 % (ref 37.5–51)
HGB BLD-MCNC: 13.4 G/DL (ref 13–17.7)
IMM GRANULOCYTES # BLD AUTO: 0.03 10*3/MM3 (ref 0–0.05)
IMM GRANULOCYTES NFR BLD AUTO: 0.3 % (ref 0–0.5)
LYMPHOCYTES # BLD AUTO: 2.1 10*3/MM3 (ref 0.7–3.1)
LYMPHOCYTES NFR BLD AUTO: 20.6 % (ref 19.6–45.3)
MAGNESIUM SERPL-MCNC: 1.9 MG/DL (ref 1.6–2.4)
MCH RBC QN AUTO: 31.9 PG (ref 26.6–33)
MCHC RBC AUTO-ENTMCNC: 31.7 G/DL (ref 31.5–35.7)
MCV RBC AUTO: 100.7 FL (ref 79–97)
MONOCYTES # BLD AUTO: 0.74 10*3/MM3 (ref 0.1–0.9)
MONOCYTES NFR BLD AUTO: 7.3 % (ref 5–12)
NEUTROPHILS NFR BLD AUTO: 6.27 10*3/MM3 (ref 1.7–7)
NEUTROPHILS NFR BLD AUTO: 61.5 % (ref 42.7–76)
NRBC BLD AUTO-RTO: 0 /100 WBC (ref 0–0.2)
NT-PROBNP SERPL-MCNC: 2042 PG/ML (ref 0–1800)
PLATELET # BLD AUTO: 271 10*3/MM3 (ref 140–450)
PMV BLD AUTO: 9.3 FL (ref 6–12)
POTASSIUM SERPL-SCNC: 3.8 MMOL/L (ref 3.5–5.2)
RBC # BLD AUTO: 4.2 10*6/MM3 (ref 4.14–5.8)
SODIUM SERPL-SCNC: 141 MMOL/L (ref 136–145)
VIT B12 BLD-MCNC: >2000 PG/ML (ref 211–946)
WBC # BLD AUTO: 10.19 10*3/MM3 (ref 3.4–10.8)

## 2020-10-26 PROCEDURE — 80048 BASIC METABOLIC PNL TOTAL CA: CPT | Performed by: PHYSICIAN ASSISTANT

## 2020-10-26 PROCEDURE — 99232 SBSQ HOSP IP/OBS MODERATE 35: CPT | Performed by: PHYSICIAN ASSISTANT

## 2020-10-26 PROCEDURE — 25010000002 CEFTRIAXONE PER 250 MG: Performed by: HOSPITALIST

## 2020-10-26 PROCEDURE — 25010000002 MAGNESIUM SULFATE IN D5W 1G/100ML (PREMIX) 1-5 GM/100ML-% SOLUTION: Performed by: HOSPITALIST

## 2020-10-26 PROCEDURE — 86140 C-REACTIVE PROTEIN: CPT | Performed by: HOSPITALIST

## 2020-10-26 PROCEDURE — 71045 X-RAY EXAM CHEST 1 VIEW: CPT | Performed by: RADIOLOGY

## 2020-10-26 PROCEDURE — 94799 UNLISTED PULMONARY SVC/PX: CPT

## 2020-10-26 PROCEDURE — 82607 VITAMIN B-12: CPT | Performed by: INTERNAL MEDICINE

## 2020-10-26 PROCEDURE — 71045 X-RAY EXAM CHEST 1 VIEW: CPT

## 2020-10-26 PROCEDURE — 85025 COMPLETE CBC W/AUTO DIFF WBC: CPT | Performed by: PHYSICIAN ASSISTANT

## 2020-10-26 PROCEDURE — 83880 ASSAY OF NATRIURETIC PEPTIDE: CPT | Performed by: HOSPITALIST

## 2020-10-26 PROCEDURE — 83735 ASSAY OF MAGNESIUM: CPT | Performed by: HOSPITALIST

## 2020-10-26 PROCEDURE — 25010000002 HYDRALAZINE PER 20 MG: Performed by: PHYSICIAN ASSISTANT

## 2020-10-26 PROCEDURE — 25010000002 HEPARIN (PORCINE) PER 1000 UNITS: Performed by: INTERNAL MEDICINE

## 2020-10-26 PROCEDURE — 82140 ASSAY OF AMMONIA: CPT | Performed by: HOSPITALIST

## 2020-10-26 PROCEDURE — 82746 ASSAY OF FOLIC ACID SERUM: CPT | Performed by: INTERNAL MEDICINE

## 2020-10-26 RX ORDER — HYDRALAZINE HYDROCHLORIDE 20 MG/ML
10 INJECTION INTRAMUSCULAR; INTRAVENOUS ONCE
Status: COMPLETED | OUTPATIENT
Start: 2020-10-26 | End: 2020-10-26

## 2020-10-26 RX ORDER — L.ACID,PARA/B.BIFIDUM/S.THERM 8B CELL
1 CAPSULE ORAL DAILY
Status: DISCONTINUED | OUTPATIENT
Start: 2020-10-26 | End: 2020-10-30

## 2020-10-26 RX ORDER — MAGNESIUM SULFATE 1 G/100ML
1 INJECTION INTRAVENOUS ONCE
Status: COMPLETED | OUTPATIENT
Start: 2020-10-26 | End: 2020-10-26

## 2020-10-26 RX ORDER — CHOLECALCIFEROL (VITAMIN D3) 125 MCG
5 CAPSULE ORAL NIGHTLY PRN
Status: DISCONTINUED | OUTPATIENT
Start: 2020-10-26 | End: 2020-10-30 | Stop reason: HOSPADM

## 2020-10-26 RX ORDER — NYSTATIN 100000 [USP'U]/G
POWDER TOPICAL EVERY 12 HOURS SCHEDULED
Status: DISCONTINUED | OUTPATIENT
Start: 2020-10-26 | End: 2020-10-30 | Stop reason: HOSPADM

## 2020-10-26 RX ORDER — FOLIC ACID 1 MG/1
500 TABLET ORAL DAILY
Status: DISCONTINUED | OUTPATIENT
Start: 2020-10-26 | End: 2020-10-26

## 2020-10-26 RX ORDER — AMLODIPINE BESYLATE 10 MG/1
10 TABLET ORAL
Status: DISCONTINUED | OUTPATIENT
Start: 2020-10-27 | End: 2020-10-30 | Stop reason: HOSPADM

## 2020-10-26 RX ORDER — FOLIC ACID 1 MG/1
1 TABLET ORAL DAILY
Status: DISCONTINUED | OUTPATIENT
Start: 2020-10-26 | End: 2020-10-30 | Stop reason: HOSPADM

## 2020-10-26 RX ORDER — AMLODIPINE BESYLATE 5 MG/1
5 TABLET ORAL ONCE
Status: COMPLETED | OUTPATIENT
Start: 2020-10-26 | End: 2020-10-26

## 2020-10-26 RX ORDER — LACTULOSE 10 G/15ML
20 SOLUTION ORAL 3 TIMES DAILY
Status: DISCONTINUED | OUTPATIENT
Start: 2020-10-26 | End: 2020-10-30 | Stop reason: HOSPADM

## 2020-10-26 RX ADMIN — AMLODIPINE BESYLATE 5 MG: 5 TABLET ORAL at 11:22

## 2020-10-26 RX ADMIN — GABAPENTIN 100 MG: 100 CAPSULE ORAL at 20:39

## 2020-10-26 RX ADMIN — ASPIRIN 81 MG: 81 TABLET, COATED ORAL at 08:43

## 2020-10-26 RX ADMIN — METRONIDAZOLE 500 MG: 500 INJECTION, SOLUTION INTRAVENOUS at 17:17

## 2020-10-26 RX ADMIN — NYSTATIN: 100000 POWDER TOPICAL at 11:22

## 2020-10-26 RX ADMIN — Medication: at 11:20

## 2020-10-26 RX ADMIN — NYSTATIN: 100000 POWDER TOPICAL at 20:41

## 2020-10-26 RX ADMIN — CLOPIDOGREL 75 MG: 75 TABLET, FILM COATED ORAL at 08:43

## 2020-10-26 RX ADMIN — OFLOXACIN 50000 UNITS: 300 TABLET, COATED ORAL at 20:40

## 2020-10-26 RX ADMIN — DOXYCYCLINE 100 MG: 100 INJECTION, POWDER, LYOPHILIZED, FOR SOLUTION INTRAVENOUS at 18:17

## 2020-10-26 RX ADMIN — DOCUSATE SODIUM 50 MG AND SENNOSIDES 8.6 MG 2 TABLET: 8.6; 5 TABLET, FILM COATED ORAL at 20:40

## 2020-10-26 RX ADMIN — AMLODIPINE BESYLATE 5 MG: 5 TABLET ORAL at 08:43

## 2020-10-26 RX ADMIN — POLYETHYLENE GLYCOL (3350) 17 G: 17 POWDER, FOR SOLUTION ORAL at 08:43

## 2020-10-26 RX ADMIN — Medication 5 MG: at 20:39

## 2020-10-26 RX ADMIN — CEFTRIAXONE 2 G: 2 INJECTION, POWDER, FOR SOLUTION INTRAMUSCULAR; INTRAVENOUS at 13:20

## 2020-10-26 RX ADMIN — HEPARIN SODIUM 5000 UNITS: 5000 INJECTION INTRAVENOUS; SUBCUTANEOUS at 06:24

## 2020-10-26 RX ADMIN — HEPARIN SODIUM 5000 UNITS: 5000 INJECTION INTRAVENOUS; SUBCUTANEOUS at 20:40

## 2020-10-26 RX ADMIN — MAGNESIUM SULFATE IN DEXTROSE 1 G: 10 INJECTION, SOLUTION INTRAVENOUS at 20:40

## 2020-10-26 RX ADMIN — METRONIDAZOLE 500 MG: 500 INJECTION, SOLUTION INTRAVENOUS at 03:18

## 2020-10-26 RX ADMIN — DOXYCYCLINE 100 MG: 100 INJECTION, POWDER, LYOPHILIZED, FOR SOLUTION INTRAVENOUS at 06:25

## 2020-10-26 RX ADMIN — HEPARIN SODIUM 5000 UNITS: 5000 INJECTION INTRAVENOUS; SUBCUTANEOUS at 13:24

## 2020-10-26 RX ADMIN — LACTULOSE 20 G: 10 SOLUTION ORAL at 11:18

## 2020-10-26 RX ADMIN — SODIUM CHLORIDE, PRESERVATIVE FREE 10 ML: 5 INJECTION INTRAVENOUS at 20:40

## 2020-10-26 RX ADMIN — Medication 1000 MCG: at 08:43

## 2020-10-26 RX ADMIN — LACTULOSE 20 G: 10 SOLUTION ORAL at 20:40

## 2020-10-26 RX ADMIN — GABAPENTIN 100 MG: 100 CAPSULE ORAL at 06:25

## 2020-10-26 RX ADMIN — HYDRALAZINE HYDROCHLORIDE 10 MG: 20 INJECTION, SOLUTION INTRAMUSCULAR; INTRAVENOUS at 03:18

## 2020-10-26 RX ADMIN — DONEPEZIL HYDROCHLORIDE 10 MG: 5 TABLET, FILM COATED ORAL at 17:17

## 2020-10-26 RX ADMIN — Medication 1 CAPSULE: at 11:18

## 2020-10-26 RX ADMIN — FOLIC ACID 1 MG: 1 TABLET ORAL at 11:18

## 2020-10-26 RX ADMIN — METRONIDAZOLE 500 MG: 500 INJECTION, SOLUTION INTRAVENOUS at 11:20

## 2020-10-26 RX ADMIN — ATORVASTATIN CALCIUM 20 MG: 20 TABLET, FILM COATED ORAL at 08:43

## 2020-10-27 ENCOUNTER — APPOINTMENT (OUTPATIENT)
Dept: CARDIOLOGY | Facility: HOSPITAL | Age: 80
End: 2020-10-27

## 2020-10-27 LAB
ANION GAP SERPL CALCULATED.3IONS-SCNC: 9.5 MMOL/L (ref 5–15)
BACTERIA UR QL AUTO: NORMAL /HPF
BASOPHILS # BLD AUTO: 0.06 10*3/MM3 (ref 0–0.2)
BASOPHILS NFR BLD AUTO: 0.5 % (ref 0–1.5)
BH CV ECHO MEAS - AI DEC SLOPE: 116 CM/SEC^2
BH CV ECHO MEAS - AI MAX PG: 8.1 MMHG
BH CV ECHO MEAS - AI MAX VEL: 142 CM/SEC
BH CV ECHO MEAS - AI P1/2T: 358.5 MSEC
BH CV ECHO MEAS - AO MAX PG (FULL): 2.4 MMHG
BH CV ECHO MEAS - AO MAX PG: 4.1 MMHG
BH CV ECHO MEAS - AO MEAN PG (FULL): 2 MMHG
BH CV ECHO MEAS - AO MEAN PG: 3 MMHG
BH CV ECHO MEAS - AO ROOT AREA (BSA CORRECTED): 1.5
BH CV ECHO MEAS - AO ROOT AREA: 8 CM^2
BH CV ECHO MEAS - AO ROOT DIAM: 3.2 CM
BH CV ECHO MEAS - AO V2 MAX: 101 CM/SEC
BH CV ECHO MEAS - AO V2 MEAN: 76.7 CM/SEC
BH CV ECHO MEAS - AO V2 VTI: 19.3 CM
BH CV ECHO MEAS - ASC AORTA: 3.5 CM
BH CV ECHO MEAS - BSA(HAYCOCK): 2.1 M^2
BH CV ECHO MEAS - BSA: 2.1 M^2
BH CV ECHO MEAS - BZI_BMI: 28.8 KILOGRAMS/M^2
BH CV ECHO MEAS - BZI_METRIC_HEIGHT: 177.8 CM
BH CV ECHO MEAS - BZI_METRIC_WEIGHT: 91.2 KG
BH CV ECHO MEAS - EDV(CUBED): 49.8 ML
BH CV ECHO MEAS - EDV(MOD-SP2): 55 ML
BH CV ECHO MEAS - EDV(MOD-SP4): 39.5 ML
BH CV ECHO MEAS - EDV(TEICH): 57.4 ML
BH CV ECHO MEAS - EF(CUBED): 21.8 %
BH CV ECHO MEAS - EF(MOD-SP2): 29.5 %
BH CV ECHO MEAS - EF(MOD-SP4): 25.1 %
BH CV ECHO MEAS - EF(TEICH): 17.9 %
BH CV ECHO MEAS - ESV(CUBED): 39 ML
BH CV ECHO MEAS - ESV(MOD-SP2): 38.8 ML
BH CV ECHO MEAS - ESV(MOD-SP4): 29.6 ML
BH CV ECHO MEAS - ESV(TEICH): 47.1 ML
BH CV ECHO MEAS - FS: 7.9 %
BH CV ECHO MEAS - IVS/LVPW: 1.2
BH CV ECHO MEAS - IVSD: 0.96 CM
BH CV ECHO MEAS - LA DIMENSION: 4.9 CM
BH CV ECHO MEAS - LA/AO: 1.5
BH CV ECHO MEAS - LV DIASTOLIC VOL/BSA (35-75): 18.9 ML/M^2
BH CV ECHO MEAS - LV MASS(C)D: 94.4 GRAMS
BH CV ECHO MEAS - LV MASS(C)DI: 45.1 GRAMS/M^2
BH CV ECHO MEAS - LV MAX PG: 1.7 MMHG
BH CV ECHO MEAS - LV MEAN PG: 1 MMHG
BH CV ECHO MEAS - LV SYSTOLIC VOL/BSA (12-30): 14.2 ML/M^2
BH CV ECHO MEAS - LV V1 MAX: 65.3 CM/SEC
BH CV ECHO MEAS - LV V1 MEAN: 46.1 CM/SEC
BH CV ECHO MEAS - LV V1 VTI: 13.9 CM
BH CV ECHO MEAS - LVIDD: 3.7 CM
BH CV ECHO MEAS - LVIDS: 3.4 CM
BH CV ECHO MEAS - LVLD AP2: 6.2 CM
BH CV ECHO MEAS - LVLD AP4: 7.3 CM
BH CV ECHO MEAS - LVLS AP2: 5.7 CM
BH CV ECHO MEAS - LVLS AP4: 5.9 CM
BH CV ECHO MEAS - LVPWD: 0.82 CM
BH CV ECHO MEAS - MV A MAX VEL: 68.1 CM/SEC
BH CV ECHO MEAS - MV DEC SLOPE: 287 CM/SEC^2
BH CV ECHO MEAS - MV DEC TIME: 0.19 SEC
BH CV ECHO MEAS - MV E MAX VEL: 53.6 CM/SEC
BH CV ECHO MEAS - MV E/A: 0.79
BH CV ECHO MEAS - PA ACC TIME: 0.06 SEC
BH CV ECHO MEAS - PA MAX PG: 3.7 MMHG
BH CV ECHO MEAS - PA MEAN PG: 2 MMHG
BH CV ECHO MEAS - PA PR(ACCEL): 53.8 MMHG
BH CV ECHO MEAS - PA V2 MAX: 96.1 CM/SEC
BH CV ECHO MEAS - PA V2 MEAN: 70.9 CM/SEC
BH CV ECHO MEAS - PA V2 VTI: 17 CM
BH CV ECHO MEAS - RAP SYSTOLE: 10 MMHG
BH CV ECHO MEAS - RVSP: 24.4 MMHG
BH CV ECHO MEAS - SI(AO): 74.2 ML/M^2
BH CV ECHO MEAS - SI(CUBED): 5.2 ML/M^2
BH CV ECHO MEAS - SI(MOD-SP2): 7.7 ML/M^2
BH CV ECHO MEAS - SI(MOD-SP4): 4.7 ML/M^2
BH CV ECHO MEAS - SI(TEICH): 4.9 ML/M^2
BH CV ECHO MEAS - SV(AO): 155.2 ML
BH CV ECHO MEAS - SV(CUBED): 10.9 ML
BH CV ECHO MEAS - SV(MOD-SP2): 16.2 ML
BH CV ECHO MEAS - SV(MOD-SP4): 9.9 ML
BH CV ECHO MEAS - SV(TEICH): 10.3 ML
BH CV ECHO MEAS - TR MAX VEL: 190 CM/SEC
BILIRUB UR QL STRIP: NEGATIVE
BUN SERPL-MCNC: 31 MG/DL (ref 8–23)
BUN/CREAT SERPL: 27.9 (ref 7–25)
CALCIUM SPEC-SCNC: 9.1 MG/DL (ref 8.6–10.5)
CHLORIDE SERPL-SCNC: 108 MMOL/L (ref 98–107)
CLARITY UR: CLEAR
CO2 SERPL-SCNC: 22.5 MMOL/L (ref 22–29)
COLOR UR: ABNORMAL
CREAT SERPL-MCNC: 1.11 MG/DL (ref 0.76–1.27)
CRP SERPL-MCNC: 2.07 MG/DL (ref 0–0.5)
DEPRECATED RDW RBC AUTO: 45.6 FL (ref 37–54)
EOSINOPHIL # BLD AUTO: 0.95 10*3/MM3 (ref 0–0.4)
EOSINOPHIL NFR BLD AUTO: 7.9 % (ref 0.3–6.2)
ERYTHROCYTE [DISTWIDTH] IN BLOOD BY AUTOMATED COUNT: 11.9 % (ref 12.3–15.4)
GFR SERPL CREATININE-BSD FRML MDRD: 64 ML/MIN/1.73
GLUCOSE SERPL-MCNC: 129 MG/DL (ref 65–99)
GLUCOSE UR STRIP-MCNC: NEGATIVE MG/DL
HCT VFR BLD AUTO: 44 % (ref 37.5–51)
HGB BLD-MCNC: 13.5 G/DL (ref 13–17.7)
HGB UR QL STRIP.AUTO: NEGATIVE
HYALINE CASTS UR QL AUTO: NORMAL /LPF
IMM GRANULOCYTES # BLD AUTO: 0.03 10*3/MM3 (ref 0–0.05)
IMM GRANULOCYTES NFR BLD AUTO: 0.2 % (ref 0–0.5)
KETONES UR QL STRIP: ABNORMAL
LEUKOCYTE ESTERASE UR QL STRIP.AUTO: ABNORMAL
LYMPHOCYTES # BLD AUTO: 2.34 10*3/MM3 (ref 0.7–3.1)
LYMPHOCYTES NFR BLD AUTO: 19.4 % (ref 19.6–45.3)
MAGNESIUM SERPL-MCNC: 2.4 MG/DL (ref 1.6–2.4)
MAXIMAL PREDICTED HEART RATE: 140 BPM
MCH RBC QN AUTO: 31.9 PG (ref 26.6–33)
MCHC RBC AUTO-ENTMCNC: 30.7 G/DL (ref 31.5–35.7)
MCV RBC AUTO: 104 FL (ref 79–97)
MONOCYTES # BLD AUTO: 1.03 10*3/MM3 (ref 0.1–0.9)
MONOCYTES NFR BLD AUTO: 8.5 % (ref 5–12)
NEUTROPHILS NFR BLD AUTO: 63.5 % (ref 42.7–76)
NEUTROPHILS NFR BLD AUTO: 7.67 10*3/MM3 (ref 1.7–7)
NITRITE UR QL STRIP: NEGATIVE
NRBC BLD AUTO-RTO: 0 /100 WBC (ref 0–0.2)
PH UR STRIP.AUTO: <=5 [PH] (ref 5–8)
PLATELET # BLD AUTO: 250 10*3/MM3 (ref 140–450)
PMV BLD AUTO: 9.5 FL (ref 6–12)
POTASSIUM SERPL-SCNC: 3.8 MMOL/L (ref 3.5–5.2)
PROT UR QL STRIP: NEGATIVE
RBC # BLD AUTO: 4.23 10*6/MM3 (ref 4.14–5.8)
RBC # UR: NORMAL /HPF
REF LAB TEST METHOD: NORMAL
SODIUM SERPL-SCNC: 140 MMOL/L (ref 136–145)
SP GR UR STRIP: 1.02 (ref 1–1.03)
SQUAMOUS #/AREA URNS HPF: NORMAL /HPF
STRESS TARGET HR: 119 BPM
UROBILINOGEN UR QL STRIP: ABNORMAL
WBC # BLD AUTO: 12.08 10*3/MM3 (ref 3.4–10.8)
WBC UR QL AUTO: NORMAL /HPF

## 2020-10-27 PROCEDURE — 25010000002 FUROSEMIDE PER 20 MG: Performed by: HOSPITALIST

## 2020-10-27 PROCEDURE — 92610 EVALUATE SWALLOWING FUNCTION: CPT

## 2020-10-27 PROCEDURE — 25010000002 CEFTRIAXONE PER 250 MG: Performed by: HOSPITALIST

## 2020-10-27 PROCEDURE — 93306 TTE W/DOPPLER COMPLETE: CPT | Performed by: SPECIALIST

## 2020-10-27 PROCEDURE — 86140 C-REACTIVE PROTEIN: CPT | Performed by: PHYSICIAN ASSISTANT

## 2020-10-27 PROCEDURE — 83735 ASSAY OF MAGNESIUM: CPT | Performed by: HOSPITALIST

## 2020-10-27 PROCEDURE — 99232 SBSQ HOSP IP/OBS MODERATE 35: CPT | Performed by: PHYSICIAN ASSISTANT

## 2020-10-27 PROCEDURE — 93306 TTE W/DOPPLER COMPLETE: CPT

## 2020-10-27 PROCEDURE — 85025 COMPLETE CBC W/AUTO DIFF WBC: CPT | Performed by: PHYSICIAN ASSISTANT

## 2020-10-27 PROCEDURE — 97167 OT EVAL HIGH COMPLEX 60 MIN: CPT

## 2020-10-27 PROCEDURE — 81001 URINALYSIS AUTO W/SCOPE: CPT | Performed by: HOSPITALIST

## 2020-10-27 PROCEDURE — 25010000002 HEPARIN (PORCINE) PER 1000 UNITS: Performed by: INTERNAL MEDICINE

## 2020-10-27 PROCEDURE — 80048 BASIC METABOLIC PNL TOTAL CA: CPT | Performed by: PHYSICIAN ASSISTANT

## 2020-10-27 PROCEDURE — 25010000002 HYDRALAZINE PER 20 MG: Performed by: PHYSICIAN ASSISTANT

## 2020-10-27 PROCEDURE — 97162 PT EVAL MOD COMPLEX 30 MIN: CPT

## 2020-10-27 RX ORDER — HYDRALAZINE HYDROCHLORIDE 20 MG/ML
10 INJECTION INTRAMUSCULAR; INTRAVENOUS ONCE
Status: COMPLETED | OUTPATIENT
Start: 2020-10-27 | End: 2020-10-27

## 2020-10-27 RX ORDER — FUROSEMIDE 10 MG/ML
20 INJECTION INTRAMUSCULAR; INTRAVENOUS ONCE
Status: COMPLETED | OUTPATIENT
Start: 2020-10-27 | End: 2020-10-27

## 2020-10-27 RX ORDER — HYDRALAZINE HYDROCHLORIDE 20 MG/ML
10 INJECTION INTRAMUSCULAR; INTRAVENOUS EVERY 6 HOURS PRN
Status: DISCONTINUED | OUTPATIENT
Start: 2020-10-27 | End: 2020-10-27

## 2020-10-27 RX ADMIN — METRONIDAZOLE 500 MG: 500 INJECTION, SOLUTION INTRAVENOUS at 17:40

## 2020-10-27 RX ADMIN — DOXYCYCLINE 100 MG: 100 INJECTION, POWDER, LYOPHILIZED, FOR SOLUTION INTRAVENOUS at 05:47

## 2020-10-27 RX ADMIN — AMLODIPINE BESYLATE 10 MG: 10 TABLET ORAL at 08:52

## 2020-10-27 RX ADMIN — LACTULOSE 20 G: 10 SOLUTION ORAL at 08:52

## 2020-10-27 RX ADMIN — DOXYCYCLINE 100 MG: 100 INJECTION, POWDER, LYOPHILIZED, FOR SOLUTION INTRAVENOUS at 18:48

## 2020-10-27 RX ADMIN — METRONIDAZOLE 500 MG: 500 INJECTION, SOLUTION INTRAVENOUS at 03:57

## 2020-10-27 RX ADMIN — NYSTATIN: 100000 POWDER TOPICAL at 08:52

## 2020-10-27 RX ADMIN — Medication 1000 MCG: at 08:52

## 2020-10-27 RX ADMIN — METRONIDAZOLE 500 MG: 500 INJECTION, SOLUTION INTRAVENOUS at 11:13

## 2020-10-27 RX ADMIN — ATORVASTATIN CALCIUM 20 MG: 20 TABLET, FILM COATED ORAL at 08:52

## 2020-10-27 RX ADMIN — HEPARIN SODIUM 5000 UNITS: 5000 INJECTION INTRAVENOUS; SUBCUTANEOUS at 15:35

## 2020-10-27 RX ADMIN — SODIUM CHLORIDE, PRESERVATIVE FREE 10 ML: 5 INJECTION INTRAVENOUS at 21:39

## 2020-10-27 RX ADMIN — DOCUSATE SODIUM 50 MG AND SENNOSIDES 8.6 MG 2 TABLET: 8.6; 5 TABLET, FILM COATED ORAL at 21:37

## 2020-10-27 RX ADMIN — HYDRALAZINE HYDROCHLORIDE 10 MG: 20 INJECTION, SOLUTION INTRAMUSCULAR; INTRAVENOUS at 04:35

## 2020-10-27 RX ADMIN — FOLIC ACID 1 MG: 1 TABLET ORAL at 08:52

## 2020-10-27 RX ADMIN — HEPARIN SODIUM 5000 UNITS: 5000 INJECTION INTRAVENOUS; SUBCUTANEOUS at 21:38

## 2020-10-27 RX ADMIN — FUROSEMIDE 20 MG: 20 INJECTION, SOLUTION INTRAMUSCULAR; INTRAVENOUS at 21:37

## 2020-10-27 RX ADMIN — GABAPENTIN 100 MG: 100 CAPSULE ORAL at 21:37

## 2020-10-27 RX ADMIN — GABAPENTIN 100 MG: 100 CAPSULE ORAL at 05:47

## 2020-10-27 RX ADMIN — CLOPIDOGREL 75 MG: 75 TABLET, FILM COATED ORAL at 08:52

## 2020-10-27 RX ADMIN — LACTULOSE 20 G: 10 SOLUTION ORAL at 21:39

## 2020-10-27 RX ADMIN — DONEPEZIL HYDROCHLORIDE 10 MG: 5 TABLET, FILM COATED ORAL at 17:40

## 2020-10-27 RX ADMIN — Medication 1 CAPSULE: at 08:52

## 2020-10-27 RX ADMIN — SODIUM CHLORIDE, PRESERVATIVE FREE 10 ML: 5 INJECTION INTRAVENOUS at 08:52

## 2020-10-27 RX ADMIN — HEPARIN SODIUM 5000 UNITS: 5000 INJECTION INTRAVENOUS; SUBCUTANEOUS at 05:47

## 2020-10-27 RX ADMIN — Medication: at 21:38

## 2020-10-27 RX ADMIN — Medication: at 08:52

## 2020-10-27 RX ADMIN — CEFTRIAXONE 2 G: 2 INJECTION, POWDER, FOR SOLUTION INTRAMUSCULAR; INTRAVENOUS at 13:02

## 2020-10-27 RX ADMIN — NYSTATIN: 100000 POWDER TOPICAL at 21:38

## 2020-10-27 RX ADMIN — ASPIRIN 81 MG: 81 TABLET, COATED ORAL at 08:52

## 2020-10-27 RX ADMIN — POLYETHYLENE GLYCOL (3350) 17 G: 17 POWDER, FOR SOLUTION ORAL at 08:52

## 2020-10-27 RX ADMIN — LACTULOSE 20 G: 10 SOLUTION ORAL at 15:35

## 2020-10-28 LAB
ANION GAP SERPL CALCULATED.3IONS-SCNC: 7.1 MMOL/L (ref 5–15)
BACTERIA SPEC AEROBE CULT: NORMAL
BACTERIA SPEC AEROBE CULT: NORMAL
BASOPHILS # BLD AUTO: 0.05 10*3/MM3 (ref 0–0.2)
BASOPHILS NFR BLD AUTO: 0.4 % (ref 0–1.5)
BUN SERPL-MCNC: 30 MG/DL (ref 8–23)
BUN/CREAT SERPL: 24.2 (ref 7–25)
CALCIUM SPEC-SCNC: 9.2 MG/DL (ref 8.6–10.5)
CHLORIDE SERPL-SCNC: 106 MMOL/L (ref 98–107)
CO2 SERPL-SCNC: 26.9 MMOL/L (ref 22–29)
CREAT SERPL-MCNC: 1.24 MG/DL (ref 0.76–1.27)
CRP SERPL-MCNC: 2.14 MG/DL (ref 0–0.5)
DEPRECATED RDW RBC AUTO: 43.8 FL (ref 37–54)
EOSINOPHIL # BLD AUTO: 1.76 10*3/MM3 (ref 0–0.4)
EOSINOPHIL NFR BLD AUTO: 13.4 % (ref 0.3–6.2)
ERYTHROCYTE [DISTWIDTH] IN BLOOD BY AUTOMATED COUNT: 11.9 % (ref 12.3–15.4)
GFR SERPL CREATININE-BSD FRML MDRD: 56 ML/MIN/1.73
GLUCOSE SERPL-MCNC: 107 MG/DL (ref 65–99)
HCT VFR BLD AUTO: 43.5 % (ref 37.5–51)
HGB BLD-MCNC: 13.6 G/DL (ref 13–17.7)
IMM GRANULOCYTES # BLD AUTO: 0.06 10*3/MM3 (ref 0–0.05)
IMM GRANULOCYTES NFR BLD AUTO: 0.5 % (ref 0–0.5)
LYMPHOCYTES # BLD AUTO: 2.22 10*3/MM3 (ref 0.7–3.1)
LYMPHOCYTES NFR BLD AUTO: 16.8 % (ref 19.6–45.3)
MCH RBC QN AUTO: 31.5 PG (ref 26.6–33)
MCHC RBC AUTO-ENTMCNC: 31.3 G/DL (ref 31.5–35.7)
MCV RBC AUTO: 100.7 FL (ref 79–97)
MONOCYTES # BLD AUTO: 0.88 10*3/MM3 (ref 0.1–0.9)
MONOCYTES NFR BLD AUTO: 6.7 % (ref 5–12)
NEUTROPHILS NFR BLD AUTO: 62.2 % (ref 42.7–76)
NEUTROPHILS NFR BLD AUTO: 8.21 10*3/MM3 (ref 1.7–7)
NRBC BLD AUTO-RTO: 0 /100 WBC (ref 0–0.2)
NT-PROBNP SERPL-MCNC: 881.9 PG/ML (ref 0–1800)
PLATELET # BLD AUTO: 283 10*3/MM3 (ref 140–450)
PMV BLD AUTO: 9.2 FL (ref 6–12)
POTASSIUM SERPL-SCNC: 3.8 MMOL/L (ref 3.5–5.2)
QT INTERVAL: 406 MS
QTC INTERVAL: 447 MS
RBC # BLD AUTO: 4.32 10*6/MM3 (ref 4.14–5.8)
SODIUM SERPL-SCNC: 140 MMOL/L (ref 136–145)
WBC # BLD AUTO: 13.18 10*3/MM3 (ref 3.4–10.8)

## 2020-10-28 PROCEDURE — 85025 COMPLETE CBC W/AUTO DIFF WBC: CPT | Performed by: PHYSICIAN ASSISTANT

## 2020-10-28 PROCEDURE — 83880 ASSAY OF NATRIURETIC PEPTIDE: CPT | Performed by: HOSPITALIST

## 2020-10-28 PROCEDURE — 25010000002 CEFTRIAXONE PER 250 MG: Performed by: HOSPITALIST

## 2020-10-28 PROCEDURE — 25010000002 HEPARIN (PORCINE) PER 1000 UNITS: Performed by: INTERNAL MEDICINE

## 2020-10-28 PROCEDURE — 99232 SBSQ HOSP IP/OBS MODERATE 35: CPT | Performed by: NURSE PRACTITIONER

## 2020-10-28 PROCEDURE — 94799 UNLISTED PULMONARY SVC/PX: CPT

## 2020-10-28 PROCEDURE — 86140 C-REACTIVE PROTEIN: CPT | Performed by: NURSE PRACTITIONER

## 2020-10-28 PROCEDURE — 93005 ELECTROCARDIOGRAM TRACING: CPT | Performed by: HOSPITALIST

## 2020-10-28 PROCEDURE — 93010 ELECTROCARDIOGRAM REPORT: CPT | Performed by: INTERNAL MEDICINE

## 2020-10-28 PROCEDURE — 97530 THERAPEUTIC ACTIVITIES: CPT

## 2020-10-28 PROCEDURE — 80048 BASIC METABOLIC PNL TOTAL CA: CPT | Performed by: PHYSICIAN ASSISTANT

## 2020-10-28 RX ORDER — GABAPENTIN 100 MG/1
100 CAPSULE ORAL NIGHTLY
Status: DISCONTINUED | OUTPATIENT
Start: 2020-10-28 | End: 2020-10-30 | Stop reason: HOSPADM

## 2020-10-28 RX ORDER — CARVEDILOL 3.12 MG/1
3.12 TABLET ORAL 2 TIMES DAILY WITH MEALS
Status: DISCONTINUED | OUTPATIENT
Start: 2020-10-28 | End: 2020-10-28

## 2020-10-28 RX ORDER — CARVEDILOL 3.12 MG/1
3.12 TABLET ORAL 2 TIMES DAILY WITH MEALS
Status: DISCONTINUED | OUTPATIENT
Start: 2020-10-28 | End: 2020-10-30 | Stop reason: HOSPADM

## 2020-10-28 RX ORDER — ACETAMINOPHEN 325 MG/1
650 TABLET ORAL EVERY 6 HOURS PRN
Status: DISCONTINUED | OUTPATIENT
Start: 2020-10-28 | End: 2020-10-30 | Stop reason: HOSPADM

## 2020-10-28 RX ADMIN — Medication: at 21:08

## 2020-10-28 RX ADMIN — CARVEDILOL 3.12 MG: 3.12 TABLET, FILM COATED ORAL at 13:22

## 2020-10-28 RX ADMIN — Medication 1000 MCG: at 12:08

## 2020-10-28 RX ADMIN — CEFTRIAXONE 2 G: 2 INJECTION, POWDER, FOR SOLUTION INTRAMUSCULAR; INTRAVENOUS at 12:06

## 2020-10-28 RX ADMIN — METRONIDAZOLE 500 MG: 500 INJECTION, SOLUTION INTRAVENOUS at 04:52

## 2020-10-28 RX ADMIN — LACTULOSE 20 G: 10 SOLUTION ORAL at 15:53

## 2020-10-28 RX ADMIN — ATORVASTATIN CALCIUM 20 MG: 20 TABLET, FILM COATED ORAL at 12:07

## 2020-10-28 RX ADMIN — LACTULOSE 20 G: 10 SOLUTION ORAL at 21:07

## 2020-10-28 RX ADMIN — SODIUM CHLORIDE, PRESERVATIVE FREE 10 ML: 5 INJECTION INTRAVENOUS at 08:42

## 2020-10-28 RX ADMIN — NYSTATIN: 100000 POWDER TOPICAL at 21:08

## 2020-10-28 RX ADMIN — POLYETHYLENE GLYCOL (3350) 17 G: 17 POWDER, FOR SOLUTION ORAL at 12:08

## 2020-10-28 RX ADMIN — METRONIDAZOLE 500 MG: 500 INJECTION, SOLUTION INTRAVENOUS at 17:23

## 2020-10-28 RX ADMIN — Medication 1 CAPSULE: at 12:07

## 2020-10-28 RX ADMIN — METRONIDAZOLE 500 MG: 500 INJECTION, SOLUTION INTRAVENOUS at 12:06

## 2020-10-28 RX ADMIN — ASPIRIN 81 MG: 81 TABLET, COATED ORAL at 12:07

## 2020-10-28 RX ADMIN — DOXYCYCLINE 100 MG: 100 INJECTION, POWDER, LYOPHILIZED, FOR SOLUTION INTRAVENOUS at 08:42

## 2020-10-28 RX ADMIN — HEPARIN SODIUM 5000 UNITS: 5000 INJECTION INTRAVENOUS; SUBCUTANEOUS at 15:53

## 2020-10-28 RX ADMIN — GABAPENTIN 100 MG: 100 CAPSULE ORAL at 21:07

## 2020-10-28 RX ADMIN — CLOPIDOGREL 75 MG: 75 TABLET, FILM COATED ORAL at 12:07

## 2020-10-28 RX ADMIN — DOXYCYCLINE 100 MG: 100 INJECTION, POWDER, LYOPHILIZED, FOR SOLUTION INTRAVENOUS at 17:23

## 2020-10-28 RX ADMIN — SODIUM CHLORIDE, PRESERVATIVE FREE 10 ML: 5 INJECTION INTRAVENOUS at 21:07

## 2020-10-28 RX ADMIN — AMLODIPINE BESYLATE 10 MG: 10 TABLET ORAL at 12:07

## 2020-10-28 RX ADMIN — Medication: at 08:42

## 2020-10-28 RX ADMIN — HEPARIN SODIUM 5000 UNITS: 5000 INJECTION INTRAVENOUS; SUBCUTANEOUS at 21:07

## 2020-10-28 RX ADMIN — NYSTATIN: 100000 POWDER TOPICAL at 08:42

## 2020-10-28 RX ADMIN — LACTULOSE 20 G: 10 SOLUTION ORAL at 12:07

## 2020-10-28 RX ADMIN — FOLIC ACID 1 MG: 1 TABLET ORAL at 12:07

## 2020-10-28 RX ADMIN — DOCUSATE SODIUM 50 MG AND SENNOSIDES 8.6 MG 2 TABLET: 8.6; 5 TABLET, FILM COATED ORAL at 21:07

## 2020-10-28 RX ADMIN — DONEPEZIL HYDROCHLORIDE 10 MG: 5 TABLET, FILM COATED ORAL at 17:23

## 2020-10-29 LAB
ANION GAP SERPL CALCULATED.3IONS-SCNC: 7.6 MMOL/L (ref 5–15)
BASOPHILS # BLD AUTO: 0.08 10*3/MM3 (ref 0–0.2)
BASOPHILS NFR BLD AUTO: 0.6 % (ref 0–1.5)
BUN SERPL-MCNC: 30 MG/DL (ref 8–23)
BUN/CREAT SERPL: 24.6 (ref 7–25)
CALCIUM SPEC-SCNC: 9.2 MG/DL (ref 8.6–10.5)
CHLORIDE SERPL-SCNC: 106 MMOL/L (ref 98–107)
CO2 SERPL-SCNC: 25.4 MMOL/L (ref 22–29)
CREAT SERPL-MCNC: 1.22 MG/DL (ref 0.76–1.27)
CRP SERPL-MCNC: 1.24 MG/DL (ref 0–0.5)
DEPRECATED RDW RBC AUTO: 43.5 FL (ref 37–54)
EOSINOPHIL # BLD AUTO: 2.41 10*3/MM3 (ref 0–0.4)
EOSINOPHIL NFR BLD AUTO: 19.2 % (ref 0.3–6.2)
ERYTHROCYTE [DISTWIDTH] IN BLOOD BY AUTOMATED COUNT: 11.9 % (ref 12.3–15.4)
GFR SERPL CREATININE-BSD FRML MDRD: 57 ML/MIN/1.73
GLUCOSE SERPL-MCNC: 110 MG/DL (ref 65–99)
HCT VFR BLD AUTO: 43 % (ref 37.5–51)
HGB BLD-MCNC: 13.8 G/DL (ref 13–17.7)
IMM GRANULOCYTES # BLD AUTO: 0.05 10*3/MM3 (ref 0–0.05)
IMM GRANULOCYTES NFR BLD AUTO: 0.4 % (ref 0–0.5)
LYMPHOCYTES # BLD AUTO: 2.19 10*3/MM3 (ref 0.7–3.1)
LYMPHOCYTES NFR BLD AUTO: 17.4 % (ref 19.6–45.3)
MCH RBC QN AUTO: 32 PG (ref 26.6–33)
MCHC RBC AUTO-ENTMCNC: 32.1 G/DL (ref 31.5–35.7)
MCV RBC AUTO: 99.8 FL (ref 79–97)
MONOCYTES # BLD AUTO: 0.87 10*3/MM3 (ref 0.1–0.9)
MONOCYTES NFR BLD AUTO: 6.9 % (ref 5–12)
NEUTROPHILS NFR BLD AUTO: 55.5 % (ref 42.7–76)
NEUTROPHILS NFR BLD AUTO: 6.96 10*3/MM3 (ref 1.7–7)
NRBC BLD AUTO-RTO: 0 /100 WBC (ref 0–0.2)
PLATELET # BLD AUTO: 289 10*3/MM3 (ref 140–450)
PMV BLD AUTO: 9 FL (ref 6–12)
POTASSIUM SERPL-SCNC: 3.9 MMOL/L (ref 3.5–5.2)
RBC # BLD AUTO: 4.31 10*6/MM3 (ref 4.14–5.8)
SODIUM SERPL-SCNC: 139 MMOL/L (ref 136–145)
WBC # BLD AUTO: 12.56 10*3/MM3 (ref 3.4–10.8)

## 2020-10-29 PROCEDURE — 85025 COMPLETE CBC W/AUTO DIFF WBC: CPT | Performed by: NURSE PRACTITIONER

## 2020-10-29 PROCEDURE — 86140 C-REACTIVE PROTEIN: CPT | Performed by: NURSE PRACTITIONER

## 2020-10-29 PROCEDURE — 25010000002 CEFTRIAXONE PER 250 MG: Performed by: HOSPITALIST

## 2020-10-29 PROCEDURE — 80048 BASIC METABOLIC PNL TOTAL CA: CPT | Performed by: NURSE PRACTITIONER

## 2020-10-29 PROCEDURE — 97530 THERAPEUTIC ACTIVITIES: CPT

## 2020-10-29 PROCEDURE — 99232 SBSQ HOSP IP/OBS MODERATE 35: CPT | Performed by: NURSE PRACTITIONER

## 2020-10-29 PROCEDURE — 25010000002 HEPARIN (PORCINE) PER 1000 UNITS: Performed by: INTERNAL MEDICINE

## 2020-10-29 PROCEDURE — 92610 EVALUATE SWALLOWING FUNCTION: CPT

## 2020-10-29 PROCEDURE — 97110 THERAPEUTIC EXERCISES: CPT

## 2020-10-29 RX ADMIN — SODIUM CHLORIDE, PRESERVATIVE FREE 10 ML: 5 INJECTION INTRAVENOUS at 21:52

## 2020-10-29 RX ADMIN — LACTULOSE 20 G: 10 SOLUTION ORAL at 17:35

## 2020-10-29 RX ADMIN — METRONIDAZOLE 500 MG: 500 INJECTION, SOLUTION INTRAVENOUS at 17:35

## 2020-10-29 RX ADMIN — DOXYCYCLINE 100 MG: 100 INJECTION, POWDER, LYOPHILIZED, FOR SOLUTION INTRAVENOUS at 17:34

## 2020-10-29 RX ADMIN — Medication 1 CAPSULE: at 08:48

## 2020-10-29 RX ADMIN — NYSTATIN: 100000 POWDER TOPICAL at 08:48

## 2020-10-29 RX ADMIN — ASPIRIN 81 MG: 81 TABLET, COATED ORAL at 08:49

## 2020-10-29 RX ADMIN — CEFTRIAXONE 2 G: 2 INJECTION, POWDER, FOR SOLUTION INTRAMUSCULAR; INTRAVENOUS at 11:33

## 2020-10-29 RX ADMIN — CARVEDILOL 3.12 MG: 3.12 TABLET, FILM COATED ORAL at 17:35

## 2020-10-29 RX ADMIN — DOXYCYCLINE 100 MG: 100 INJECTION, POWDER, LYOPHILIZED, FOR SOLUTION INTRAVENOUS at 06:00

## 2020-10-29 RX ADMIN — HEPARIN SODIUM 5000 UNITS: 5000 INJECTION INTRAVENOUS; SUBCUTANEOUS at 21:51

## 2020-10-29 RX ADMIN — DONEPEZIL HYDROCHLORIDE 10 MG: 5 TABLET, FILM COATED ORAL at 17:35

## 2020-10-29 RX ADMIN — LACTULOSE 20 G: 10 SOLUTION ORAL at 08:48

## 2020-10-29 RX ADMIN — CLOPIDOGREL 75 MG: 75 TABLET, FILM COATED ORAL at 08:49

## 2020-10-29 RX ADMIN — FOLIC ACID 1 MG: 1 TABLET ORAL at 08:48

## 2020-10-29 RX ADMIN — ATORVASTATIN CALCIUM 20 MG: 20 TABLET, FILM COATED ORAL at 08:48

## 2020-10-29 RX ADMIN — Medication 1000 MCG: at 08:48

## 2020-10-29 RX ADMIN — SODIUM CHLORIDE, PRESERVATIVE FREE 10 ML: 5 INJECTION INTRAVENOUS at 08:48

## 2020-10-29 RX ADMIN — LACTULOSE 20 G: 10 SOLUTION ORAL at 21:50

## 2020-10-29 RX ADMIN — HEPARIN SODIUM 5000 UNITS: 5000 INJECTION INTRAVENOUS; SUBCUTANEOUS at 14:05

## 2020-10-29 RX ADMIN — AMLODIPINE BESYLATE 10 MG: 10 TABLET ORAL at 08:49

## 2020-10-29 RX ADMIN — GABAPENTIN 100 MG: 100 CAPSULE ORAL at 21:50

## 2020-10-29 RX ADMIN — POLYETHYLENE GLYCOL (3350) 17 G: 17 POWDER, FOR SOLUTION ORAL at 08:49

## 2020-10-29 RX ADMIN — CARVEDILOL 3.12 MG: 3.12 TABLET, FILM COATED ORAL at 08:49

## 2020-10-29 RX ADMIN — METRONIDAZOLE 500 MG: 500 INJECTION, SOLUTION INTRAVENOUS at 11:33

## 2020-10-29 RX ADMIN — METRONIDAZOLE 500 MG: 500 INJECTION, SOLUTION INTRAVENOUS at 04:15

## 2020-10-29 RX ADMIN — HEPARIN SODIUM 5000 UNITS: 5000 INJECTION INTRAVENOUS; SUBCUTANEOUS at 05:59

## 2020-10-29 RX ADMIN — NYSTATIN: 100000 POWDER TOPICAL at 21:51

## 2020-10-29 RX ADMIN — DOCUSATE SODIUM 50 MG AND SENNOSIDES 8.6 MG 2 TABLET: 8.6; 5 TABLET, FILM COATED ORAL at 21:50

## 2020-10-29 RX ADMIN — Medication: at 08:49

## 2020-10-30 VITALS
RESPIRATION RATE: 18 BRPM | SYSTOLIC BLOOD PRESSURE: 127 MMHG | HEART RATE: 72 BPM | HEIGHT: 70 IN | WEIGHT: 191.1 LBS | OXYGEN SATURATION: 97 % | DIASTOLIC BLOOD PRESSURE: 64 MMHG | BODY MASS INDEX: 27.36 KG/M2 | TEMPERATURE: 98.5 F

## 2020-10-30 LAB
CRP SERPL-MCNC: 0.98 MG/DL (ref 0–0.5)
MAGNESIUM SERPL-MCNC: 2.3 MG/DL (ref 1.6–2.4)
SARS-COV-2 RDRP RESP QL NAA+PROBE: NOT DETECTED

## 2020-10-30 PROCEDURE — 25010000002 HEPARIN (PORCINE) PER 1000 UNITS: Performed by: INTERNAL MEDICINE

## 2020-10-30 PROCEDURE — 87635 SARS-COV-2 COVID-19 AMP PRB: CPT | Performed by: NURSE PRACTITIONER

## 2020-10-30 PROCEDURE — 97530 THERAPEUTIC ACTIVITIES: CPT

## 2020-10-30 PROCEDURE — 86140 C-REACTIVE PROTEIN: CPT | Performed by: NURSE PRACTITIONER

## 2020-10-30 PROCEDURE — 99239 HOSP IP/OBS DSCHRG MGMT >30: CPT | Performed by: NURSE PRACTITIONER

## 2020-10-30 PROCEDURE — 25010000002 CEFTRIAXONE PER 250 MG: Performed by: HOSPITALIST

## 2020-10-30 PROCEDURE — 83735 ASSAY OF MAGNESIUM: CPT | Performed by: NURSE PRACTITIONER

## 2020-10-30 RX ORDER — GABAPENTIN 100 MG/1
100 CAPSULE ORAL NIGHTLY
Qty: 3 CAPSULE | Refills: 0 | Status: ON HOLD | OUTPATIENT
Start: 2020-10-30 | End: 2021-01-01

## 2020-10-30 RX ORDER — CARVEDILOL 3.12 MG/1
3.12 TABLET ORAL 2 TIMES DAILY WITH MEALS
Start: 2020-10-30 | End: 2021-01-01 | Stop reason: HOSPADM

## 2020-10-30 RX ORDER — FOLIC ACID 1 MG/1
1 TABLET ORAL DAILY
Start: 2020-10-31

## 2020-10-30 RX ORDER — POLYETHYLENE GLYCOL 3350 17 G/17G
17 POWDER, FOR SOLUTION ORAL DAILY
Start: 2020-10-31

## 2020-10-30 RX ORDER — AMLODIPINE BESYLATE 10 MG/1
10 TABLET ORAL
Qty: 30 TABLET
Start: 2020-10-31 | End: 2021-01-01 | Stop reason: HOSPADM

## 2020-10-30 RX ADMIN — CARVEDILOL 3.12 MG: 3.12 TABLET, FILM COATED ORAL at 08:33

## 2020-10-30 RX ADMIN — DOXYCYCLINE 100 MG: 100 INJECTION, POWDER, LYOPHILIZED, FOR SOLUTION INTRAVENOUS at 06:14

## 2020-10-30 RX ADMIN — NYSTATIN: 100000 POWDER TOPICAL at 08:33

## 2020-10-30 RX ADMIN — LACTULOSE 20 G: 10 SOLUTION ORAL at 16:38

## 2020-10-30 RX ADMIN — SODIUM CHLORIDE, PRESERVATIVE FREE 10 ML: 5 INJECTION INTRAVENOUS at 08:33

## 2020-10-30 RX ADMIN — FOLIC ACID 1 MG: 1 TABLET ORAL at 08:33

## 2020-10-30 RX ADMIN — LACTULOSE 20 G: 10 SOLUTION ORAL at 08:33

## 2020-10-30 RX ADMIN — Medication 1 CAPSULE: at 08:33

## 2020-10-30 RX ADMIN — AMLODIPINE BESYLATE 10 MG: 10 TABLET ORAL at 08:33

## 2020-10-30 RX ADMIN — ASPIRIN 81 MG: 81 TABLET, COATED ORAL at 08:33

## 2020-10-30 RX ADMIN — DONEPEZIL HYDROCHLORIDE 10 MG: 5 TABLET, FILM COATED ORAL at 16:38

## 2020-10-30 RX ADMIN — HEPARIN SODIUM 5000 UNITS: 5000 INJECTION INTRAVENOUS; SUBCUTANEOUS at 06:13

## 2020-10-30 RX ADMIN — POLYETHYLENE GLYCOL (3350) 17 G: 17 POWDER, FOR SOLUTION ORAL at 08:33

## 2020-10-30 RX ADMIN — HEPARIN SODIUM 5000 UNITS: 5000 INJECTION INTRAVENOUS; SUBCUTANEOUS at 14:00

## 2020-10-30 RX ADMIN — Medication 1000 MCG: at 08:33

## 2020-10-30 RX ADMIN — Medication: at 08:49

## 2020-10-30 RX ADMIN — METRONIDAZOLE 500 MG: 500 INJECTION, SOLUTION INTRAVENOUS at 02:18

## 2020-10-30 RX ADMIN — CEFTRIAXONE 2 G: 2 INJECTION, POWDER, FOR SOLUTION INTRAMUSCULAR; INTRAVENOUS at 11:38

## 2020-10-30 RX ADMIN — ATORVASTATIN CALCIUM 20 MG: 20 TABLET, FILM COATED ORAL at 08:33

## 2020-10-30 RX ADMIN — CLOPIDOGREL 75 MG: 75 TABLET, FILM COATED ORAL at 08:33

## 2020-11-02 ENCOUNTER — PATIENT OUTREACH (OUTPATIENT)
Dept: CASE MANAGEMENT | Facility: OTHER | Age: 80
End: 2020-11-02

## 2020-11-02 LAB
FLUAV RNA NPH QL NAA+PROBE: NEGATIVE
FLUBV RNA NPH QL NAA+PROBE: NEGATIVE

## 2020-11-02 NOTE — OUTREACH NOTE
SNF Follow-up Note      Responses   Acute Facility Discharged From  [facility]   Acute Discharge Date  10/30/20   Name of the Skilled Nursing Facility?  [facility]   Tier Level of the Skilled Nursing Facility  2   Purpose of SNF Admission  PT, OT   Estimated length of stay for the patient?  TBD   Who is the insurance provider or payor of patient stay?  Medicare   Progression of Patient?  Patient admitted to SNF after hospitalization,  outreach scheduled to check payor status          Evi Vaughn RN  Ambulatory     11/2/2020, 08:35 EST

## 2020-11-06 ENCOUNTER — PATIENT OUTREACH (OUTPATIENT)
Dept: CASE MANAGEMENT | Facility: OTHER | Age: 80
End: 2020-11-06

## 2020-11-06 ENCOUNTER — LAB REQUISITION (OUTPATIENT)
Dept: LAB | Facility: HOSPITAL | Age: 80
End: 2020-11-06

## 2020-11-06 DIAGNOSIS — I10 ESSENTIAL (PRIMARY) HYPERTENSION: ICD-10-CM

## 2020-11-06 LAB
25(OH)D3 SERPL-MCNC: 67.8 NG/ML (ref 30–100)
ALBUMIN SERPL-MCNC: 3.83 G/DL (ref 3.5–5.2)
ALBUMIN/GLOB SERPL: 1.5 G/DL
ALP SERPL-CCNC: 71 U/L (ref 39–117)
ALT SERPL W P-5'-P-CCNC: 28 U/L (ref 1–41)
ANION GAP SERPL CALCULATED.3IONS-SCNC: 9.3 MMOL/L (ref 5–15)
AST SERPL-CCNC: 21 U/L (ref 1–40)
BASOPHILS # BLD AUTO: 0.09 10*3/MM3 (ref 0–0.2)
BASOPHILS NFR BLD AUTO: 0.8 % (ref 0–1.5)
BILIRUB SERPL-MCNC: 0.7 MG/DL (ref 0–1.2)
BUN SERPL-MCNC: 33 MG/DL (ref 8–23)
BUN/CREAT SERPL: 26 (ref 7–25)
CALCIUM SPEC-SCNC: 9.5 MG/DL (ref 8.6–10.5)
CHLORIDE SERPL-SCNC: 101 MMOL/L (ref 98–107)
CO2 SERPL-SCNC: 27.7 MMOL/L (ref 22–29)
CREAT SERPL-MCNC: 1.27 MG/DL (ref 0.76–1.27)
DEPRECATED RDW RBC AUTO: 40.5 FL (ref 37–54)
EOSINOPHIL # BLD AUTO: 0.67 10*3/MM3 (ref 0–0.4)
EOSINOPHIL NFR BLD AUTO: 6.3 % (ref 0.3–6.2)
ERYTHROCYTE [DISTWIDTH] IN BLOOD BY AUTOMATED COUNT: 11.6 % (ref 12.3–15.4)
FOLATE SERPL-MCNC: 8.28 NG/ML (ref 4.78–24.2)
GFR SERPL CREATININE-BSD FRML MDRD: 55 ML/MIN/1.73
GLOBULIN UR ELPH-MCNC: 2.6 GM/DL
GLUCOSE SERPL-MCNC: 92 MG/DL (ref 65–99)
HCT VFR BLD AUTO: 39.5 % (ref 37.5–51)
HGB BLD-MCNC: 13 G/DL (ref 13–17.7)
IMM GRANULOCYTES # BLD AUTO: 0.01 10*3/MM3 (ref 0–0.05)
IMM GRANULOCYTES NFR BLD AUTO: 0.1 % (ref 0–0.5)
LYMPHOCYTES # BLD AUTO: 2.57 10*3/MM3 (ref 0.7–3.1)
LYMPHOCYTES NFR BLD AUTO: 24.1 % (ref 19.6–45.3)
MCH RBC QN AUTO: 31.6 PG (ref 26.6–33)
MCHC RBC AUTO-ENTMCNC: 32.9 G/DL (ref 31.5–35.7)
MCV RBC AUTO: 95.9 FL (ref 79–97)
MONOCYTES # BLD AUTO: 0.67 10*3/MM3 (ref 0.1–0.9)
MONOCYTES NFR BLD AUTO: 6.3 % (ref 5–12)
NEUTROPHILS NFR BLD AUTO: 6.67 10*3/MM3 (ref 1.7–7)
NEUTROPHILS NFR BLD AUTO: 62.4 % (ref 42.7–76)
NRBC BLD AUTO-RTO: 0 /100 WBC (ref 0–0.2)
PLATELET # BLD AUTO: 309 10*3/MM3 (ref 140–450)
PMV BLD AUTO: 9.2 FL (ref 6–12)
POTASSIUM SERPL-SCNC: 4.7 MMOL/L (ref 3.5–5.2)
PROT SERPL-MCNC: 6.4 G/DL (ref 6–8.5)
RBC # BLD AUTO: 4.12 10*6/MM3 (ref 4.14–5.8)
SODIUM SERPL-SCNC: 138 MMOL/L (ref 136–145)
VIT B12 BLD-MCNC: 1873 PG/ML (ref 211–946)
WBC # BLD AUTO: 10.68 10*3/MM3 (ref 3.4–10.8)

## 2020-11-06 PROCEDURE — 82746 ASSAY OF FOLIC ACID SERUM: CPT | Performed by: INTERNAL MEDICINE

## 2020-11-06 PROCEDURE — 82607 VITAMIN B-12: CPT | Performed by: INTERNAL MEDICINE

## 2020-11-06 PROCEDURE — 82306 VITAMIN D 25 HYDROXY: CPT | Performed by: INTERNAL MEDICINE

## 2020-11-06 PROCEDURE — 80053 COMPREHEN METABOLIC PANEL: CPT | Performed by: INTERNAL MEDICINE

## 2020-11-06 PROCEDURE — 85025 COMPLETE CBC W/AUTO DIFF WBC: CPT | Performed by: INTERNAL MEDICINE

## 2020-11-06 NOTE — OUTREACH NOTE
SNF Follow-up Note      Responses   Acute Facility Discharged From  [facility]   Acute Discharge Date  10/30/20   Name of the Skilled Nursing Facility?  [facility]   Tier Level of the Skilled Nursing Facility  2   Purpose of SNF Admission  PT, OT   Estimated length of stay for the patient?  TBD   Who is the insurance provider or payor of patient stay?  Medicare   Progression of Patient?  Patient is currently covered by Medicare a for skilled care.          Evi Vaughn RN  Ambulatory     11/6/2020, 15:53 EST

## 2020-12-03 ENCOUNTER — PATIENT OUTREACH (OUTPATIENT)
Dept: CASE MANAGEMENT | Facility: OTHER | Age: 80
End: 2020-12-03

## 2020-12-03 ENCOUNTER — EPISODE CHANGES (OUTPATIENT)
Dept: CASE MANAGEMENT | Facility: OTHER | Age: 80
End: 2020-12-03

## 2020-12-03 NOTE — OUTREACH NOTE
SNF Follow-up Note      Responses   Acute Facility Discharged From  [facility]   Acute Discharge Date  10/30/20   Name of the Skilled Nursing Facility?  [facility]   Tier Level of the Skilled Nursing Facility  2   Purpose of SNF Admission  PT, OT   Estimated length of stay for the patient?  TBD   Who is the insurance provider or payor of patient stay?  Medicare   Progression of Patient?  Spoke with Luz Maria at [facility], patient is covered currently by Medicare A for skilled care.          Evi Vaughn RN  Ambulatory     12/3/2020, 11:04 EST

## 2020-12-05 ENCOUNTER — LAB REQUISITION (OUTPATIENT)
Dept: LAB | Facility: HOSPITAL | Age: 80
End: 2020-12-05

## 2020-12-05 DIAGNOSIS — R09.81 NASAL CONGESTION: ICD-10-CM

## 2020-12-05 DIAGNOSIS — R05.9 COUGH: ICD-10-CM

## 2020-12-05 LAB
FLUAV RNA RESP QL NAA+PROBE: NOT DETECTED
FLUBV RNA RESP QL NAA+PROBE: NOT DETECTED
SARS-COV-2 RNA RESP QL NAA+PROBE: DETECTED

## 2020-12-05 PROCEDURE — 87636 SARSCOV2 & INF A&B AMP PRB: CPT | Performed by: NURSE PRACTITIONER

## 2020-12-16 ENCOUNTER — LAB REQUISITION (OUTPATIENT)
Dept: LAB | Facility: HOSPITAL | Age: 80
End: 2020-12-16

## 2020-12-16 DIAGNOSIS — I10 ESSENTIAL (PRIMARY) HYPERTENSION: ICD-10-CM

## 2020-12-16 LAB
ALBUMIN SERPL-MCNC: 3.64 G/DL (ref 3.5–5.2)
ALBUMIN/GLOB SERPL: 1.3 G/DL
ALP SERPL-CCNC: 93 U/L (ref 39–117)
ALT SERPL W P-5'-P-CCNC: 10 U/L (ref 1–41)
ANION GAP SERPL CALCULATED.3IONS-SCNC: 8.6 MMOL/L (ref 5–15)
AST SERPL-CCNC: 12 U/L (ref 1–40)
BASOPHILS # BLD AUTO: 0.01 10*3/MM3 (ref 0–0.2)
BASOPHILS NFR BLD AUTO: 0.2 % (ref 0–1.5)
BILIRUB SERPL-MCNC: 0.6 MG/DL (ref 0–1.2)
BUN SERPL-MCNC: 41 MG/DL (ref 8–23)
BUN/CREAT SERPL: 32 (ref 7–25)
CALCIUM SPEC-SCNC: 9.2 MG/DL (ref 8.6–10.5)
CHLORIDE SERPL-SCNC: 103 MMOL/L (ref 98–107)
CO2 SERPL-SCNC: 25.4 MMOL/L (ref 22–29)
CREAT SERPL-MCNC: 1.28 MG/DL (ref 0.76–1.27)
CRP SERPL-MCNC: 0.44 MG/DL (ref 0–0.5)
DEPRECATED RDW RBC AUTO: 41.8 FL (ref 37–54)
EOSINOPHIL # BLD AUTO: 0.01 10*3/MM3 (ref 0–0.4)
EOSINOPHIL NFR BLD AUTO: 0.2 % (ref 0.3–6.2)
ERYTHROCYTE [DISTWIDTH] IN BLOOD BY AUTOMATED COUNT: 11.9 % (ref 12.3–15.4)
GFR SERPL CREATININE-BSD FRML MDRD: 54 ML/MIN/1.73
GLOBULIN UR ELPH-MCNC: 2.9 GM/DL
GLUCOSE SERPL-MCNC: 97 MG/DL (ref 65–99)
HCT VFR BLD AUTO: 34.5 % (ref 37.5–51)
HGB BLD-MCNC: 11.5 G/DL (ref 13–17.7)
IMM GRANULOCYTES # BLD AUTO: 0.02 10*3/MM3 (ref 0–0.05)
IMM GRANULOCYTES NFR BLD AUTO: 0.3 % (ref 0–0.5)
LYMPHOCYTES # BLD AUTO: 1.35 10*3/MM3 (ref 0.7–3.1)
LYMPHOCYTES NFR BLD AUTO: 23.6 % (ref 19.6–45.3)
MCH RBC QN AUTO: 31.6 PG (ref 26.6–33)
MCHC RBC AUTO-ENTMCNC: 33.3 G/DL (ref 31.5–35.7)
MCV RBC AUTO: 94.8 FL (ref 79–97)
MONOCYTES # BLD AUTO: 0.86 10*3/MM3 (ref 0.1–0.9)
MONOCYTES NFR BLD AUTO: 15 % (ref 5–12)
NEUTROPHILS NFR BLD AUTO: 3.48 10*3/MM3 (ref 1.7–7)
NEUTROPHILS NFR BLD AUTO: 60.7 % (ref 42.7–76)
NRBC BLD AUTO-RTO: 0 /100 WBC (ref 0–0.2)
PLATELET # BLD AUTO: 264 10*3/MM3 (ref 140–450)
PMV BLD AUTO: 9.1 FL (ref 6–12)
POTASSIUM SERPL-SCNC: 3.7 MMOL/L (ref 3.5–5.2)
PROT SERPL-MCNC: 6.5 G/DL (ref 6–8.5)
RBC # BLD AUTO: 3.64 10*6/MM3 (ref 4.14–5.8)
SODIUM SERPL-SCNC: 137 MMOL/L (ref 136–145)
WBC # BLD AUTO: 5.73 10*3/MM3 (ref 3.4–10.8)

## 2020-12-16 PROCEDURE — 86140 C-REACTIVE PROTEIN: CPT | Performed by: INTERNAL MEDICINE

## 2020-12-16 PROCEDURE — 85025 COMPLETE CBC W/AUTO DIFF WBC: CPT | Performed by: INTERNAL MEDICINE

## 2020-12-16 PROCEDURE — 80053 COMPREHEN METABOLIC PANEL: CPT | Performed by: INTERNAL MEDICINE

## 2020-12-17 ENCOUNTER — APPOINTMENT (OUTPATIENT)
Dept: CT IMAGING | Facility: HOSPITAL | Age: 80
End: 2020-12-17

## 2020-12-17 ENCOUNTER — PATIENT OUTREACH (OUTPATIENT)
Dept: CASE MANAGEMENT | Facility: OTHER | Age: 80
End: 2020-12-17

## 2020-12-17 ENCOUNTER — HOSPITAL ENCOUNTER (EMERGENCY)
Facility: HOSPITAL | Age: 80
Discharge: NURSING, REHAB OR LONG-TERM CARE | End: 2020-12-18
Attending: EMERGENCY MEDICINE | Admitting: EMERGENCY MEDICINE

## 2020-12-17 ENCOUNTER — APPOINTMENT (OUTPATIENT)
Dept: GENERAL RADIOLOGY | Facility: HOSPITAL | Age: 80
End: 2020-12-17

## 2020-12-17 DIAGNOSIS — U07.1 COVID-19: Primary | ICD-10-CM

## 2020-12-17 LAB
A-A DO2: 35.9 MMHG (ref 0–300)
ALBUMIN SERPL-MCNC: 3.64 G/DL (ref 3.5–5.2)
ALBUMIN/GLOB SERPL: 1.1 G/DL
ALP SERPL-CCNC: 96 U/L (ref 39–117)
ALT SERPL W P-5'-P-CCNC: 10 U/L (ref 1–41)
ANION GAP SERPL CALCULATED.3IONS-SCNC: 8.1 MMOL/L (ref 5–15)
ARTERIAL PATENCY WRIST A: POSITIVE
AST SERPL-CCNC: 12 U/L (ref 1–40)
ATMOSPHERIC PRESS: 730 MMHG
BASE EXCESS BLDA CALC-SCNC: 2.4 MMOL/L (ref 0–2)
BASOPHILS # BLD AUTO: 0 10*3/MM3 (ref 0–0.2)
BASOPHILS NFR BLD AUTO: 0 % (ref 0–1.5)
BDY SITE: ABNORMAL
BILIRUB SERPL-MCNC: 0.7 MG/DL (ref 0–1.2)
BODY TEMPERATURE: 0 C
BUN SERPL-MCNC: 42 MG/DL (ref 8–23)
BUN/CREAT SERPL: 33.9 (ref 7–25)
CALCIUM SPEC-SCNC: 9.4 MG/DL (ref 8.6–10.5)
CHLORIDE SERPL-SCNC: 103 MMOL/L (ref 98–107)
CO2 BLDA-SCNC: 27.7 MMOL/L (ref 22–33)
CO2 SERPL-SCNC: 27.9 MMOL/L (ref 22–29)
COHGB MFR BLD: 1.2 % (ref 0–5)
CREAT SERPL-MCNC: 1.24 MG/DL (ref 0.76–1.27)
CRP SERPL-MCNC: 1.54 MG/DL (ref 0–0.5)
D DIMER PPP FEU-MCNC: 2.25 MCGFEU/ML (ref 0–0.5)
D-LACTATE SERPL-SCNC: 1.3 MMOL/L (ref 0.5–2)
DEPRECATED RDW RBC AUTO: 42.5 FL (ref 37–54)
EOSINOPHIL # BLD AUTO: 0.11 10*3/MM3 (ref 0–0.4)
EOSINOPHIL NFR BLD AUTO: 2 % (ref 0.3–6.2)
ERYTHROCYTE [DISTWIDTH] IN BLOOD BY AUTOMATED COUNT: 12 % (ref 12.3–15.4)
FERRITIN SERPL-MCNC: 544.1 NG/ML (ref 30–400)
GFR SERPL CREATININE-BSD FRML MDRD: 56 ML/MIN/1.73
GLOBULIN UR ELPH-MCNC: 3.3 GM/DL
GLUCOSE SERPL-MCNC: 139 MG/DL (ref 65–99)
HCO3 BLDA-SCNC: 26.5 MMOL/L (ref 20–26)
HCT VFR BLD AUTO: 38.1 % (ref 37.5–51)
HCT VFR BLD CALC: 38 % (ref 38–51)
HGB BLD-MCNC: 12.6 G/DL (ref 13–17.7)
HGB BLDA-MCNC: 12.4 G/DL (ref 14–18)
IMM GRANULOCYTES # BLD AUTO: 0.02 10*3/MM3 (ref 0–0.05)
IMM GRANULOCYTES NFR BLD AUTO: 0.4 % (ref 0–0.5)
INHALED O2 CONCENTRATION: 21 %
LDH SERPL-CCNC: 137 U/L (ref 135–225)
LYMPHOCYTES # BLD AUTO: 0.61 10*3/MM3 (ref 0.7–3.1)
LYMPHOCYTES NFR BLD AUTO: 11.2 % (ref 19.6–45.3)
Lab: ABNORMAL
MCH RBC QN AUTO: 31.9 PG (ref 26.6–33)
MCHC RBC AUTO-ENTMCNC: 33.1 G/DL (ref 31.5–35.7)
MCV RBC AUTO: 96.5 FL (ref 79–97)
METHGB BLD QL: 0 % (ref 0–3)
MODALITY: ABNORMAL
MONOCYTES # BLD AUTO: 0.43 10*3/MM3 (ref 0.1–0.9)
MONOCYTES NFR BLD AUTO: 7.9 % (ref 5–12)
NEUTROPHILS NFR BLD AUTO: 4.26 10*3/MM3 (ref 1.7–7)
NEUTROPHILS NFR BLD AUTO: 78.5 % (ref 42.7–76)
NOTE: ABNORMAL
NRBC BLD AUTO-RTO: 0 /100 WBC (ref 0–0.2)
OXYHGB MFR BLDV: 93.1 % (ref 94–99)
PCO2 BLDA: 38.3 MM HG (ref 35–45)
PCO2 TEMP ADJ BLD: ABNORMAL MM[HG]
PH BLDA: 7.45 PH UNITS (ref 7.35–7.45)
PH, TEMP CORRECTED: ABNORMAL
PLATELET # BLD AUTO: 283 10*3/MM3 (ref 140–450)
PMV BLD AUTO: 8.9 FL (ref 6–12)
PO2 BLDA: 64.3 MM HG (ref 83–108)
PO2 TEMP ADJ BLD: ABNORMAL MM[HG]
POTASSIUM SERPL-SCNC: 4 MMOL/L (ref 3.5–5.2)
PROT SERPL-MCNC: 6.9 G/DL (ref 6–8.5)
RBC # BLD AUTO: 3.95 10*6/MM3 (ref 4.14–5.8)
SAO2 % BLDCOA: 94.2 % (ref 94–99)
SODIUM SERPL-SCNC: 139 MMOL/L (ref 136–145)
VENTILATOR MODE: ABNORMAL
WBC # BLD AUTO: 5.43 10*3/MM3 (ref 3.4–10.8)

## 2020-12-17 PROCEDURE — 96374 THER/PROPH/DIAG INJ IV PUSH: CPT

## 2020-12-17 PROCEDURE — 82728 ASSAY OF FERRITIN: CPT | Performed by: PHYSICIAN ASSISTANT

## 2020-12-17 PROCEDURE — 86140 C-REACTIVE PROTEIN: CPT | Performed by: PHYSICIAN ASSISTANT

## 2020-12-17 PROCEDURE — 84145 PROCALCITONIN (PCT): CPT | Performed by: PHYSICIAN ASSISTANT

## 2020-12-17 PROCEDURE — 82375 ASSAY CARBOXYHB QUANT: CPT

## 2020-12-17 PROCEDURE — 83615 LACTATE (LD) (LDH) ENZYME: CPT | Performed by: PHYSICIAN ASSISTANT

## 2020-12-17 PROCEDURE — 83050 HGB METHEMOGLOBIN QUAN: CPT

## 2020-12-17 PROCEDURE — 85379 FIBRIN DEGRADATION QUANT: CPT | Performed by: PHYSICIAN ASSISTANT

## 2020-12-17 PROCEDURE — 0 IOPAMIDOL PER 1 ML: Performed by: EMERGENCY MEDICINE

## 2020-12-17 PROCEDURE — 71275 CT ANGIOGRAPHY CHEST: CPT

## 2020-12-17 PROCEDURE — 83605 ASSAY OF LACTIC ACID: CPT | Performed by: PHYSICIAN ASSISTANT

## 2020-12-17 PROCEDURE — 71045 X-RAY EXAM CHEST 1 VIEW: CPT | Performed by: RADIOLOGY

## 2020-12-17 PROCEDURE — 25010000002 LORAZEPAM PER 2 MG: Performed by: STUDENT IN AN ORGANIZED HEALTH CARE EDUCATION/TRAINING PROGRAM

## 2020-12-17 PROCEDURE — 99283 EMERGENCY DEPT VISIT LOW MDM: CPT

## 2020-12-17 PROCEDURE — 36600 WITHDRAWAL OF ARTERIAL BLOOD: CPT

## 2020-12-17 PROCEDURE — 71275 CT ANGIOGRAPHY CHEST: CPT | Performed by: RADIOLOGY

## 2020-12-17 PROCEDURE — 96361 HYDRATE IV INFUSION ADD-ON: CPT

## 2020-12-17 PROCEDURE — 82805 BLOOD GASES W/O2 SATURATION: CPT

## 2020-12-17 PROCEDURE — 71045 X-RAY EXAM CHEST 1 VIEW: CPT

## 2020-12-17 PROCEDURE — 80053 COMPREHEN METABOLIC PANEL: CPT | Performed by: PHYSICIAN ASSISTANT

## 2020-12-17 PROCEDURE — 85025 COMPLETE CBC W/AUTO DIFF WBC: CPT | Performed by: PHYSICIAN ASSISTANT

## 2020-12-17 RX ORDER — LORAZEPAM 2 MG/ML
1 INJECTION INTRAMUSCULAR ONCE
Status: COMPLETED | OUTPATIENT
Start: 2020-12-17 | End: 2020-12-17

## 2020-12-17 RX ADMIN — SODIUM CHLORIDE 1000 ML: 9 INJECTION, SOLUTION INTRAVENOUS at 15:40

## 2020-12-17 RX ADMIN — IOPAMIDOL 90 ML: 755 INJECTION, SOLUTION INTRAVENOUS at 18:02

## 2020-12-17 RX ADMIN — LORAZEPAM 1 MG: 2 INJECTION, SOLUTION INTRAMUSCULAR; INTRAVENOUS at 19:12

## 2020-12-17 NOTE — ED NOTES
Tried to get an IV in AC or higher and pt kicked RN and tried to swing at tech.      Samia Dobbs, RN  12/17/20 4023

## 2020-12-17 NOTE — ED PROVIDER NOTES
Subjective   Pt is covid pos from [facility]       History provided by:  Patient  History limited by:  Dementia   used: No    Illness  Severity:  Mild  Onset quality:  Sudden  Duration:  1 day  Timing:  Constant  Progression:  Worsening  Chronicity:  New  Context:  Covid  Relieved by:  Nothing   Worsened by:  Nothing   Ineffective treatments:  None   Associated symptoms: fever and shortness of breath    Associated symptoms: no diarrhea, no vomiting and no wheezing        Review of Systems   Unable to perform ROS: Dementia   Constitutional: Positive for activity change and fever.   Respiratory: Positive for shortness of breath. Negative for wheezing.    Gastrointestinal: Negative for diarrhea and vomiting.   Psychiatric/Behavioral: The patient is nervous/anxious.    All other systems reviewed and are negative.      Past Medical History:   Diagnosis Date   • Arthritis    • Cervical spine disease     remotely suggested surgical intervention.  Patient deferred.    • Cervical spine disease    • CKD (chronic kidney disease) 10/30/2020   • Coronary artery disease    • Dementia (CMS/HCC)    • Dyslipidemia    • Hypertension    • Impotence    • Osteoarthritis 10/30/2020   • Palpitations    • Pneumonia    • Vertigo        No Known Allergies    Past Surgical History:   Procedure Laterality Date   • ARM LESION/CYST EXCISION Left 7/30/2020    Procedure: WIDE EXCISION MELANOMA LEFT ARM INTERMEDIATE CLOSURE;  Surgeon: Roman Mohan MD;  Location:  [facility];  Service: General;  Laterality: Left;   • ARTERIAL BYPASS SURGERY     • BRONCHOSCOPY N/A 5/24/2018    Procedure: BRONCHOSCOPY WITH WASHINGS;  Surgeon: Leonides Quarles MD;  Location:  [facility];  Service: Pulmonary   • CHOLECYSTECTOMY WITH INTRAOPERATIVE CHOLANGIOGRAM N/A 5/16/2018    Procedure: OPEN CHOLECYSTECTOMY;  Surgeon: Rosie Montalvo MD;  Location:  [facility];  Service: General   • COLONOSCOPY N/A 3/30/2018    Procedure: COLONOSCOPY;  Surgeon: Simone  JOSE AMNUEL Valderrama MD;  Location: [facility] OR;  Service: Gastroenterology   • CORONARY ARTERY BYPASS GRAFT     • KNEE ARTHROPLASTY, PARTIAL REPLACEMENT      Lt , 2015   • REPLACEMENT TOTAL KNEE      Rt   • SENTINEL NODE BIOPSY Left 2020    Procedure: SENTINEL NODE INJECTION AND BIOPSY LEFT AXILLA;  Surgeon: Roman Mohan MD;  [facility];  Service: General;  Laterality: Left;       Family History   Problem Relation Age of Onset   • No Known Problems Mother    • No Known Problems Father    • No Known Problems Sister    • No Known Problems Brother        Social History     Socioeconomic History   • Marital status:      Spouse name: Not on file   • Number of children: Not on file   • Years of education: Not on file   • Highest education level: Not on file   Tobacco Use   • Smoking status: Former Smoker     Packs/day: 1.50     Years: 7.00     Pack years: 10.50     Types: Cigarettes, Pipe, Cigars     Quit date: 1986     Years since quittin.5   • Smokeless tobacco: Never Used   Substance and Sexual Activity   • Alcohol use: Yes     Comment: occassionally    • Drug use: No   • Sexual activity: Defer           Objective   Physical Exam  Vitals signs and nursing note reviewed.   Constitutional:       Appearance: He is well-developed.   HENT:      Head: Normocephalic.   Neck:      Musculoskeletal: Neck supple.   Cardiovascular:      Rate and Rhythm: Normal rate and regular rhythm.   Pulmonary:      Effort: Pulmonary effort is normal.      Breath sounds: Normal breath sounds.   Abdominal:      General: Bowel sounds are normal.      Palpations: Abdomen is soft.      Tenderness: There is no abdominal tenderness.   Musculoskeletal: Normal range of motion.   Skin:     General: Skin is warm and dry.   Neurological:      Mental Status: He is alert and oriented to person, place, and time.   Psychiatric:         Behavior: Behavior normal.         Thought Content: Thought content normal.         Judgment: Judgment  normal.         Procedures           ED Course  ED Course as of Dec 20 2229   Thu Dec 17, 2020   1958 Discussed with radiology.  Patient will be given Ativan and taken back for a second PE protocol since patient moved and the study was not adequate.    []   2110 Unable to obtain CT PE twice    []   2132 Discussed with Dr Rene, pt is not hypoxic or tachycardic. Send back to nursing home     []      ED Course User Index  [] Rob Zaidi, DO  [] Hillary Mackenzie PA                                           [facility]    Final diagnoses:   COVID-19            Hillary Mackenzie PA  12/20/20 2229

## 2020-12-17 NOTE — ED NOTES
RN attempted to call pt wife 3x and no answer for consent. Will continue to contact wife.      Samia Dobbs RN  12/17/20 2271

## 2020-12-17 NOTE — OUTREACH NOTE
SNF Follow-up Note      Responses   Acute Facility Discharged From  [place]   Acute Discharge Date  10/30/20   Name of the Skilled Nursing Facility?  [facility]   Tier Level of the Skilled Nursing Facility  2   Purpose of SNF Admission  PT, OT   Estimated length of stay for the patient?  TBD   Who is the insurance provider or payor of patient stay?  Medicare   Progression of Patient?  Spoke with Natali at [facility], pt is still covered by Med A but states he is currently in the ED, unsure if he will be admitted.          Evi Vaughn RN  Ambulatory     12/17/2020, 14:36 EST

## 2020-12-17 NOTE — ED NOTES
Pt buttocks has a red non blanchable spot. Pt is incontinent and wears briefs. Pt was placed on his right side. Respirations even and unlabored. NADN. Pt has not spoke to RN. Pt has history of dementia. RN tried to call pt wife and no answer.      Samia Dobbs RN  12/17/20 5503

## 2020-12-17 NOTE — ED NOTES
Received verbal consent from Tonia Soni which is pt wife for CT  With contrast.      Samia Dobbs RN  12/17/20 9351

## 2020-12-17 NOTE — ED NOTES
Pt wife phone number is [phone]     Samia Dobbs, RN  12/17/20 7872       Samia Dobbs RN  12/17/20 7774

## 2020-12-18 NOTE — ED NOTES
Called [facility] to check on the status of the truck for transport back to [facility]. Was told they had paged the call out and were aware of it but still didn't know a time frame on it.     Joshua Aiken  12/17/20 7382

## 2020-12-18 NOTE — ED NOTES
[facility] to give [facility] twice once the phone was disconnected and the other no answer.      Samia Dobbs RN  12/17/20 5334

## 2020-12-18 NOTE — ED NOTES
Report received from ROQUE Gracia. Pt resting on stretcher at this time with eyes closed appearing to be asleep. Pt RR even and unlabored, skin WPD. Safety measures remain WDL. [facility] Gracia report. NADN. WCTM. Airborne/droplet precautions maintained.      Lenore Robison RN  12/18/20 0059

## 2020-12-18 NOTE — ED NOTES
[facility] arrived at this time. Report given at bedside. EMS made aware of Pt COVID + and isolation precautions. Per Dr. Zaidi PT to return to NH with IV to right AC in place. EMS made aware to relay message to NH as well as follow up information and work up results. Attempted to call report to [facility] with no answer again.      Lenore Robison, RN  12/18/20 0026

## 2020-12-18 NOTE — ED NOTES
Called [facility] to transport patient back to [facility].     Nayana Reyna  12/17/20 1066

## 2020-12-30 NOTE — OUTREACH NOTE
SNF Follow-up Note      Responses   Acute Facility Discharged From  [place]   Acute Discharge Date  10/30/20   Name of the Skilled Nursing Facility?  [facility]   Tier Level of the Skilled Nursing Facility  2   Purpose of SNF Admission  PT, OT   Estimated length of stay for the patient?  short term   Who is the insurance provider or payor of patient stay?  Medicare   Progression of Patient?  ACMICHELLE spoke with Tana at [facility]. Patient is still covered by Medicare A for skilled care,  patient is not expected to stay long term.          Evi Vaughn RN  Ambulatory     12/30/2020, 15:05 EST

## 2021-01-01 ENCOUNTER — APPOINTMENT (OUTPATIENT)
Dept: GENERAL RADIOLOGY | Facility: HOSPITAL | Age: 81
End: 2021-01-01

## 2021-01-01 ENCOUNTER — HOSPITAL ENCOUNTER (INPATIENT)
Facility: HOSPITAL | Age: 81
LOS: 3 days | Discharge: NURSING, REHAB OR LONG-TERM CARE | End: 2021-01-25
Attending: STUDENT IN AN ORGANIZED HEALTH CARE EDUCATION/TRAINING PROGRAM | Admitting: INTERNAL MEDICINE

## 2021-01-01 ENCOUNTER — PATIENT OUTREACH (OUTPATIENT)
Dept: CASE MANAGEMENT | Facility: OTHER | Age: 81
End: 2021-01-01

## 2021-01-01 ENCOUNTER — APPOINTMENT (OUTPATIENT)
Dept: CT IMAGING | Facility: HOSPITAL | Age: 81
End: 2021-01-01

## 2021-01-01 ENCOUNTER — LAB REQUISITION (OUTPATIENT)
Dept: LAB | Facility: HOSPITAL | Age: 81
End: 2021-01-01

## 2021-01-01 ENCOUNTER — OFFICE VISIT (OUTPATIENT)
Dept: CARDIOLOGY | Facility: CLINIC | Age: 81
End: 2021-01-01

## 2021-01-01 ENCOUNTER — APPOINTMENT (OUTPATIENT)
Dept: CARDIOLOGY | Facility: HOSPITAL | Age: 81
End: 2021-01-01

## 2021-01-01 ENCOUNTER — HOSPITAL ENCOUNTER (INPATIENT)
Facility: HOSPITAL | Age: 81
LOS: 5 days | Discharge: NURSING, REHAB OR LONG-TERM CARE | End: 2021-05-28
Attending: FAMILY MEDICINE | Admitting: INTERNAL MEDICINE

## 2021-01-01 ENCOUNTER — HOSPITAL ENCOUNTER (INPATIENT)
Facility: HOSPITAL | Age: 81
LOS: 1 days | End: 2021-12-18
Attending: STUDENT IN AN ORGANIZED HEALTH CARE EDUCATION/TRAINING PROGRAM

## 2021-01-01 ENCOUNTER — APPOINTMENT (OUTPATIENT)
Dept: ULTRASOUND IMAGING | Facility: HOSPITAL | Age: 81
End: 2021-01-01

## 2021-01-01 ENCOUNTER — HOSPITAL ENCOUNTER (EMERGENCY)
Facility: HOSPITAL | Age: 81
Discharge: NURSING, REHAB OR LONG-TERM CARE | End: 2021-03-09
Attending: EMERGENCY MEDICINE | Admitting: EMERGENCY MEDICINE

## 2021-01-01 ENCOUNTER — HOSPITAL ENCOUNTER (INPATIENT)
Facility: HOSPITAL | Age: 81
LOS: 6 days | Discharge: NURSING, REHAB OR LONG-TERM CARE | End: 2021-08-10
Attending: EMERGENCY MEDICINE | Admitting: INTERNAL MEDICINE

## 2021-01-01 VITALS
WEIGHT: 180.08 LBS | DIASTOLIC BLOOD PRESSURE: 73 MMHG | HEIGHT: 70 IN | RESPIRATION RATE: 18 BRPM | OXYGEN SATURATION: 97 % | SYSTOLIC BLOOD PRESSURE: 126 MMHG | TEMPERATURE: 97.6 F | HEART RATE: 68 BPM | BODY MASS INDEX: 25.78 KG/M2

## 2021-01-01 VITALS
DIASTOLIC BLOOD PRESSURE: 78 MMHG | BODY MASS INDEX: 25.34 KG/M2 | SYSTOLIC BLOOD PRESSURE: 124 MMHG | HEART RATE: 70 BPM | HEIGHT: 70 IN | OXYGEN SATURATION: 95 % | WEIGHT: 177 LBS

## 2021-01-01 VITALS
DIASTOLIC BLOOD PRESSURE: 63 MMHG | RESPIRATION RATE: 16 BRPM | HEIGHT: 70 IN | HEART RATE: 61 BPM | OXYGEN SATURATION: 96 % | WEIGHT: 170.1 LBS | BODY MASS INDEX: 24.35 KG/M2 | SYSTOLIC BLOOD PRESSURE: 119 MMHG | TEMPERATURE: 98.1 F

## 2021-01-01 VITALS
HEIGHT: 70 IN | RESPIRATION RATE: 26 BRPM | BODY MASS INDEX: 25.88 KG/M2 | OXYGEN SATURATION: 94 % | TEMPERATURE: 106 F | DIASTOLIC BLOOD PRESSURE: 38 MMHG | SYSTOLIC BLOOD PRESSURE: 123 MMHG | WEIGHT: 180.78 LBS | HEART RATE: 85 BPM

## 2021-01-01 VITALS
HEIGHT: 70 IN | DIASTOLIC BLOOD PRESSURE: 57 MMHG | SYSTOLIC BLOOD PRESSURE: 117 MMHG | TEMPERATURE: 98.2 F | WEIGHT: 179.3 LBS | BODY MASS INDEX: 25.67 KG/M2 | OXYGEN SATURATION: 98 % | HEART RATE: 59 BPM | RESPIRATION RATE: 18 BRPM

## 2021-01-01 VITALS
RESPIRATION RATE: 20 BRPM | DIASTOLIC BLOOD PRESSURE: 50 MMHG | BODY MASS INDEX: 25.87 KG/M2 | SYSTOLIC BLOOD PRESSURE: 111 MMHG | OXYGEN SATURATION: 97 % | TEMPERATURE: 98.1 F | HEART RATE: 56 BPM | HEIGHT: 70 IN | WEIGHT: 180.7 LBS

## 2021-01-01 DIAGNOSIS — S22.43XA CLOSED FRACTURE OF MULTIPLE RIBS OF BOTH SIDES, INITIAL ENCOUNTER: ICD-10-CM

## 2021-01-01 DIAGNOSIS — R09.02 HYPOXEMIA: ICD-10-CM

## 2021-01-01 DIAGNOSIS — I25.10 CORONARY ARTERY DISEASE INVOLVING NATIVE CORONARY ARTERY OF NATIVE HEART WITHOUT ANGINA PECTORIS: Primary | ICD-10-CM

## 2021-01-01 DIAGNOSIS — D64.9 ANEMIA, UNSPECIFIED: ICD-10-CM

## 2021-01-01 DIAGNOSIS — J18.9 PNEUMONIA OF BOTH LOWER LOBES DUE TO INFECTIOUS ORGANISM: Primary | ICD-10-CM

## 2021-01-01 DIAGNOSIS — I46.9 CARDIOPULMONARY ARREST (HCC): Primary | ICD-10-CM

## 2021-01-01 DIAGNOSIS — R00.2 PALPITATIONS: ICD-10-CM

## 2021-01-01 DIAGNOSIS — R50.9 FEVER, UNSPECIFIED: ICD-10-CM

## 2021-01-01 DIAGNOSIS — N18.9 CHRONIC KIDNEY DISEASE, UNSPECIFIED: ICD-10-CM

## 2021-01-01 DIAGNOSIS — R57.0 CARDIOGENIC SHOCK (HCC): ICD-10-CM

## 2021-01-01 DIAGNOSIS — N17.0 ACUTE KIDNEY INJURY (AKI) WITH ACUTE TUBULAR NECROSIS (ATN) (HCC): ICD-10-CM

## 2021-01-01 DIAGNOSIS — I10 ESSENTIAL (PRIMARY) HYPERTENSION: ICD-10-CM

## 2021-01-01 DIAGNOSIS — K56.7 ILEUS (HCC): ICD-10-CM

## 2021-01-01 DIAGNOSIS — D72.829 LEUKOCYTOSIS, UNSPECIFIED TYPE: ICD-10-CM

## 2021-01-01 DIAGNOSIS — E86.0 DEHYDRATION: ICD-10-CM

## 2021-01-01 DIAGNOSIS — N39.0 URINARY TRACT INFECTION WITHOUT HEMATURIA, SITE UNSPECIFIED: Primary | ICD-10-CM

## 2021-01-01 DIAGNOSIS — A41.9 SEPSIS, DUE TO UNSPECIFIED ORGANISM, UNSPECIFIED WHETHER ACUTE ORGAN DYSFUNCTION PRESENT (HCC): ICD-10-CM
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
## 2021-01-01 DIAGNOSIS — S27.0XXA TRAUMATIC PNEUMOTHORAX, INITIAL ENCOUNTER: ICD-10-CM

## 2021-01-01 DIAGNOSIS — I10 ESSENTIAL HYPERTENSION: ICD-10-CM

## 2021-01-01 DIAGNOSIS — S09.90XA INJURY OF HEAD, INITIAL ENCOUNTER: ICD-10-CM

## 2021-01-01 DIAGNOSIS — J18.9 PNEUMONIA DUE TO INFECTIOUS ORGANISM, UNSPECIFIED LATERALITY, UNSPECIFIED PART OF LUNG: ICD-10-CM

## 2021-01-01 DIAGNOSIS — R77.8 ELEVATED TROPONIN: ICD-10-CM

## 2021-01-01 DIAGNOSIS — E87.0 HYPERNATREMIA: ICD-10-CM

## 2021-01-01 LAB
25(OH)D3 SERPL-MCNC: 64.8 NG/ML (ref 30–100)
A-A DO2: 17.5 MMHG (ref 0–300)
A-A DO2: 214.1 MMHG (ref 0–300)
A-A DO2: 253.1 MMHG (ref 0–300)
A-A DO2: 266 MMHG (ref 0–300)
A-A DO2: 329.2 MMHG (ref 0–300)
A-A DO2: 355.7 MMHG (ref 0–300)
A-A DO2: 48 MMHG (ref 0–300)
ALBUMIN SERPL-MCNC: 2.29 G/DL (ref 3.5–5.2)
ALBUMIN SERPL-MCNC: 2.38 G/DL (ref 3.5–5.2)
ALBUMIN SERPL-MCNC: 2.39 G/DL (ref 3.5–5.2)
ALBUMIN SERPL-MCNC: 2.4 G/DL (ref 3.5–5.2)
ALBUMIN SERPL-MCNC: 2.41 G/DL (ref 3.5–5.2)
ALBUMIN SERPL-MCNC: 2.49 G/DL (ref 3.5–5.2)
ALBUMIN SERPL-MCNC: 2.56 G/DL (ref 3.5–5.2)
ALBUMIN SERPL-MCNC: 2.7 G/DL (ref 3.5–5.2)
ALBUMIN SERPL-MCNC: 2.72 G/DL (ref 3.5–5.2)
ALBUMIN SERPL-MCNC: 2.77 G/DL (ref 3.5–5.2)
ALBUMIN SERPL-MCNC: 2.81 G/DL (ref 3.5–5.2)
ALBUMIN SERPL-MCNC: 2.83 G/DL (ref 3.5–5.2)
ALBUMIN SERPL-MCNC: 2.86 G/DL (ref 3.5–5.2)
ALBUMIN SERPL-MCNC: 3.02 G/DL (ref 3.5–5.2)
ALBUMIN SERPL-MCNC: 3.03 G/DL (ref 3.5–5.2)
ALBUMIN SERPL-MCNC: 3.12 G/DL (ref 3.5–5.2)
ALBUMIN SERPL-MCNC: 3.24 G/DL (ref 3.5–5.2)
ALBUMIN SERPL-MCNC: 3.34 G/DL (ref 3.5–5.2)
ALBUMIN SERPL-MCNC: 3.46 G/DL (ref 3.5–5.2)
ALBUMIN SERPL-MCNC: 3.5 G/DL (ref 3.5–5.2)
ALBUMIN SERPL-MCNC: 3.64 G/DL (ref 3.5–5.2)
ALBUMIN SERPL-MCNC: 3.69 G/DL (ref 3.5–5.2)
ALBUMIN/GLOB SERPL: 0.8 G/DL
ALBUMIN/GLOB SERPL: 0.9 G/DL
ALBUMIN/GLOB SERPL: 1 G/DL
ALBUMIN/GLOB SERPL: 1.1 G/DL
ALBUMIN/GLOB SERPL: 1.1 G/DL
ALBUMIN/GLOB SERPL: 1.2 G/DL
ALP SERPL-CCNC: 100 U/L (ref 39–117)
ALP SERPL-CCNC: 122 U/L (ref 39–117)
ALP SERPL-CCNC: 125 U/L (ref 39–117)
ALP SERPL-CCNC: 132 U/L (ref 39–117)
ALP SERPL-CCNC: 141 U/L (ref 39–117)
ALP SERPL-CCNC: 164 U/L (ref 39–117)
ALP SERPL-CCNC: 166 U/L (ref 39–117)
ALP SERPL-CCNC: 71 U/L (ref 39–117)
ALP SERPL-CCNC: 72 U/L (ref 39–117)
ALP SERPL-CCNC: 75 U/L (ref 39–117)
ALP SERPL-CCNC: 75 U/L (ref 39–117)
ALP SERPL-CCNC: 78 U/L (ref 39–117)
ALP SERPL-CCNC: 78 U/L (ref 39–117)
ALP SERPL-CCNC: 79 U/L (ref 39–117)
ALP SERPL-CCNC: 80 U/L (ref 39–117)
ALP SERPL-CCNC: 85 U/L (ref 39–117)
ALP SERPL-CCNC: 86 U/L (ref 39–117)
ALP SERPL-CCNC: 89 U/L (ref 39–117)
ALP SERPL-CCNC: 90 U/L (ref 39–117)
ALP SERPL-CCNC: 94 U/L (ref 39–117)
ALP SERPL-CCNC: 97 U/L (ref 39–117)
ALP SERPL-CCNC: 97 U/L (ref 39–117)
ALT SERPL W P-5'-P-CCNC: 10 U/L (ref 1–41)
ALT SERPL W P-5'-P-CCNC: 10 U/L (ref 1–41)
ALT SERPL W P-5'-P-CCNC: 11 U/L (ref 1–41)
ALT SERPL W P-5'-P-CCNC: 11 U/L (ref 1–41)
ALT SERPL W P-5'-P-CCNC: 14 U/L (ref 1–41)
ALT SERPL W P-5'-P-CCNC: 14 U/L (ref 1–41)
ALT SERPL W P-5'-P-CCNC: 16 U/L (ref 1–41)
ALT SERPL W P-5'-P-CCNC: 19 U/L (ref 1–41)
ALT SERPL W P-5'-P-CCNC: 21 U/L (ref 1–41)
ALT SERPL W P-5'-P-CCNC: 25 U/L (ref 1–41)
ALT SERPL W P-5'-P-CCNC: 253 U/L (ref 1–41)
ALT SERPL W P-5'-P-CCNC: 26 U/L (ref 1–41)
ALT SERPL W P-5'-P-CCNC: 29 U/L (ref 1–41)
ALT SERPL W P-5'-P-CCNC: 31 U/L (ref 1–41)
ALT SERPL W P-5'-P-CCNC: 34 U/L (ref 1–41)
ALT SERPL W P-5'-P-CCNC: 401 U/L (ref 1–41)
ALT SERPL W P-5'-P-CCNC: 406 U/L (ref 1–41)
ALT SERPL W P-5'-P-CCNC: 441 U/L (ref 1–41)
ALT SERPL W P-5'-P-CCNC: 46 U/L (ref 1–41)
ALT SERPL W P-5'-P-CCNC: 6 U/L (ref 1–41)
ALT SERPL W P-5'-P-CCNC: 7 U/L (ref 1–41)
ALT SERPL W P-5'-P-CCNC: 8 U/L (ref 1–41)
ANION GAP SERPL CALCULATED.3IONS-SCNC: 10.1 MMOL/L (ref 5–15)
ANION GAP SERPL CALCULATED.3IONS-SCNC: 10.3 MMOL/L (ref 5–15)
ANION GAP SERPL CALCULATED.3IONS-SCNC: 10.7 MMOL/L (ref 5–15)
ANION GAP SERPL CALCULATED.3IONS-SCNC: 11.1 MMOL/L (ref 5–15)
ANION GAP SERPL CALCULATED.3IONS-SCNC: 11.4 MMOL/L (ref 5–15)
ANION GAP SERPL CALCULATED.3IONS-SCNC: 13.4 MMOL/L (ref 5–15)
ANION GAP SERPL CALCULATED.3IONS-SCNC: 13.9 MMOL/L (ref 5–15)
ANION GAP SERPL CALCULATED.3IONS-SCNC: 21.7 MMOL/L (ref 5–15)
ANION GAP SERPL CALCULATED.3IONS-SCNC: 23 MMOL/L (ref 5–15)
ANION GAP SERPL CALCULATED.3IONS-SCNC: 4.6 MMOL/L (ref 5–15)
ANION GAP SERPL CALCULATED.3IONS-SCNC: 4.8 MMOL/L (ref 5–15)
ANION GAP SERPL CALCULATED.3IONS-SCNC: 4.8 MMOL/L (ref 5–15)
ANION GAP SERPL CALCULATED.3IONS-SCNC: 4.9 MMOL/L (ref 5–15)
ANION GAP SERPL CALCULATED.3IONS-SCNC: 5.3 MMOL/L (ref 5–15)
ANION GAP SERPL CALCULATED.3IONS-SCNC: 5.9 MMOL/L (ref 5–15)
ANION GAP SERPL CALCULATED.3IONS-SCNC: 6.4 MMOL/L (ref 5–15)
ANION GAP SERPL CALCULATED.3IONS-SCNC: 6.8 MMOL/L (ref 5–15)
ANION GAP SERPL CALCULATED.3IONS-SCNC: 7.1 MMOL/L (ref 5–15)
ANION GAP SERPL CALCULATED.3IONS-SCNC: 7.3 MMOL/L (ref 5–15)
ANION GAP SERPL CALCULATED.3IONS-SCNC: 7.4 MMOL/L (ref 5–15)
ANION GAP SERPL CALCULATED.3IONS-SCNC: 7.5 MMOL/L (ref 5–15)
ANION GAP SERPL CALCULATED.3IONS-SCNC: 7.5 MMOL/L (ref 5–15)
ANION GAP SERPL CALCULATED.3IONS-SCNC: 7.8 MMOL/L (ref 5–15)
ANION GAP SERPL CALCULATED.3IONS-SCNC: 7.9 MMOL/L (ref 5–15)
ANION GAP SERPL CALCULATED.3IONS-SCNC: 8.1 MMOL/L (ref 5–15)
ANION GAP SERPL CALCULATED.3IONS-SCNC: 8.3 MMOL/L (ref 5–15)
ANION GAP SERPL CALCULATED.3IONS-SCNC: 8.3 MMOL/L (ref 5–15)
ANION GAP SERPL CALCULATED.3IONS-SCNC: 8.6 MMOL/L (ref 5–15)
ANION GAP SERPL CALCULATED.3IONS-SCNC: 8.8 MMOL/L (ref 5–15)
ANION GAP SERPL CALCULATED.3IONS-SCNC: 9 MMOL/L (ref 5–15)
ANION GAP SERPL CALCULATED.3IONS-SCNC: 9.1 MMOL/L (ref 5–15)
ANION GAP SERPL CALCULATED.3IONS-SCNC: 9.2 MMOL/L (ref 5–15)
ANION GAP SERPL CALCULATED.3IONS-SCNC: 9.3 MMOL/L (ref 5–15)
ANION GAP SERPL CALCULATED.3IONS-SCNC: 9.4 MMOL/L (ref 5–15)
APTT PPP: 34.6 SECONDS (ref 25.6–35.3)
APTT PPP: 44.5 SECONDS (ref 25.5–35.4)
APTT PPP: 58.4 SECONDS (ref 25.5–35.4)
ARTERIAL PATENCY WRIST A: ABNORMAL
ARTERIAL PATENCY WRIST A: POSITIVE
ARTERIAL PATENCY WRIST A: POSITIVE
AST SERPL-CCNC: 10 U/L (ref 1–40)
AST SERPL-CCNC: 11 U/L (ref 1–40)
AST SERPL-CCNC: 12 U/L (ref 1–40)
AST SERPL-CCNC: 13 U/L (ref 1–40)
AST SERPL-CCNC: 13 U/L (ref 1–40)
AST SERPL-CCNC: 139 U/L (ref 1–40)
AST SERPL-CCNC: 14 U/L (ref 1–40)
AST SERPL-CCNC: 16 U/L (ref 1–40)
AST SERPL-CCNC: 21 U/L (ref 1–40)
AST SERPL-CCNC: 23 U/L (ref 1–40)
AST SERPL-CCNC: 24 U/L (ref 1–40)
AST SERPL-CCNC: 245 U/L (ref 1–40)
AST SERPL-CCNC: 258 U/L (ref 1–40)
AST SERPL-CCNC: 258 U/L (ref 1–40)
AST SERPL-CCNC: 26 U/L (ref 1–40)
AST SERPL-CCNC: 30 U/L (ref 1–40)
AST SERPL-CCNC: 30 U/L (ref 1–40)
AST SERPL-CCNC: 33 U/L (ref 1–40)
ATMOSPHERIC PRESS: 726 MMHG
ATMOSPHERIC PRESS: 728 MMHG
ATMOSPHERIC PRESS: 729 MMHG
ATMOSPHERIC PRESS: 730 MMHG
B PARAPERT DNA SPEC QL NAA+PROBE: NOT DETECTED
B PERT DNA SPEC QL NAA+PROBE: NOT DETECTED
BACTERIA BLD CULT: ABNORMAL
BACTERIA SPEC AEROBE CULT: ABNORMAL
BACTERIA SPEC AEROBE CULT: NO GROWTH
BACTERIA SPEC AEROBE CULT: NORMAL
BACTERIA UR QL AUTO: ABNORMAL /HPF
BASE EXCESS BLDA CALC-SCNC: -11.9 MMOL/L (ref 0–2)
BASE EXCESS BLDA CALC-SCNC: -16.3 MMOL/L (ref 0–2)
BASE EXCESS BLDA CALC-SCNC: -3.4 MMOL/L (ref 0–2)
BASE EXCESS BLDA CALC-SCNC: -7.5 MMOL/L (ref 0–2)
BASE EXCESS BLDA CALC-SCNC: -8.7 MMOL/L (ref 0–2)
BASE EXCESS BLDA CALC-SCNC: -9.5 MMOL/L (ref 0–2)
BASE EXCESS BLDA CALC-SCNC: 1.9 MMOL/L (ref 0–2)
BASE EXCESS BLDV CALC-SCNC: -1.5 MMOL/L (ref 0–2)
BASOPHILS # BLD AUTO: 0.02 10*3/MM3 (ref 0–0.2)
BASOPHILS # BLD AUTO: 0.02 10*3/MM3 (ref 0–0.2)
BASOPHILS # BLD AUTO: 0.03 10*3/MM3 (ref 0–0.2)
BASOPHILS # BLD AUTO: 0.03 10*3/MM3 (ref 0–0.2)
BASOPHILS # BLD AUTO: 0.04 10*3/MM3 (ref 0–0.2)
BASOPHILS # BLD AUTO: 0.05 10*3/MM3 (ref 0–0.2)
BASOPHILS # BLD AUTO: 0.06 10*3/MM3 (ref 0–0.2)
BASOPHILS # BLD AUTO: 0.07 10*3/MM3 (ref 0–0.2)
BASOPHILS # BLD AUTO: 0.08 10*3/MM3 (ref 0–0.2)
BASOPHILS # BLD AUTO: 0.09 10*3/MM3 (ref 0–0.2)
BASOPHILS NFR BLD AUTO: 0.2 % (ref 0–1.5)
BASOPHILS NFR BLD AUTO: 0.3 % (ref 0–1.5)
BASOPHILS NFR BLD AUTO: 0.4 % (ref 0–1.5)
BASOPHILS NFR BLD AUTO: 0.5 % (ref 0–1.5)
BASOPHILS NFR BLD AUTO: 0.6 % (ref 0–1.5)
BASOPHILS NFR BLD AUTO: 0.7 % (ref 0–1.5)
BDY SITE: ABNORMAL
BH CV ECHO MEAS - ACS: 2.3 CM
BH CV ECHO MEAS - AO ROOT AREA (BSA CORRECTED): 1.8
BH CV ECHO MEAS - AO ROOT AREA: 10.2 CM^2
BH CV ECHO MEAS - AO ROOT DIAM: 3.6 CM
BH CV ECHO MEAS - BSA(HAYCOCK): 2 M^2
BH CV ECHO MEAS - BSA: 2 M^2
BH CV ECHO MEAS - BZI_BMI: 25.3 KILOGRAMS/M^2
BH CV ECHO MEAS - BZI_METRIC_HEIGHT: 177.8 CM
BH CV ECHO MEAS - BZI_METRIC_WEIGHT: 79.8 KG
BH CV ECHO MEAS - EDV(MOD-SP4): 69 ML
BH CV ECHO MEAS - EF(MOD-SP4): 67 %
BH CV ECHO MEAS - ESV(MOD-SP4): 22.8 ML
BH CV ECHO MEAS - LA DIMENSION: 5.2 CM
BH CV ECHO MEAS - LA/AO: 1.4
BH CV ECHO MEAS - LV DIASTOLIC VOL/BSA (35-75): 34.9 ML/M^2
BH CV ECHO MEAS - LV SYSTOLIC VOL/BSA (12-30): 11.5 ML/M^2
BH CV ECHO MEAS - LVLD AP4: 7.6 CM
BH CV ECHO MEAS - LVLS AP4: 6.7 CM
BH CV ECHO MEAS - LVOT AREA (M): 3.5 CM^2
BH CV ECHO MEAS - LVOT AREA: 3.5 CM^2
BH CV ECHO MEAS - LVOT DIAM: 2.1 CM
BH CV ECHO MEAS - MV A MAX VEL: 57.4 CM/SEC
BH CV ECHO MEAS - MV E MAX VEL: 60.4 CM/SEC
BH CV ECHO MEAS - MV E/A: 1.1
BH CV ECHO MEAS - PA ACC TIME: 0.05 SEC
BH CV ECHO MEAS - PA PR(ACCEL): 57 MMHG
BH CV ECHO MEAS - RAP SYSTOLE: 10 MMHG
BH CV ECHO MEAS - RVSP: 37.9 MMHG
BH CV ECHO MEAS - SI(MOD-SP4): 23.4 ML/M^2
BH CV ECHO MEAS - SV(MOD-SP4): 46.2 ML
BH CV ECHO MEAS - TR MAX VEL: 264 CM/SEC
BILIRUB SERPL-MCNC: 0.2 MG/DL (ref 0–1.2)
BILIRUB SERPL-MCNC: 0.3 MG/DL (ref 0–1.2)
BILIRUB SERPL-MCNC: 0.4 MG/DL (ref 0–1.2)
BILIRUB SERPL-MCNC: 0.5 MG/DL (ref 0–1.2)
BILIRUB SERPL-MCNC: 0.6 MG/DL (ref 0–1.2)
BILIRUB SERPL-MCNC: 0.6 MG/DL (ref 0–1.2)
BILIRUB SERPL-MCNC: 0.7 MG/DL (ref 0–1.2)
BILIRUB SERPL-MCNC: 0.7 MG/DL (ref 0–1.2)
BILIRUB SERPL-MCNC: 0.8 MG/DL (ref 0–1.2)
BILIRUB SERPL-MCNC: 1 MG/DL (ref 0–1.2)
BILIRUB UR QL STRIP: ABNORMAL
BILIRUB UR QL STRIP: NEGATIVE
BODY TEMPERATURE: 0 C
BODY TEMPERATURE: 37 C
BOTTLE TYPE: ABNORMAL
BUN SERPL-MCNC: 105 MG/DL (ref 8–23)
BUN SERPL-MCNC: 16 MG/DL (ref 8–23)
BUN SERPL-MCNC: 19 MG/DL (ref 8–23)
BUN SERPL-MCNC: 20 MG/DL (ref 8–23)
BUN SERPL-MCNC: 21 MG/DL (ref 8–23)
BUN SERPL-MCNC: 23 MG/DL (ref 8–23)
BUN SERPL-MCNC: 23 MG/DL (ref 8–23)
BUN SERPL-MCNC: 24 MG/DL (ref 8–23)
BUN SERPL-MCNC: 25 MG/DL (ref 8–23)
BUN SERPL-MCNC: 25 MG/DL (ref 8–23)
BUN SERPL-MCNC: 26 MG/DL (ref 8–23)
BUN SERPL-MCNC: 27 MG/DL (ref 8–23)
BUN SERPL-MCNC: 28 MG/DL (ref 8–23)
BUN SERPL-MCNC: 28 MG/DL (ref 8–23)
BUN SERPL-MCNC: 29 MG/DL (ref 8–23)
BUN SERPL-MCNC: 30 MG/DL (ref 8–23)
BUN SERPL-MCNC: 31 MG/DL (ref 8–23)
BUN SERPL-MCNC: 32 MG/DL (ref 8–23)
BUN SERPL-MCNC: 33 MG/DL (ref 8–23)
BUN SERPL-MCNC: 36 MG/DL (ref 8–23)
BUN SERPL-MCNC: 38 MG/DL (ref 8–23)
BUN SERPL-MCNC: 38 MG/DL (ref 8–23)
BUN SERPL-MCNC: 41 MG/DL (ref 8–23)
BUN SERPL-MCNC: 42 MG/DL (ref 8–23)
BUN SERPL-MCNC: 42 MG/DL (ref 8–23)
BUN SERPL-MCNC: 46 MG/DL (ref 8–23)
BUN SERPL-MCNC: 50 MG/DL (ref 8–23)
BUN SERPL-MCNC: 52 MG/DL (ref 8–23)
BUN SERPL-MCNC: 54 MG/DL (ref 8–23)
BUN SERPL-MCNC: 55 MG/DL (ref 8–23)
BUN SERPL-MCNC: 56 MG/DL (ref 8–23)
BUN SERPL-MCNC: 57 MG/DL (ref 8–23)
BUN SERPL-MCNC: 60 MG/DL (ref 8–23)
BUN SERPL-MCNC: 61 MG/DL (ref 8–23)
BUN SERPL-MCNC: 62 MG/DL (ref 8–23)
BUN SERPL-MCNC: 68 MG/DL (ref 8–23)
BUN SERPL-MCNC: 73 MG/DL (ref 8–23)
BUN SERPL-MCNC: 74 MG/DL (ref 8–23)
BUN SERPL-MCNC: 78 MG/DL (ref 8–23)
BUN SERPL-MCNC: 79 MG/DL (ref 8–23)
BUN SERPL-MCNC: 89 MG/DL (ref 8–23)
BUN SERPL-MCNC: 90 MG/DL (ref 8–23)
BUN SERPL-MCNC: 90 MG/DL (ref 8–23)
BUN/CREAT SERPL: 13.2 (ref 7–25)
BUN/CREAT SERPL: 16.2 (ref 7–25)
BUN/CREAT SERPL: 16.5 (ref 7–25)
BUN/CREAT SERPL: 16.8 (ref 7–25)
BUN/CREAT SERPL: 18.4 (ref 7–25)
BUN/CREAT SERPL: 18.4 (ref 7–25)
BUN/CREAT SERPL: 18.7 (ref 7–25)
BUN/CREAT SERPL: 18.9 (ref 7–25)
BUN/CREAT SERPL: 19.3 (ref 7–25)
BUN/CREAT SERPL: 19.5 (ref 7–25)
BUN/CREAT SERPL: 19.6 (ref 7–25)
BUN/CREAT SERPL: 19.9 (ref 7–25)
BUN/CREAT SERPL: 20 (ref 7–25)
BUN/CREAT SERPL: 20.7 (ref 7–25)
BUN/CREAT SERPL: 21.1 (ref 7–25)
BUN/CREAT SERPL: 21.1 (ref 7–25)
BUN/CREAT SERPL: 21.6 (ref 7–25)
BUN/CREAT SERPL: 21.8 (ref 7–25)
BUN/CREAT SERPL: 22.1 (ref 7–25)
BUN/CREAT SERPL: 22.4 (ref 7–25)
BUN/CREAT SERPL: 22.4 (ref 7–25)
BUN/CREAT SERPL: 22.7 (ref 7–25)
BUN/CREAT SERPL: 22.9 (ref 7–25)
BUN/CREAT SERPL: 23.7 (ref 7–25)
BUN/CREAT SERPL: 25.4 (ref 7–25)
BUN/CREAT SERPL: 25.8 (ref 7–25)
BUN/CREAT SERPL: 26.8 (ref 7–25)
BUN/CREAT SERPL: 27.1 (ref 7–25)
BUN/CREAT SERPL: 27.9 (ref 7–25)
BUN/CREAT SERPL: 28.1 (ref 7–25)
BUN/CREAT SERPL: 28.2 (ref 7–25)
BUN/CREAT SERPL: 28.3 (ref 7–25)
BUN/CREAT SERPL: 30.7 (ref 7–25)
BUN/CREAT SERPL: 31 (ref 7–25)
BUN/CREAT SERPL: 31.7 (ref 7–25)
BUN/CREAT SERPL: 33.3 (ref 7–25)
BUN/CREAT SERPL: 34.4 (ref 7–25)
BUN/CREAT SERPL: 35 (ref 7–25)
BUN/CREAT SERPL: 36.3 (ref 7–25)
BUN/CREAT SERPL: 37.2 (ref 7–25)
BUN/CREAT SERPL: 38 (ref 7–25)
BUN/CREAT SERPL: 38.5 (ref 7–25)
BUN/CREAT SERPL: 39.8 (ref 7–25)
BUN/CREAT SERPL: 39.9 (ref 7–25)
BUN/CREAT SERPL: 40.2 (ref 7–25)
BUN/CREAT SERPL: 41.3 (ref 7–25)
BUN/CREAT SERPL: 41.3 (ref 7–25)
BUN/CREAT SERPL: 41.4 (ref 7–25)
C PNEUM DNA NPH QL NAA+NON-PROBE: NOT DETECTED
CA-I SERPL ISE-MCNC: 1.21 MMOL/L (ref 1.12–1.32)
CALCIUM SPEC-SCNC: 10 MG/DL (ref 8.6–10.5)
CALCIUM SPEC-SCNC: 10.2 MG/DL (ref 8.6–10.5)
CALCIUM SPEC-SCNC: 7.9 MG/DL (ref 8.6–10.5)
CALCIUM SPEC-SCNC: 8 MG/DL (ref 8.6–10.5)
CALCIUM SPEC-SCNC: 8 MG/DL (ref 8.6–10.5)
CALCIUM SPEC-SCNC: 8.2 MG/DL (ref 8.6–10.5)
CALCIUM SPEC-SCNC: 8.3 MG/DL (ref 8.6–10.5)
CALCIUM SPEC-SCNC: 8.4 MG/DL (ref 8.6–10.5)
CALCIUM SPEC-SCNC: 8.5 MG/DL (ref 8.6–10.5)
CALCIUM SPEC-SCNC: 8.6 MG/DL (ref 8.6–10.5)
CALCIUM SPEC-SCNC: 8.7 MG/DL (ref 8.6–10.5)
CALCIUM SPEC-SCNC: 8.8 MG/DL (ref 8.6–10.5)
CALCIUM SPEC-SCNC: 9 MG/DL (ref 8.6–10.5)
CALCIUM SPEC-SCNC: 9.1 MG/DL (ref 8.6–10.5)
CALCIUM SPEC-SCNC: 9.2 MG/DL (ref 8.6–10.5)
CALCIUM SPEC-SCNC: 9.2 MG/DL (ref 8.6–10.5)
CALCIUM SPEC-SCNC: 9.3 MG/DL (ref 8.6–10.5)
CALCIUM SPEC-SCNC: 9.3 MG/DL (ref 8.6–10.5)
CALCIUM SPEC-SCNC: 9.8 MG/DL (ref 8.6–10.5)
CHLORIDE SERPL-SCNC: 100 MMOL/L (ref 98–107)
CHLORIDE SERPL-SCNC: 100 MMOL/L (ref 98–107)
CHLORIDE SERPL-SCNC: 101 MMOL/L (ref 98–107)
CHLORIDE SERPL-SCNC: 102 MMOL/L (ref 98–107)
CHLORIDE SERPL-SCNC: 102 MMOL/L (ref 98–107)
CHLORIDE SERPL-SCNC: 103 MMOL/L (ref 98–107)
CHLORIDE SERPL-SCNC: 104 MMOL/L (ref 98–107)
CHLORIDE SERPL-SCNC: 105 MMOL/L (ref 98–107)
CHLORIDE SERPL-SCNC: 106 MMOL/L (ref 98–107)
CHLORIDE SERPL-SCNC: 107 MMOL/L (ref 98–107)
CHLORIDE SERPL-SCNC: 108 MMOL/L (ref 98–107)
CHLORIDE SERPL-SCNC: 109 MMOL/L (ref 98–107)
CHLORIDE SERPL-SCNC: 109 MMOL/L (ref 98–107)
CHLORIDE SERPL-SCNC: 112 MMOL/L (ref 98–107)
CHLORIDE SERPL-SCNC: 113 MMOL/L (ref 98–107)
CHLORIDE SERPL-SCNC: 114 MMOL/L (ref 98–107)
CHLORIDE SERPL-SCNC: 115 MMOL/L (ref 98–107)
CHLORIDE SERPL-SCNC: 117 MMOL/L (ref 98–107)
CHLORIDE SERPL-SCNC: 118 MMOL/L (ref 98–107)
CHLORIDE SERPL-SCNC: 118 MMOL/L (ref 98–107)
CHLORIDE SERPL-SCNC: 120 MMOL/L (ref 98–107)
CHLORIDE SERPL-SCNC: 120 MMOL/L (ref 98–107)
CHLORIDE SERPL-SCNC: 121 MMOL/L (ref 98–107)
CHLORIDE SERPL-SCNC: 123 MMOL/L (ref 98–107)
CHLORIDE SERPL-SCNC: 124 MMOL/L (ref 98–107)
CHLORIDE SERPL-SCNC: 125 MMOL/L (ref 98–107)
CHLORIDE SERPL-SCNC: 126 MMOL/L (ref 98–107)
CHLORIDE SERPL-SCNC: 127 MMOL/L (ref 98–107)
CHLORIDE SERPL-SCNC: 127 MMOL/L (ref 98–107)
CHLORIDE SERPL-SCNC: 129 MMOL/L (ref 98–107)
CHLORIDE SERPL-SCNC: 129 MMOL/L (ref 98–107)
CHLORIDE SERPL-SCNC: 130 MMOL/L (ref 98–107)
CK SERPL-CCNC: 348 U/L (ref 20–200)
CK SERPL-CCNC: 52 U/L (ref 20–200)
CK SERPL-CCNC: 544 U/L (ref 20–200)
CLARITY UR: ABNORMAL
CLARITY UR: CLEAR
CO2 BLDA-SCNC: 15.2 MMOL/L (ref 22–33)
CO2 BLDA-SCNC: 15.4 MMOL/L (ref 22–33)
CO2 BLDA-SCNC: 15.4 MMOL/L (ref 22–33)
CO2 BLDA-SCNC: 15.8 MMOL/L (ref 22–33)
CO2 BLDA-SCNC: 15.9 MMOL/L (ref 22–33)
CO2 BLDA-SCNC: 23.7 MMOL/L (ref 22–33)
CO2 BLDA-SCNC: 24.8 MMOL/L (ref 22–33)
CO2 BLDA-SCNC: 27.5 MMOL/L (ref 22–33)
CO2 SERPL-SCNC: 11.3 MMOL/L (ref 22–29)
CO2 SERPL-SCNC: 13.1 MMOL/L (ref 22–29)
CO2 SERPL-SCNC: 15 MMOL/L (ref 22–29)
CO2 SERPL-SCNC: 16 MMOL/L (ref 22–29)
CO2 SERPL-SCNC: 16.6 MMOL/L (ref 22–29)
CO2 SERPL-SCNC: 18.6 MMOL/L (ref 22–29)
CO2 SERPL-SCNC: 18.7 MMOL/L (ref 22–29)
CO2 SERPL-SCNC: 18.8 MMOL/L (ref 22–29)
CO2 SERPL-SCNC: 19.6 MMOL/L (ref 22–29)
CO2 SERPL-SCNC: 19.6 MMOL/L (ref 22–29)
CO2 SERPL-SCNC: 19.7 MMOL/L (ref 22–29)
CO2 SERPL-SCNC: 19.7 MMOL/L (ref 22–29)
CO2 SERPL-SCNC: 20.2 MMOL/L (ref 22–29)
CO2 SERPL-SCNC: 20.5 MMOL/L (ref 22–29)
CO2 SERPL-SCNC: 20.9 MMOL/L (ref 22–29)
CO2 SERPL-SCNC: 21.2 MMOL/L (ref 22–29)
CO2 SERPL-SCNC: 21.2 MMOL/L (ref 22–29)
CO2 SERPL-SCNC: 21.6 MMOL/L (ref 22–29)
CO2 SERPL-SCNC: 21.7 MMOL/L (ref 22–29)
CO2 SERPL-SCNC: 21.7 MMOL/L (ref 22–29)
CO2 SERPL-SCNC: 22.1 MMOL/L (ref 22–29)
CO2 SERPL-SCNC: 22.7 MMOL/L (ref 22–29)
CO2 SERPL-SCNC: 22.9 MMOL/L (ref 22–29)
CO2 SERPL-SCNC: 23.2 MMOL/L (ref 22–29)
CO2 SERPL-SCNC: 23.2 MMOL/L (ref 22–29)
CO2 SERPL-SCNC: 23.6 MMOL/L (ref 22–29)
CO2 SERPL-SCNC: 23.9 MMOL/L (ref 22–29)
CO2 SERPL-SCNC: 24.2 MMOL/L (ref 22–29)
CO2 SERPL-SCNC: 24.6 MMOL/L (ref 22–29)
CO2 SERPL-SCNC: 24.7 MMOL/L (ref 22–29)
CO2 SERPL-SCNC: 25.2 MMOL/L (ref 22–29)
CO2 SERPL-SCNC: 25.5 MMOL/L (ref 22–29)
CO2 SERPL-SCNC: 25.6 MMOL/L (ref 22–29)
CO2 SERPL-SCNC: 25.7 MMOL/L (ref 22–29)
CO2 SERPL-SCNC: 25.7 MMOL/L (ref 22–29)
CO2 SERPL-SCNC: 25.9 MMOL/L (ref 22–29)
CO2 SERPL-SCNC: 26.3 MMOL/L (ref 22–29)
CO2 SERPL-SCNC: 26.7 MMOL/L (ref 22–29)
CO2 SERPL-SCNC: 26.9 MMOL/L (ref 22–29)
CO2 SERPL-SCNC: 27.1 MMOL/L (ref 22–29)
CO2 SERPL-SCNC: 27.4 MMOL/L (ref 22–29)
CO2 SERPL-SCNC: 27.4 MMOL/L (ref 22–29)
CO2 SERPL-SCNC: 27.7 MMOL/L (ref 22–29)
CO2 SERPL-SCNC: 27.9 MMOL/L (ref 22–29)
CO2 SERPL-SCNC: 27.9 MMOL/L (ref 22–29)
CO2 SERPL-SCNC: 30.1 MMOL/L (ref 22–29)
COHGB MFR BLD: 0.4 % (ref 0–5)
COHGB MFR BLD: 0.6 % (ref 0–5)
COHGB MFR BLD: 0.6 % (ref 0–5)
COHGB MFR BLD: 0.7 % (ref 0–5)
COHGB MFR BLD: 0.7 % (ref 0–5)
COHGB MFR BLD: 1.4 % (ref 0–5)
COHGB MFR BLD: 1.5 % (ref 0–5)
COHGB MFR BLD: 1.8 % (ref 0–5)
COLOR UR: ABNORMAL
COLOR UR: ABNORMAL
COLOR UR: YELLOW
CREAT SERPL-MCNC: 1.14 MG/DL (ref 0.76–1.27)
CREAT SERPL-MCNC: 1.15 MG/DL (ref 0.76–1.27)
CREAT SERPL-MCNC: 1.18 MG/DL (ref 0.76–1.27)
CREAT SERPL-MCNC: 1.19 MG/DL (ref 0.76–1.27)
CREAT SERPL-MCNC: 1.19 MG/DL (ref 0.76–1.27)
CREAT SERPL-MCNC: 1.21 MG/DL (ref 0.76–1.27)
CREAT SERPL-MCNC: 1.24 MG/DL (ref 0.76–1.27)
CREAT SERPL-MCNC: 1.25 MG/DL (ref 0.76–1.27)
CREAT SERPL-MCNC: 1.27 MG/DL (ref 0.76–1.27)
CREAT SERPL-MCNC: 1.3 MG/DL (ref 0.76–1.27)
CREAT SERPL-MCNC: 1.3 MG/DL (ref 0.76–1.27)
CREAT SERPL-MCNC: 1.33 MG/DL (ref 0.76–1.27)
CREAT SERPL-MCNC: 1.33 MG/DL (ref 0.76–1.27)
CREAT SERPL-MCNC: 1.34 MG/DL (ref 0.76–1.27)
CREAT SERPL-MCNC: 1.35 MG/DL (ref 0.76–1.27)
CREAT SERPL-MCNC: 1.36 MG/DL (ref 0.76–1.27)
CREAT SERPL-MCNC: 1.36 MG/DL (ref 0.76–1.27)
CREAT SERPL-MCNC: 1.38 MG/DL (ref 0.76–1.27)
CREAT SERPL-MCNC: 1.39 MG/DL (ref 0.76–1.27)
CREAT SERPL-MCNC: 1.4 MG/DL (ref 0.76–1.27)
CREAT SERPL-MCNC: 1.42 MG/DL (ref 0.76–1.27)
CREAT SERPL-MCNC: 1.45 MG/DL (ref 0.76–1.27)
CREAT SERPL-MCNC: 1.45 MG/DL (ref 0.76–1.27)
CREAT SERPL-MCNC: 1.56 MG/DL (ref 0.76–1.27)
CREAT SERPL-MCNC: 1.57 MG/DL (ref 0.76–1.27)
CREAT SERPL-MCNC: 1.57 MG/DL (ref 0.76–1.27)
CREAT SERPL-MCNC: 1.6 MG/DL (ref 0.76–1.27)
CREAT SERPL-MCNC: 1.63 MG/DL (ref 0.76–1.27)
CREAT SERPL-MCNC: 1.65 MG/DL (ref 0.76–1.27)
CREAT SERPL-MCNC: 1.68 MG/DL (ref 0.76–1.27)
CREAT SERPL-MCNC: 1.71 MG/DL (ref 0.76–1.27)
CREAT SERPL-MCNC: 1.71 MG/DL (ref 0.76–1.27)
CREAT SERPL-MCNC: 1.85 MG/DL (ref 0.76–1.27)
CREAT SERPL-MCNC: 1.89 MG/DL (ref 0.76–1.27)
CREAT SERPL-MCNC: 1.91 MG/DL (ref 0.76–1.27)
CREAT SERPL-MCNC: 1.92 MG/DL (ref 0.76–1.27)
CREAT SERPL-MCNC: 1.94 MG/DL (ref 0.76–1.27)
CREAT SERPL-MCNC: 1.95 MG/DL (ref 0.76–1.27)
CREAT SERPL-MCNC: 1.96 MG/DL (ref 0.76–1.27)
CREAT SERPL-MCNC: 2.03 MG/DL (ref 0.76–1.27)
CREAT SERPL-MCNC: 2.18 MG/DL (ref 0.76–1.27)
CREAT SERPL-MCNC: 2.18 MG/DL (ref 0.76–1.27)
CREAT SERPL-MCNC: 2.34 MG/DL (ref 0.76–1.27)
CREAT SERPL-MCNC: 2.63 MG/DL (ref 0.76–1.27)
CREAT SERPL-MCNC: 2.66 MG/DL (ref 0.76–1.27)
CRP SERPL-MCNC: 0.8 MG/DL (ref 0–0.5)
CRP SERPL-MCNC: 0.92 MG/DL (ref 0–0.5)
CRP SERPL-MCNC: 1.2 MG/DL (ref 0–0.5)
CRP SERPL-MCNC: 1.6 MG/DL (ref 0–0.5)
CRP SERPL-MCNC: 1.8 MG/DL (ref 0–0.5)
CRP SERPL-MCNC: 12.28 MG/DL (ref 0–0.5)
CRP SERPL-MCNC: 12.35 MG/DL (ref 0–0.5)
CRP SERPL-MCNC: 13.32 MG/DL (ref 0–0.5)
CRP SERPL-MCNC: 13.38 MG/DL (ref 0–0.5)
CRP SERPL-MCNC: 2.07 MG/DL (ref 0–0.5)
CRP SERPL-MCNC: 3.47 MG/DL (ref 0–0.5)
CRP SERPL-MCNC: 3.69 MG/DL (ref 0–0.5)
CRP SERPL-MCNC: 3.95 MG/DL (ref 0–0.5)
CRP SERPL-MCNC: 4.3 MG/DL (ref 0–0.5)
CRP SERPL-MCNC: 5.34 MG/DL (ref 0–0.5)
CRP SERPL-MCNC: 6.76 MG/DL (ref 0–0.5)
CRP SERPL-MCNC: <0.3 MG/DL (ref 0–0.5)
CYTOLOGIST CVX/VAG CYTO: NORMAL
D-LACTATE SERPL-SCNC: 1.3 MMOL/L (ref 0.5–2)
D-LACTATE SERPL-SCNC: 1.7 MMOL/L (ref 0.5–2)
D-LACTATE SERPL-SCNC: 1.8 MMOL/L (ref 0.5–2)
D-LACTATE SERPL-SCNC: 11.1 MMOL/L (ref 0.5–2)
D-LACTATE SERPL-SCNC: 2.2 MMOL/L (ref 0.5–2)
D-LACTATE SERPL-SCNC: 2.2 MMOL/L (ref 0.5–2)
D-LACTATE SERPL-SCNC: 2.6 MMOL/L (ref 0.5–2)
D-LACTATE SERPL-SCNC: 2.6 MMOL/L (ref 0.5–2)
D-LACTATE SERPL-SCNC: 4.5 MMOL/L (ref 0.5–2)
D-LACTATE SERPL-SCNC: 6.8 MMOL/L (ref 0.5–2)
D-LACTATE SERPL-SCNC: 7.1 MMOL/L (ref 0.5–2)
DEPRECATED RDW RBC AUTO: 43.1 FL (ref 37–54)
DEPRECATED RDW RBC AUTO: 45.3 FL (ref 37–54)
DEPRECATED RDW RBC AUTO: 46.6 FL (ref 37–54)
DEPRECATED RDW RBC AUTO: 46.9 FL (ref 37–54)
DEPRECATED RDW RBC AUTO: 47.2 FL (ref 37–54)
DEPRECATED RDW RBC AUTO: 47.7 FL (ref 37–54)
DEPRECATED RDW RBC AUTO: 47.8 FL (ref 37–54)
DEPRECATED RDW RBC AUTO: 48.2 FL (ref 37–54)
DEPRECATED RDW RBC AUTO: 48.5 FL (ref 37–54)
DEPRECATED RDW RBC AUTO: 48.6 FL (ref 37–54)
DEPRECATED RDW RBC AUTO: 49.1 FL (ref 37–54)
DEPRECATED RDW RBC AUTO: 49.6 FL (ref 37–54)
DEPRECATED RDW RBC AUTO: 50.6 FL (ref 37–54)
DEPRECATED RDW RBC AUTO: 50.9 FL (ref 37–54)
DEPRECATED RDW RBC AUTO: 50.9 FL (ref 37–54)
DEPRECATED RDW RBC AUTO: 51 FL (ref 37–54)
DEPRECATED RDW RBC AUTO: 51.4 FL (ref 37–54)
DEPRECATED RDW RBC AUTO: 51.5 FL (ref 37–54)
DEPRECATED RDW RBC AUTO: 51.6 FL (ref 37–54)
DEPRECATED RDW RBC AUTO: 52 FL (ref 37–54)
DEPRECATED RDW RBC AUTO: 52.3 FL (ref 37–54)
DEPRECATED RDW RBC AUTO: 52.9 FL (ref 37–54)
DEPRECATED RDW RBC AUTO: 53 FL (ref 37–54)
DEPRECATED RDW RBC AUTO: 53.3 FL (ref 37–54)
DEPRECATED RDW RBC AUTO: 53.9 FL (ref 37–54)
DEPRECATED RDW RBC AUTO: 54.2 FL (ref 37–54)
DEPRECATED RDW RBC AUTO: 55.9 FL (ref 37–54)
DEPRECATED RDW RBC AUTO: 56.9 FL (ref 37–54)
DEPRECATED RDW RBC AUTO: 57.1 FL (ref 37–54)
DEPRECATED RDW RBC AUTO: 60.2 FL (ref 37–54)
EOSINOPHIL # BLD AUTO: 0.04 10*3/MM3 (ref 0–0.4)
EOSINOPHIL # BLD AUTO: 0.04 10*3/MM3 (ref 0–0.4)
EOSINOPHIL # BLD AUTO: 0.05 10*3/MM3 (ref 0–0.4)
EOSINOPHIL # BLD AUTO: 0.06 10*3/MM3 (ref 0–0.4)
EOSINOPHIL # BLD AUTO: 0.06 10*3/MM3 (ref 0–0.4)
EOSINOPHIL # BLD AUTO: 0.1 10*3/MM3 (ref 0–0.4)
EOSINOPHIL # BLD AUTO: 0.13 10*3/MM3 (ref 0–0.4)
EOSINOPHIL # BLD AUTO: 0.51 10*3/MM3 (ref 0–0.4)
EOSINOPHIL # BLD AUTO: 0.53 10*3/MM3 (ref 0–0.4)
EOSINOPHIL # BLD AUTO: 0.62 10*3/MM3 (ref 0–0.4)
EOSINOPHIL # BLD AUTO: 0.62 10*3/MM3 (ref 0–0.4)
EOSINOPHIL # BLD AUTO: 0.91 10*3/MM3 (ref 0–0.4)
EOSINOPHIL # BLD AUTO: 0.92 10*3/MM3 (ref 0–0.4)
EOSINOPHIL # BLD AUTO: 1.04 10*3/MM3 (ref 0–0.4)
EOSINOPHIL # BLD AUTO: 1.12 10*3/MM3 (ref 0–0.4)
EOSINOPHIL # BLD AUTO: 1.21 10*3/MM3 (ref 0–0.4)
EOSINOPHIL # BLD AUTO: 1.22 10*3/MM3 (ref 0–0.4)
EOSINOPHIL # BLD AUTO: 1.26 10*3/MM3 (ref 0–0.4)
EOSINOPHIL # BLD AUTO: 1.31 10*3/MM3 (ref 0–0.4)
EOSINOPHIL # BLD AUTO: 1.38 10*3/MM3 (ref 0–0.4)
EOSINOPHIL # BLD AUTO: 1.48 10*3/MM3 (ref 0–0.4)
EOSINOPHIL # BLD AUTO: 1.58 10*3/MM3 (ref 0–0.4)
EOSINOPHIL # BLD AUTO: 1.64 10*3/MM3 (ref 0–0.4)
EOSINOPHIL # BLD AUTO: 1.7 10*3/MM3 (ref 0–0.4)
EOSINOPHIL # BLD AUTO: 1.7 10*3/MM3 (ref 0–0.4)
EOSINOPHIL # BLD AUTO: 1.82 10*3/MM3 (ref 0–0.4)
EOSINOPHIL # BLD AUTO: 1.89 10*3/MM3 (ref 0–0.4)
EOSINOPHIL # BLD MANUAL: 0.39 10*3/MM3 (ref 0–0.4)
EOSINOPHIL NFR BLD AUTO: 0.2 % (ref 0.3–6.2)
EOSINOPHIL NFR BLD AUTO: 0.3 % (ref 0.3–6.2)
EOSINOPHIL NFR BLD AUTO: 0.6 % (ref 0.3–6.2)
EOSINOPHIL NFR BLD AUTO: 0.6 % (ref 0.3–6.2)
EOSINOPHIL NFR BLD AUTO: 0.7 % (ref 0.3–6.2)
EOSINOPHIL NFR BLD AUTO: 10.4 % (ref 0.3–6.2)
EOSINOPHIL NFR BLD AUTO: 12.6 % (ref 0.3–6.2)
EOSINOPHIL NFR BLD AUTO: 13.3 % (ref 0.3–6.2)
EOSINOPHIL NFR BLD AUTO: 13.9 % (ref 0.3–6.2)
EOSINOPHIL NFR BLD AUTO: 15 % (ref 0.3–6.2)
EOSINOPHIL NFR BLD AUTO: 15.7 % (ref 0.3–6.2)
EOSINOPHIL NFR BLD AUTO: 16.3 % (ref 0.3–6.2)
EOSINOPHIL NFR BLD AUTO: 17.1 % (ref 0.3–6.2)
EOSINOPHIL NFR BLD AUTO: 18 % (ref 0.3–6.2)
EOSINOPHIL NFR BLD AUTO: 18.5 % (ref 0.3–6.2)
EOSINOPHIL NFR BLD AUTO: 18.6 % (ref 0.3–6.2)
EOSINOPHIL NFR BLD AUTO: 18.7 % (ref 0.3–6.2)
EOSINOPHIL NFR BLD AUTO: 18.8 % (ref 0.3–6.2)
EOSINOPHIL NFR BLD AUTO: 3.6 % (ref 0.3–6.2)
EOSINOPHIL NFR BLD AUTO: 3.9 % (ref 0.3–6.2)
EOSINOPHIL NFR BLD AUTO: 5 % (ref 0.3–6.2)
EOSINOPHIL NFR BLD AUTO: 5.1 % (ref 0.3–6.2)
EOSINOPHIL NFR BLD AUTO: 8.7 % (ref 0.3–6.2)
EOSINOPHIL NFR BLD AUTO: 8.7 % (ref 0.3–6.2)
EOSINOPHIL NFR BLD AUTO: 9.3 % (ref 0.3–6.2)
EOSINOPHIL NFR BLD MANUAL: 1 % (ref 0.3–6.2)
ERYTHROCYTE [DISTWIDTH] IN BLOOD BY AUTOMATED COUNT: 12.2 % (ref 12.3–15.4)
ERYTHROCYTE [DISTWIDTH] IN BLOOD BY AUTOMATED COUNT: 12.5 % (ref 12.3–15.4)
ERYTHROCYTE [DISTWIDTH] IN BLOOD BY AUTOMATED COUNT: 12.7 % (ref 12.3–15.4)
ERYTHROCYTE [DISTWIDTH] IN BLOOD BY AUTOMATED COUNT: 13 % (ref 12.3–15.4)
ERYTHROCYTE [DISTWIDTH] IN BLOOD BY AUTOMATED COUNT: 13.1 % (ref 12.3–15.4)
ERYTHROCYTE [DISTWIDTH] IN BLOOD BY AUTOMATED COUNT: 13.2 % (ref 12.3–15.4)
ERYTHROCYTE [DISTWIDTH] IN BLOOD BY AUTOMATED COUNT: 13.3 % (ref 12.3–15.4)
ERYTHROCYTE [DISTWIDTH] IN BLOOD BY AUTOMATED COUNT: 13.4 % (ref 12.3–15.4)
ERYTHROCYTE [DISTWIDTH] IN BLOOD BY AUTOMATED COUNT: 13.5 % (ref 12.3–15.4)
ERYTHROCYTE [DISTWIDTH] IN BLOOD BY AUTOMATED COUNT: 13.5 % (ref 12.3–15.4)
ERYTHROCYTE [DISTWIDTH] IN BLOOD BY AUTOMATED COUNT: 13.6 % (ref 12.3–15.4)
ERYTHROCYTE [DISTWIDTH] IN BLOOD BY AUTOMATED COUNT: 13.7 % (ref 12.3–15.4)
ERYTHROCYTE [DISTWIDTH] IN BLOOD BY AUTOMATED COUNT: 13.8 % (ref 12.3–15.4)
ERYTHROCYTE [DISTWIDTH] IN BLOOD BY AUTOMATED COUNT: 13.9 % (ref 12.3–15.4)
ERYTHROCYTE [DISTWIDTH] IN BLOOD BY AUTOMATED COUNT: 13.9 % (ref 12.3–15.4)
ERYTHROCYTE [DISTWIDTH] IN BLOOD BY AUTOMATED COUNT: 14.1 % (ref 12.3–15.4)
ERYTHROCYTE [DISTWIDTH] IN BLOOD BY AUTOMATED COUNT: 14.2 % (ref 12.3–15.4)
ERYTHROCYTE [DISTWIDTH] IN BLOOD BY AUTOMATED COUNT: 14.2 % (ref 12.3–15.4)
ERYTHROCYTE [DISTWIDTH] IN BLOOD BY AUTOMATED COUNT: 14.5 % (ref 12.3–15.4)
ERYTHROCYTE [DISTWIDTH] IN BLOOD BY AUTOMATED COUNT: 14.7 % (ref 12.3–15.4)
ERYTHROCYTE [SEDIMENTATION RATE] IN BLOOD: 15 MM/HR (ref 0–20)
ERYTHROCYTE [SEDIMENTATION RATE] IN BLOOD: 2 MM/HR (ref 0–20)
FLUAV RNA RESP QL NAA+PROBE: NOT DETECTED
FLUAV SUBTYP SPEC NAA+PROBE: NOT DETECTED
FLUBV RNA ISLT QL NAA+PROBE: NOT DETECTED
FLUBV RNA RESP QL NAA+PROBE: NOT DETECTED
FOLATE SERPL-MCNC: 16.3 NG/ML (ref 4.78–24.2)
FOLATE SERPL-MCNC: 18.2 NG/ML (ref 4.78–24.2)
GFR SERPL CREATININE-BSD FRML MDRD: 23 ML/MIN/1.73
GFR SERPL CREATININE-BSD FRML MDRD: 23 ML/MIN/1.73
GFR SERPL CREATININE-BSD FRML MDRD: 27 ML/MIN/1.73
GFR SERPL CREATININE-BSD FRML MDRD: 29 ML/MIN/1.73
GFR SERPL CREATININE-BSD FRML MDRD: 29 ML/MIN/1.73
GFR SERPL CREATININE-BSD FRML MDRD: 32 ML/MIN/1.73
GFR SERPL CREATININE-BSD FRML MDRD: 33 ML/MIN/1.73
GFR SERPL CREATININE-BSD FRML MDRD: 34 ML/MIN/1.73
GFR SERPL CREATININE-BSD FRML MDRD: 35 ML/MIN/1.73
GFR SERPL CREATININE-BSD FRML MDRD: 39 ML/MIN/1.73
GFR SERPL CREATININE-BSD FRML MDRD: 40 ML/MIN/1.73
GFR SERPL CREATININE-BSD FRML MDRD: 41 ML/MIN/1.73
GFR SERPL CREATININE-BSD FRML MDRD: 42 ML/MIN/1.73
GFR SERPL CREATININE-BSD FRML MDRD: 43 ML/MIN/1.73
GFR SERPL CREATININE-BSD FRML MDRD: 47 ML/MIN/1.73
GFR SERPL CREATININE-BSD FRML MDRD: 47 ML/MIN/1.73
GFR SERPL CREATININE-BSD FRML MDRD: 48 ML/MIN/1.73
GFR SERPL CREATININE-BSD FRML MDRD: 49 ML/MIN/1.73
GFR SERPL CREATININE-BSD FRML MDRD: 50 ML/MIN/1.73
GFR SERPL CREATININE-BSD FRML MDRD: 50 ML/MIN/1.73
GFR SERPL CREATININE-BSD FRML MDRD: 51 ML/MIN/1.73
GFR SERPL CREATININE-BSD FRML MDRD: 52 ML/MIN/1.73
GFR SERPL CREATININE-BSD FRML MDRD: 52 ML/MIN/1.73
GFR SERPL CREATININE-BSD FRML MDRD: 53 ML/MIN/1.73
GFR SERPL CREATININE-BSD FRML MDRD: 53 ML/MIN/1.73
GFR SERPL CREATININE-BSD FRML MDRD: 54 ML/MIN/1.73
GFR SERPL CREATININE-BSD FRML MDRD: 55 ML/MIN/1.73
GFR SERPL CREATININE-BSD FRML MDRD: 56 ML/MIN/1.73
GFR SERPL CREATININE-BSD FRML MDRD: 58 ML/MIN/1.73
GFR SERPL CREATININE-BSD FRML MDRD: 59 ML/MIN/1.73
GFR SERPL CREATININE-BSD FRML MDRD: 61 ML/MIN/1.73
GFR SERPL CREATININE-BSD FRML MDRD: 62 ML/MIN/1.73
GLOBULIN UR ELPH-MCNC: 2.5 GM/DL
GLOBULIN UR ELPH-MCNC: 2.6 GM/DL
GLOBULIN UR ELPH-MCNC: 2.6 GM/DL
GLOBULIN UR ELPH-MCNC: 2.7 GM/DL
GLOBULIN UR ELPH-MCNC: 2.7 GM/DL
GLOBULIN UR ELPH-MCNC: 2.8 GM/DL
GLOBULIN UR ELPH-MCNC: 2.9 GM/DL
GLOBULIN UR ELPH-MCNC: 3 GM/DL
GLOBULIN UR ELPH-MCNC: 3.1 GM/DL
GLOBULIN UR ELPH-MCNC: 3.2 GM/DL
GLOBULIN UR ELPH-MCNC: 3.2 GM/DL
GLOBULIN UR ELPH-MCNC: 3.3 GM/DL
GLOBULIN UR ELPH-MCNC: 3.4 GM/DL
GLOBULIN UR ELPH-MCNC: 3.6 GM/DL
GLOBULIN UR ELPH-MCNC: 3.7 GM/DL
GLOBULIN UR ELPH-MCNC: 3.9 GM/DL
GLUCOSE BLDC GLUCOMTR-MCNC: 100 MG/DL (ref 70–130)
GLUCOSE BLDC GLUCOMTR-MCNC: 103 MG/DL (ref 70–130)
GLUCOSE BLDC GLUCOMTR-MCNC: 105 MG/DL (ref 70–130)
GLUCOSE BLDC GLUCOMTR-MCNC: 106 MG/DL (ref 70–130)
GLUCOSE BLDC GLUCOMTR-MCNC: 111 MG/DL (ref 70–130)
GLUCOSE BLDC GLUCOMTR-MCNC: 112 MG/DL (ref 70–130)
GLUCOSE BLDC GLUCOMTR-MCNC: 114 MG/DL (ref 70–130)
GLUCOSE BLDC GLUCOMTR-MCNC: 114 MG/DL (ref 70–130)
GLUCOSE BLDC GLUCOMTR-MCNC: 116 MG/DL (ref 70–130)
GLUCOSE BLDC GLUCOMTR-MCNC: 116 MG/DL (ref 70–130)
GLUCOSE BLDC GLUCOMTR-MCNC: 117 MG/DL (ref 70–130)
GLUCOSE BLDC GLUCOMTR-MCNC: 128 MG/DL (ref 70–130)
GLUCOSE BLDC GLUCOMTR-MCNC: 129 MG/DL (ref 70–130)
GLUCOSE BLDC GLUCOMTR-MCNC: 134 MG/DL (ref 70–130)
GLUCOSE BLDC GLUCOMTR-MCNC: 137 MG/DL (ref 70–130)
GLUCOSE BLDC GLUCOMTR-MCNC: 150 MG/DL (ref 70–130)
GLUCOSE BLDC GLUCOMTR-MCNC: 152 MG/DL (ref 70–130)
GLUCOSE BLDC GLUCOMTR-MCNC: 154 MG/DL (ref 70–130)
GLUCOSE BLDC GLUCOMTR-MCNC: 81 MG/DL (ref 70–130)
GLUCOSE BLDC GLUCOMTR-MCNC: 81 MG/DL (ref 70–130)
GLUCOSE BLDC GLUCOMTR-MCNC: 82 MG/DL (ref 70–130)
GLUCOSE BLDC GLUCOMTR-MCNC: 87 MG/DL (ref 70–130)
GLUCOSE BLDC GLUCOMTR-MCNC: 90 MG/DL (ref 70–130)
GLUCOSE BLDC GLUCOMTR-MCNC: 93 MG/DL (ref 70–130)
GLUCOSE BLDC GLUCOMTR-MCNC: 94 MG/DL (ref 70–130)
GLUCOSE BLDC GLUCOMTR-MCNC: 94 MG/DL (ref 70–130)
GLUCOSE BLDC GLUCOMTR-MCNC: 95 MG/DL (ref 70–130)
GLUCOSE BLDC GLUCOMTR-MCNC: 98 MG/DL (ref 70–130)
GLUCOSE BLDC GLUCOMTR-MCNC: 98 MG/DL (ref 70–130)
GLUCOSE BLDC GLUCOMTR-MCNC: 99 MG/DL (ref 70–130)
GLUCOSE BLDC GLUCOMTR-MCNC: 99 MG/DL (ref 70–130)
GLUCOSE SERPL-MCNC: 101 MG/DL (ref 65–99)
GLUCOSE SERPL-MCNC: 101 MG/DL (ref 65–99)
GLUCOSE SERPL-MCNC: 102 MG/DL (ref 65–99)
GLUCOSE SERPL-MCNC: 102 MG/DL (ref 65–99)
GLUCOSE SERPL-MCNC: 103 MG/DL (ref 65–99)
GLUCOSE SERPL-MCNC: 104 MG/DL (ref 65–99)
GLUCOSE SERPL-MCNC: 105 MG/DL (ref 65–99)
GLUCOSE SERPL-MCNC: 107 MG/DL (ref 65–99)
GLUCOSE SERPL-MCNC: 108 MG/DL (ref 65–99)
GLUCOSE SERPL-MCNC: 110 MG/DL (ref 65–99)
GLUCOSE SERPL-MCNC: 117 MG/DL (ref 65–99)
GLUCOSE SERPL-MCNC: 119 MG/DL (ref 65–99)
GLUCOSE SERPL-MCNC: 125 MG/DL (ref 65–99)
GLUCOSE SERPL-MCNC: 127 MG/DL (ref 65–99)
GLUCOSE SERPL-MCNC: 128 MG/DL (ref 65–99)
GLUCOSE SERPL-MCNC: 129 MG/DL (ref 65–99)
GLUCOSE SERPL-MCNC: 129 MG/DL (ref 65–99)
GLUCOSE SERPL-MCNC: 130 MG/DL (ref 65–99)
GLUCOSE SERPL-MCNC: 132 MG/DL (ref 65–99)
GLUCOSE SERPL-MCNC: 134 MG/DL (ref 65–99)
GLUCOSE SERPL-MCNC: 174 MG/DL (ref 65–99)
GLUCOSE SERPL-MCNC: 270 MG/DL (ref 65–99)
GLUCOSE SERPL-MCNC: 83 MG/DL (ref 65–99)
GLUCOSE SERPL-MCNC: 83 MG/DL (ref 65–99)
GLUCOSE SERPL-MCNC: 85 MG/DL (ref 65–99)
GLUCOSE SERPL-MCNC: 87 MG/DL (ref 65–99)
GLUCOSE SERPL-MCNC: 88 MG/DL (ref 65–99)
GLUCOSE SERPL-MCNC: 89 MG/DL (ref 65–99)
GLUCOSE SERPL-MCNC: 90 MG/DL (ref 65–99)
GLUCOSE SERPL-MCNC: 92 MG/DL (ref 65–99)
GLUCOSE SERPL-MCNC: 93 MG/DL (ref 65–99)
GLUCOSE SERPL-MCNC: 93 MG/DL (ref 65–99)
GLUCOSE SERPL-MCNC: 94 MG/DL (ref 65–99)
GLUCOSE SERPL-MCNC: 94 MG/DL (ref 65–99)
GLUCOSE SERPL-MCNC: 95 MG/DL (ref 65–99)
GLUCOSE SERPL-MCNC: 96 MG/DL (ref 65–99)
GLUCOSE SERPL-MCNC: 97 MG/DL (ref 65–99)
GLUCOSE SERPL-MCNC: 98 MG/DL (ref 65–99)
GLUCOSE SERPL-MCNC: 99 MG/DL (ref 65–99)
GLUCOSE UR STRIP-MCNC: NEGATIVE MG/DL
GRAM STN SPEC: ABNORMAL
HADV DNA SPEC NAA+PROBE: NOT DETECTED
HCO3 BLDA-SCNC: 14.2 MMOL/L (ref 20–26)
HCO3 BLDA-SCNC: 14.2 MMOL/L (ref 20–26)
HCO3 BLDA-SCNC: 14.7 MMOL/L (ref 20–26)
HCO3 BLDA-SCNC: 14.7 MMOL/L (ref 20–26)
HCO3 BLDA-SCNC: 15 MMOL/L (ref 20–26)
HCO3 BLDA-SCNC: 22.4 MMOL/L (ref 20–26)
HCO3 BLDA-SCNC: 26.3 MMOL/L (ref 20–26)
HCO3 BLDV-SCNC: 23.6 MMOL/L (ref 22–28)
HCOV 229E RNA SPEC QL NAA+PROBE: NOT DETECTED
HCOV HKU1 RNA SPEC QL NAA+PROBE: NOT DETECTED
HCOV NL63 RNA SPEC QL NAA+PROBE: NOT DETECTED
HCOV OC43 RNA SPEC QL NAA+PROBE: NOT DETECTED
HCT VFR BLD AUTO: 28.6 % (ref 37.5–51)
HCT VFR BLD AUTO: 30 % (ref 37.5–51)
HCT VFR BLD AUTO: 30.7 % (ref 37.5–51)
HCT VFR BLD AUTO: 31.4 % (ref 37.5–51)
HCT VFR BLD AUTO: 31.5 % (ref 37.5–51)
HCT VFR BLD AUTO: 32.3 % (ref 37.5–51)
HCT VFR BLD AUTO: 32.4 % (ref 37.5–51)
HCT VFR BLD AUTO: 32.6 % (ref 37.5–51)
HCT VFR BLD AUTO: 33.6 % (ref 37.5–51)
HCT VFR BLD AUTO: 34.1 % (ref 37.5–51)
HCT VFR BLD AUTO: 34.1 % (ref 37.5–51)
HCT VFR BLD AUTO: 35.3 % (ref 37.5–51)
HCT VFR BLD AUTO: 35.5 % (ref 37.5–51)
HCT VFR BLD AUTO: 36 % (ref 37.5–51)
HCT VFR BLD AUTO: 36 % (ref 37.5–51)
HCT VFR BLD AUTO: 36.6 % (ref 37.5–51)
HCT VFR BLD AUTO: 36.8 % (ref 37.5–51)
HCT VFR BLD AUTO: 37.4 % (ref 37.5–51)
HCT VFR BLD AUTO: 37.5 % (ref 37.5–51)
HCT VFR BLD AUTO: 38.1 % (ref 37.5–51)
HCT VFR BLD AUTO: 38.2 % (ref 37.5–51)
HCT VFR BLD AUTO: 39.8 % (ref 37.5–51)
HCT VFR BLD AUTO: 39.9 % (ref 37.5–51)
HCT VFR BLD AUTO: 40.1 % (ref 37.5–51)
HCT VFR BLD AUTO: 40.2 % (ref 37.5–51)
HCT VFR BLD AUTO: 42.8 % (ref 37.5–51)
HCT VFR BLD AUTO: 43.2 % (ref 37.5–51)
HCT VFR BLD AUTO: 44.8 % (ref 37.5–51)
HCT VFR BLD AUTO: 45.1 % (ref 37.5–51)
HCT VFR BLD AUTO: 47 % (ref 37.5–51)
HCT VFR BLD AUTO: 51.4 % (ref 37.5–51)
HCT VFR BLD CALC: 30.7 % (ref 38–51)
HCT VFR BLD CALC: 35.7 % (ref 38–51)
HCT VFR BLD CALC: 36.2 % (ref 38–51)
HCT VFR BLD CALC: 36.4 % (ref 38–51)
HCT VFR BLD CALC: 36.7 % (ref 38–51)
HCT VFR BLD CALC: 38.1 % (ref 38–51)
HCT VFR BLD CALC: 38.5 % (ref 38–51)
HGB BLD-MCNC: 10.1 G/DL (ref 13–17.7)
HGB BLD-MCNC: 10.3 G/DL (ref 13–17.7)
HGB BLD-MCNC: 10.3 G/DL (ref 13–17.7)
HGB BLD-MCNC: 10.4 G/DL (ref 13–17.7)
HGB BLD-MCNC: 10.4 G/DL (ref 13–17.7)
HGB BLD-MCNC: 10.5 G/DL (ref 13–17.7)
HGB BLD-MCNC: 10.8 G/DL (ref 13–17.7)
HGB BLD-MCNC: 10.8 G/DL (ref 13–17.7)
HGB BLD-MCNC: 10.9 G/DL (ref 13–17.7)
HGB BLD-MCNC: 11.2 G/DL (ref 13–17.7)
HGB BLD-MCNC: 11.3 G/DL (ref 13–17.7)
HGB BLD-MCNC: 11.4 G/DL (ref 13–17.7)
HGB BLD-MCNC: 11.4 G/DL (ref 13–17.7)
HGB BLD-MCNC: 11.5 G/DL (ref 13–17.7)
HGB BLD-MCNC: 11.5 G/DL (ref 13–17.7)
HGB BLD-MCNC: 11.7 G/DL (ref 13–17.7)
HGB BLD-MCNC: 11.8 G/DL (ref 13–17.7)
HGB BLD-MCNC: 11.9 G/DL (ref 13–17.7)
HGB BLD-MCNC: 12.3 G/DL (ref 13–17.7)
HGB BLD-MCNC: 12.5 G/DL (ref 13–17.7)
HGB BLD-MCNC: 12.7 G/DL (ref 13–17.7)
HGB BLD-MCNC: 13.4 G/DL (ref 13–17.7)
HGB BLD-MCNC: 13.5 G/DL (ref 13–17.7)
HGB BLD-MCNC: 13.6 G/DL (ref 13–17.7)
HGB BLD-MCNC: 15.1 G/DL (ref 13–17.7)
HGB BLD-MCNC: 9 G/DL (ref 13–17.7)
HGB BLD-MCNC: 9.2 G/DL (ref 13–17.7)
HGB BLD-MCNC: 9.5 G/DL (ref 13–17.7)
HGB BLD-MCNC: 9.9 G/DL (ref 13–17.7)
HGB BLDA-MCNC: 10 G/DL (ref 14–18)
HGB BLDA-MCNC: 11.7 G/DL (ref 14–18)
HGB BLDA-MCNC: 11.8 G/DL (ref 14–18)
HGB BLDA-MCNC: 11.9 G/DL (ref 14–18)
HGB BLDA-MCNC: 12 G/DL (ref 14–18)
HGB BLDA-MCNC: 12.3 G/DL (ref 14–18)
HGB BLDA-MCNC: 12.4 G/DL (ref 14–18)
HGB BLDA-MCNC: 12.6 G/DL (ref 14–18)
HGB UR QL STRIP.AUTO: ABNORMAL
HGB UR QL STRIP.AUTO: NEGATIVE
HMPV RNA NPH QL NAA+NON-PROBE: NOT DETECTED
HOLD SPECIMEN: NORMAL
HPIV1 RNA ISLT QL NAA+PROBE: NOT DETECTED
HPIV1 RNA SPEC QL NAA+PROBE: NOT DETECTED
HPIV2 RNA SPEC QL NAA+PROBE: NOT DETECTED
HPIV3 RNA NPH QL NAA+PROBE: NOT DETECTED
HPIV4 P GENE NPH QL NAA+PROBE: NOT DETECTED
HYALINE CASTS UR QL AUTO: ABNORMAL /LPF
HYPOCHROMIA BLD QL: ABNORMAL
HYPOCHROMIA BLD QL: ABNORMAL
HYPOCHROMIA BLD QL: NORMAL
IMM GRANULOCYTES # BLD AUTO: 0.01 10*3/MM3 (ref 0–0.05)
IMM GRANULOCYTES # BLD AUTO: 0.02 10*3/MM3 (ref 0–0.05)
IMM GRANULOCYTES # BLD AUTO: 0.03 10*3/MM3 (ref 0–0.05)
IMM GRANULOCYTES # BLD AUTO: 0.04 10*3/MM3 (ref 0–0.05)
IMM GRANULOCYTES # BLD AUTO: 0.05 10*3/MM3 (ref 0–0.05)
IMM GRANULOCYTES # BLD AUTO: 0.05 10*3/MM3 (ref 0–0.05)
IMM GRANULOCYTES # BLD AUTO: 0.06 10*3/MM3 (ref 0–0.05)
IMM GRANULOCYTES # BLD AUTO: 0.11 10*3/MM3 (ref 0–0.05)
IMM GRANULOCYTES # BLD AUTO: 0.12 10*3/MM3 (ref 0–0.05)
IMM GRANULOCYTES # BLD AUTO: 0.12 10*3/MM3 (ref 0–0.05)
IMM GRANULOCYTES # BLD AUTO: 0.13 10*3/MM3 (ref 0–0.05)
IMM GRANULOCYTES # BLD AUTO: 0.18 10*3/MM3 (ref 0–0.05)
IMM GRANULOCYTES # BLD AUTO: 0.21 10*3/MM3 (ref 0–0.05)
IMM GRANULOCYTES NFR BLD AUTO: 0.1 % (ref 0–0.5)
IMM GRANULOCYTES NFR BLD AUTO: 0.2 % (ref 0–0.5)
IMM GRANULOCYTES NFR BLD AUTO: 0.3 % (ref 0–0.5)
IMM GRANULOCYTES NFR BLD AUTO: 0.4 % (ref 0–0.5)
IMM GRANULOCYTES NFR BLD AUTO: 0.5 % (ref 0–0.5)
IMM GRANULOCYTES NFR BLD AUTO: 0.6 % (ref 0–0.5)
IMM GRANULOCYTES NFR BLD AUTO: 0.7 % (ref 0–0.5)
IMM GRANULOCYTES NFR BLD AUTO: 0.9 % (ref 0–0.5)
IMM GRANULOCYTES NFR BLD AUTO: 1.1 % (ref 0–0.5)
INHALED O2 CONCENTRATION: 100 %
INHALED O2 CONCENTRATION: 21 %
INHALED O2 CONCENTRATION: 50 %
INHALED O2 CONCENTRATION: 70 %
INR PPP: 1.08 (ref 0.9–1.1)
INR PPP: 1.16 (ref 0.9–1.1)
INR PPP: 1.9 (ref 0.9–1.1)
KETONES UR QL STRIP: ABNORMAL
KETONES UR QL STRIP: NEGATIVE
LACTATE HOLD SPECIMEN: NORMAL
LEUKOCYTE ESTERASE UR QL STRIP.AUTO: ABNORMAL
LEUKOCYTE ESTERASE UR QL STRIP.AUTO: NEGATIVE
LYMPHOCYTES # BLD AUTO: 0.84 10*3/MM3 (ref 0.7–3.1)
LYMPHOCYTES # BLD AUTO: 0.89 10*3/MM3 (ref 0.7–3.1)
LYMPHOCYTES # BLD AUTO: 0.91 10*3/MM3 (ref 0.7–3.1)
LYMPHOCYTES # BLD AUTO: 1.19 10*3/MM3 (ref 0.7–3.1)
LYMPHOCYTES # BLD AUTO: 1.27 10*3/MM3 (ref 0.7–3.1)
LYMPHOCYTES # BLD AUTO: 1.46 10*3/MM3 (ref 0.7–3.1)
LYMPHOCYTES # BLD AUTO: 1.47 10*3/MM3 (ref 0.7–3.1)
LYMPHOCYTES # BLD AUTO: 1.77 10*3/MM3 (ref 0.7–3.1)
LYMPHOCYTES # BLD AUTO: 1.83 10*3/MM3 (ref 0.7–3.1)
LYMPHOCYTES # BLD AUTO: 1.94 10*3/MM3 (ref 0.7–3.1)
LYMPHOCYTES # BLD AUTO: 1.95 10*3/MM3 (ref 0.7–3.1)
LYMPHOCYTES # BLD AUTO: 2.11 10*3/MM3 (ref 0.7–3.1)
LYMPHOCYTES # BLD AUTO: 2.12 10*3/MM3 (ref 0.7–3.1)
LYMPHOCYTES # BLD AUTO: 2.15 10*3/MM3 (ref 0.7–3.1)
LYMPHOCYTES # BLD AUTO: 2.2 10*3/MM3 (ref 0.7–3.1)
LYMPHOCYTES # BLD AUTO: 2.22 10*3/MM3 (ref 0.7–3.1)
LYMPHOCYTES # BLD AUTO: 2.25 10*3/MM3 (ref 0.7–3.1)
LYMPHOCYTES # BLD AUTO: 2.27 10*3/MM3 (ref 0.7–3.1)
LYMPHOCYTES # BLD AUTO: 2.29 10*3/MM3 (ref 0.7–3.1)
LYMPHOCYTES # BLD AUTO: 2.42 10*3/MM3 (ref 0.7–3.1)
LYMPHOCYTES # BLD AUTO: 2.5 10*3/MM3 (ref 0.7–3.1)
LYMPHOCYTES # BLD AUTO: 2.51 10*3/MM3 (ref 0.7–3.1)
LYMPHOCYTES # BLD AUTO: 2.63 10*3/MM3 (ref 0.7–3.1)
LYMPHOCYTES # BLD AUTO: 2.63 10*3/MM3 (ref 0.7–3.1)
LYMPHOCYTES # BLD AUTO: 2.77 10*3/MM3 (ref 0.7–3.1)
LYMPHOCYTES # BLD AUTO: 2.81 10*3/MM3 (ref 0.7–3.1)
LYMPHOCYTES # BLD AUTO: 2.86 10*3/MM3 (ref 0.7–3.1)
LYMPHOCYTES # BLD MANUAL: 10.24 10*3/MM3 (ref 0.7–3.1)
LYMPHOCYTES # BLD MANUAL: 3.16 10*3/MM3 (ref 0.7–3.1)
LYMPHOCYTES NFR BLD AUTO: 13.9 % (ref 19.6–45.3)
LYMPHOCYTES NFR BLD AUTO: 13.9 % (ref 19.6–45.3)
LYMPHOCYTES NFR BLD AUTO: 14.3 % (ref 19.6–45.3)
LYMPHOCYTES NFR BLD AUTO: 14.7 % (ref 19.6–45.3)
LYMPHOCYTES NFR BLD AUTO: 14.8 % (ref 19.6–45.3)
LYMPHOCYTES NFR BLD AUTO: 17.1 % (ref 19.6–45.3)
LYMPHOCYTES NFR BLD AUTO: 17.3 % (ref 19.6–45.3)
LYMPHOCYTES NFR BLD AUTO: 18.4 % (ref 19.6–45.3)
LYMPHOCYTES NFR BLD AUTO: 20.4 % (ref 19.6–45.3)
LYMPHOCYTES NFR BLD AUTO: 21 % (ref 19.6–45.3)
LYMPHOCYTES NFR BLD AUTO: 21.1 % (ref 19.6–45.3)
LYMPHOCYTES NFR BLD AUTO: 22.4 % (ref 19.6–45.3)
LYMPHOCYTES NFR BLD AUTO: 22.8 % (ref 19.6–45.3)
LYMPHOCYTES NFR BLD AUTO: 24.3 % (ref 19.6–45.3)
LYMPHOCYTES NFR BLD AUTO: 25.5 % (ref 19.6–45.3)
LYMPHOCYTES NFR BLD AUTO: 25.8 % (ref 19.6–45.3)
LYMPHOCYTES NFR BLD AUTO: 26.2 % (ref 19.6–45.3)
LYMPHOCYTES NFR BLD AUTO: 26.8 % (ref 19.6–45.3)
LYMPHOCYTES NFR BLD AUTO: 26.9 % (ref 19.6–45.3)
LYMPHOCYTES NFR BLD AUTO: 27.2 % (ref 19.6–45.3)
LYMPHOCYTES NFR BLD AUTO: 27.8 % (ref 19.6–45.3)
LYMPHOCYTES NFR BLD AUTO: 30.2 % (ref 19.6–45.3)
LYMPHOCYTES NFR BLD AUTO: 4.3 % (ref 19.6–45.3)
LYMPHOCYTES NFR BLD AUTO: 4.8 % (ref 19.6–45.3)
LYMPHOCYTES NFR BLD AUTO: 4.9 % (ref 19.6–45.3)
LYMPHOCYTES NFR BLD AUTO: 5.3 % (ref 19.6–45.3)
LYMPHOCYTES NFR BLD AUTO: 6.6 % (ref 19.6–45.3)
LYMPHOCYTES NFR BLD MANUAL: 15 % (ref 5–12)
LYMPHOCYTES NFR BLD MANUAL: 6 % (ref 5–12)
Lab: ABNORMAL
M PNEUMO IGG SER IA-ACNC: NOT DETECTED
MACROCYTES BLD QL SMEAR: ABNORMAL
MACROCYTES BLD QL SMEAR: ABNORMAL
MACROCYTES BLD QL SMEAR: NORMAL
MACROCYTES BLD QL SMEAR: NORMAL
MAGNESIUM SERPL-MCNC: 1.7 MG/DL (ref 1.6–2.4)
MAGNESIUM SERPL-MCNC: 1.8 MG/DL (ref 1.6–2.4)
MAGNESIUM SERPL-MCNC: 2.1 MG/DL (ref 1.6–2.4)
MAGNESIUM SERPL-MCNC: 2.2 MG/DL (ref 1.6–2.4)
MAGNESIUM SERPL-MCNC: 2.2 MG/DL (ref 1.6–2.4)
MAGNESIUM SERPL-MCNC: 2.3 MG/DL (ref 1.6–2.4)
MAGNESIUM SERPL-MCNC: 2.5 MG/DL (ref 1.6–2.4)
MAGNESIUM SERPL-MCNC: 2.7 MG/DL (ref 1.6–2.4)
MAXIMAL PREDICTED HEART RATE: 139 BPM
MCH RBC QN AUTO: 30.7 PG (ref 26.6–33)
MCH RBC QN AUTO: 30.9 PG (ref 26.6–33)
MCH RBC QN AUTO: 31 PG (ref 26.6–33)
MCH RBC QN AUTO: 31.1 PG (ref 26.6–33)
MCH RBC QN AUTO: 31.2 PG (ref 26.6–33)
MCH RBC QN AUTO: 31.3 PG (ref 26.6–33)
MCH RBC QN AUTO: 31.3 PG (ref 26.6–33)
MCH RBC QN AUTO: 31.4 PG (ref 26.6–33)
MCH RBC QN AUTO: 31.5 PG (ref 26.6–33)
MCH RBC QN AUTO: 31.6 PG (ref 26.6–33)
MCH RBC QN AUTO: 31.7 PG (ref 26.6–33)
MCH RBC QN AUTO: 31.9 PG (ref 26.6–33)
MCH RBC QN AUTO: 32 PG (ref 26.6–33)
MCH RBC QN AUTO: 32.5 PG (ref 26.6–33)
MCH RBC QN AUTO: 32.5 PG (ref 26.6–33)
MCH RBC QN AUTO: 32.6 PG (ref 26.6–33)
MCH RBC QN AUTO: 32.7 PG (ref 26.6–33)
MCHC RBC AUTO-ENTMCNC: 28.1 G/DL (ref 31.5–35.7)
MCHC RBC AUTO-ENTMCNC: 28.5 G/DL (ref 31.5–35.7)
MCHC RBC AUTO-ENTMCNC: 28.5 G/DL (ref 31.5–35.7)
MCHC RBC AUTO-ENTMCNC: 28.7 G/DL (ref 31.5–35.7)
MCHC RBC AUTO-ENTMCNC: 29.4 G/DL (ref 31.5–35.7)
MCHC RBC AUTO-ENTMCNC: 29.5 G/DL (ref 31.5–35.7)
MCHC RBC AUTO-ENTMCNC: 29.8 G/DL (ref 31.5–35.7)
MCHC RBC AUTO-ENTMCNC: 29.9 G/DL (ref 31.5–35.7)
MCHC RBC AUTO-ENTMCNC: 30 G/DL (ref 31.5–35.7)
MCHC RBC AUTO-ENTMCNC: 30 G/DL (ref 31.5–35.7)
MCHC RBC AUTO-ENTMCNC: 30.4 G/DL (ref 31.5–35.7)
MCHC RBC AUTO-ENTMCNC: 30.5 G/DL (ref 31.5–35.7)
MCHC RBC AUTO-ENTMCNC: 30.7 G/DL (ref 31.5–35.7)
MCHC RBC AUTO-ENTMCNC: 30.7 G/DL (ref 31.5–35.7)
MCHC RBC AUTO-ENTMCNC: 30.9 G/DL (ref 31.5–35.7)
MCHC RBC AUTO-ENTMCNC: 30.9 G/DL (ref 31.5–35.7)
MCHC RBC AUTO-ENTMCNC: 31.2 G/DL (ref 31.5–35.7)
MCHC RBC AUTO-ENTMCNC: 31.4 G/DL (ref 31.5–35.7)
MCHC RBC AUTO-ENTMCNC: 31.5 G/DL (ref 31.5–35.7)
MCHC RBC AUTO-ENTMCNC: 31.6 G/DL (ref 31.5–35.7)
MCHC RBC AUTO-ENTMCNC: 31.6 G/DL (ref 31.5–35.7)
MCHC RBC AUTO-ENTMCNC: 31.7 G/DL (ref 31.5–35.7)
MCHC RBC AUTO-ENTMCNC: 31.7 G/DL (ref 31.5–35.7)
MCHC RBC AUTO-ENTMCNC: 31.8 G/DL (ref 31.5–35.7)
MCHC RBC AUTO-ENTMCNC: 31.9 G/DL (ref 31.5–35.7)
MCHC RBC AUTO-ENTMCNC: 32 G/DL (ref 31.5–35.7)
MCHC RBC AUTO-ENTMCNC: 32.1 G/DL (ref 31.5–35.7)
MCHC RBC AUTO-ENTMCNC: 32.2 G/DL (ref 31.5–35.7)
MCHC RBC AUTO-ENTMCNC: 32.2 G/DL (ref 31.5–35.7)
MCHC RBC AUTO-ENTMCNC: 32.3 G/DL (ref 31.5–35.7)
MCV RBC AUTO: 100.6 FL (ref 79–97)
MCV RBC AUTO: 100.8 FL (ref 79–97)
MCV RBC AUTO: 100.8 FL (ref 79–97)
MCV RBC AUTO: 101.4 FL (ref 79–97)
MCV RBC AUTO: 101.7 FL (ref 79–97)
MCV RBC AUTO: 102.1 FL (ref 79–97)
MCV RBC AUTO: 102.4 FL (ref 79–97)
MCV RBC AUTO: 102.7 FL (ref 79–97)
MCV RBC AUTO: 102.7 FL (ref 79–97)
MCV RBC AUTO: 103.5 FL (ref 79–97)
MCV RBC AUTO: 103.9 FL (ref 79–97)
MCV RBC AUTO: 104.2 FL (ref 79–97)
MCV RBC AUTO: 104.3 FL (ref 79–97)
MCV RBC AUTO: 104.9 FL (ref 79–97)
MCV RBC AUTO: 105.1 FL (ref 79–97)
MCV RBC AUTO: 105.3 FL (ref 79–97)
MCV RBC AUTO: 107.5 FL (ref 79–97)
MCV RBC AUTO: 108.5 FL (ref 79–97)
MCV RBC AUTO: 109.6 FL (ref 79–97)
MCV RBC AUTO: 110.5 FL (ref 79–97)
MCV RBC AUTO: 110.9 FL (ref 79–97)
MCV RBC AUTO: 96.7 FL (ref 79–97)
MCV RBC AUTO: 96.9 FL (ref 79–97)
MCV RBC AUTO: 97.4 FL (ref 79–97)
MCV RBC AUTO: 98.1 FL (ref 79–97)
MCV RBC AUTO: 98.4 FL (ref 79–97)
MCV RBC AUTO: 98.8 FL (ref 79–97)
MCV RBC AUTO: 99.4 FL (ref 79–97)
MCV RBC AUTO: 99.4 FL (ref 79–97)
MCV RBC AUTO: 99.8 FL (ref 79–97)
METAMYELOCYTES NFR BLD MANUAL: 1 % (ref 0–0)
METAMYELOCYTES NFR BLD MANUAL: 3 % (ref 0–0)
METHGB BLD QL: 0 % (ref 0–3)
METHGB BLD QL: 0.1 % (ref 0–3)
METHGB BLD QL: 0.2 % (ref 0–3)
METHGB BLD QL: 0.3 % (ref 0–3)
METHGB BLD QL: 0.3 % (ref 0–3)
MODALITY: ABNORMAL
MONOCYTES # BLD AUTO: 0.56 10*3/MM3 (ref 0.1–0.9)
MONOCYTES # BLD AUTO: 0.6 10*3/MM3 (ref 0.1–0.9)
MONOCYTES # BLD AUTO: 0.62 10*3/MM3 (ref 0.1–0.9)
MONOCYTES # BLD AUTO: 0.63 10*3/MM3 (ref 0.1–0.9)
MONOCYTES # BLD AUTO: 0.65 10*3/MM3 (ref 0.1–0.9)
MONOCYTES # BLD AUTO: 0.71 10*3/MM3 (ref 0.1–0.9)
MONOCYTES # BLD AUTO: 0.72 10*3/MM3 (ref 0.1–0.9)
MONOCYTES # BLD AUTO: 0.73 10*3/MM3 (ref 0.1–0.9)
MONOCYTES # BLD AUTO: 0.75 10*3/MM3 (ref 0.1–0.9)
MONOCYTES # BLD AUTO: 0.77 10*3/MM3 (ref 0.1–0.9)
MONOCYTES # BLD AUTO: 0.78 10*3/MM3 (ref 0.1–0.9)
MONOCYTES # BLD AUTO: 0.82 10*3/MM3 (ref 0.1–0.9)
MONOCYTES # BLD AUTO: 0.91 10*3/MM3 (ref 0.1–0.9)
MONOCYTES # BLD AUTO: 0.94 10*3/MM3 (ref 0.1–0.9)
MONOCYTES # BLD AUTO: 0.95 10*3/MM3 (ref 0.1–0.9)
MONOCYTES # BLD AUTO: 1.01 10*3/MM3 (ref 0.1–0.9)
MONOCYTES # BLD AUTO: 1.05 10*3/MM3 (ref 0.1–0.9)
MONOCYTES # BLD AUTO: 1.1 10*3/MM3 (ref 0.1–0.9)
MONOCYTES # BLD AUTO: 1.16 10*3/MM3 (ref 0.1–0.9)
MONOCYTES # BLD AUTO: 1.17 10*3/MM3 (ref 0.1–0.9)
MONOCYTES # BLD AUTO: 1.18 10*3/MM3 (ref 0.1–0.9)
MONOCYTES # BLD AUTO: 1.19 10*3/MM3 (ref 0.1–0.9)
MONOCYTES # BLD AUTO: 1.32 10*3/MM3 (ref 0.1–0.9)
MONOCYTES # BLD AUTO: 1.35 10*3/MM3 (ref 0.1–0.9)
MONOCYTES # BLD AUTO: 2.71 10*3/MM3 (ref 0.1–0.9)
MONOCYTES # BLD: 2.36 10*3/MM3 (ref 0.1–0.9)
MONOCYTES # BLD: 5.92 10*3/MM3 (ref 0.1–0.9)
MONOCYTES NFR BLD AUTO: 13.6 % (ref 5–12)
MONOCYTES NFR BLD AUTO: 4.9 % (ref 5–12)
MONOCYTES NFR BLD AUTO: 5.2 % (ref 5–12)
MONOCYTES NFR BLD AUTO: 5.2 % (ref 5–12)
MONOCYTES NFR BLD AUTO: 6 % (ref 5–12)
MONOCYTES NFR BLD AUTO: 6.3 % (ref 5–12)
MONOCYTES NFR BLD AUTO: 6.4 % (ref 5–12)
MONOCYTES NFR BLD AUTO: 7.1 % (ref 5–12)
MONOCYTES NFR BLD AUTO: 7.2 % (ref 5–12)
MONOCYTES NFR BLD AUTO: 7.2 % (ref 5–12)
MONOCYTES NFR BLD AUTO: 7.3 % (ref 5–12)
MONOCYTES NFR BLD AUTO: 7.4 % (ref 5–12)
MONOCYTES NFR BLD AUTO: 7.5 % (ref 5–12)
MONOCYTES NFR BLD AUTO: 7.5 % (ref 5–12)
MONOCYTES NFR BLD AUTO: 7.6 % (ref 5–12)
MONOCYTES NFR BLD AUTO: 7.6 % (ref 5–12)
MONOCYTES NFR BLD AUTO: 7.7 % (ref 5–12)
MONOCYTES NFR BLD AUTO: 8.1 % (ref 5–12)
MONOCYTES NFR BLD AUTO: 8.4 % (ref 5–12)
MONOCYTES NFR BLD AUTO: 9.1 % (ref 5–12)
MONOCYTES NFR BLD AUTO: 9.2 % (ref 5–12)
MONOCYTES NFR BLD AUTO: 9.3 % (ref 5–12)
MONOCYTES NFR BLD AUTO: 9.6 % (ref 5–12)
MONOCYTES NFR BLD AUTO: 9.7 % (ref 5–12)
MONOCYTES NFR BLD AUTO: 9.8 % (ref 5–12)
MRSA DNA SPEC QL NAA+PROBE: NEGATIVE
MRSA DNA SPEC QL NAA+PROBE: NEGATIVE
MRSA DNA SPEC QL NAA+PROBE: POSITIVE
MYELOCYTES NFR BLD MANUAL: 1 % (ref 0–0)
NEUTROPHILS # BLD AUTO: 24.82 10*3/MM3 (ref 1.7–7)
NEUTROPHILS # BLD AUTO: 29.99 10*3/MM3 (ref 1.7–7)
NEUTROPHILS NFR BLD AUTO: 10.48 10*3/MM3 (ref 1.7–7)
NEUTROPHILS NFR BLD AUTO: 12.64 10*3/MM3 (ref 1.7–7)
NEUTROPHILS NFR BLD AUTO: 13.21 10*3/MM3 (ref 1.7–7)
NEUTROPHILS NFR BLD AUTO: 14.19 10*3/MM3 (ref 1.7–7)
NEUTROPHILS NFR BLD AUTO: 16.04 10*3/MM3 (ref 1.7–7)
NEUTROPHILS NFR BLD AUTO: 18.53 10*3/MM3 (ref 1.7–7)
NEUTROPHILS NFR BLD AUTO: 19.27 10*3/MM3 (ref 1.7–7)
NEUTROPHILS NFR BLD AUTO: 21.54 10*3/MM3 (ref 1.7–7)
NEUTROPHILS NFR BLD AUTO: 3.62 10*3/MM3 (ref 1.7–7)
NEUTROPHILS NFR BLD AUTO: 3.75 10*3/MM3 (ref 1.7–7)
NEUTROPHILS NFR BLD AUTO: 4.05 10*3/MM3 (ref 1.7–7)
NEUTROPHILS NFR BLD AUTO: 4.14 10*3/MM3 (ref 1.7–7)
NEUTROPHILS NFR BLD AUTO: 4.21 10*3/MM3 (ref 1.7–7)
NEUTROPHILS NFR BLD AUTO: 4.34 10*3/MM3 (ref 1.7–7)
NEUTROPHILS NFR BLD AUTO: 4.52 10*3/MM3 (ref 1.7–7)
NEUTROPHILS NFR BLD AUTO: 4.56 10*3/MM3 (ref 1.7–7)
NEUTROPHILS NFR BLD AUTO: 4.94 10*3/MM3 (ref 1.7–7)
NEUTROPHILS NFR BLD AUTO: 45 % (ref 42.7–76)
NEUTROPHILS NFR BLD AUTO: 45.9 % (ref 42.7–76)
NEUTROPHILS NFR BLD AUTO: 46 % (ref 42.7–76)
NEUTROPHILS NFR BLD AUTO: 46.3 % (ref 42.7–76)
NEUTROPHILS NFR BLD AUTO: 48.3 % (ref 42.7–76)
NEUTROPHILS NFR BLD AUTO: 49 % (ref 42.7–76)
NEUTROPHILS NFR BLD AUTO: 49.8 % (ref 42.7–76)
NEUTROPHILS NFR BLD AUTO: 5.07 10*3/MM3 (ref 1.7–7)
NEUTROPHILS NFR BLD AUTO: 5.2 10*3/MM3 (ref 1.7–7)
NEUTROPHILS NFR BLD AUTO: 5.64 10*3/MM3 (ref 1.7–7)
NEUTROPHILS NFR BLD AUTO: 5.65 10*3/MM3 (ref 1.7–7)
NEUTROPHILS NFR BLD AUTO: 5.79 10*3/MM3 (ref 1.7–7)
NEUTROPHILS NFR BLD AUTO: 50.9 % (ref 42.7–76)
NEUTROPHILS NFR BLD AUTO: 51.9 % (ref 42.7–76)
NEUTROPHILS NFR BLD AUTO: 52.4 % (ref 42.7–76)
NEUTROPHILS NFR BLD AUTO: 56.9 % (ref 42.7–76)
NEUTROPHILS NFR BLD AUTO: 6.68 10*3/MM3 (ref 1.7–7)
NEUTROPHILS NFR BLD AUTO: 60.3 % (ref 42.7–76)
NEUTROPHILS NFR BLD AUTO: 62.4 % (ref 42.7–76)
NEUTROPHILS NFR BLD AUTO: 63.4 % (ref 42.7–76)
NEUTROPHILS NFR BLD AUTO: 63.7 % (ref 42.7–76)
NEUTROPHILS NFR BLD AUTO: 65.3 % (ref 42.7–76)
NEUTROPHILS NFR BLD AUTO: 66 % (ref 42.7–76)
NEUTROPHILS NFR BLD AUTO: 66.2 % (ref 42.7–76)
NEUTROPHILS NFR BLD AUTO: 69.7 % (ref 42.7–76)
NEUTROPHILS NFR BLD AUTO: 7.94 10*3/MM3 (ref 1.7–7)
NEUTROPHILS NFR BLD AUTO: 71.4 % (ref 42.7–76)
NEUTROPHILS NFR BLD AUTO: 73.2 % (ref 42.7–76)
NEUTROPHILS NFR BLD AUTO: 76.9 % (ref 42.7–76)
NEUTROPHILS NFR BLD AUTO: 8.57 10*3/MM3 (ref 1.7–7)
NEUTROPHILS NFR BLD AUTO: 8.78 10*3/MM3 (ref 1.7–7)
NEUTROPHILS NFR BLD AUTO: 84.2 % (ref 42.7–76)
NEUTROPHILS NFR BLD AUTO: 86.6 % (ref 42.7–76)
NEUTROPHILS NFR BLD AUTO: 87.3 % (ref 42.7–76)
NEUTROPHILS NFR BLD AUTO: 88 % (ref 42.7–76)
NEUTROPHILS NFR BLD AUTO: 88.8 % (ref 42.7–76)
NEUTROPHILS NFR BLD AUTO: 9.01 10*3/MM3 (ref 1.7–7)
NEUTROPHILS NFR BLD MANUAL: 50 % (ref 42.7–76)
NEUTROPHILS NFR BLD MANUAL: 68 % (ref 42.7–76)
NEUTS BAND NFR BLD MANUAL: 13 % (ref 0–5)
NEUTS BAND NFR BLD MANUAL: 8 % (ref 0–5)
NEUTS VAC BLD QL SMEAR: ABNORMAL
NITRITE UR QL STRIP: NEGATIVE
NOTE: ABNORMAL
NOTIFIED BY: ABNORMAL
NOTIFIED BY: ABNORMAL
NOTIFIED WHO: ABNORMAL
NOTIFIED WHO: ABNORMAL
NRBC BLD AUTO-RTO: 0 /100 WBC (ref 0–0.2)
NRBC SPEC MANUAL: 1 /100 WBC (ref 0–0.2)
NT-PROBNP SERPL-MCNC: 1404 PG/ML (ref 0–1800)
NT-PROBNP SERPL-MCNC: 2971 PG/ML (ref 0–1800)
NT-PROBNP SERPL-MCNC: 4899 PG/ML (ref 0–1800)
NT-PROBNP SERPL-MCNC: 785.6 PG/ML (ref 0–1800)
OXYHGB MFR BLDV: 85.8 % (ref 94–99)
OXYHGB MFR BLDV: 89.9 % (ref 45–75)
OXYHGB MFR BLDV: 95 % (ref 94–99)
OXYHGB MFR BLDV: 95.6 % (ref 94–99)
OXYHGB MFR BLDV: 96.3 % (ref 94–99)
OXYHGB MFR BLDV: 98.8 % (ref 94–99)
OXYHGB MFR BLDV: 99 % (ref 94–99)
OXYHGB MFR BLDV: >99 % (ref 94–99)
PATH INTERP BLD-IMP: NORMAL
PCO2 BLDA: 21.6 MM HG (ref 35–45)
PCO2 BLDA: 24.5 MM HG (ref 35–45)
PCO2 BLDA: 28.3 MM HG (ref 35–45)
PCO2 BLDA: 32.4 MM HG (ref 35–45)
PCO2 BLDA: 39.5 MM HG (ref 35–45)
PCO2 BLDA: 42 MM HG (ref 35–45)
PCO2 BLDA: 53.1 MM HG (ref 35–45)
PCO2 BLDV: 40.2 MM HG (ref 41–51)
PCO2 TEMP ADJ BLD: ABNORMAL MM[HG]
PEEP RESPIRATORY: 5 CM[H2O]
PH BLDA: 7.04 PH UNITS (ref 7.35–7.45)
PH BLDA: 7.25 PH UNITS (ref 7.35–7.45)
PH BLDA: 7.33 PH UNITS (ref 7.35–7.45)
PH BLDA: 7.33 PH UNITS (ref 7.35–7.45)
PH BLDA: 7.39 PH UNITS (ref 7.35–7.45)
PH BLDA: 7.43 PH UNITS (ref 7.35–7.45)
PH BLDA: 7.44 PH UNITS (ref 7.35–7.45)
PH BLDV: 7.38 PH UNITS (ref 7.32–7.42)
PH UR STRIP.AUTO: 5.5 [PH] (ref 5–8)
PH UR STRIP.AUTO: 8 [PH] (ref 5–8)
PH UR STRIP.AUTO: <=5 [PH] (ref 5–8)
PH, TEMP CORRECTED: ABNORMAL
PHOSPHATE SERPL-MCNC: 2.4 MG/DL (ref 2.5–4.5)
PHOSPHATE SERPL-MCNC: 2.8 MG/DL (ref 2.5–4.5)
PHOSPHATE SERPL-MCNC: 3.1 MG/DL (ref 2.5–4.5)
PHOSPHATE SERPL-MCNC: 3.5 MG/DL (ref 2.5–4.5)
PLAT MORPH BLD: NORMAL
PLATELET # BLD AUTO: 149 10*3/MM3 (ref 140–450)
PLATELET # BLD AUTO: 155 10*3/MM3 (ref 140–450)
PLATELET # BLD AUTO: 163 10*3/MM3 (ref 140–450)
PLATELET # BLD AUTO: 170 10*3/MM3 (ref 140–450)
PLATELET # BLD AUTO: 182 10*3/MM3 (ref 140–450)
PLATELET # BLD AUTO: 206 10*3/MM3 (ref 140–450)
PLATELET # BLD AUTO: 206 10*3/MM3 (ref 140–450)
PLATELET # BLD AUTO: 212 10*3/MM3 (ref 140–450)
PLATELET # BLD AUTO: 218 10*3/MM3 (ref 140–450)
PLATELET # BLD AUTO: 218 10*3/MM3 (ref 140–450)
PLATELET # BLD AUTO: 220 10*3/MM3 (ref 140–450)
PLATELET # BLD AUTO: 221 10*3/MM3 (ref 140–450)
PLATELET # BLD AUTO: 225 10*3/MM3 (ref 140–450)
PLATELET # BLD AUTO: 237 10*3/MM3 (ref 140–450)
PLATELET # BLD AUTO: 244 10*3/MM3 (ref 140–450)
PLATELET # BLD AUTO: 245 10*3/MM3 (ref 140–450)
PLATELET # BLD AUTO: 248 10*3/MM3 (ref 140–450)
PLATELET # BLD AUTO: 249 10*3/MM3 (ref 140–450)
PLATELET # BLD AUTO: 252 10*3/MM3 (ref 140–450)
PLATELET # BLD AUTO: 254 10*3/MM3 (ref 140–450)
PLATELET # BLD AUTO: 257 10*3/MM3 (ref 140–450)
PLATELET # BLD AUTO: 258 10*3/MM3 (ref 140–450)
PLATELET # BLD AUTO: 263 10*3/MM3 (ref 140–450)
PLATELET # BLD AUTO: 266 10*3/MM3 (ref 140–450)
PLATELET # BLD AUTO: 267 10*3/MM3 (ref 140–450)
PLATELET # BLD AUTO: 273 10*3/MM3 (ref 140–450)
PLATELET # BLD AUTO: 287 10*3/MM3 (ref 140–450)
PLATELET # BLD AUTO: 382 10*3/MM3 (ref 140–450)
PLATELET # BLD AUTO: 385 10*3/MM3 (ref 140–450)
PLATELET # BLD AUTO: 397 10*3/MM3 (ref 140–450)
PMV BLD AUTO: 10 FL (ref 6–12)
PMV BLD AUTO: 10.2 FL (ref 6–12)
PMV BLD AUTO: 8.6 FL (ref 6–12)
PMV BLD AUTO: 8.6 FL (ref 6–12)
PMV BLD AUTO: 8.7 FL (ref 6–12)
PMV BLD AUTO: 8.8 FL (ref 6–12)
PMV BLD AUTO: 8.9 FL (ref 6–12)
PMV BLD AUTO: 9.1 FL (ref 6–12)
PMV BLD AUTO: 9.2 FL (ref 6–12)
PMV BLD AUTO: 9.2 FL (ref 6–12)
PMV BLD AUTO: 9.3 FL (ref 6–12)
PMV BLD AUTO: 9.4 FL (ref 6–12)
PMV BLD AUTO: 9.4 FL (ref 6–12)
PMV BLD AUTO: 9.6 FL (ref 6–12)
PMV BLD AUTO: 9.7 FL (ref 6–12)
PMV BLD AUTO: 9.7 FL (ref 6–12)
PMV BLD AUTO: 9.8 FL (ref 6–12)
PMV BLD AUTO: 9.9 FL (ref 6–12)
PO2 BLDA: 126 MM HG (ref 83–108)
PO2 BLDA: 199 MM HG (ref 83–108)
PO2 BLDA: 364 MM HG (ref 83–108)
PO2 BLDA: 50.6 MM HG (ref 83–108)
PO2 BLDA: 78.3 MM HG (ref 83–108)
PO2 BLDA: 88.2 MM HG (ref 83–108)
PO2 BLDA: 95 MM HG (ref 83–108)
PO2 BLDV: 57.4 MM HG (ref 27–53)
PO2 TEMP ADJ BLD: ABNORMAL MM[HG]
POLYCHROMASIA BLD QL SMEAR: ABNORMAL
POTASSIUM SERPL-SCNC: 3.2 MMOL/L (ref 3.5–5.2)
POTASSIUM SERPL-SCNC: 3.3 MMOL/L (ref 3.5–5.2)
POTASSIUM SERPL-SCNC: 3.5 MMOL/L (ref 3.5–5.2)
POTASSIUM SERPL-SCNC: 3.5 MMOL/L (ref 3.5–5.2)
POTASSIUM SERPL-SCNC: 3.6 MMOL/L (ref 3.5–5.2)
POTASSIUM SERPL-SCNC: 3.7 MMOL/L (ref 3.5–5.2)
POTASSIUM SERPL-SCNC: 3.8 MMOL/L (ref 3.5–5.2)
POTASSIUM SERPL-SCNC: 3.8 MMOL/L (ref 3.5–5.2)
POTASSIUM SERPL-SCNC: 3.9 MMOL/L (ref 3.5–5.2)
POTASSIUM SERPL-SCNC: 4 MMOL/L (ref 3.5–5.2)
POTASSIUM SERPL-SCNC: 4.1 MMOL/L (ref 3.5–5.2)
POTASSIUM SERPL-SCNC: 4.2 MMOL/L (ref 3.5–5.2)
POTASSIUM SERPL-SCNC: 4.3 MMOL/L (ref 3.5–5.2)
POTASSIUM SERPL-SCNC: 4.4 MMOL/L (ref 3.5–5.2)
POTASSIUM SERPL-SCNC: 4.5 MMOL/L (ref 3.5–5.2)
POTASSIUM SERPL-SCNC: 4.6 MMOL/L (ref 3.5–5.2)
POTASSIUM SERPL-SCNC: 4.7 MMOL/L (ref 3.5–5.2)
POTASSIUM SERPL-SCNC: 4.8 MMOL/L (ref 3.5–5.2)
POTASSIUM SERPL-SCNC: 4.8 MMOL/L (ref 3.5–5.2)
POTASSIUM SERPL-SCNC: 4.9 MMOL/L (ref 3.5–5.2)
POTASSIUM SERPL-SCNC: 4.9 MMOL/L (ref 3.5–5.2)
POTASSIUM SERPL-SCNC: 5 MMOL/L (ref 3.5–5.2)
PROCALCITONIN SERPL-MCNC: 0.11 NG/ML (ref 0–0.25)
PROCALCITONIN SERPL-MCNC: 0.13 NG/ML (ref 0–0.25)
PROCALCITONIN SERPL-MCNC: 1.07 NG/ML (ref 0–0.25)
PROT SERPL-MCNC: 4.9 G/DL (ref 6–8.5)
PROT SERPL-MCNC: 5.2 G/DL (ref 6–8.5)
PROT SERPL-MCNC: 5.3 G/DL (ref 6–8.5)
PROT SERPL-MCNC: 5.4 G/DL (ref 6–8.5)
PROT SERPL-MCNC: 5.5 G/DL (ref 6–8.5)
PROT SERPL-MCNC: 5.6 G/DL (ref 6–8.5)
PROT SERPL-MCNC: 5.6 G/DL (ref 6–8.5)
PROT SERPL-MCNC: 5.7 G/DL (ref 6–8.5)
PROT SERPL-MCNC: 5.7 G/DL (ref 6–8.5)
PROT SERPL-MCNC: 6 G/DL (ref 6–8.5)
PROT SERPL-MCNC: 6 G/DL (ref 6–8.5)
PROT SERPL-MCNC: 6.3 G/DL (ref 6–8.5)
PROT SERPL-MCNC: 6.4 G/DL (ref 6–8.5)
PROT SERPL-MCNC: 6.4 G/DL (ref 6–8.5)
PROT SERPL-MCNC: 6.6 G/DL (ref 6–8.5)
PROT SERPL-MCNC: 6.9 G/DL (ref 6–8.5)
PROT SERPL-MCNC: 7.3 G/DL (ref 6–8.5)
PROT SERPL-MCNC: 7.6 G/DL (ref 6–8.5)
PROT UR QL STRIP: ABNORMAL
PROT UR QL STRIP: NEGATIVE
PROTHROMBIN TIME: 14.4 SECONDS (ref 12.8–14.5)
PROTHROMBIN TIME: 14.6 SECONDS (ref 11.9–14.1)
PROTHROMBIN TIME: 22.2 SECONDS (ref 12.8–14.5)
QT INTERVAL: 352 MS
QT INTERVAL: 356 MS
QT INTERVAL: 416 MS
QT INTERVAL: 430 MS
QT INTERVAL: 444 MS
QT INTERVAL: 464 MS
QTC INTERVAL: 455 MS
QTC INTERVAL: 457 MS
QTC INTERVAL: 490 MS
QTC INTERVAL: 492 MS
QTC INTERVAL: 493 MS
QTC INTERVAL: 518 MS
RBC # BLD AUTO: 2.82 10*6/MM3 (ref 4.14–5.8)
RBC # BLD AUTO: 2.83 10*6/MM3 (ref 4.14–5.8)
RBC # BLD AUTO: 2.92 10*6/MM3 (ref 4.14–5.8)
RBC # BLD AUTO: 3.13 10*6/MM3 (ref 4.14–5.8)
RBC # BLD AUTO: 3.15 10*6/MM3 (ref 4.14–5.8)
RBC # BLD AUTO: 3.16 10*6/MM3 (ref 4.14–5.8)
RBC # BLD AUTO: 3.28 10*6/MM3 (ref 4.14–5.8)
RBC # BLD AUTO: 3.34 10*6/MM3 (ref 4.14–5.8)
RBC # BLD AUTO: 3.34 10*6/MM3 (ref 4.14–5.8)
RBC # BLD AUTO: 3.36 10*6/MM3 (ref 4.14–5.8)
RBC # BLD AUTO: 3.45 10*6/MM3 (ref 4.14–5.8)
RBC # BLD AUTO: 3.5 10*6/MM3 (ref 4.14–5.8)
RBC # BLD AUTO: 3.57 10*6/MM3 (ref 4.14–5.8)
RBC # BLD AUTO: 3.59 10*6/MM3 (ref 4.14–5.8)
RBC # BLD AUTO: 3.6 10*6/MM3 (ref 4.14–5.8)
RBC # BLD AUTO: 3.66 10*6/MM3 (ref 4.14–5.8)
RBC # BLD AUTO: 3.71 10*6/MM3 (ref 4.14–5.8)
RBC # BLD AUTO: 3.72 10*6/MM3 (ref 4.14–5.8)
RBC # BLD AUTO: 3.72 10*6/MM3 (ref 4.14–5.8)
RBC # BLD AUTO: 3.75 10*6/MM3 (ref 4.14–5.8)
RBC # BLD AUTO: 3.83 10*6/MM3 (ref 4.14–5.8)
RBC # BLD AUTO: 3.91 10*6/MM3 (ref 4.14–5.8)
RBC # BLD AUTO: 3.93 10*6/MM3 (ref 4.14–5.8)
RBC # BLD AUTO: 3.98 10*6/MM3 (ref 4.14–5.8)
RBC # BLD AUTO: 4.02 10*6/MM3 (ref 4.14–5.8)
RBC # BLD AUTO: 4.07 10*6/MM3 (ref 4.14–5.8)
RBC # BLD AUTO: 4.27 10*6/MM3 (ref 4.14–5.8)
RBC # BLD AUTO: 4.29 10*6/MM3 (ref 4.14–5.8)
RBC # BLD AUTO: 4.34 10*6/MM3 (ref 4.14–5.8)
RBC # BLD AUTO: 4.88 10*6/MM3 (ref 4.14–5.8)
RBC # UR STRIP: ABNORMAL /HPF
RBC # UR: ABNORMAL /HPF
REF LAB TEST METHOD: ABNORMAL
RHINOVIRUS RNA SPEC NAA+PROBE: NOT DETECTED
RSV RNA NPH QL NAA+NON-PROBE: NOT DETECTED
S AUREUS DNA SPEC QL NAA+PROBE: NEGATIVE
S AUREUS DNA SPEC QL NAA+PROBE: POSITIVE
S AUREUS DNA SPEC QL NAA+PROBE: POSITIVE
SAO2 % BLDCOA: 87.6 % (ref 94–99)
SAO2 % BLDCOA: 96.2 % (ref 94–99)
SAO2 % BLDCOA: 96.6 % (ref 94–99)
SAO2 % BLDCOA: 97.2 % (ref 94–99)
SAO2 % BLDCOA: >99.2 % (ref 94–99)
SAO2 % BLDCOV: 91.4 % (ref 45–75)
SARS-COV-2 RNA NPH QL NAA+NON-PROBE: NOT DETECTED
SARS-COV-2 RNA RESP QL NAA+PROBE: NOT DETECTED
SCAN SLIDE: NORMAL
SCAN SLIDE: NORMAL
SET MECH RESP RATE: 20
SET MECH RESP RATE: 20
SET MECH RESP RATE: 26
SODIUM SERPL-SCNC: 133 MMOL/L (ref 136–145)
SODIUM SERPL-SCNC: 135 MMOL/L (ref 136–145)
SODIUM SERPL-SCNC: 136 MMOL/L (ref 136–145)
SODIUM SERPL-SCNC: 137 MMOL/L (ref 136–145)
SODIUM SERPL-SCNC: 137 MMOL/L (ref 136–145)
SODIUM SERPL-SCNC: 138 MMOL/L (ref 136–145)
SODIUM SERPL-SCNC: 138 MMOL/L (ref 136–145)
SODIUM SERPL-SCNC: 139 MMOL/L (ref 136–145)
SODIUM SERPL-SCNC: 140 MMOL/L (ref 136–145)
SODIUM SERPL-SCNC: 141 MMOL/L (ref 136–145)
SODIUM SERPL-SCNC: 141 MMOL/L (ref 136–145)
SODIUM SERPL-SCNC: 142 MMOL/L (ref 136–145)
SODIUM SERPL-SCNC: 143 MMOL/L (ref 136–145)
SODIUM SERPL-SCNC: 144 MMOL/L (ref 136–145)
SODIUM SERPL-SCNC: 145 MMOL/L (ref 136–145)
SODIUM SERPL-SCNC: 148 MMOL/L (ref 136–145)
SODIUM SERPL-SCNC: 149 MMOL/L (ref 136–145)
SODIUM SERPL-SCNC: 149 MMOL/L (ref 136–145)
SODIUM SERPL-SCNC: 150 MMOL/L (ref 136–145)
SODIUM SERPL-SCNC: 153 MMOL/L (ref 136–145)
SODIUM SERPL-SCNC: 154 MMOL/L (ref 136–145)
SODIUM SERPL-SCNC: 155 MMOL/L (ref 136–145)
SODIUM SERPL-SCNC: 155 MMOL/L (ref 136–145)
SODIUM SERPL-SCNC: 156 MMOL/L (ref 136–145)
SODIUM SERPL-SCNC: 156 MMOL/L (ref 136–145)
SODIUM SERPL-SCNC: 157 MMOL/L (ref 136–145)
SODIUM SERPL-SCNC: 158 MMOL/L (ref 136–145)
SODIUM SERPL-SCNC: 158 MMOL/L (ref 136–145)
SODIUM SERPL-SCNC: 159 MMOL/L (ref 136–145)
SP GR UR STRIP: 1.02 (ref 1–1.03)
SQUAMOUS #/AREA URNS HPF: ABNORMAL /HPF
STRESS TARGET HR: 118 BPM
T4 FREE SERPL-MCNC: 1 NG/DL (ref 0.93–1.7)
T4 FREE SERPL-MCNC: 1.15 NG/DL (ref 0.93–1.7)
TROPONIN T SERPL-MCNC: 0.01 NG/ML (ref 0–0.03)
TROPONIN T SERPL-MCNC: 0.02 NG/ML (ref 0–0.03)
TROPONIN T SERPL-MCNC: 0.02 NG/ML (ref 0–0.03)
TROPONIN T SERPL-MCNC: 0.03 NG/ML (ref 0–0.03)
TROPONIN T SERPL-MCNC: 0.04 NG/ML (ref 0–0.03)
TROPONIN T SERPL-MCNC: 0.04 NG/ML (ref 0–0.03)
TROPONIN T SERPL-MCNC: 0.07 NG/ML (ref 0–0.03)
TROPONIN T SERPL-MCNC: 0.34 NG/ML (ref 0–0.03)
TROPONIN T SERPL-MCNC: 0.54 NG/ML (ref 0–0.03)
TROPONIN T SERPL-MCNC: 0.66 NG/ML (ref 0–0.03)
TROPONIN T SERPL-MCNC: 0.69 NG/ML (ref 0–0.03)
TROPONIN T SERPL-MCNC: 0.74 NG/ML (ref 0–0.03)
TSH SERPL DL<=0.05 MIU/L-ACNC: 1.61 UIU/ML (ref 0.27–4.2)
TSH SERPL DL<=0.05 MIU/L-ACNC: 2.4 UIU/ML (ref 0.27–4.2)
UROBILINOGEN UR QL STRIP: ABNORMAL
UROBILINOGEN UR QL STRIP: NORMAL
VANCOMYCIN TROUGH SERPL-MCNC: 16.3 MCG/ML (ref 5–20)
VARIANT LYMPHS NFR BLD MANUAL: 26 % (ref 19.6–45.3)
VARIANT LYMPHS NFR BLD MANUAL: 8 % (ref 19.6–45.3)
VENTILATOR MODE: ABNORMAL
VIT B12 BLD-MCNC: 1175 PG/ML (ref 211–946)
VIT B12 BLD-MCNC: 1207 PG/ML (ref 211–946)
VIT B12 BLD-MCNC: 1222 PG/ML (ref 211–946)
VIT B12 BLD-MCNC: 724 PG/ML (ref 211–946)
VIT B12 BLD-MCNC: >2000 PG/ML (ref 211–946)
VT ON VENT VENT: 500 ML
WBC # BLD AUTO: 10.12 10*3/MM3 (ref 3.4–10.8)
WBC # BLD AUTO: 10.48 10*3/MM3 (ref 3.4–10.8)
WBC # BLD AUTO: 10.84 10*3/MM3 (ref 3.4–10.8)
WBC # BLD AUTO: 12.01 10*3/MM3 (ref 3.4–10.8)
WBC # BLD AUTO: 12.24 10*3/MM3 (ref 3.4–10.8)
WBC # BLD AUTO: 12.32 10*3/MM3 (ref 3.4–10.8)
WBC # BLD AUTO: 13.62 10*3/MM3 (ref 3.4–10.8)
WBC # BLD AUTO: 14.31 10*3/MM3 (ref 3.4–10.8)
WBC # BLD AUTO: 15.72 10*3/MM3 (ref 3.4–10.8)
WBC # BLD AUTO: 16.45 10*3/MM3 (ref 3.4–10.8)
WBC # BLD AUTO: 18.52 10*3/MM3 (ref 3.4–10.8)
WBC # BLD AUTO: 21.06 10*3/MM3 (ref 3.4–10.8)
WBC # BLD AUTO: 24.25 10*3/MM3 (ref 3.4–10.8)
WBC # BLD AUTO: 7.25 10*3/MM3 (ref 3.4–10.8)
WBC # BLD AUTO: 8.2 10*3/MM3 (ref 3.4–10.8)
WBC # BLD AUTO: 8.42 10*3/MM3 (ref 3.4–10.8)
WBC # BLD AUTO: 8.46 10*3/MM3 (ref 3.4–10.8)
WBC # BLD AUTO: 8.51 10*3/MM3 (ref 3.4–10.8)
WBC # BLD AUTO: 8.68 10*3/MM3 (ref 3.4–10.8)
WBC # BLD AUTO: 8.72 10*3/MM3 (ref 3.4–10.8)
WBC # BLD AUTO: 8.72 10*3/MM3 (ref 3.4–10.8)
WBC # BLD AUTO: 8.87 10*3/MM3 (ref 3.4–10.8)
WBC # BLD AUTO: 8.91 10*3/MM3 (ref 3.4–10.8)
WBC # BLD AUTO: 9.83 10*3/MM3 (ref 3.4–10.8)
WBC # BLD AUTO: 9.92 10*3/MM3 (ref 3.4–10.8)
WBC # UR STRIP: ABNORMAL /HPF
WBC NRBC COR # BLD: 14.05 10*3/MM3 (ref 3.4–10.8)
WBC NRBC COR # BLD: 19.89 10*3/MM3 (ref 3.4–10.8)
WBC NRBC COR # BLD: 22.11 10*3/MM3 (ref 3.4–10.8)
WBC NRBC COR # BLD: 39.4 10*3/MM3 (ref 3.4–10.8)
WBC NRBC COR # BLD: 39.46 10*3/MM3 (ref 3.4–10.8)
WBC UR QL AUTO: ABNORMAL /HPF
WHOLE BLOOD HOLD SPECIMEN: NORMAL

## 2021-01-01 PROCEDURE — 83605 ASSAY OF LACTIC ACID: CPT | Performed by: INTERNAL MEDICINE

## 2021-01-01 PROCEDURE — 80053 COMPREHEN METABOLIC PANEL: CPT | Performed by: INTERNAL MEDICINE

## 2021-01-01 PROCEDURE — P9612 CATHETERIZE FOR URINE SPEC: HCPCS

## 2021-01-01 PROCEDURE — 86140 C-REACTIVE PROTEIN: CPT | Performed by: INTERNAL MEDICINE

## 2021-01-01 PROCEDURE — 99222 1ST HOSP IP/OBS MODERATE 55: CPT | Performed by: SPECIALIST

## 2021-01-01 PROCEDURE — 71045 X-RAY EXAM CHEST 1 VIEW: CPT | Performed by: RADIOLOGY

## 2021-01-01 PROCEDURE — 99232 SBSQ HOSP IP/OBS MODERATE 35: CPT | Performed by: INTERNAL MEDICINE

## 2021-01-01 PROCEDURE — 83605 ASSAY OF LACTIC ACID: CPT | Performed by: STUDENT IN AN ORGANIZED HEALTH CARE EDUCATION/TRAINING PROGRAM

## 2021-01-01 PROCEDURE — 87640 STAPH A DNA AMP PROBE: CPT | Performed by: INTERNAL MEDICINE

## 2021-01-01 PROCEDURE — 25010000002 HEPARIN (PORCINE) PER 1000 UNITS: Performed by: INTERNAL MEDICINE

## 2021-01-01 PROCEDURE — 80053 COMPREHEN METABOLIC PANEL: CPT | Performed by: NURSE PRACTITIONER

## 2021-01-01 PROCEDURE — 36600 WITHDRAWAL OF ARTERIAL BLOOD: CPT

## 2021-01-01 PROCEDURE — 82607 VITAMIN B-12: CPT | Performed by: NURSE PRACTITIONER

## 2021-01-01 PROCEDURE — 71250 CT THORAX DX C-: CPT

## 2021-01-01 PROCEDURE — 93010 ELECTROCARDIOGRAM REPORT: CPT | Performed by: INTERNAL MEDICINE

## 2021-01-01 PROCEDURE — 99232 SBSQ HOSP IP/OBS MODERATE 35: CPT | Performed by: PHYSICIAN ASSISTANT

## 2021-01-01 PROCEDURE — 87205 SMEAR GRAM STAIN: CPT | Performed by: PHYSICIAN ASSISTANT

## 2021-01-01 PROCEDURE — 80048 BASIC METABOLIC PNL TOTAL CA: CPT | Performed by: INTERNAL MEDICINE

## 2021-01-01 PROCEDURE — 94640 AIRWAY INHALATION TREATMENT: CPT

## 2021-01-01 PROCEDURE — 25010000003 POTASSIUM CHLORIDE 10 MEQ/100ML SOLUTION: Performed by: PHYSICIAN ASSISTANT

## 2021-01-01 PROCEDURE — 25010000002 AMIODARONE PER 30 MG: Performed by: STUDENT IN AN ORGANIZED HEALTH CARE EDUCATION/TRAINING PROGRAM

## 2021-01-01 PROCEDURE — 71045 X-RAY EXAM CHEST 1 VIEW: CPT

## 2021-01-01 PROCEDURE — 85025 COMPLETE CBC W/AUTO DIFF WBC: CPT | Performed by: PHYSICIAN ASSISTANT

## 2021-01-01 PROCEDURE — 83880 ASSAY OF NATRIURETIC PEPTIDE: CPT | Performed by: EMERGENCY MEDICINE

## 2021-01-01 PROCEDURE — 25010000002 CEFTRIAXONE PER 250 MG: Performed by: INTERNAL MEDICINE

## 2021-01-01 PROCEDURE — 82375 ASSAY CARBOXYHB QUANT: CPT

## 2021-01-01 PROCEDURE — 82962 GLUCOSE BLOOD TEST: CPT

## 2021-01-01 PROCEDURE — 80048 BASIC METABOLIC PNL TOTAL CA: CPT | Performed by: HOSPITALIST

## 2021-01-01 PROCEDURE — 71250 CT THORAX DX C-: CPT | Performed by: RADIOLOGY

## 2021-01-01 PROCEDURE — 94799 UNLISTED PULMONARY SVC/PX: CPT

## 2021-01-01 PROCEDURE — 85652 RBC SED RATE AUTOMATED: CPT | Performed by: EMERGENCY MEDICINE

## 2021-01-01 PROCEDURE — 25010000002 HEPARIN (PORCINE) PER 1000 UNITS: Performed by: HOSPITALIST

## 2021-01-01 PROCEDURE — 80053 COMPREHEN METABOLIC PANEL: CPT | Performed by: EMERGENCY MEDICINE

## 2021-01-01 PROCEDURE — 85025 COMPLETE CBC W/AUTO DIFF WBC: CPT | Performed by: NURSE PRACTITIONER

## 2021-01-01 PROCEDURE — 87186 SC STD MICRODIL/AGAR DIL: CPT | Performed by: STUDENT IN AN ORGANIZED HEALTH CARE EDUCATION/TRAINING PROGRAM

## 2021-01-01 PROCEDURE — 25010000002 MAGNESIUM SULFATE IN D5W 1G/100ML (PREMIX) 1-5 GM/100ML-% SOLUTION: Performed by: NURSE PRACTITIONER

## 2021-01-01 PROCEDURE — 84145 PROCALCITONIN (PCT): CPT | Performed by: EMERGENCY MEDICINE

## 2021-01-01 PROCEDURE — 99214 OFFICE O/P EST MOD 30 MIN: CPT | Performed by: INTERNAL MEDICINE

## 2021-01-01 PROCEDURE — 87636 SARSCOV2 & INF A&B AMP PRB: CPT | Performed by: PHYSICIAN ASSISTANT

## 2021-01-01 PROCEDURE — 87147 CULTURE TYPE IMMUNOLOGIC: CPT | Performed by: PHYSICIAN ASSISTANT

## 2021-01-01 PROCEDURE — 81001 URINALYSIS AUTO W/SCOPE: CPT | Performed by: INTERNAL MEDICINE

## 2021-01-01 PROCEDURE — 25010000002 MEROPENEM PER 100 MG: Performed by: EMERGENCY MEDICINE

## 2021-01-01 PROCEDURE — 84132 ASSAY OF SERUM POTASSIUM: CPT | Performed by: STUDENT IN AN ORGANIZED HEALTH CARE EDUCATION/TRAINING PROGRAM

## 2021-01-01 PROCEDURE — 74176 CT ABD & PELVIS W/O CONTRAST: CPT

## 2021-01-01 PROCEDURE — 74176 CT ABD & PELVIS W/O CONTRAST: CPT | Performed by: RADIOLOGY

## 2021-01-01 PROCEDURE — 92950 HEART/LUNG RESUSCITATION CPR: CPT

## 2021-01-01 PROCEDURE — 25010000002 VANCOMYCIN 5 G RECONSTITUTED SOLUTION: Performed by: PHYSICIAN ASSISTANT

## 2021-01-01 PROCEDURE — 0BH17EZ INSERTION OF ENDOTRACHEAL AIRWAY INTO TRACHEA, VIA NATURAL OR ARTIFICIAL OPENING: ICD-10-PCS | Performed by: STUDENT IN AN ORGANIZED HEALTH CARE EDUCATION/TRAINING PROGRAM

## 2021-01-01 PROCEDURE — 84100 ASSAY OF PHOSPHORUS: CPT | Performed by: PHYSICIAN ASSISTANT

## 2021-01-01 PROCEDURE — 31500 INSERT EMERGENCY AIRWAY: CPT

## 2021-01-01 PROCEDURE — 83735 ASSAY OF MAGNESIUM: CPT | Performed by: INTERNAL MEDICINE

## 2021-01-01 PROCEDURE — 82805 BLOOD GASES W/O2 SATURATION: CPT

## 2021-01-01 PROCEDURE — 84484 ASSAY OF TROPONIN QUANT: CPT | Performed by: NURSE PRACTITIONER

## 2021-01-01 PROCEDURE — 94003 VENT MGMT INPAT SUBQ DAY: CPT

## 2021-01-01 PROCEDURE — 87086 URINE CULTURE/COLONY COUNT: CPT | Performed by: EMERGENCY MEDICINE

## 2021-01-01 PROCEDURE — 82306 VITAMIN D 25 HYDROXY: CPT | Performed by: INTERNAL MEDICINE

## 2021-01-01 PROCEDURE — 85025 COMPLETE CBC W/AUTO DIFF WBC: CPT | Performed by: EMERGENCY MEDICINE

## 2021-01-01 PROCEDURE — 25010000002 CEFEPIME PER 500 MG: Performed by: INTERNAL MEDICINE

## 2021-01-01 PROCEDURE — 02HV33Z INSERTION OF INFUSION DEVICE INTO SUPERIOR VENA CAVA, PERCUTANEOUS APPROACH: ICD-10-PCS | Performed by: STUDENT IN AN ORGANIZED HEALTH CARE EDUCATION/TRAINING PROGRAM

## 2021-01-01 PROCEDURE — 82550 ASSAY OF CK (CPK): CPT | Performed by: EMERGENCY MEDICINE

## 2021-01-01 PROCEDURE — 25010000003 MAGNESIUM SULFATE 4 GM/100ML SOLUTION: Performed by: INTERNAL MEDICINE

## 2021-01-01 PROCEDURE — 83735 ASSAY OF MAGNESIUM: CPT | Performed by: NURSE PRACTITIONER

## 2021-01-01 PROCEDURE — 85025 COMPLETE CBC W/AUTO DIFF WBC: CPT | Performed by: INTERNAL MEDICINE

## 2021-01-01 PROCEDURE — 25010000002 HEPARIN (PORCINE) PER 1000 UNITS: Performed by: NURSE PRACTITIONER

## 2021-01-01 PROCEDURE — 25010000002 PIPERACILLIN SOD-TAZOBACTAM PER 1 G: Performed by: EMERGENCY MEDICINE

## 2021-01-01 PROCEDURE — 99239 HOSP IP/OBS DSCHRG MGMT >30: CPT | Performed by: INTERNAL MEDICINE

## 2021-01-01 PROCEDURE — 76775 US EXAM ABDO BACK WALL LIM: CPT

## 2021-01-01 PROCEDURE — 99291 CRITICAL CARE FIRST HOUR: CPT

## 2021-01-01 PROCEDURE — 72125 CT NECK SPINE W/O DYE: CPT

## 2021-01-01 PROCEDURE — 76775 US EXAM ABDO BACK WALL LIM: CPT | Performed by: RADIOLOGY

## 2021-01-01 PROCEDURE — 80053 COMPREHEN METABOLIC PANEL: CPT | Performed by: STUDENT IN AN ORGANIZED HEALTH CARE EDUCATION/TRAINING PROGRAM

## 2021-01-01 PROCEDURE — 85730 THROMBOPLASTIN TIME PARTIAL: CPT | Performed by: EMERGENCY MEDICINE

## 2021-01-01 PROCEDURE — 85730 THROMBOPLASTIN TIME PARTIAL: CPT | Performed by: PHYSICIAN ASSISTANT

## 2021-01-01 PROCEDURE — 99223 1ST HOSP IP/OBS HIGH 75: CPT | Performed by: NURSE PRACTITIONER

## 2021-01-01 PROCEDURE — 82820 HEMOGLOBIN-OXYGEN AFFINITY: CPT

## 2021-01-01 PROCEDURE — 84100 ASSAY OF PHOSPHORUS: CPT | Performed by: INTERNAL MEDICINE

## 2021-01-01 PROCEDURE — 84439 ASSAY OF FREE THYROXINE: CPT | Performed by: FAMILY MEDICINE

## 2021-01-01 PROCEDURE — 25010000002 VANCOMYCIN PER 500 MG: Performed by: PHYSICIAN ASSISTANT

## 2021-01-01 PROCEDURE — 25010000002 VANCOMYCIN 5 G RECONSTITUTED SOLUTION: Performed by: EMERGENCY MEDICINE

## 2021-01-01 PROCEDURE — 87070 CULTURE OTHR SPECIMN AEROBIC: CPT | Performed by: PHYSICIAN ASSISTANT

## 2021-01-01 PROCEDURE — 85007 BL SMEAR W/DIFF WBC COUNT: CPT | Performed by: NURSE PRACTITIONER

## 2021-01-01 PROCEDURE — 74230 X-RAY XM SWLNG FUNCJ C+: CPT | Performed by: RADIOLOGY

## 2021-01-01 PROCEDURE — 80202 ASSAY OF VANCOMYCIN: CPT | Performed by: STUDENT IN AN ORGANIZED HEALTH CARE EDUCATION/TRAINING PROGRAM

## 2021-01-01 PROCEDURE — 74230 X-RAY XM SWLNG FUNCJ C+: CPT

## 2021-01-01 PROCEDURE — 87633 RESP VIRUS 12-25 TARGETS: CPT | Performed by: INTERNAL MEDICINE

## 2021-01-01 PROCEDURE — 87636 SARSCOV2 & INF A&B AMP PRB: CPT | Performed by: FAMILY MEDICINE

## 2021-01-01 PROCEDURE — 5A1945Z RESPIRATORY VENTILATION, 24-96 CONSECUTIVE HOURS: ICD-10-PCS | Performed by: STUDENT IN AN ORGANIZED HEALTH CARE EDUCATION/TRAINING PROGRAM

## 2021-01-01 PROCEDURE — 70450 CT HEAD/BRAIN W/O DYE: CPT

## 2021-01-01 PROCEDURE — 99223 1ST HOSP IP/OBS HIGH 75: CPT | Performed by: PHYSICIAN ASSISTANT

## 2021-01-01 PROCEDURE — 99231 SBSQ HOSP IP/OBS SF/LOW 25: CPT | Performed by: INTERNAL MEDICINE

## 2021-01-01 PROCEDURE — 99285 EMERGENCY DEPT VISIT HI MDM: CPT

## 2021-01-01 PROCEDURE — 87641 MR-STAPH DNA AMP PROBE: CPT | Performed by: INTERNAL MEDICINE

## 2021-01-01 PROCEDURE — 84145 PROCALCITONIN (PCT): CPT | Performed by: PHYSICIAN ASSISTANT

## 2021-01-01 PROCEDURE — 86140 C-REACTIVE PROTEIN: CPT | Performed by: PHYSICIAN ASSISTANT

## 2021-01-01 PROCEDURE — 73562 X-RAY EXAM OF KNEE 3: CPT | Performed by: RADIOLOGY

## 2021-01-01 PROCEDURE — 0100U HC BIOFIRE FILMARRAY RESP PANEL 2: CPT | Performed by: NURSE PRACTITIONER

## 2021-01-01 PROCEDURE — 25010000002 PHENYLEPHRINE 10 MG/ML SOLUTION

## 2021-01-01 PROCEDURE — 84484 ASSAY OF TROPONIN QUANT: CPT | Performed by: EMERGENCY MEDICINE

## 2021-01-01 PROCEDURE — 87077 CULTURE AEROBIC IDENTIFY: CPT | Performed by: STUDENT IN AN ORGANIZED HEALTH CARE EDUCATION/TRAINING PROGRAM

## 2021-01-01 PROCEDURE — 83605 ASSAY OF LACTIC ACID: CPT | Performed by: EMERGENCY MEDICINE

## 2021-01-01 PROCEDURE — 93005 ELECTROCARDIOGRAM TRACING: CPT | Performed by: STUDENT IN AN ORGANIZED HEALTH CARE EDUCATION/TRAINING PROGRAM

## 2021-01-01 PROCEDURE — 99232 SBSQ HOSP IP/OBS MODERATE 35: CPT | Performed by: STUDENT IN AN ORGANIZED HEALTH CARE EDUCATION/TRAINING PROGRAM

## 2021-01-01 PROCEDURE — 92610 EVALUATE SWALLOWING FUNCTION: CPT

## 2021-01-01 PROCEDURE — 83735 ASSAY OF MAGNESIUM: CPT | Performed by: FAMILY MEDICINE

## 2021-01-01 PROCEDURE — 36415 COLL VENOUS BLD VENIPUNCTURE: CPT

## 2021-01-01 PROCEDURE — 83735 ASSAY OF MAGNESIUM: CPT | Performed by: EMERGENCY MEDICINE

## 2021-01-01 PROCEDURE — 99283 EMERGENCY DEPT VISIT LOW MDM: CPT

## 2021-01-01 PROCEDURE — 0202U NFCT DS 22 TRGT SARS-COV-2: CPT | Performed by: STUDENT IN AN ORGANIZED HEALTH CARE EDUCATION/TRAINING PROGRAM

## 2021-01-01 PROCEDURE — 86140 C-REACTIVE PROTEIN: CPT | Performed by: NURSE PRACTITIONER

## 2021-01-01 PROCEDURE — 82607 VITAMIN B-12: CPT | Performed by: INTERNAL MEDICINE

## 2021-01-01 PROCEDURE — 87636 SARSCOV2 & INF A&B AMP PRB: CPT | Performed by: EMERGENCY MEDICINE

## 2021-01-01 PROCEDURE — 85027 COMPLETE CBC AUTOMATED: CPT | Performed by: INTERNAL MEDICINE

## 2021-01-01 PROCEDURE — 83880 ASSAY OF NATRIURETIC PEPTIDE: CPT | Performed by: PHYSICIAN ASSISTANT

## 2021-01-01 PROCEDURE — 93005 ELECTROCARDIOGRAM TRACING: CPT | Performed by: INTERNAL MEDICINE

## 2021-01-01 PROCEDURE — 99223 1ST HOSP IP/OBS HIGH 75: CPT | Performed by: INTERNAL MEDICINE

## 2021-01-01 PROCEDURE — 70450 CT HEAD/BRAIN W/O DYE: CPT | Performed by: RADIOLOGY

## 2021-01-01 PROCEDURE — 73060 X-RAY EXAM OF HUMERUS: CPT | Performed by: RADIOLOGY

## 2021-01-01 PROCEDURE — 86140 C-REACTIVE PROTEIN: CPT | Performed by: FAMILY MEDICINE

## 2021-01-01 PROCEDURE — 84484 ASSAY OF TROPONIN QUANT: CPT | Performed by: STUDENT IN AN ORGANIZED HEALTH CARE EDUCATION/TRAINING PROGRAM

## 2021-01-01 PROCEDURE — 83050 HGB METHEMOGLOBIN QUAN: CPT

## 2021-01-01 PROCEDURE — 73090 X-RAY EXAM OF FOREARM: CPT | Performed by: RADIOLOGY

## 2021-01-01 PROCEDURE — 99233 SBSQ HOSP IP/OBS HIGH 50: CPT | Performed by: INTERNAL MEDICINE

## 2021-01-01 PROCEDURE — 87040 BLOOD CULTURE FOR BACTERIA: CPT | Performed by: FAMILY MEDICINE

## 2021-01-01 PROCEDURE — 85007 BL SMEAR W/DIFF WBC COUNT: CPT | Performed by: EMERGENCY MEDICINE

## 2021-01-01 PROCEDURE — 84439 ASSAY OF FREE THYROXINE: CPT | Performed by: EMERGENCY MEDICINE

## 2021-01-01 PROCEDURE — 99233 SBSQ HOSP IP/OBS HIGH 50: CPT | Performed by: PHYSICIAN ASSISTANT

## 2021-01-01 PROCEDURE — 73562 X-RAY EXAM OF KNEE 3: CPT

## 2021-01-01 PROCEDURE — 25010000002 PIPERACILLIN SOD-TAZOBACTAM PER 1 G: Performed by: FAMILY MEDICINE

## 2021-01-01 PROCEDURE — 84443 ASSAY THYROID STIM HORMONE: CPT | Performed by: EMERGENCY MEDICINE

## 2021-01-01 PROCEDURE — 25010000002 PIPERACILLIN SOD-TAZOBACTAM PER 1 G: Performed by: STUDENT IN AN ORGANIZED HEALTH CARE EDUCATION/TRAINING PROGRAM

## 2021-01-01 PROCEDURE — 97163 PT EVAL HIGH COMPLEX 45 MIN: CPT

## 2021-01-01 PROCEDURE — 86140 C-REACTIVE PROTEIN: CPT | Performed by: HOSPITALIST

## 2021-01-01 PROCEDURE — 97162 PT EVAL MOD COMPLEX 30 MIN: CPT

## 2021-01-01 PROCEDURE — 85060 BLOOD SMEAR INTERPRETATION: CPT | Performed by: STUDENT IN AN ORGANIZED HEALTH CARE EDUCATION/TRAINING PROGRAM

## 2021-01-01 PROCEDURE — 93306 TTE W/DOPPLER COMPLETE: CPT

## 2021-01-01 PROCEDURE — 92611 MOTION FLUOROSCOPY/SWALLOW: CPT

## 2021-01-01 PROCEDURE — 84484 ASSAY OF TROPONIN QUANT: CPT | Performed by: PHYSICIAN ASSISTANT

## 2021-01-01 PROCEDURE — 25010000002 VANCOMYCIN 5 G RECONSTITUTED SOLUTION: Performed by: INTERNAL MEDICINE

## 2021-01-01 PROCEDURE — 87086 URINE CULTURE/COLONY COUNT: CPT | Performed by: INTERNAL MEDICINE

## 2021-01-01 PROCEDURE — 25010000002 PIPERACILLIN SOD-TAZOBACTAM PER 1 G: Performed by: INTERNAL MEDICINE

## 2021-01-01 PROCEDURE — 84443 ASSAY THYROID STIM HORMONE: CPT | Performed by: FAMILY MEDICINE

## 2021-01-01 PROCEDURE — 81001 URINALYSIS AUTO W/SCOPE: CPT | Performed by: PHYSICIAN ASSISTANT

## 2021-01-01 PROCEDURE — 87040 BLOOD CULTURE FOR BACTERIA: CPT | Performed by: EMERGENCY MEDICINE

## 2021-01-01 PROCEDURE — 93306 TTE W/DOPPLER COMPLETE: CPT | Performed by: INTERNAL MEDICINE

## 2021-01-01 PROCEDURE — 25010000002 PIPERACILLIN SOD-TAZOBACTAM PER 1 G: Performed by: PHYSICIAN ASSISTANT

## 2021-01-01 PROCEDURE — 87040 BLOOD CULTURE FOR BACTERIA: CPT | Performed by: STUDENT IN AN ORGANIZED HEALTH CARE EDUCATION/TRAINING PROGRAM

## 2021-01-01 PROCEDURE — 80053 COMPREHEN METABOLIC PANEL: CPT | Performed by: PHYSICIAN ASSISTANT

## 2021-01-01 PROCEDURE — 85610 PROTHROMBIN TIME: CPT | Performed by: STUDENT IN AN ORGANIZED HEALTH CARE EDUCATION/TRAINING PROGRAM

## 2021-01-01 PROCEDURE — 82550 ASSAY OF CK (CPK): CPT | Performed by: PHYSICIAN ASSISTANT

## 2021-01-01 PROCEDURE — 73030 X-RAY EXAM OF SHOULDER: CPT | Performed by: RADIOLOGY

## 2021-01-01 PROCEDURE — 85025 COMPLETE CBC W/AUTO DIFF WBC: CPT | Performed by: FAMILY MEDICINE

## 2021-01-01 PROCEDURE — 0W9930Z DRAINAGE OF RIGHT PLEURAL CAVITY WITH DRAINAGE DEVICE, PERCUTANEOUS APPROACH: ICD-10-PCS | Performed by: STUDENT IN AN ORGANIZED HEALTH CARE EDUCATION/TRAINING PROGRAM

## 2021-01-01 PROCEDURE — 73090 X-RAY EXAM OF FOREARM: CPT

## 2021-01-01 PROCEDURE — 85025 COMPLETE CBC W/AUTO DIFF WBC: CPT | Performed by: HOSPITALIST

## 2021-01-01 PROCEDURE — 83735 ASSAY OF MAGNESIUM: CPT | Performed by: HOSPITALIST

## 2021-01-01 PROCEDURE — 82746 ASSAY OF FOLIC ACID SERUM: CPT | Performed by: NURSE PRACTITIONER

## 2021-01-01 PROCEDURE — 87641 MR-STAPH DNA AMP PROBE: CPT | Performed by: HOSPITALIST

## 2021-01-01 PROCEDURE — 93005 ELECTROCARDIOGRAM TRACING: CPT | Performed by: PHYSICIAN ASSISTANT

## 2021-01-01 PROCEDURE — 85610 PROTHROMBIN TIME: CPT | Performed by: EMERGENCY MEDICINE

## 2021-01-01 PROCEDURE — 25010000002 ATROPINE PER 0.01 MG: Performed by: STUDENT IN AN ORGANIZED HEALTH CARE EDUCATION/TRAINING PROGRAM

## 2021-01-01 PROCEDURE — 25010000002 MAGNESIUM SULFATE 2 GM/50ML SOLUTION: Performed by: INTERNAL MEDICINE

## 2021-01-01 PROCEDURE — 73060 X-RAY EXAM OF HUMERUS: CPT

## 2021-01-01 PROCEDURE — 25010000002 MAGNESIUM SULFATE PER 500 MG OF MAGNESIUM: Performed by: STUDENT IN AN ORGANIZED HEALTH CARE EDUCATION/TRAINING PROGRAM

## 2021-01-01 PROCEDURE — 81001 URINALYSIS AUTO W/SCOPE: CPT | Performed by: STUDENT IN AN ORGANIZED HEALTH CARE EDUCATION/TRAINING PROGRAM

## 2021-01-01 PROCEDURE — 82746 ASSAY OF FOLIC ACID SERUM: CPT | Performed by: INTERNAL MEDICINE

## 2021-01-01 PROCEDURE — 85025 COMPLETE CBC W/AUTO DIFF WBC: CPT | Performed by: STUDENT IN AN ORGANIZED HEALTH CARE EDUCATION/TRAINING PROGRAM

## 2021-01-01 PROCEDURE — 85610 PROTHROMBIN TIME: CPT | Performed by: PHYSICIAN ASSISTANT

## 2021-01-01 PROCEDURE — 87150 DNA/RNA AMPLIFIED PROBE: CPT | Performed by: STUDENT IN AN ORGANIZED HEALTH CARE EDUCATION/TRAINING PROGRAM

## 2021-01-01 PROCEDURE — 25010000002 EPINEPHRINE 1 MG/10ML SOLUTION PREFILLED SYRINGE: Performed by: STUDENT IN AN ORGANIZED HEALTH CARE EDUCATION/TRAINING PROGRAM

## 2021-01-01 PROCEDURE — 94002 VENT MGMT INPAT INIT DAY: CPT

## 2021-01-01 PROCEDURE — 87147 CULTURE TYPE IMMUNOLOGIC: CPT | Performed by: STUDENT IN AN ORGANIZED HEALTH CARE EDUCATION/TRAINING PROGRAM

## 2021-01-01 PROCEDURE — 87040 BLOOD CULTURE FOR BACTERIA: CPT | Performed by: PHYSICIAN ASSISTANT

## 2021-01-01 PROCEDURE — 85007 BL SMEAR W/DIFF WBC COUNT: CPT | Performed by: STUDENT IN AN ORGANIZED HEALTH CARE EDUCATION/TRAINING PROGRAM

## 2021-01-01 PROCEDURE — 87640 STAPH A DNA AMP PROBE: CPT | Performed by: HOSPITALIST

## 2021-01-01 PROCEDURE — 83605 ASSAY OF LACTIC ACID: CPT | Performed by: PHYSICIAN ASSISTANT

## 2021-01-01 PROCEDURE — 83880 ASSAY OF NATRIURETIC PEPTIDE: CPT | Performed by: STUDENT IN AN ORGANIZED HEALTH CARE EDUCATION/TRAINING PROGRAM

## 2021-01-01 PROCEDURE — 97167 OT EVAL HIGH COMPLEX 60 MIN: CPT | Performed by: OCCUPATIONAL THERAPIST

## 2021-01-01 PROCEDURE — 86140 C-REACTIVE PROTEIN: CPT | Performed by: EMERGENCY MEDICINE

## 2021-01-01 PROCEDURE — 85730 THROMBOPLASTIN TIME PARTIAL: CPT | Performed by: STUDENT IN AN ORGANIZED HEALTH CARE EDUCATION/TRAINING PROGRAM

## 2021-01-01 PROCEDURE — 83880 ASSAY OF NATRIURETIC PEPTIDE: CPT | Performed by: NURSE PRACTITIONER

## 2021-01-01 PROCEDURE — 25010000002 PIPERACILLIN SOD-TAZOBACTAM PER 1 G

## 2021-01-01 PROCEDURE — 81001 URINALYSIS AUTO W/SCOPE: CPT | Performed by: FAMILY MEDICINE

## 2021-01-01 PROCEDURE — 87186 SC STD MICRODIL/AGAR DIL: CPT | Performed by: PHYSICIAN ASSISTANT

## 2021-01-01 PROCEDURE — 83605 ASSAY OF LACTIC ACID: CPT | Performed by: FAMILY MEDICINE

## 2021-01-01 PROCEDURE — 73030 X-RAY EXAM OF SHOULDER: CPT

## 2021-01-01 PROCEDURE — 83735 ASSAY OF MAGNESIUM: CPT | Performed by: PHYSICIAN ASSISTANT

## 2021-01-01 PROCEDURE — 80053 COMPREHEN METABOLIC PANEL: CPT | Performed by: FAMILY MEDICINE

## 2021-01-01 PROCEDURE — 82330 ASSAY OF CALCIUM: CPT | Performed by: PHYSICIAN ASSISTANT

## 2021-01-01 PROCEDURE — 93005 ELECTROCARDIOGRAM TRACING: CPT | Performed by: EMERGENCY MEDICINE

## 2021-01-01 PROCEDURE — 72125 CT NECK SPINE W/O DYE: CPT | Performed by: RADIOLOGY

## 2021-01-01 PROCEDURE — 85014 HEMATOCRIT: CPT | Performed by: INTERNAL MEDICINE

## 2021-01-01 PROCEDURE — 81001 URINALYSIS AUTO W/SCOPE: CPT | Performed by: EMERGENCY MEDICINE

## 2021-01-01 PROCEDURE — 25010000002 VANCOMYCIN 5 G RECONSTITUTED SOLUTION: Performed by: STUDENT IN AN ORGANIZED HEALTH CARE EDUCATION/TRAINING PROGRAM

## 2021-01-01 PROCEDURE — 80053 COMPREHEN METABOLIC PANEL: CPT | Performed by: HOSPITALIST

## 2021-01-01 PROCEDURE — 85018 HEMOGLOBIN: CPT | Performed by: INTERNAL MEDICINE

## 2021-01-01 PROCEDURE — 87633 RESP VIRUS 12-25 TARGETS: CPT | Performed by: HOSPITALIST

## 2021-01-01 PROCEDURE — 81003 URINALYSIS AUTO W/O SCOPE: CPT | Performed by: NURSE PRACTITIONER

## 2021-01-01 PROCEDURE — 97530 THERAPEUTIC ACTIVITIES: CPT

## 2021-01-01 PROCEDURE — C1751 CATH, INF, PER/CENT/MIDLINE: HCPCS

## 2021-01-01 PROCEDURE — 84145 PROCALCITONIN (PCT): CPT | Performed by: STUDENT IN AN ORGANIZED HEALTH CARE EDUCATION/TRAINING PROGRAM

## 2021-01-01 PROCEDURE — 84100 ASSAY OF PHOSPHORUS: CPT | Performed by: NURSE PRACTITIONER

## 2021-01-01 PROCEDURE — 85007 BL SMEAR W/DIFF WBC COUNT: CPT | Performed by: PHYSICIAN ASSISTANT

## 2021-01-01 PROCEDURE — 25010000002 EPINEPHRINE 1 MG/ML SOLUTION

## 2021-01-01 PROCEDURE — 99239 HOSP IP/OBS DSCHRG MGMT >30: CPT | Performed by: STUDENT IN AN ORGANIZED HEALTH CARE EDUCATION/TRAINING PROGRAM

## 2021-01-01 RX ORDER — METHYLPREDNISOLONE 4 MG/1
TABLET ORAL 2 TIMES DAILY
COMMUNITY
Start: 2021-01-01

## 2021-01-01 RX ORDER — POLYETHYLENE GLYCOL 3350 17 G/17G
17 POWDER, FOR SOLUTION ORAL DAILY
Status: CANCELLED | OUTPATIENT
Start: 2021-01-01

## 2021-01-01 RX ORDER — SODIUM CHLORIDE 0.9 % (FLUSH) 0.9 %
10 SYRINGE (ML) INJECTION EVERY 12 HOURS SCHEDULED
Status: DISCONTINUED | OUTPATIENT
Start: 2021-01-01 | End: 2021-01-01 | Stop reason: HOSPADM

## 2021-01-01 RX ORDER — LACTULOSE 10 G/15ML
20 SOLUTION ORAL EVERY 12 HOURS PRN
Status: DISCONTINUED | OUTPATIENT
Start: 2021-01-01 | End: 2021-01-01 | Stop reason: HOSPADM

## 2021-01-01 RX ORDER — DOXYCYCLINE 100 MG/1
100 TABLET ORAL 2 TIMES DAILY
Qty: 6 TABLET | Refills: 0
Start: 2021-01-01 | End: 2021-01-01

## 2021-01-01 RX ORDER — TIZANIDINE 4 MG/1
4 TABLET ORAL DAILY PRN
Status: CANCELLED | OUTPATIENT
Start: 2021-01-01

## 2021-01-01 RX ORDER — ATORVASTATIN CALCIUM 20 MG/1
20 TABLET, FILM COATED ORAL DAILY
Status: DISCONTINUED | OUTPATIENT
Start: 2021-01-01 | End: 2021-01-01 | Stop reason: HOSPADM

## 2021-01-01 RX ORDER — ACETAMINOPHEN 650 MG/1
650 SUPPOSITORY RECTAL ONCE
Status: COMPLETED | OUTPATIENT
Start: 2021-01-01 | End: 2021-01-01

## 2021-01-01 RX ORDER — SODIUM PHOSPHATE, DIBASIC AND SODIUM PHOSPHATE, MONOBASIC 7; 19 G/133ML; G/133ML
1 ENEMA RECTAL EVERY 12 HOURS PRN
COMMUNITY
End: 2021-01-01

## 2021-01-01 RX ORDER — MAGNESIUM SULFATE HEPTAHYDRATE 40 MG/ML
2 INJECTION, SOLUTION INTRAVENOUS AS NEEDED
Status: DISCONTINUED | OUTPATIENT
Start: 2021-01-01 | End: 2021-01-01 | Stop reason: HOSPADM

## 2021-01-01 RX ORDER — DONEPEZIL HYDROCHLORIDE 5 MG/1
10 TABLET, FILM COATED ORAL NIGHTLY
Status: CANCELLED | OUTPATIENT
Start: 2021-01-01

## 2021-01-01 RX ORDER — ACETAMINOPHEN 500 MG
500 TABLET ORAL EVERY 4 HOURS PRN
Status: DISCONTINUED | OUTPATIENT
Start: 2021-01-01 | End: 2021-01-01 | Stop reason: HOSPADM

## 2021-01-01 RX ORDER — POTASSIUM CHLORIDE 7.45 MG/ML
10 INJECTION INTRAVENOUS
Status: DISCONTINUED | OUTPATIENT
Start: 2021-01-01 | End: 2021-01-01 | Stop reason: HOSPADM

## 2021-01-01 RX ORDER — BISACODYL 10 MG
10 SUPPOSITORY, RECTAL RECTAL EVERY 12 HOURS PRN
Status: DISCONTINUED | OUTPATIENT
Start: 2021-01-01 | End: 2021-01-01 | Stop reason: HOSPADM

## 2021-01-01 RX ORDER — ALBUTEROL SULFATE 2.5 MG/3ML
2.5 SOLUTION RESPIRATORY (INHALATION) EVERY 6 HOURS PRN
COMMUNITY

## 2021-01-01 RX ORDER — CARBOXYMETHYLCELLULOSE SODIUM 5 MG/ML
1 SOLUTION/ DROPS OPHTHALMIC 2 TIMES DAILY
Status: CANCELLED | OUTPATIENT
Start: 2021-01-01

## 2021-01-01 RX ORDER — AMIODARONE HYDROCHLORIDE 50 MG/ML
INJECTION, SOLUTION INTRAVENOUS
Status: COMPLETED | OUTPATIENT
Start: 2021-01-01 | End: 2021-01-01

## 2021-01-01 RX ORDER — IPRATROPIUM BROMIDE AND ALBUTEROL SULFATE 2.5; .5 MG/3ML; MG/3ML
3 SOLUTION RESPIRATORY (INHALATION)
COMMUNITY

## 2021-01-01 RX ORDER — NITROGLYCERIN 0.4 MG/1
0.4 TABLET SUBLINGUAL
Status: CANCELLED | OUTPATIENT
Start: 2021-01-01

## 2021-01-01 RX ORDER — FOLIC ACID 1 MG/1
1 TABLET ORAL DAILY
Status: DISCONTINUED | OUTPATIENT
Start: 2021-01-01 | End: 2021-01-01 | Stop reason: HOSPADM

## 2021-01-01 RX ORDER — GABAPENTIN 100 MG/1
100 CAPSULE ORAL NIGHTLY
Status: DISCONTINUED | OUTPATIENT
Start: 2021-01-01 | End: 2021-01-01 | Stop reason: HOSPADM

## 2021-01-01 RX ORDER — EPINEPHRINE 0.1 MG/ML
SYRINGE (ML) INJECTION
Status: COMPLETED | OUTPATIENT
Start: 2021-01-01 | End: 2021-01-01

## 2021-01-01 RX ORDER — ASPIRIN 81 MG/1
81 TABLET ORAL DAILY
Status: DISCONTINUED | OUTPATIENT
Start: 2021-01-01 | End: 2021-01-01 | Stop reason: HOSPADM

## 2021-01-01 RX ORDER — ERYTHROMYCIN 5 MG/G
1 OINTMENT OPHTHALMIC EVERY 6 HOURS
Status: CANCELLED | OUTPATIENT
Start: 2021-01-01 | End: 2021-12-23

## 2021-01-01 RX ORDER — NITROGLYCERIN 0.4 MG/1
0.4 TABLET SUBLINGUAL
Status: DISCONTINUED | OUTPATIENT
Start: 2021-01-01 | End: 2021-01-01 | Stop reason: HOSPADM

## 2021-01-01 RX ORDER — FAMOTIDINE 20 MG/1
20 TABLET, FILM COATED ORAL DAILY
Status: ON HOLD | COMMUNITY
End: 2021-01-01

## 2021-01-01 RX ORDER — DEXTROSE MONOHYDRATE 25 G/50ML
25 INJECTION, SOLUTION INTRAVENOUS
Status: DISCONTINUED | OUTPATIENT
Start: 2021-01-01 | End: 2021-01-01 | Stop reason: HOSPADM

## 2021-01-01 RX ORDER — BISACODYL 10 MG
10 SUPPOSITORY, RECTAL RECTAL DAILY PRN
Status: CANCELLED | OUTPATIENT
Start: 2021-01-01

## 2021-01-01 RX ORDER — LORAZEPAM 2 MG/ML
1 INJECTION INTRAMUSCULAR EVERY 4 HOURS PRN
Status: DISCONTINUED | OUTPATIENT
Start: 2021-01-01 | End: 2021-01-01 | Stop reason: HOSPADM

## 2021-01-01 RX ORDER — AMOXICILLIN AND CLAVULANATE POTASSIUM 875; 125 MG/1; MG/1
1 TABLET, FILM COATED ORAL 2 TIMES DAILY
Qty: 14 TABLET | Refills: 0 | Status: SHIPPED | OUTPATIENT
Start: 2021-01-01 | End: 2021-01-01

## 2021-01-01 RX ORDER — POTASSIUM CHLORIDE 20 MEQ/1
40 TABLET, EXTENDED RELEASE ORAL EVERY 4 HOURS
Status: DISCONTINUED | OUTPATIENT
Start: 2021-01-01 | End: 2021-01-01

## 2021-01-01 RX ORDER — L.ACID,PARA/B.BIFIDUM/S.THERM 8B CELL
1 CAPSULE ORAL DAILY
Status: DISCONTINUED | OUTPATIENT
Start: 2021-01-01 | End: 2021-01-01 | Stop reason: HOSPADM

## 2021-01-01 RX ORDER — MEMANTINE HYDROCHLORIDE 10 MG/1
10 TABLET ORAL EVERY 12 HOURS SCHEDULED
Status: DISCONTINUED | OUTPATIENT
Start: 2021-01-01 | End: 2021-01-01 | Stop reason: HOSPADM

## 2021-01-01 RX ORDER — POTASSIUM CHLORIDE 7.45 MG/ML
10 INJECTION INTRAVENOUS
Status: DISPENSED | OUTPATIENT
Start: 2021-01-01 | End: 2021-01-01

## 2021-01-01 RX ORDER — MAGNESIUM SULFATE 1 G/100ML
1 INJECTION INTRAVENOUS AS NEEDED
Status: DISCONTINUED | OUTPATIENT
Start: 2021-01-01 | End: 2021-01-01 | Stop reason: HOSPADM

## 2021-01-01 RX ORDER — NITROGLYCERIN 0.4 MG/1
0.4 TABLET SUBLINGUAL
Qty: 30 TABLET | Refills: 12 | Status: SHIPPED | OUTPATIENT
Start: 2021-01-01

## 2021-01-01 RX ORDER — HEPARIN SODIUM 5000 [USP'U]/ML
5000 INJECTION, SOLUTION INTRAVENOUS; SUBCUTANEOUS EVERY 12 HOURS SCHEDULED
Status: DISCONTINUED | OUTPATIENT
Start: 2021-01-01 | End: 2021-01-01 | Stop reason: HOSPADM

## 2021-01-01 RX ORDER — LACTULOSE 10 G/15ML
20 SOLUTION ORAL EVERY 12 HOURS PRN
Status: CANCELLED | OUTPATIENT
Start: 2021-01-01

## 2021-01-01 RX ORDER — MIRTAZAPINE 15 MG/1
15 TABLET, FILM COATED ORAL NIGHTLY
Status: DISCONTINUED | OUTPATIENT
Start: 2021-01-01 | End: 2021-01-01 | Stop reason: HOSPADM

## 2021-01-01 RX ORDER — SODIUM CHLORIDE 0.9 % (FLUSH) 0.9 %
10 SYRINGE (ML) INJECTION EVERY 12 HOURS SCHEDULED
Status: CANCELLED | OUTPATIENT
Start: 2021-01-01

## 2021-01-01 RX ORDER — POTASSIUM CHLORIDE 1.5 G/1.77G
40 POWDER, FOR SOLUTION ORAL EVERY 4 HOURS
Status: DISCONTINUED | OUTPATIENT
Start: 2021-01-01 | End: 2021-01-01

## 2021-01-01 RX ORDER — FAMOTIDINE 20 MG/1
20 TABLET, FILM COATED ORAL DAILY
Status: DISCONTINUED | OUTPATIENT
Start: 2021-01-01 | End: 2021-01-01 | Stop reason: HOSPADM

## 2021-01-01 RX ORDER — ACETAMINOPHEN 500 MG
500 TABLET ORAL EVERY 6 HOURS PRN
Status: CANCELLED | OUTPATIENT
Start: 2021-01-01

## 2021-01-01 RX ORDER — TIZANIDINE HYDROCHLORIDE 4 MG/1
4 CAPSULE, GELATIN COATED ORAL DAILY PRN
COMMUNITY
End: 2021-01-01

## 2021-01-01 RX ORDER — ALBUTEROL SULFATE 2.5 MG/3ML
2.5 SOLUTION RESPIRATORY (INHALATION) EVERY 6 HOURS PRN
Status: CANCELLED | OUTPATIENT
Start: 2021-01-01

## 2021-01-01 RX ORDER — IPRATROPIUM BROMIDE AND ALBUTEROL SULFATE 2.5; .5 MG/3ML; MG/3ML
3 SOLUTION RESPIRATORY (INHALATION)
Status: DISCONTINUED | OUTPATIENT
Start: 2021-01-01 | End: 2021-01-01 | Stop reason: HOSPADM

## 2021-01-01 RX ORDER — POLYETHYLENE GLYCOL 3350 17 G/17G
17 POWDER, FOR SOLUTION ORAL DAILY
Status: DISCONTINUED | OUTPATIENT
Start: 2021-01-01 | End: 2021-01-01 | Stop reason: HOSPADM

## 2021-01-01 RX ORDER — ATORVASTATIN CALCIUM 20 MG/1
20 TABLET, FILM COATED ORAL DAILY
Status: CANCELLED | OUTPATIENT
Start: 2021-01-01

## 2021-01-01 RX ORDER — MIRTAZAPINE 30 MG/1
30 TABLET, FILM COATED ORAL NIGHTLY
COMMUNITY

## 2021-01-01 RX ORDER — BISACODYL 10 MG
10 SUPPOSITORY, RECTAL RECTAL EVERY 12 HOURS PRN
Status: CANCELLED | OUTPATIENT
Start: 2021-01-01

## 2021-01-01 RX ORDER — POTASSIUM CHLORIDE 7.45 MG/ML
10 INJECTION INTRAVENOUS
Status: COMPLETED | OUTPATIENT
Start: 2021-01-01 | End: 2021-01-01

## 2021-01-01 RX ORDER — ASPIRIN 300 MG/1
300 SUPPOSITORY RECTAL EVERY 6 HOURS PRN
Status: DISCONTINUED | OUTPATIENT
Start: 2021-01-01 | End: 2021-01-01 | Stop reason: HOSPADM

## 2021-01-01 RX ORDER — SODIUM PHOSPHATE, DIBASIC AND SODIUM PHOSPHATE, MONOBASIC 7; 19 G/133ML; G/133ML
1 ENEMA RECTAL 2 TIMES DAILY PRN
COMMUNITY

## 2021-01-01 RX ORDER — POTASSIUM CHLORIDE 750 MG/1
40 TABLET, FILM COATED, EXTENDED RELEASE ORAL AS NEEDED
Status: DISCONTINUED | OUTPATIENT
Start: 2021-01-01 | End: 2021-01-01 | Stop reason: HOSPADM

## 2021-01-01 RX ORDER — DEXTROSE MONOHYDRATE 50 MG/ML
50 INJECTION, SOLUTION INTRAVENOUS CONTINUOUS
Status: CANCELLED | OUTPATIENT
Start: 2021-01-01

## 2021-01-01 RX ORDER — SODIUM CHLORIDE 0.9 % (FLUSH) 0.9 %
10 SYRINGE (ML) INJECTION AS NEEDED
Status: CANCELLED | OUTPATIENT
Start: 2021-01-01

## 2021-01-01 RX ORDER — FOLIC ACID 1 MG/1
1 TABLET ORAL DAILY
Status: CANCELLED | OUTPATIENT
Start: 2021-01-01

## 2021-01-01 RX ORDER — HEPARIN SODIUM 5000 [USP'U]/ML
5000 INJECTION, SOLUTION INTRAVENOUS; SUBCUTANEOUS EVERY 8 HOURS SCHEDULED
Status: CANCELLED | OUTPATIENT
Start: 2021-01-01

## 2021-01-01 RX ORDER — IPRATROPIUM BROMIDE AND ALBUTEROL SULFATE 2.5; .5 MG/3ML; MG/3ML
3 SOLUTION RESPIRATORY (INHALATION)
Status: CANCELLED | OUTPATIENT
Start: 2021-01-01

## 2021-01-01 RX ORDER — PANTOPRAZOLE SODIUM 40 MG/10ML
40 INJECTION, POWDER, LYOPHILIZED, FOR SOLUTION INTRAVENOUS
Status: DISCONTINUED | OUTPATIENT
Start: 2021-01-01 | End: 2021-01-01

## 2021-01-01 RX ORDER — SODIUM CHLORIDE 0.9 % (FLUSH) 0.9 %
10 SYRINGE (ML) INJECTION AS NEEDED
Status: DISCONTINUED | OUTPATIENT
Start: 2021-01-01 | End: 2021-01-01 | Stop reason: HOSPADM

## 2021-01-01 RX ORDER — CALCIUM CHLORIDE 100 MG/ML
INJECTION INTRAVENOUS; INTRAVENTRICULAR
Status: COMPLETED | OUTPATIENT
Start: 2021-01-01 | End: 2021-01-01

## 2021-01-01 RX ORDER — METRONIDAZOLE 500 MG/1
500 TABLET ORAL 3 TIMES DAILY
Qty: 9 TABLET | Refills: 0
Start: 2021-01-01 | End: 2021-01-01

## 2021-01-01 RX ORDER — MEMANTINE HYDROCHLORIDE 10 MG/1
10 TABLET ORAL EVERY 12 HOURS SCHEDULED
Status: CANCELLED | OUTPATIENT
Start: 2021-01-01

## 2021-01-01 RX ORDER — AMOXICILLIN AND CLAVULANATE POTASSIUM 875; 125 MG/1; MG/1
1 TABLET, FILM COATED ORAL EVERY 12 HOURS SCHEDULED
Status: DISCONTINUED | OUTPATIENT
Start: 2021-01-01 | End: 2021-01-01 | Stop reason: HOSPADM

## 2021-01-01 RX ORDER — SODIUM CHLORIDE 0.9 % (FLUSH) 0.9 %
3 SYRINGE (ML) INJECTION EVERY 12 HOURS SCHEDULED
Status: DISCONTINUED | OUTPATIENT
Start: 2021-01-01 | End: 2021-01-01 | Stop reason: HOSPADM

## 2021-01-01 RX ORDER — SODIUM CHLORIDE 0.9 % (FLUSH) 0.9 %
3-10 SYRINGE (ML) INJECTION AS NEEDED
Status: DISCONTINUED | OUTPATIENT
Start: 2021-01-01 | End: 2021-01-01 | Stop reason: HOSPADM

## 2021-01-01 RX ORDER — ASPIRIN 300 MG/1
600 SUPPOSITORY RECTAL EVERY 6 HOURS PRN
Status: DISCONTINUED | OUTPATIENT
Start: 2021-01-01 | End: 2021-01-01

## 2021-01-01 RX ORDER — BACITRACIN ZINC AND POLYMYXIN B SULFATE 500; 10000 [USP'U]/G; [USP'U]/G
OINTMENT OPHTHALMIC EVERY 12 HOURS SCHEDULED
Qty: 1 APPLICATION | Refills: 0 | Status: SHIPPED | OUTPATIENT
Start: 2021-01-01 | End: 2021-01-01

## 2021-01-01 RX ORDER — ACETYLCYSTEINE 200 MG/ML
3 SOLUTION ORAL; RESPIRATORY (INHALATION)
Status: DISCONTINUED | OUTPATIENT
Start: 2021-01-01 | End: 2021-01-01 | Stop reason: HOSPADM

## 2021-01-01 RX ORDER — CARBOXYMETHYLCELLULOSE SODIUM 5 MG/ML
1 SOLUTION/ DROPS OPHTHALMIC 2 TIMES DAILY
Status: DISCONTINUED | OUTPATIENT
Start: 2021-01-01 | End: 2021-01-01 | Stop reason: HOSPADM

## 2021-01-01 RX ORDER — ACETAMINOPHEN 650 MG/1
650 SUPPOSITORY RECTAL EVERY 8 HOURS PRN
Status: DISCONTINUED | OUTPATIENT
Start: 2021-01-01 | End: 2021-01-01 | Stop reason: HOSPADM

## 2021-01-01 RX ORDER — TIZANIDINE 4 MG/1
4 TABLET ORAL DAILY PRN
Status: DISCONTINUED | OUTPATIENT
Start: 2021-01-01 | End: 2021-01-01 | Stop reason: HOSPADM

## 2021-01-01 RX ORDER — DOXYCYCLINE HYCLATE 100 MG/1
100 CAPSULE ORAL 2 TIMES DAILY
COMMUNITY
Start: 2021-01-01 | End: 2021-12-23

## 2021-01-01 RX ORDER — MORPHINE SULFATE 10 MG/ML
8 INJECTION INTRAMUSCULAR; INTRAVENOUS; SUBCUTANEOUS ONCE AS NEEDED
Status: DISCONTINUED | OUTPATIENT
Start: 2021-01-01 | End: 2021-01-01 | Stop reason: HOSPADM

## 2021-01-01 RX ORDER — DONEPEZIL HYDROCHLORIDE 5 MG/1
10 TABLET, FILM COATED ORAL NIGHTLY
Status: DISCONTINUED | OUTPATIENT
Start: 2021-01-01 | End: 2021-01-01 | Stop reason: HOSPADM

## 2021-01-01 RX ORDER — MORPHINE SULFATE 10 MG/ML
8 INJECTION INTRAMUSCULAR; INTRAVENOUS; SUBCUTANEOUS ONCE AS NEEDED
Status: DISCONTINUED | OUTPATIENT
Start: 2021-01-01 | End: 2021-01-01 | Stop reason: SDUPTHER

## 2021-01-01 RX ORDER — SODIUM CHLORIDE 9 MG/ML
75 INJECTION, SOLUTION INTRAVENOUS CONTINUOUS
Status: DISCONTINUED | OUTPATIENT
Start: 2021-01-01 | End: 2021-01-01

## 2021-01-01 RX ORDER — MIRTAZAPINE 15 MG/1
30 TABLET, FILM COATED ORAL NIGHTLY
Status: CANCELLED | OUTPATIENT
Start: 2021-01-01

## 2021-01-01 RX ORDER — TIZANIDINE HYDROCHLORIDE 4 MG/1
4 CAPSULE, GELATIN COATED ORAL DAILY PRN
COMMUNITY

## 2021-01-01 RX ORDER — LANSOPRAZOLE
30 KIT
Status: DISCONTINUED | OUTPATIENT
Start: 2021-01-01 | End: 2021-01-01 | Stop reason: HOSPADM

## 2021-01-01 RX ORDER — CARVEDILOL 6.25 MG/1
6.25 TABLET ORAL 2 TIMES DAILY WITH MEALS
Status: DISCONTINUED | OUTPATIENT
Start: 2021-01-01 | End: 2021-01-01

## 2021-01-01 RX ORDER — MAGNESIUM SULFATE HEPTAHYDRATE 40 MG/ML
4 INJECTION, SOLUTION INTRAVENOUS ONCE
Status: COMPLETED | OUTPATIENT
Start: 2021-01-01 | End: 2021-01-01

## 2021-01-01 RX ORDER — POTASSIUM CHLORIDE 1.5 G/1.77G
40 POWDER, FOR SOLUTION ORAL AS NEEDED
Status: DISCONTINUED | OUTPATIENT
Start: 2021-01-01 | End: 2021-01-01 | Stop reason: HOSPADM

## 2021-01-01 RX ORDER — ERYTHROMYCIN 5 MG/G
1 OINTMENT OPHTHALMIC EVERY 6 HOURS
COMMUNITY
Start: 2021-01-01 | End: 2021-12-23

## 2021-01-01 RX ORDER — ACETAMINOPHEN 325 MG/1
650 TABLET ORAL EVERY 6 HOURS PRN
Status: DISCONTINUED | OUTPATIENT
Start: 2021-01-01 | End: 2021-01-01 | Stop reason: HOSPADM

## 2021-01-01 RX ORDER — CLOPIDOGREL BISULFATE 75 MG/1
75 TABLET ORAL DAILY
Status: DISCONTINUED | OUTPATIENT
Start: 2021-01-01 | End: 2021-01-01 | Stop reason: HOSPADM

## 2021-01-01 RX ORDER — SODIUM CHLORIDE 9 MG/ML
125 INJECTION, SOLUTION INTRAVENOUS CONTINUOUS
Status: DISCONTINUED | OUTPATIENT
Start: 2021-01-01 | End: 2021-01-01

## 2021-01-01 RX ORDER — DOXYCYCLINE 100 MG/1
100 CAPSULE ORAL EVERY 12 HOURS SCHEDULED
Status: CANCELLED | OUTPATIENT
Start: 2021-01-01 | End: 2021-12-23

## 2021-01-01 RX ORDER — ACETAMINOPHEN 500 MG
500 TABLET ORAL EVERY 6 HOURS PRN
COMMUNITY

## 2021-01-01 RX ORDER — LANOLIN ALCOHOL/MO/W.PET/CERES
3000 CREAM (GRAM) TOPICAL DAILY
Status: DISCONTINUED | OUTPATIENT
Start: 2021-01-01 | End: 2021-01-01 | Stop reason: HOSPADM

## 2021-01-01 RX ORDER — BACITRACIN ZINC AND POLYMYXIN B SULFATE 500; 10000 [USP'U]/G; [USP'U]/G
OINTMENT OPHTHALMIC EVERY 12 HOURS SCHEDULED
Status: DISCONTINUED | OUTPATIENT
Start: 2021-01-01 | End: 2021-01-01 | Stop reason: HOSPADM

## 2021-01-01 RX ORDER — MIRTAZAPINE 15 MG/1
30 TABLET, FILM COATED ORAL NIGHTLY
Status: DISCONTINUED | OUTPATIENT
Start: 2021-01-01 | End: 2021-01-01 | Stop reason: HOSPADM

## 2021-01-01 RX ORDER — L.ACID,PARA/B.BIFIDUM/S.THERM 8B CELL
1 CAPSULE ORAL DAILY
Qty: 3 CAPSULE | Refills: 0
Start: 2021-01-01 | End: 2021-01-01

## 2021-01-01 RX ORDER — MAGNESIUM SULFATE HEPTAHYDRATE 40 MG/ML
2 INJECTION, SOLUTION INTRAVENOUS ONCE
Status: COMPLETED | OUTPATIENT
Start: 2021-01-01 | End: 2021-01-01

## 2021-01-01 RX ORDER — DEXTROSE MONOHYDRATE 50 MG/ML
75 INJECTION, SOLUTION INTRAVENOUS CONTINUOUS
Status: DISCONTINUED | OUTPATIENT
Start: 2021-01-01 | End: 2021-01-01

## 2021-01-01 RX ORDER — NICOTINE POLACRILEX 4 MG
15 LOZENGE BUCCAL
Status: DISCONTINUED | OUTPATIENT
Start: 2021-01-01 | End: 2021-01-01 | Stop reason: HOSPADM

## 2021-01-01 RX ORDER — MAGNESIUM SULFATE 1 G/100ML
1 INJECTION INTRAVENOUS ONCE
Status: COMPLETED | OUTPATIENT
Start: 2021-01-01 | End: 2021-01-01

## 2021-01-01 RX ORDER — CEFDINIR 300 MG/1
300 CAPSULE ORAL 2 TIMES DAILY
Qty: 6 CAPSULE | Refills: 0
Start: 2021-01-01 | End: 2021-01-01

## 2021-01-01 RX ORDER — MEMANTINE HYDROCHLORIDE 10 MG/1
10 TABLET ORAL 2 TIMES DAILY
COMMUNITY

## 2021-01-01 RX ORDER — PHENYLEPHRINE HCL IN 0.9% NACL 0.5 MG/5ML
.5-3 SYRINGE (ML) INTRAVENOUS
Status: DISCONTINUED | OUTPATIENT
Start: 2021-01-01 | End: 2021-01-01

## 2021-01-01 RX ORDER — TIZANIDINE 4 MG/1
4 TABLET ORAL DAILY PRN
Status: DISCONTINUED | OUTPATIENT
Start: 2021-01-01 | End: 2021-01-01

## 2021-01-01 RX ORDER — MAGNESIUM SULFATE HEPTAHYDRATE 500 MG/ML
INJECTION, SOLUTION INTRAMUSCULAR; INTRAVENOUS
Status: COMPLETED | OUTPATIENT
Start: 2021-01-01 | End: 2021-01-01

## 2021-01-01 RX ORDER — MEMANTINE HYDROCHLORIDE 5 MG/1
5 TABLET ORAL 2 TIMES DAILY
Status: CANCELLED | OUTPATIENT
Start: 2021-01-01

## 2021-01-01 RX ORDER — NOREPINEPHRINE BIT/0.9 % NACL 8 MG/250ML
.02-.3 INFUSION BOTTLE (ML) INTRAVENOUS
Status: DISCONTINUED | OUTPATIENT
Start: 2021-01-01 | End: 2021-01-01 | Stop reason: HOSPADM

## 2021-01-01 RX ORDER — CHOLECALCIFEROL (VITAMIN D3) 125 MCG
5 CAPSULE ORAL NIGHTLY
Status: DISCONTINUED | OUTPATIENT
Start: 2021-01-01 | End: 2021-01-01 | Stop reason: HOSPADM

## 2021-01-01 RX ORDER — METOPROLOL SUCCINATE 25 MG/1
25 TABLET, EXTENDED RELEASE ORAL
Start: 2021-01-01 | End: 2021-01-01

## 2021-01-01 RX ORDER — CASTOR OIL AND BALSAM, PERU 788; 87 MG/G; MG/G
OINTMENT TOPICAL EVERY 12 HOURS SCHEDULED
Status: DISCONTINUED | OUTPATIENT
Start: 2021-01-01 | End: 2021-01-01 | Stop reason: HOSPADM

## 2021-01-01 RX ORDER — FAMOTIDINE 20 MG/1
20 TABLET, FILM COATED ORAL DAILY
Status: CANCELLED | OUTPATIENT
Start: 2021-01-01

## 2021-01-01 RX ORDER — SODIUM CHLORIDE 9 MG/ML
125 INJECTION, SOLUTION INTRAVENOUS CONTINUOUS
Status: DISCONTINUED | OUTPATIENT
Start: 2021-01-01 | End: 2021-01-01 | Stop reason: HOSPADM

## 2021-01-01 RX ORDER — ATROPINE SULFATE 1 MG/ML
INJECTION, SOLUTION INTRAMUSCULAR; INTRAVENOUS; SUBCUTANEOUS
Status: COMPLETED | OUTPATIENT
Start: 2021-01-01 | End: 2021-01-01

## 2021-01-01 RX ORDER — METOPROLOL SUCCINATE 25 MG/1
25 TABLET, EXTENDED RELEASE ORAL
Status: DISCONTINUED | OUTPATIENT
Start: 2021-01-01 | End: 2021-01-01 | Stop reason: HOSPADM

## 2021-01-01 RX ORDER — TIZANIDINE 4 MG/1
4 TABLET ORAL NIGHTLY PRN
Status: DISCONTINUED | OUTPATIENT
Start: 2021-01-01 | End: 2021-01-01 | Stop reason: HOSPADM

## 2021-01-01 RX ORDER — LANOLIN ALCOHOL/MO/W.PET/CERES
6 CREAM (GRAM) TOPICAL NIGHTLY
Status: DISCONTINUED | OUTPATIENT
Start: 2021-01-01 | End: 2021-01-01 | Stop reason: HOSPADM

## 2021-01-01 RX ORDER — MIRTAZAPINE 15 MG/1
30 TABLET, FILM COATED ORAL NIGHTLY
Status: DISCONTINUED | OUTPATIENT
Start: 2021-01-01 | End: 2021-01-01

## 2021-01-01 RX ORDER — GLYCOPYRROLATE 0.2 MG/ML
0.2 INJECTION INTRAMUSCULAR; INTRAVENOUS EVERY 4 HOURS PRN
Status: DISCONTINUED | OUTPATIENT
Start: 2021-01-01 | End: 2021-01-01 | Stop reason: HOSPADM

## 2021-01-01 RX ORDER — AMOXICILLIN AND CLAVULANATE POTASSIUM 875; 125 MG/1; MG/1
1 TABLET, FILM COATED ORAL 2 TIMES DAILY
Qty: 16 TABLET | Refills: 0 | Status: SHIPPED | OUTPATIENT
Start: 2021-01-01 | End: 2021-01-01

## 2021-01-01 RX ORDER — FAMOTIDINE 20 MG/1
20 TABLET, FILM COATED ORAL DAILY
Qty: 30 TABLET | Refills: 0 | Status: SHIPPED | OUTPATIENT
Start: 2021-01-01

## 2021-01-01 RX ORDER — CARVEDILOL 3.12 MG/1
3.12 TABLET ORAL 2 TIMES DAILY WITH MEALS
Status: DISCONTINUED | OUTPATIENT
Start: 2021-01-01 | End: 2021-01-01

## 2021-01-01 RX ORDER — MIRTAZAPINE 15 MG/1
15 TABLET, FILM COATED ORAL NIGHTLY
Status: ON HOLD | COMMUNITY
End: 2021-01-01

## 2021-01-01 RX ORDER — LANOLIN ALCOHOL/MO/W.PET/CERES
6 CREAM (GRAM) TOPICAL NIGHTLY
Status: CANCELLED | OUTPATIENT
Start: 2021-01-01

## 2021-01-01 RX ORDER — BISACODYL 10 MG
10 SUPPOSITORY, RECTAL RECTAL EVERY 12 HOURS PRN
COMMUNITY

## 2021-01-01 RX ORDER — ACETAMINOPHEN 500 MG
500 TABLET ORAL EVERY 4 HOURS PRN
Status: ON HOLD | COMMUNITY
End: 2021-01-01

## 2021-01-01 RX ORDER — ACETAMINOPHEN 500 MG
500 TABLET ORAL EVERY 6 HOURS PRN
Status: DISCONTINUED | OUTPATIENT
Start: 2021-01-01 | End: 2021-01-01 | Stop reason: HOSPADM

## 2021-01-01 RX ORDER — MAGNESIUM SULFATE HEPTAHYDRATE 40 MG/ML
4 INJECTION, SOLUTION INTRAVENOUS AS NEEDED
Status: DISCONTINUED | OUTPATIENT
Start: 2021-01-01 | End: 2021-01-01 | Stop reason: HOSPADM

## 2021-01-01 RX ORDER — ACETAMINOPHEN 120 MG/1
60 SUPPOSITORY RECTAL EVERY 4 HOURS PRN
Status: ON HOLD | COMMUNITY
End: 2021-01-01

## 2021-01-01 RX ORDER — BIOTIN 10 MG
3000 TABLET ORAL
COMMUNITY
End: 2021-01-01

## 2021-01-01 RX ORDER — LACTULOSE 10 G/15ML
20 SOLUTION ORAL EVERY 12 HOURS PRN
COMMUNITY

## 2021-01-01 RX ORDER — DEXTRAN 70, AND HYPROMELLOSE 2910 1; 3 MG/ML; MG/ML
1 SOLUTION/ DROPS OPHTHALMIC 2 TIMES DAILY
COMMUNITY

## 2021-01-01 RX ORDER — SENNOSIDES 8.6 MG
650 CAPSULE ORAL EVERY 6 HOURS PRN
COMMUNITY
End: 2021-01-01

## 2021-01-01 RX ADMIN — SODIUM CHLORIDE, PRESERVATIVE FREE 10 ML: 5 INJECTION INTRAVENOUS at 20:33

## 2021-01-01 RX ADMIN — HEPARIN SODIUM 5000 UNITS: 5000 INJECTION INTRAVENOUS; SUBCUTANEOUS at 21:00

## 2021-01-01 RX ADMIN — METRONIDAZOLE 500 MG: 500 INJECTION, SOLUTION INTRAVENOUS at 18:22

## 2021-01-01 RX ADMIN — Medication: at 08:14

## 2021-01-01 RX ADMIN — HEPARIN SODIUM 5000 UNITS: 5000 INJECTION INTRAVENOUS; SUBCUTANEOUS at 08:35

## 2021-01-01 RX ADMIN — MEMANTINE HYDROCHLORIDE 10 MG: 10 TABLET, FILM COATED ORAL at 09:52

## 2021-01-01 RX ADMIN — MAGNESIUM SULFATE 2 G: 2 INJECTION INTRAVENOUS at 16:40

## 2021-01-01 RX ADMIN — BACITRACIN ZINC AND POLYMYXIN B SULFATE: 500; 10000 OINTMENT OPHTHALMIC at 09:01

## 2021-01-01 RX ADMIN — CEFEPIME HYDROCHLORIDE 2 G: 2 INJECTION, POWDER, FOR SOLUTION INTRAVENOUS at 08:59

## 2021-01-01 RX ADMIN — MEMANTINE HYDROCHLORIDE 10 MG: 10 TABLET, FILM COATED ORAL at 09:41

## 2021-01-01 RX ADMIN — ACETAMINOPHEN 650 MG: 650 SUPPOSITORY RECTAL at 06:38

## 2021-01-01 RX ADMIN — ATORVASTATIN CALCIUM 20 MG: 20 TABLET, FILM COATED ORAL at 08:38

## 2021-01-01 RX ADMIN — Medication 1 MG: at 23:16

## 2021-01-01 RX ADMIN — BACITRACIN ZINC AND POLYMYXIN B SULFATE: 500; 10000 OINTMENT OPHTHALMIC at 08:00

## 2021-01-01 RX ADMIN — ETHYL ALCOHOL 1 EACH: 62 SWAB TOPICAL at 19:35

## 2021-01-01 RX ADMIN — METRONIDAZOLE 500 MG: 500 INJECTION, SOLUTION INTRAVENOUS at 14:34

## 2021-01-01 RX ADMIN — FOLIC ACID 1 MG: 1 TABLET ORAL at 08:00

## 2021-01-01 RX ADMIN — SODIUM CHLORIDE, PRESERVATIVE FREE 10 ML: 5 INJECTION INTRAVENOUS at 09:00

## 2021-01-01 RX ADMIN — METRONIDAZOLE 500 MG: 500 INJECTION, SOLUTION INTRAVENOUS at 16:04

## 2021-01-01 RX ADMIN — MAGNESIUM SULFATE HEPTAHYDRATE 4 G: 4 INJECTION, SOLUTION INTRAVENOUS at 18:32

## 2021-01-01 RX ADMIN — DOXYCYCLINE 100 MG: 100 INJECTION, POWDER, LYOPHILIZED, FOR SOLUTION INTRAVENOUS at 21:00

## 2021-01-01 RX ADMIN — CARBOXYMETHYLCELLULOSE SODIUM 1 DROP: 5 SOLUTION/ DROPS OPHTHALMIC at 19:39

## 2021-01-01 RX ADMIN — Medication 0.5 MCG/KG/MIN: at 00:07

## 2021-01-01 RX ADMIN — FOLIC ACID 1 MG: 1 TABLET ORAL at 08:58

## 2021-01-01 RX ADMIN — ETHYL ALCOHOL 1 EACH: 62 SWAB TOPICAL at 08:14

## 2021-01-01 RX ADMIN — METOPROLOL TARTRATE 12.5 MG: 25 TABLET, FILM COATED ORAL at 08:45

## 2021-01-01 RX ADMIN — METRONIDAZOLE 500 MG: 500 INJECTION, SOLUTION INTRAVENOUS at 22:03

## 2021-01-01 RX ADMIN — METRONIDAZOLE 500 MG: 500 INJECTION, SOLUTION INTRAVENOUS at 22:07

## 2021-01-01 RX ADMIN — Medication: at 11:33

## 2021-01-01 RX ADMIN — CEFTRIAXONE 1 G: 1 INJECTION, POWDER, FOR SOLUTION INTRAMUSCULAR; INTRAVENOUS at 21:00

## 2021-01-01 RX ADMIN — PIPERACILLIN SODIUM AND TAZOBACTAM SODIUM 3.38 G: 3; .375 INJECTION, POWDER, LYOPHILIZED, FOR SOLUTION INTRAVENOUS at 11:11

## 2021-01-01 RX ADMIN — ACETAMINOPHEN 650 MG: 650 SUPPOSITORY RECTAL at 12:36

## 2021-01-01 RX ADMIN — SODIUM BICARBONATE 50 MEQ: 84 INJECTION, SOLUTION INTRAVENOUS at 23:01

## 2021-01-01 RX ADMIN — SODIUM CHLORIDE, PRESERVATIVE FREE 10 ML: 5 INJECTION INTRAVENOUS at 08:00

## 2021-01-01 RX ADMIN — ETHYL ALCOHOL 1 EACH: 62 SWAB TOPICAL at 09:01

## 2021-01-01 RX ADMIN — Medication: at 22:02

## 2021-01-01 RX ADMIN — DOXYCYCLINE 100 MG: 100 INJECTION, POWDER, LYOPHILIZED, FOR SOLUTION INTRAVENOUS at 08:37

## 2021-01-01 RX ADMIN — IPRATROPIUM BROMIDE AND ALBUTEROL SULFATE 3 ML: .5; 3 SOLUTION RESPIRATORY (INHALATION) at 00:57

## 2021-01-01 RX ADMIN — ASPIRIN 81 MG: 81 TABLET, COATED ORAL at 08:52

## 2021-01-01 RX ADMIN — POTASSIUM CHLORIDE 10 MEQ: 7.46 INJECTION, SOLUTION INTRAVENOUS at 08:01

## 2021-01-01 RX ADMIN — SODIUM CHLORIDE, PRESERVATIVE FREE 3 ML: 5 INJECTION INTRAVENOUS at 09:42

## 2021-01-01 RX ADMIN — DONEPEZIL HYDROCHLORIDE 10 MG: 5 TABLET, FILM COATED ORAL at 21:01

## 2021-01-01 RX ADMIN — ACETYLCYSTEINE 3 ML: 200 SOLUTION ORAL; RESPIRATORY (INHALATION) at 19:41

## 2021-01-01 RX ADMIN — CEFEPIME HYDROCHLORIDE 2 G: 2 INJECTION, POWDER, FOR SOLUTION INTRAVENOUS at 10:37

## 2021-01-01 RX ADMIN — POLYETHYLENE GLYCOL (3350) 17 G: 17 POWDER, FOR SOLUTION ORAL at 08:37

## 2021-01-01 RX ADMIN — MEROPENEM 1 G: 1 INJECTION, POWDER, FOR SOLUTION INTRAVENOUS at 00:03

## 2021-01-01 RX ADMIN — SODIUM CHLORIDE 125 ML/HR: 900 INJECTION INTRAVENOUS at 13:05

## 2021-01-01 RX ADMIN — BACITRACIN ZINC AND POLYMYXIN B SULFATE: 500; 10000 OINTMENT OPHTHALMIC at 08:14

## 2021-01-01 RX ADMIN — Medication 5 MG: at 19:40

## 2021-01-01 RX ADMIN — CARBOXYMETHYLCELLULOSE SODIUM 1 DROP: 5 SOLUTION/ DROPS OPHTHALMIC at 20:05

## 2021-01-01 RX ADMIN — METOPROLOL TARTRATE 12.5 MG: 25 TABLET, FILM COATED ORAL at 20:16

## 2021-01-01 RX ADMIN — METRONIDAZOLE 500 MG: 500 INJECTION, SOLUTION INTRAVENOUS at 20:04

## 2021-01-01 RX ADMIN — METRONIDAZOLE 500 MG: 500 INJECTION, SOLUTION INTRAVENOUS at 15:51

## 2021-01-01 RX ADMIN — ACETYLCYSTEINE 3 ML: 200 SOLUTION ORAL; RESPIRATORY (INHALATION) at 19:48

## 2021-01-01 RX ADMIN — METRONIDAZOLE 500 MG: 500 INJECTION, SOLUTION INTRAVENOUS at 22:44

## 2021-01-01 RX ADMIN — METRONIDAZOLE 500 MG: 500 INJECTION, SOLUTION INTRAVENOUS at 13:04

## 2021-01-01 RX ADMIN — IPRATROPIUM BROMIDE AND ALBUTEROL SULFATE 3 ML: .5; 3 SOLUTION RESPIRATORY (INHALATION) at 18:28

## 2021-01-01 RX ADMIN — Medication 1 CAPSULE: at 08:13

## 2021-01-01 RX ADMIN — PIPERACILLIN SODIUM AND TAZOBACTAM SODIUM 3.38 G: 3; .375 INJECTION, POWDER, LYOPHILIZED, FOR SOLUTION INTRAVENOUS at 04:50

## 2021-01-01 RX ADMIN — ETHYL ALCOHOL 1 EACH: 62 SWAB TOPICAL at 19:39

## 2021-01-01 RX ADMIN — SERTRALINE 50 MG: 50 TABLET, FILM COATED ORAL at 21:01

## 2021-01-01 RX ADMIN — ETHYL ALCOHOL 1 EACH: 62 SWAB TOPICAL at 08:46

## 2021-01-01 RX ADMIN — SERTRALINE 50 MG: 50 TABLET, FILM COATED ORAL at 19:31

## 2021-01-01 RX ADMIN — ASPIRIN 81 MG: 81 TABLET, COATED ORAL at 13:12

## 2021-01-01 RX ADMIN — ASPIRIN 300 MG: 300 SUPPOSITORY RECTAL at 06:38

## 2021-01-01 RX ADMIN — DONEPEZIL HYDROCHLORIDE 10 MG: 5 TABLET, FILM COATED ORAL at 19:32

## 2021-01-01 RX ADMIN — POTASSIUM CHLORIDE 10 MEQ: 7.46 INJECTION, SOLUTION INTRAVENOUS at 06:12

## 2021-01-01 RX ADMIN — VASOPRESSIN 0.04 UNITS/MIN: 20 INJECTION INTRAVENOUS at 03:44

## 2021-01-01 RX ADMIN — Medication 1 CAPSULE: at 08:45

## 2021-01-01 RX ADMIN — CEFTRIAXONE 1 G: 1 INJECTION, POWDER, FOR SOLUTION INTRAMUSCULAR; INTRAVENOUS at 19:30

## 2021-01-01 RX ADMIN — BACITRACIN ZINC AND POLYMYXIN B SULFATE: 500; 10000 OINTMENT OPHTHALMIC at 19:56

## 2021-01-01 RX ADMIN — CASTOR OIL AND BALSAM, PERU: 788; 87 OINTMENT TOPICAL at 08:14

## 2021-01-01 RX ADMIN — MIRTAZAPINE 15 MG: 15 TABLET, FILM COATED ORAL at 22:02

## 2021-01-01 RX ADMIN — MAGNESIUM SULFATE HEPTAHYDRATE 4 G: 40 INJECTION, SOLUTION INTRAVENOUS at 05:07

## 2021-01-01 RX ADMIN — SODIUM CHLORIDE 100 ML/HR: 9 INJECTION, SOLUTION INTRAVENOUS at 08:21

## 2021-01-01 RX ADMIN — HEPARIN SODIUM 5000 UNITS: 5000 INJECTION INTRAVENOUS; SUBCUTANEOUS at 22:03

## 2021-01-01 RX ADMIN — METOPROLOL SUCCINATE 25 MG: 25 TABLET, EXTENDED RELEASE ORAL at 08:37

## 2021-01-01 RX ADMIN — METRONIDAZOLE 500 MG: 500 INJECTION, SOLUTION INTRAVENOUS at 05:28

## 2021-01-01 RX ADMIN — FOLIC ACID 1 MG: 1 TABLET ORAL at 08:45

## 2021-01-01 RX ADMIN — ACETYLCYSTEINE 3 ML: 200 SOLUTION ORAL; RESPIRATORY (INHALATION) at 06:43

## 2021-01-01 RX ADMIN — ATROPINE SULFATE 1 MG: 1 INJECTION, SOLUTION INTRAMUSCULAR; INTRAVENOUS; SUBCUTANEOUS at 23:12

## 2021-01-01 RX ADMIN — HEPARIN SODIUM 5000 UNITS: 5000 INJECTION INTRAVENOUS; SUBCUTANEOUS at 08:21

## 2021-01-01 RX ADMIN — CEFTRIAXONE 1 G: 1 INJECTION, POWDER, FOR SOLUTION INTRAMUSCULAR; INTRAVENOUS at 19:32

## 2021-01-01 RX ADMIN — VANCOMYCIN HYDROCHLORIDE 1500 MG: 5 INJECTION, POWDER, LYOPHILIZED, FOR SOLUTION INTRAVENOUS at 15:18

## 2021-01-01 RX ADMIN — DOXYCYCLINE 100 MG: 100 INJECTION, POWDER, LYOPHILIZED, FOR SOLUTION INTRAVENOUS at 09:07

## 2021-01-01 RX ADMIN — MEMANTINE HYDROCHLORIDE 10 MG: 10 TABLET, FILM COATED ORAL at 08:38

## 2021-01-01 RX ADMIN — METRONIDAZOLE 500 MG: 500 INJECTION, SOLUTION INTRAVENOUS at 15:33

## 2021-01-01 RX ADMIN — Medication 0.16 MCG/KG/MIN: at 23:50

## 2021-01-01 RX ADMIN — HEPARIN SODIUM 5000 UNITS: 5000 INJECTION INTRAVENOUS; SUBCUTANEOUS at 09:07

## 2021-01-01 RX ADMIN — SODIUM BICARBONATE: 84 INJECTION, SOLUTION INTRAVENOUS at 00:22

## 2021-01-01 RX ADMIN — IPRATROPIUM BROMIDE AND ALBUTEROL SULFATE 3 ML: .5; 3 SOLUTION RESPIRATORY (INHALATION) at 12:57

## 2021-01-01 RX ADMIN — METRONIDAZOLE 500 MG: 500 INJECTION, SOLUTION INTRAVENOUS at 05:10

## 2021-01-01 RX ADMIN — MEMANTINE HYDROCHLORIDE 10 MG: 10 TABLET, FILM COATED ORAL at 09:51

## 2021-01-01 RX ADMIN — SERTRALINE 50 MG: 50 TABLET, FILM COATED ORAL at 19:30

## 2021-01-01 RX ADMIN — METRONIDAZOLE 500 MG: 500 INJECTION, SOLUTION INTRAVENOUS at 19:38

## 2021-01-01 RX ADMIN — ATORVASTATIN CALCIUM 20 MG: 20 TABLET, FILM COATED ORAL at 13:12

## 2021-01-01 RX ADMIN — IPRATROPIUM BROMIDE AND ALBUTEROL SULFATE 3 ML: .5; 3 SOLUTION RESPIRATORY (INHALATION) at 06:57

## 2021-01-01 RX ADMIN — HEPARIN SODIUM 5000 UNITS: 5000 INJECTION INTRAVENOUS; SUBCUTANEOUS at 22:07

## 2021-01-01 RX ADMIN — VANCOMYCIN HYDROCHLORIDE 1750 MG: 5 INJECTION, POWDER, LYOPHILIZED, FOR SOLUTION INTRAVENOUS at 02:19

## 2021-01-01 RX ADMIN — VANCOMYCIN HYDROCHLORIDE 1500 MG: 5 INJECTION, POWDER, LYOPHILIZED, FOR SOLUTION INTRAVENOUS at 17:33

## 2021-01-01 RX ADMIN — SODIUM CHLORIDE, PRESERVATIVE FREE 10 ML: 5 INJECTION INTRAVENOUS at 19:55

## 2021-01-01 RX ADMIN — GABAPENTIN 100 MG: 100 CAPSULE ORAL at 20:33

## 2021-01-01 RX ADMIN — MEMANTINE HYDROCHLORIDE 10 MG: 10 TABLET, FILM COATED ORAL at 19:56

## 2021-01-01 RX ADMIN — SODIUM BICARBONATE: 84 INJECTION, SOLUTION INTRAVENOUS at 15:33

## 2021-01-01 RX ADMIN — ETHYL ALCOHOL 1 EACH: 62 SWAB TOPICAL at 20:02

## 2021-01-01 RX ADMIN — CARBOXYMETHYLCELLULOSE SODIUM 1 DROP: 5 SOLUTION/ DROPS OPHTHALMIC at 09:49

## 2021-01-01 RX ADMIN — MEMANTINE HYDROCHLORIDE 10 MG: 10 TABLET, FILM COATED ORAL at 08:00

## 2021-01-01 RX ADMIN — CARVEDILOL 3.12 MG: 3.12 TABLET, FILM COATED ORAL at 09:41

## 2021-01-01 RX ADMIN — MIRTAZAPINE 15 MG: 15 TABLET, FILM COATED ORAL at 19:55

## 2021-01-01 RX ADMIN — CARBOXYMETHYLCELLULOSE SODIUM 1 DROP: 5 SOLUTION/ DROPS OPHTHALMIC at 08:13

## 2021-01-01 RX ADMIN — Medication 3000 MCG: at 09:41

## 2021-01-01 RX ADMIN — ATORVASTATIN CALCIUM 20 MG: 20 TABLET, FILM COATED ORAL at 08:58

## 2021-01-01 RX ADMIN — MEMANTINE HYDROCHLORIDE 10 MG: 10 TABLET, FILM COATED ORAL at 13:12

## 2021-01-01 RX ADMIN — ETHYL ALCOHOL 1 EACH: 62 SWAB TOPICAL at 20:18

## 2021-01-01 RX ADMIN — POTASSIUM CHLORIDE 10 MEQ: 7.46 INJECTION, SOLUTION INTRAVENOUS at 08:58

## 2021-01-01 RX ADMIN — MIRTAZAPINE 30 MG: 15 TABLET, FILM COATED ORAL at 20:02

## 2021-01-01 RX ADMIN — CEFEPIME HYDROCHLORIDE 2 G: 2 INJECTION, POWDER, FOR SOLUTION INTRAVENOUS at 19:55

## 2021-01-01 RX ADMIN — Medication 3000 MCG: at 08:58

## 2021-01-01 RX ADMIN — DOXYCYCLINE 100 MG: 100 INJECTION, POWDER, LYOPHILIZED, FOR SOLUTION INTRAVENOUS at 08:58

## 2021-01-01 RX ADMIN — SODIUM CHLORIDE 125 ML/HR: 900 INJECTION INTRAVENOUS at 20:00

## 2021-01-01 RX ADMIN — SERTRALINE 50 MG: 50 TABLET, FILM COATED ORAL at 22:25

## 2021-01-01 RX ADMIN — BACITRACIN ZINC AND POLYMYXIN B SULFATE 1 APPLICATION: 500; 10000 OINTMENT OPHTHALMIC at 09:49

## 2021-01-01 RX ADMIN — METRONIDAZOLE 500 MG: 500 INJECTION, SOLUTION INTRAVENOUS at 20:17

## 2021-01-01 RX ADMIN — IPRATROPIUM BROMIDE AND ALBUTEROL SULFATE 3 ML: .5; 3 SOLUTION RESPIRATORY (INHALATION) at 06:43

## 2021-01-01 RX ADMIN — Medication 1 MG: at 23:13

## 2021-01-01 RX ADMIN — SODIUM CHLORIDE, PRESERVATIVE FREE 10 ML: 5 INJECTION INTRAVENOUS at 20:06

## 2021-01-01 RX ADMIN — POTASSIUM CHLORIDE 10 MEQ: 7.46 INJECTION, SOLUTION INTRAVENOUS at 10:00

## 2021-01-01 RX ADMIN — IPRATROPIUM BROMIDE AND ALBUTEROL SULFATE 3 ML: .5; 3 SOLUTION RESPIRATORY (INHALATION) at 19:48

## 2021-01-01 RX ADMIN — PANTOPRAZOLE SODIUM 40 MG: 40 INJECTION, POWDER, FOR SOLUTION INTRAVENOUS at 05:03

## 2021-01-01 RX ADMIN — CALCIUM CHLORIDE 1 G: 100 INJECTION, SOLUTION INTRAVENOUS at 23:07

## 2021-01-01 RX ADMIN — POTASSIUM CHLORIDE 10 MEQ: 7.46 INJECTION, SOLUTION INTRAVENOUS at 09:00

## 2021-01-01 RX ADMIN — Medication: at 19:56

## 2021-01-01 RX ADMIN — CEFEPIME HYDROCHLORIDE 2 G: 2 INJECTION, POWDER, FOR SOLUTION INTRAVENOUS at 08:14

## 2021-01-01 RX ADMIN — SODIUM CHLORIDE, PRESERVATIVE FREE 10 ML: 5 INJECTION INTRAVENOUS at 08:46

## 2021-01-01 RX ADMIN — SERTRALINE 50 MG: 50 TABLET, FILM COATED ORAL at 19:56

## 2021-01-01 RX ADMIN — CEFEPIME HYDROCHLORIDE 2 G: 2 INJECTION, POWDER, FOR SOLUTION INTRAVENOUS at 20:04

## 2021-01-01 RX ADMIN — SODIUM CHLORIDE, PRESERVATIVE FREE 3 ML: 5 INJECTION INTRAVENOUS at 08:38

## 2021-01-01 RX ADMIN — PIPERACILLIN SODIUM AND TAZOBACTAM SODIUM 3.38 G: 3; .375 INJECTION, POWDER, LYOPHILIZED, FOR SOLUTION INTRAVENOUS at 19:19

## 2021-01-01 RX ADMIN — POTASSIUM CHLORIDE 10 MEQ: 7.46 INJECTION, SOLUTION INTRAVENOUS at 07:41

## 2021-01-01 RX ADMIN — FAMOTIDINE 20 MG: 20 TABLET, FILM COATED ORAL at 07:59

## 2021-01-01 RX ADMIN — HEPARIN SODIUM 5000 UNITS: 5000 INJECTION INTRAVENOUS; SUBCUTANEOUS at 19:20

## 2021-01-01 RX ADMIN — METRONIDAZOLE 500 MG: 500 INJECTION, SOLUTION INTRAVENOUS at 00:15

## 2021-01-01 RX ADMIN — MEMANTINE HYDROCHLORIDE 10 MG: 10 TABLET, FILM COATED ORAL at 22:25

## 2021-01-01 RX ADMIN — Medication: at 19:39

## 2021-01-01 RX ADMIN — POLYETHYLENE GLYCOL (3350) 17 G: 17 POWDER, FOR SOLUTION ORAL at 08:47

## 2021-01-01 RX ADMIN — NOREPINEPHRINE BITARTRATE 0.3 MCG/KG/MIN: 1 INJECTION, SOLUTION, CONCENTRATE INTRAVENOUS at 23:15

## 2021-01-01 RX ADMIN — FAMOTIDINE 20 MG: 20 TABLET, FILM COATED ORAL at 17:24

## 2021-01-01 RX ADMIN — CARBOXYMETHYLCELLULOSE SODIUM 1 DROP: 5 SOLUTION/ DROPS OPHTHALMIC at 08:00

## 2021-01-01 RX ADMIN — MIRTAZAPINE 30 MG: 15 TABLET, FILM COATED ORAL at 21:00

## 2021-01-01 RX ADMIN — METRONIDAZOLE 500 MG: 500 INJECTION, SOLUTION INTRAVENOUS at 03:11

## 2021-01-01 RX ADMIN — CEFEPIME HYDROCHLORIDE 2 G: 2 INJECTION, POWDER, FOR SOLUTION INTRAVENOUS at 22:04

## 2021-01-01 RX ADMIN — METRONIDAZOLE 500 MG: 500 INJECTION, SOLUTION INTRAVENOUS at 14:09

## 2021-01-01 RX ADMIN — PANTOPRAZOLE SODIUM 40 MG: 40 INJECTION, POWDER, FOR SOLUTION INTRAVENOUS at 06:13

## 2021-01-01 RX ADMIN — MEMANTINE HYDROCHLORIDE 10 MG: 10 TABLET, FILM COATED ORAL at 20:02

## 2021-01-01 RX ADMIN — SODIUM CHLORIDE 1000 ML: 9 INJECTION, SOLUTION INTRAVENOUS at 15:41

## 2021-01-01 RX ADMIN — Medication 3000 MCG: at 08:38

## 2021-01-01 RX ADMIN — ETHYL ALCOHOL 1 EACH: 62 SWAB TOPICAL at 22:44

## 2021-01-01 RX ADMIN — CARVEDILOL 6.25 MG: 6.25 TABLET, FILM COATED ORAL at 18:22

## 2021-01-01 RX ADMIN — CARBOXYMETHYLCELLULOSE SODIUM 1 DROP: 5 SOLUTION/ DROPS OPHTHALMIC at 08:45

## 2021-01-01 RX ADMIN — SODIUM CHLORIDE, PRESERVATIVE FREE 10 ML: 5 INJECTION INTRAVENOUS at 22:04

## 2021-01-01 RX ADMIN — SERTRALINE 50 MG: 50 TABLET, FILM COATED ORAL at 19:40

## 2021-01-01 RX ADMIN — SODIUM BICARBONATE: 84 INJECTION, SOLUTION INTRAVENOUS at 03:11

## 2021-01-01 RX ADMIN — ACETYLCYSTEINE 3 ML: 200 SOLUTION ORAL; RESPIRATORY (INHALATION) at 06:57

## 2021-01-01 RX ADMIN — Medication 3000 MCG: at 13:11

## 2021-01-01 RX ADMIN — Medication 1 CAPSULE: at 17:24

## 2021-01-01 RX ADMIN — DONEPEZIL HYDROCHLORIDE 10 MG: 5 TABLET, FILM COATED ORAL at 20:02

## 2021-01-01 RX ADMIN — VANCOMYCIN HYDROCHLORIDE 1000 MG: 1 INJECTION, POWDER, LYOPHILIZED, FOR SOLUTION INTRAVENOUS at 17:24

## 2021-01-01 RX ADMIN — CEFEPIME HYDROCHLORIDE 2 G: 2 INJECTION, POWDER, FOR SOLUTION INTRAVENOUS at 19:39

## 2021-01-01 RX ADMIN — Medication 1 CAPSULE: at 07:59

## 2021-01-01 RX ADMIN — TIZANIDINE 4 MG: 4 TABLET ORAL at 09:41

## 2021-01-01 RX ADMIN — MIRTAZAPINE 30 MG: 15 TABLET, FILM COATED ORAL at 22:25

## 2021-01-01 RX ADMIN — MEMANTINE HYDROCHLORIDE 10 MG: 10 TABLET, FILM COATED ORAL at 08:58

## 2021-01-01 RX ADMIN — IPRATROPIUM BROMIDE AND ALBUTEROL SULFATE 3 ML: .5; 3 SOLUTION RESPIRATORY (INHALATION) at 06:42

## 2021-01-01 RX ADMIN — SODIUM CHLORIDE, PRESERVATIVE FREE 3 ML: 5 INJECTION INTRAVENOUS at 09:07

## 2021-01-01 RX ADMIN — HEPARIN SODIUM 5000 UNITS: 5000 INJECTION INTRAVENOUS; SUBCUTANEOUS at 08:59

## 2021-01-01 RX ADMIN — HEPARIN SODIUM 5000 UNITS: 5000 INJECTION INTRAVENOUS; SUBCUTANEOUS at 20:17

## 2021-01-01 RX ADMIN — POLYETHYLENE GLYCOL (3350) 17 G: 17 POWDER, FOR SOLUTION ORAL at 08:53

## 2021-01-01 RX ADMIN — BACITRACIN ZINC AND POLYMYXIN B SULFATE: 500; 10000 OINTMENT OPHTHALMIC at 19:39

## 2021-01-01 RX ADMIN — ETHYL ALCOHOL 1 EACH: 62 SWAB TOPICAL at 20:06

## 2021-01-01 RX ADMIN — METRONIDAZOLE 500 MG: 500 INJECTION, SOLUTION INTRAVENOUS at 20:03

## 2021-01-01 RX ADMIN — SODIUM CHLORIDE 125 ML/HR: 900 INJECTION INTRAVENOUS at 06:52

## 2021-01-01 RX ADMIN — Medication 1 CAPSULE: at 08:58

## 2021-01-01 RX ADMIN — MEMANTINE HYDROCHLORIDE 10 MG: 10 TABLET, FILM COATED ORAL at 21:01

## 2021-01-01 RX ADMIN — MEMANTINE HYDROCHLORIDE 10 MG: 10 TABLET, FILM COATED ORAL at 22:02

## 2021-01-01 RX ADMIN — Medication 1 CAPSULE: at 09:41

## 2021-01-01 RX ADMIN — ATORVASTATIN CALCIUM 20 MG: 20 TABLET, FILM COATED ORAL at 09:51

## 2021-01-01 RX ADMIN — HEPARIN SODIUM 5000 UNITS: 5000 INJECTION INTRAVENOUS; SUBCUTANEOUS at 09:41

## 2021-01-01 RX ADMIN — CARVEDILOL 6.25 MG: 6.25 TABLET, FILM COATED ORAL at 08:58

## 2021-01-01 RX ADMIN — DOXYCYCLINE 100 MG: 100 INJECTION, POWDER, LYOPHILIZED, FOR SOLUTION INTRAVENOUS at 08:53

## 2021-01-01 RX ADMIN — CARBOXYMETHYLCELLULOSE SODIUM 1 DROP: 5 SOLUTION/ DROPS OPHTHALMIC at 09:52

## 2021-01-01 RX ADMIN — HEPARIN SODIUM 5000 UNITS: 5000 INJECTION INTRAVENOUS; SUBCUTANEOUS at 20:29

## 2021-01-01 RX ADMIN — HEPARIN SODIUM 5000 UNITS: 5000 INJECTION INTRAVENOUS; SUBCUTANEOUS at 08:53

## 2021-01-01 RX ADMIN — MEMANTINE HYDROCHLORIDE 10 MG: 10 TABLET, FILM COATED ORAL at 08:13

## 2021-01-01 RX ADMIN — Medication 1 CAPSULE: at 08:53

## 2021-01-01 RX ADMIN — SODIUM CHLORIDE, PRESERVATIVE FREE 10 ML: 5 INJECTION INTRAVENOUS at 09:02

## 2021-01-01 RX ADMIN — METRONIDAZOLE 500 MG: 500 INJECTION, SOLUTION INTRAVENOUS at 06:03

## 2021-01-01 RX ADMIN — SODIUM CHLORIDE 1000 ML: 9 INJECTION, SOLUTION INTRAVENOUS at 00:32

## 2021-01-01 RX ADMIN — DOXYCYCLINE 100 MG: 100 INJECTION, POWDER, LYOPHILIZED, FOR SOLUTION INTRAVENOUS at 20:01

## 2021-01-01 RX ADMIN — FOLIC ACID 1 MG: 1 TABLET ORAL at 08:14

## 2021-01-01 RX ADMIN — MEMANTINE HYDROCHLORIDE 10 MG: 10 TABLET, FILM COATED ORAL at 19:30

## 2021-01-01 RX ADMIN — ASPIRIN 81 MG: 81 TABLET, COATED ORAL at 08:38

## 2021-01-01 RX ADMIN — HEPARIN SODIUM 5000 UNITS: 5000 INJECTION INTRAVENOUS; SUBCUTANEOUS at 08:00

## 2021-01-01 RX ADMIN — FAMOTIDINE 20 MG: 20 TABLET, FILM COATED ORAL at 08:58

## 2021-01-01 RX ADMIN — ETHYL ALCOHOL 1 EACH: 62 SWAB TOPICAL at 22:03

## 2021-01-01 RX ADMIN — HEPARIN SODIUM 5000 UNITS: 5000 INJECTION INTRAVENOUS; SUBCUTANEOUS at 19:57

## 2021-01-01 RX ADMIN — HEPARIN SODIUM 5000 UNITS: 5000 INJECTION INTRAVENOUS; SUBCUTANEOUS at 19:41

## 2021-01-01 RX ADMIN — FAMOTIDINE 20 MG: 20 TABLET, FILM COATED ORAL at 08:45

## 2021-01-01 RX ADMIN — METRONIDAZOLE 500 MG: 500 INJECTION, SOLUTION INTRAVENOUS at 21:57

## 2021-01-01 RX ADMIN — POLYETHYLENE GLYCOL (3350) 17 G: 17 POWDER, FOR SOLUTION ORAL at 09:51

## 2021-01-01 RX ADMIN — AMIODARONE HYDROCHLORIDE 150 MG: 50 INJECTION, SOLUTION INTRAVENOUS at 23:15

## 2021-01-01 RX ADMIN — PIPERACILLIN SODIUM AND TAZOBACTAM SODIUM 3.38 G: 3; .375 INJECTION, POWDER, LYOPHILIZED, FOR SOLUTION INTRAVENOUS at 17:24

## 2021-01-01 RX ADMIN — METRONIDAZOLE 500 MG: 500 INJECTION, SOLUTION INTRAVENOUS at 08:21

## 2021-01-01 RX ADMIN — Medication 6 MG: at 19:19

## 2021-01-01 RX ADMIN — FOLIC ACID 1 MG: 1 TABLET ORAL at 09:54

## 2021-01-01 RX ADMIN — DOXYCYCLINE 100 MG: 100 INJECTION, POWDER, LYOPHILIZED, FOR SOLUTION INTRAVENOUS at 19:32

## 2021-01-01 RX ADMIN — ETHYL ALCOHOL 1 EACH: 62 SWAB TOPICAL at 08:58

## 2021-01-01 RX ADMIN — Medication 1 CAPSULE: at 13:12

## 2021-01-01 RX ADMIN — FOLIC ACID 1 MG: 1 TABLET ORAL at 08:59

## 2021-01-01 RX ADMIN — HEPARIN SODIUM 5000 UNITS: 5000 INJECTION INTRAVENOUS; SUBCUTANEOUS at 08:45

## 2021-01-01 RX ADMIN — ETHYL ALCOHOL 1 EACH: 62 SWAB TOPICAL at 08:37

## 2021-01-01 RX ADMIN — OFLOXACIN 50000 UNITS: 300 TABLET, COATED ORAL at 08:58

## 2021-01-01 RX ADMIN — Medication 5 MG: at 22:02

## 2021-01-01 RX ADMIN — MEMANTINE HYDROCHLORIDE 10 MG: 10 TABLET, FILM COATED ORAL at 19:29

## 2021-01-01 RX ADMIN — POLYETHYLENE GLYCOL (3350) 17 G: 17 POWDER, FOR SOLUTION ORAL at 08:00

## 2021-01-01 RX ADMIN — DOXYCYCLINE 100 MG: 100 INJECTION, POWDER, LYOPHILIZED, FOR SOLUTION INTRAVENOUS at 19:56

## 2021-01-01 RX ADMIN — Medication: at 08:01

## 2021-01-01 RX ADMIN — SODIUM CHLORIDE, PRESERVATIVE FREE 3 ML: 5 INJECTION INTRAVENOUS at 20:01

## 2021-01-01 RX ADMIN — DEXTROSE MONOHYDRATE 75 ML/HR: 50 INJECTION, SOLUTION INTRAVENOUS at 08:01

## 2021-01-01 RX ADMIN — DONEPEZIL HYDROCHLORIDE 10 MG: 5 TABLET, FILM COATED ORAL at 22:02

## 2021-01-01 RX ADMIN — CARBOXYMETHYLCELLULOSE SODIUM 1 DROP: 5 SOLUTION/ DROPS OPHTHALMIC at 19:55

## 2021-01-01 RX ADMIN — DOXYCYCLINE 100 MG: 100 INJECTION, POWDER, LYOPHILIZED, FOR SOLUTION INTRAVENOUS at 19:31

## 2021-01-01 RX ADMIN — DONEPEZIL HYDROCHLORIDE 10 MG: 5 TABLET, FILM COATED ORAL at 19:56

## 2021-01-01 RX ADMIN — MEMANTINE HYDROCHLORIDE 10 MG: 10 TABLET, FILM COATED ORAL at 19:40

## 2021-01-01 RX ADMIN — CEFEPIME HYDROCHLORIDE 2 G: 2 INJECTION, POWDER, FOR SOLUTION INTRAVENOUS at 08:00

## 2021-01-01 RX ADMIN — IPRATROPIUM BROMIDE AND ALBUTEROL SULFATE 3 ML: .5; 3 SOLUTION RESPIRATORY (INHALATION) at 12:02

## 2021-01-01 RX ADMIN — HEPARIN SODIUM 5000 UNITS: 5000 INJECTION INTRAVENOUS; SUBCUTANEOUS at 08:12

## 2021-01-01 RX ADMIN — DONEPEZIL HYDROCHLORIDE 10 MG: 5 TABLET, FILM COATED ORAL at 19:39

## 2021-01-01 RX ADMIN — DEXTROSE MONOHYDRATE 75 ML/HR: 50 INJECTION, SOLUTION INTRAVENOUS at 20:04

## 2021-01-01 RX ADMIN — METRONIDAZOLE 500 MG: 500 INJECTION, SOLUTION INTRAVENOUS at 12:26

## 2021-01-01 RX ADMIN — METRONIDAZOLE 500 MG: 500 INJECTION, SOLUTION INTRAVENOUS at 14:41

## 2021-01-01 RX ADMIN — DONEPEZIL HYDROCHLORIDE 10 MG: 5 TABLET, FILM COATED ORAL at 19:30

## 2021-01-01 RX ADMIN — HEPARIN SODIUM 5000 UNITS: 5000 INJECTION INTRAVENOUS; SUBCUTANEOUS at 08:57

## 2021-01-01 RX ADMIN — IPRATROPIUM BROMIDE AND ALBUTEROL SULFATE 3 ML: .5; 3 SOLUTION RESPIRATORY (INHALATION) at 12:52

## 2021-01-01 RX ADMIN — MEMANTINE HYDROCHLORIDE 10 MG: 10 TABLET, FILM COATED ORAL at 20:05

## 2021-01-01 RX ADMIN — FAMOTIDINE 20 MG: 20 TABLET, FILM COATED ORAL at 08:14

## 2021-01-01 RX ADMIN — IPRATROPIUM BROMIDE AND ALBUTEROL SULFATE 3 ML: .5; 3 SOLUTION RESPIRATORY (INHALATION) at 19:41

## 2021-01-01 RX ADMIN — CARVEDILOL 3.12 MG: 3.12 TABLET, FILM COATED ORAL at 17:37

## 2021-01-01 RX ADMIN — SODIUM CHLORIDE, PRESERVATIVE FREE 10 ML: 5 INJECTION INTRAVENOUS at 08:35

## 2021-01-01 RX ADMIN — FOLIC ACID 1 MG: 1 TABLET ORAL at 09:41

## 2021-01-01 RX ADMIN — METRONIDAZOLE 500 MG: 500 INJECTION, SOLUTION INTRAVENOUS at 05:07

## 2021-01-01 RX ADMIN — ETHYL ALCOHOL 1 EACH: 62 SWAB TOPICAL at 08:00

## 2021-01-01 RX ADMIN — SODIUM CHLORIDE, PRESERVATIVE FREE 10 ML: 5 INJECTION INTRAVENOUS at 08:15

## 2021-01-01 RX ADMIN — HEPARIN SODIUM 5000 UNITS: 5000 INJECTION INTRAVENOUS; SUBCUTANEOUS at 09:52

## 2021-01-01 RX ADMIN — Medication 5 MG: at 20:17

## 2021-01-01 RX ADMIN — ATORVASTATIN CALCIUM 20 MG: 20 TABLET, FILM COATED ORAL at 08:00

## 2021-01-01 RX ADMIN — ATORVASTATIN CALCIUM 20 MG: 20 TABLET, FILM COATED ORAL at 08:45

## 2021-01-01 RX ADMIN — DONEPEZIL HYDROCHLORIDE 10 MG: 5 TABLET, FILM COATED ORAL at 19:20

## 2021-01-01 RX ADMIN — PIPERACILLIN SODIUM AND TAZOBACTAM SODIUM 4.5 G: 4; .5 INJECTION, POWDER, LYOPHILIZED, FOR SOLUTION INTRAVENOUS at 16:20

## 2021-01-01 RX ADMIN — MIRTAZAPINE 30 MG: 15 TABLET, FILM COATED ORAL at 19:32

## 2021-01-01 RX ADMIN — METRONIDAZOLE 500 MG: 500 INJECTION, SOLUTION INTRAVENOUS at 04:18

## 2021-01-01 RX ADMIN — ETHYL ALCOHOL 1 EACH: 62 SWAB TOPICAL at 19:57

## 2021-01-01 RX ADMIN — CARBOXYMETHYLCELLULOSE SODIUM 1 DROP: 5 SOLUTION/ DROPS OPHTHALMIC at 22:24

## 2021-01-01 RX ADMIN — PIPERACILLIN SODIUM,TAZOBACTAM SODIUM 3.38 G: 3; .375 INJECTION, POWDER, FOR SOLUTION INTRAVENOUS at 12:35

## 2021-01-01 RX ADMIN — ATORVASTATIN CALCIUM 20 MG: 20 TABLET, FILM COATED ORAL at 08:13

## 2021-01-01 RX ADMIN — CARBOXYMETHYLCELLULOSE SODIUM 1 DROP: 5 SOLUTION/ DROPS OPHTHALMIC at 22:03

## 2021-01-01 RX ADMIN — HEPARIN SODIUM 5000 UNITS: 5000 INJECTION INTRAVENOUS; SUBCUTANEOUS at 08:34

## 2021-01-01 RX ADMIN — POLYETHYLENE GLYCOL (3350) 17 G: 17 POWDER, FOR SOLUTION ORAL at 09:41

## 2021-01-01 RX ADMIN — ACETYLCYSTEINE 3 ML: 200 SOLUTION ORAL; RESPIRATORY (INHALATION) at 06:42

## 2021-01-01 RX ADMIN — MIRTAZAPINE 15 MG: 15 TABLET, FILM COATED ORAL at 19:19

## 2021-01-01 RX ADMIN — ACETAMINOPHEN 650 MG: 650 SUPPOSITORY RECTAL at 20:57

## 2021-01-01 RX ADMIN — SODIUM BICARBONATE: 84 INJECTION, SOLUTION INTRAVENOUS at 10:34

## 2021-01-01 RX ADMIN — Medication: at 08:46

## 2021-01-01 RX ADMIN — PIPERACILLIN SODIUM AND TAZOBACTAM SODIUM 3.38 G: 3; .375 INJECTION, POWDER, LYOPHILIZED, FOR SOLUTION INTRAVENOUS at 12:30

## 2021-01-01 RX ADMIN — BACITRACIN ZINC AND POLYMYXIN B SULFATE: 500; 10000 OINTMENT OPHTHALMIC at 09:53

## 2021-01-01 RX ADMIN — CEFTRIAXONE 1 G: 1 INJECTION, POWDER, FOR SOLUTION INTRAMUSCULAR; INTRAVENOUS at 19:56

## 2021-01-01 RX ADMIN — SODIUM CHLORIDE, PRESERVATIVE FREE 10 ML: 5 INJECTION INTRAVENOUS at 08:36

## 2021-01-01 RX ADMIN — IPRATROPIUM BROMIDE AND ALBUTEROL SULFATE 3 ML: .5; 3 SOLUTION RESPIRATORY (INHALATION) at 00:39

## 2021-01-01 RX ADMIN — MIRTAZAPINE 30 MG: 15 TABLET, FILM COATED ORAL at 20:05

## 2021-01-01 RX ADMIN — CASTOR OIL AND BALSAM, PERU: 788; 87 OINTMENT TOPICAL at 09:01

## 2021-01-01 RX ADMIN — MEMANTINE HYDROCHLORIDE 10 MG: 10 TABLET, FILM COATED ORAL at 08:45

## 2021-01-01 RX ADMIN — SODIUM CHLORIDE, PRESERVATIVE FREE 10 ML: 5 INJECTION INTRAVENOUS at 20:18

## 2021-01-01 RX ADMIN — VASOPRESSIN 0.04 UNITS/MIN: 20 INJECTION INTRAVENOUS at 10:58

## 2021-01-01 RX ADMIN — SODIUM CHLORIDE, PRESERVATIVE FREE 10 ML: 5 INJECTION INTRAVENOUS at 19:40

## 2021-01-01 RX ADMIN — PANTOPRAZOLE SODIUM 40 MG: 40 INJECTION, POWDER, FOR SOLUTION INTRAVENOUS at 06:29

## 2021-01-01 RX ADMIN — VASOPRESSIN 0.04 UNITS/MIN: 20 INJECTION INTRAVENOUS at 18:48

## 2021-01-01 RX ADMIN — HEPARIN SODIUM 5000 UNITS: 5000 INJECTION INTRAVENOUS; SUBCUTANEOUS at 20:01

## 2021-01-01 RX ADMIN — Medication: at 15:33

## 2021-01-01 RX ADMIN — FOLIC ACID 1 MG: 1 TABLET ORAL at 08:38

## 2021-01-01 RX ADMIN — HEPARIN SODIUM 5000 UNITS: 5000 INJECTION INTRAVENOUS; SUBCUTANEOUS at 19:29

## 2021-01-01 RX ADMIN — ATORVASTATIN CALCIUM 20 MG: 20 TABLET, FILM COATED ORAL at 09:41

## 2021-01-01 RX ADMIN — BACITRACIN ZINC AND POLYMYXIN B SULFATE: 500; 10000 OINTMENT OPHTHALMIC at 08:45

## 2021-01-01 RX ADMIN — VANCOMYCIN HYDROCHLORIDE 1000 MG: 1 INJECTION, POWDER, LYOPHILIZED, FOR SOLUTION INTRAVENOUS at 14:09

## 2021-01-01 RX ADMIN — Medication 5 MG: at 20:05

## 2021-01-01 RX ADMIN — ASPIRIN 81 MG: 81 TABLET, COATED ORAL at 08:58

## 2021-01-01 RX ADMIN — MAGNESIUM SULFATE HEPTAHYDRATE 1 G: 500 INJECTION, SOLUTION INTRAMUSCULAR; INTRAVENOUS at 23:06

## 2021-01-01 RX ADMIN — CASTOR OIL AND BALSAM, PERU: 788; 87 OINTMENT TOPICAL at 22:03

## 2021-01-01 RX ADMIN — Medication: at 20:06

## 2021-01-01 RX ADMIN — DONEPEZIL HYDROCHLORIDE 10 MG: 5 TABLET, FILM COATED ORAL at 20:16

## 2021-01-01 RX ADMIN — Medication 1 CAPSULE: at 08:38

## 2021-01-01 RX ADMIN — CEFEPIME HYDROCHLORIDE 2 G: 2 INJECTION, POWDER, FOR SOLUTION INTRAVENOUS at 09:51

## 2021-01-01 RX ADMIN — ETHYL ALCOHOL 1 EACH: 62 SWAB TOPICAL at 09:51

## 2021-01-01 RX ADMIN — SODIUM CHLORIDE, PRESERVATIVE FREE 10 ML: 5 INJECTION INTRAVENOUS at 08:42

## 2021-01-01 RX ADMIN — FOLIC ACID 1 MG: 1 TABLET ORAL at 09:51

## 2021-01-01 RX ADMIN — METOPROLOL TARTRATE 12.5 MG: 25 TABLET, FILM COATED ORAL at 20:05

## 2021-01-01 RX ADMIN — ASPIRIN 81 MG: 81 TABLET, COATED ORAL at 09:41

## 2021-01-01 RX ADMIN — MIRTAZAPINE 30 MG: 15 TABLET, FILM COATED ORAL at 19:56

## 2021-01-01 RX ADMIN — MEMANTINE HYDROCHLORIDE 10 MG: 10 TABLET, FILM COATED ORAL at 08:52

## 2021-01-01 RX ADMIN — Medication 1 MG: at 23:01

## 2021-01-01 RX ADMIN — METOPROLOL SUCCINATE 25 MG: 25 TABLET, EXTENDED RELEASE ORAL at 11:55

## 2021-01-01 RX ADMIN — Medication: at 11:36

## 2021-01-01 RX ADMIN — SODIUM CHLORIDE, PRESERVATIVE FREE 10 ML: 5 INJECTION INTRAVENOUS at 20:29

## 2021-01-01 RX ADMIN — PIPERACILLIN SODIUM AND TAZOBACTAM SODIUM 3.38 G: 3; .375 INJECTION, POWDER, LYOPHILIZED, FOR SOLUTION INTRAVENOUS at 14:53

## 2021-01-01 RX ADMIN — Medication: at 16:54

## 2021-01-01 RX ADMIN — Medication: at 17:09

## 2021-01-01 RX ADMIN — POLYETHYLENE GLYCOL (3350) 17 G: 17 POWDER, FOR SOLUTION ORAL at 08:57

## 2021-01-01 RX ADMIN — HEPARIN SODIUM 5000 UNITS: 5000 INJECTION INTRAVENOUS; SUBCUTANEOUS at 08:42

## 2021-01-01 RX ADMIN — BACITRACIN ZINC AND POLYMYXIN B SULFATE: 500; 10000 OINTMENT OPHTHALMIC at 22:24

## 2021-01-01 RX ADMIN — NOREPINEPHRINE BITARTRATE 0.26 MCG/KG/MIN: 1 INJECTION, SOLUTION, CONCENTRATE INTRAVENOUS at 13:38

## 2021-01-01 RX ADMIN — POLYETHYLENE GLYCOL (3350) 17 G: 17 POWDER, FOR SOLUTION ORAL at 08:13

## 2021-01-01 RX ADMIN — ACETAMINOPHEN 650 MG: 325 TABLET ORAL at 18:22

## 2021-01-01 RX ADMIN — VANCOMYCIN HYDROCHLORIDE 1000 MG: 1 INJECTION, POWDER, LYOPHILIZED, FOR SOLUTION INTRAVENOUS at 17:09

## 2021-01-01 RX ADMIN — METOPROLOL TARTRATE 12.5 MG: 25 TABLET, FILM COATED ORAL at 09:52

## 2021-01-01 RX ADMIN — SODIUM CHLORIDE 1000 ML: 9 INJECTION, SOLUTION INTRAVENOUS at 13:12

## 2021-01-01 RX ADMIN — METRONIDAZOLE 500 MG: 500 INJECTION, SOLUTION INTRAVENOUS at 19:54

## 2021-01-01 RX ADMIN — HEPARIN SODIUM 5000 UNITS: 5000 INJECTION INTRAVENOUS; SUBCUTANEOUS at 19:55

## 2021-01-01 RX ADMIN — SERTRALINE 50 MG: 50 TABLET, FILM COATED ORAL at 22:02

## 2021-01-01 RX ADMIN — SERTRALINE 50 MG: 50 TABLET, FILM COATED ORAL at 20:05

## 2021-01-01 RX ADMIN — Medication 3000 MCG: at 08:52

## 2021-01-01 RX ADMIN — BACITRACIN ZINC AND POLYMYXIN B SULFATE: 500; 10000 OINTMENT OPHTHALMIC at 22:02

## 2021-01-01 RX ADMIN — Medication: at 11:08

## 2021-01-01 RX ADMIN — Medication: at 11:14

## 2021-01-01 RX ADMIN — PIPERACILLIN SODIUM AND TAZOBACTAM SODIUM 3.38 G: 3; .375 INJECTION, POWDER, LYOPHILIZED, FOR SOLUTION INTRAVENOUS at 11:05

## 2021-01-01 RX ADMIN — Medication 5 MG: at 19:56

## 2021-01-01 RX ADMIN — SODIUM CHLORIDE 125 ML/HR: 900 INJECTION INTRAVENOUS at 06:21

## 2021-01-01 RX ADMIN — CASTOR OIL AND BALSAM, PERU: 788; 87 OINTMENT TOPICAL at 19:39

## 2021-01-01 RX ADMIN — CARBOXYMETHYLCELLULOSE SODIUM 1 DROP: 5 SOLUTION/ DROPS OPHTHALMIC at 09:01

## 2021-01-01 RX ADMIN — SODIUM BICARBONATE: 84 INJECTION, SOLUTION INTRAVENOUS at 17:06

## 2021-01-01 RX ADMIN — METRONIDAZOLE 500 MG: 500 INJECTION, SOLUTION INTRAVENOUS at 05:16

## 2021-01-01 RX ADMIN — CEFEPIME HYDROCHLORIDE 2 G: 2 INJECTION, POWDER, FOR SOLUTION INTRAVENOUS at 08:45

## 2021-01-01 RX ADMIN — PIPERACILLIN SODIUM AND TAZOBACTAM SODIUM 3.38 G: 3; .375 INJECTION, POWDER, LYOPHILIZED, FOR SOLUTION INTRAVENOUS at 12:12

## 2021-01-01 RX ADMIN — POLYETHYLENE GLYCOL (3350) 17 G: 17 POWDER, FOR SOLUTION ORAL at 08:59

## 2021-01-01 RX ADMIN — METRONIDAZOLE 500 MG: 500 INJECTION, SOLUTION INTRAVENOUS at 05:18

## 2021-01-01 RX ADMIN — PIPERACILLIN SODIUM AND TAZOBACTAM SODIUM 3.38 G: 3; .375 INJECTION, POWDER, LYOPHILIZED, FOR SOLUTION INTRAVENOUS at 02:45

## 2021-01-01 RX ADMIN — SERTRALINE 50 MG: 50 TABLET, FILM COATED ORAL at 20:02

## 2021-01-01 RX ADMIN — GABAPENTIN 100 MG: 100 CAPSULE ORAL at 19:19

## 2021-01-01 RX ADMIN — IPRATROPIUM BROMIDE AND ALBUTEROL SULFATE 3 ML: .5; 3 SOLUTION RESPIRATORY (INHALATION) at 00:09

## 2021-01-01 RX ADMIN — POTASSIUM CHLORIDE 10 MEQ: 7.46 INJECTION, SOLUTION INTRAVENOUS at 11:12

## 2021-01-01 RX ADMIN — Medication: at 07:41

## 2021-01-01 RX ADMIN — PIPERACILLIN SODIUM AND TAZOBACTAM SODIUM 3.38 G: 3; .375 INJECTION, POWDER, LYOPHILIZED, FOR SOLUTION INTRAVENOUS at 20:30

## 2021-01-01 RX ADMIN — Medication: at 17:06

## 2021-01-01 RX ADMIN — ACETAMINOPHEN 650 MG: 325 TABLET ORAL at 11:52

## 2021-01-01 RX ADMIN — METOPROLOL TARTRATE 12.5 MG: 25 TABLET, FILM COATED ORAL at 10:38

## 2021-01-01 RX ADMIN — DONEPEZIL HYDROCHLORIDE 10 MG: 5 TABLET, FILM COATED ORAL at 20:33

## 2021-01-01 RX ADMIN — BACITRACIN ZINC AND POLYMYXIN B SULFATE: 500; 10000 OINTMENT OPHTHALMIC at 20:06

## 2021-01-01 RX ADMIN — POLYETHYLENE GLYCOL (3350) 17 G: 17 POWDER, FOR SOLUTION ORAL at 09:52

## 2021-01-01 RX ADMIN — ATORVASTATIN CALCIUM 20 MG: 20 TABLET, FILM COATED ORAL at 08:53

## 2021-01-01 RX ADMIN — SODIUM CHLORIDE 75 ML/HR: 9 INJECTION, SOLUTION INTRAVENOUS at 22:31

## 2021-01-01 RX ADMIN — PIPERACILLIN SODIUM AND TAZOBACTAM SODIUM 3.38 G: 3; .375 INJECTION, POWDER, LYOPHILIZED, FOR SOLUTION INTRAVENOUS at 04:46

## 2021-01-01 RX ADMIN — MIRTAZAPINE 15 MG: 15 TABLET, FILM COATED ORAL at 19:40

## 2021-01-01 RX ADMIN — MIRTAZAPINE 15 MG: 15 TABLET, FILM COATED ORAL at 20:33

## 2021-01-01 RX ADMIN — ACETYLCYSTEINE 3 ML: 200 SOLUTION ORAL; RESPIRATORY (INHALATION) at 18:28

## 2021-01-01 RX ADMIN — Medication 6 MG: at 20:33

## 2021-01-01 RX ADMIN — FOLIC ACID 1 MG: 1 TABLET ORAL at 13:12

## 2021-01-01 RX ADMIN — METRONIDAZOLE 500 MG: 500 INJECTION, SOLUTION INTRAVENOUS at 06:36

## 2021-01-01 RX ADMIN — ATORVASTATIN CALCIUM 20 MG: 20 TABLET, FILM COATED ORAL at 09:52

## 2021-01-01 RX ADMIN — MIRTAZAPINE 30 MG: 15 TABLET, FILM COATED ORAL at 19:30

## 2021-01-01 RX ADMIN — SODIUM CHLORIDE, PRESERVATIVE FREE 10 ML: 5 INJECTION INTRAVENOUS at 09:54

## 2021-01-01 RX ADMIN — PIPERACILLIN AND TAZOBACTAM 3.38 G: 3; .375 INJECTION, POWDER, FOR SOLUTION INTRAVENOUS at 00:32

## 2021-01-01 RX ADMIN — Medication: at 15:51

## 2021-01-01 RX ADMIN — ETHYL ALCOHOL 1 EACH: 62 SWAB TOPICAL at 09:41

## 2021-01-01 RX ADMIN — SODIUM CHLORIDE 1000 ML: 9 INJECTION, SOLUTION INTRAVENOUS at 06:20

## 2021-01-01 RX ADMIN — DONEPEZIL HYDROCHLORIDE 10 MG: 5 TABLET, FILM COATED ORAL at 20:05

## 2021-01-01 RX ADMIN — HEPARIN SODIUM 5000 UNITS: 5000 INJECTION INTRAVENOUS; SUBCUTANEOUS at 20:05

## 2021-01-01 RX ADMIN — FOLIC ACID 1 MG: 1 TABLET ORAL at 08:52

## 2021-01-01 RX ADMIN — CEFTRIAXONE 1 G: 1 INJECTION, POWDER, FOR SOLUTION INTRAMUSCULAR; INTRAVENOUS at 20:01

## 2021-01-01 RX ADMIN — SODIUM CHLORIDE, PRESERVATIVE FREE 3 ML: 5 INJECTION INTRAVENOUS at 08:57

## 2021-01-01 RX ADMIN — HEPARIN SODIUM 5000 UNITS: 5000 INJECTION INTRAVENOUS; SUBCUTANEOUS at 20:33

## 2021-01-01 RX ADMIN — CEFEPIME HYDROCHLORIDE 2 G: 2 INJECTION, POWDER, FOR SOLUTION INTRAVENOUS at 20:18

## 2021-01-01 RX ADMIN — VANCOMYCIN HYDROCHLORIDE 1000 MG: 1 INJECTION, POWDER, LYOPHILIZED, FOR SOLUTION INTRAVENOUS at 17:06

## 2021-01-01 RX ADMIN — DOXYCYCLINE 100 MG: 100 INJECTION, POWDER, LYOPHILIZED, FOR SOLUTION INTRAVENOUS at 11:15

## 2021-01-01 RX ADMIN — PIPERACILLIN SODIUM AND TAZOBACTAM SODIUM 3.38 G: 3; .375 INJECTION, POWDER, LYOPHILIZED, FOR SOLUTION INTRAVENOUS at 20:33

## 2021-01-01 RX ADMIN — VANCOMYCIN HYDROCHLORIDE 1500 MG: 5 INJECTION, POWDER, LYOPHILIZED, FOR SOLUTION INTRAVENOUS at 13:38

## 2021-01-01 RX ADMIN — MAGNESIUM SULFATE IN DEXTROSE 1 G: 10 INJECTION, SOLUTION INTRAVENOUS at 18:04

## 2021-01-01 RX ADMIN — HEPARIN SODIUM 5000 UNITS: 5000 INJECTION INTRAVENOUS; SUBCUTANEOUS at 08:37

## 2021-01-01 RX ADMIN — PANTOPRAZOLE SODIUM 40 MG: 40 INJECTION, POWDER, FOR SOLUTION INTRAVENOUS at 18:04

## 2021-01-07 NOTE — OUTREACH NOTE
SNF Follow-up Note      Responses   Acute Facility Discharged From  [place]   Acute Discharge Date  10/30/20   Name of the Skilled Nursing Facility?  [facility]   Tier Level of the Skilled Nursing Facility  2   Purpose of SNF Admission  PT, OT   Estimated length of stay for the patient?  short term   Who is the insurance provider or payor of patient stay?  Medicare   Progression of Patient?  ACM spoke with Luz Maria at The [facility],  patient is still covered by Medicare A for skilled care. No dc date has been set at this time.           Evi Vaughn RN  Ambulatory     1/7/2021, 16:15 EST

## 2021-01-21 NOTE — OUTREACH NOTE
SNF Follow-up Note      Responses   Acute Facility Discharged From  [place]   Acute Discharge Date  10/30/20   Name of the Skilled Nursing Facility?  [facility]   Tier Level of the Skilled Nursing Facility  2   Purpose of SNF Admission  PT, OT   Estimated length of stay for the patient?  short term   Who is the insurance provider or payor of patient stay?  Medicare   Progression of Patient?  ACM spoke with Ger at [facility], patient is expected to dc from therapy on 1/27/21 but may be staying a little longer before returning home.           Evi Vaughn RN  Ambulatory     1/21/2021, 12:24 EST

## 2021-01-22 PROBLEM — J18.9 PNEUMONIA OF BOTH LOWER LOBES DUE TO INFECTIOUS ORGANISM: Status: ACTIVE | Noted: 2021-01-01

## 2021-02-04 NOTE — OUTREACH NOTE
SNF Follow-up Note      Responses   Acute Facility Discharged From  [place]   Acute Discharge Date  10/30/20   Name of the Skilled Nursing Facility?  [facility]   Tier Level of the Skilled Nursing Facility  2   Purpose of SNF Admission  PT, OT, SN   Estimated length of stay for the patient?  short term   Who is the insurance provider or payor of patient stay?  Medicare   Progression of Patient?  ACM spoke with Luz Maria at [facility], patient completed therapy on 1/27/21 but continues to be covered by Med A for skilled nursing          Evi Vaughn RN  Ambulatory     2/4/2021, 11:57 EST

## 2021-02-11 NOTE — OUTREACH NOTE
SNF Follow-up Note      Responses   Acute Facility Discharged From  [place]   Acute Discharge Date  10/30/20   Name of the Skilled Nursing Facility?  [facility]   Tier Level of the Skilled Nursing Facility  2   Purpose of SNF Admission  PT, OT, SN   Estimated length of stay for the patient?  short term   Progression of Patient?  ACM spoke with Luz Maria at [facility], patient completed therapy on 1/27/21 but continues to be covered by Med A for skilled nursing          Evi Vaughn RN  Ambulatory     2/11/2021, 11:33 EST

## 2021-02-18 NOTE — OUTREACH NOTE
SNF Follow-up Note      Responses   Acute Facility Discharged From  [place]   Acute Discharge Date  10/30/20   Name of the Skilled Nursing Facility?  [facility]   Tier Level of the Skilled Nursing Facility  2   Purpose of SNF Admission  PT, OT, SN   Estimated length of stay for the patient?  short term   Who is the insurance provider or payor of patient stay?  Private Pay   Progression of Patient?  ACM spoke with Luz Maria at [facility],  pt went Private Pay 2/11/21 but is still expected to dc to home,  no date has been set yet.          Evi Vaughn RN  Ambulatory     2/18/2021, 10:22 EST

## 2021-02-25 NOTE — OUTREACH NOTE
SNF Follow-up Note      Responses   Acute Facility Discharged From  [place]   Acute Discharge Date  10/30/20   Name of the Skilled Nursing Facility?  [facility]   Tier Level of the Skilled Nursing Facility  2   Purpose of SNF Admission  PT, OT, SN   Estimated length of stay for the patient?  short term   Who is the insurance provider or payor of patient stay?  Private Pay   Progression of Patient?  ACM spoke with Luz Maria at [facility], patient is still PP and is expected to return home upon discharge          Evi Vaughn RN  Ambulatory     2/25/2021, 14:03 EST

## 2021-03-04 NOTE — OUTREACH NOTE
SNF Follow-up Note      Responses   Acute Facility Discharged From  [place]   Acute Discharge Date  10/30/20   Name of the Skilled Nursing Facility?  [facility]   Tier Level of the Skilled Nursing Facility  2   Purpose of SNF Admission  PT, OT, SN   Estimated length of stay for the patient?  short term   Who is the insurance provider or payor of patient stay?  Private Pay   Progression of Patient?  ACM spoke with Luz Maria at [facility], patient is still PP and is expected to return home upon discharge          Evi Vaughn RN  Ambulatory     3/4/2021, 11:45 EST

## 2021-03-09 NOTE — ED NOTES
Report given to ROQUE Cruz at [facility].      Luzmaria Marmolejo, RN  03/09/21 0569

## 2021-03-09 NOTE — ED NOTES
"[facility] reported patient \"slipped out of geriatric chair and was found in the floor for an unknown amount of time\"; reported they didn't notice any deformities, and reported they didn't know if the patient had injured himself. No visible lacerations, abrasions, or bruising noted to patients. Patient's mental status at baseline per Nursing home. VSS. Call light within reach. Safety precautions in place at this time. LEONARDN. Luzmaria Zamora RN  03/09/21 0531    "

## 2021-03-12 NOTE — OUTREACH NOTE
SNF Follow-up Note      Responses   Acute Facility Discharged From  [place]   Acute Discharge Date  10/30/20   Name of the Skilled Nursing Facility?  [facility]   Tier Level of the Skilled Nursing Facility  2   Purpose of SNF Admission  PT, OT, SN   Estimated length of stay for the patient?  short term   Who is the insurance provider or payor of patient stay?  Private Pay   Progression of Patient?  ACM spoke with Tana at [facility], patient is still PP and is expected to return home upon discharge          Evi Vaughn RN  Ambulatory     3/12/2021, 15:49 EST

## 2021-03-12 NOTE — ED PROVIDER NOTES
Subjective     History provided by:  Patient   used: No    Fall  Mechanism of injury: fall    Injury location:  Head/neck  Head/neck injury location:  Head  Incident location:  Nursing home  Arrived directly from scene: yes    Fall:     Fall occurred:  Unable to specify    Impact surface:  Unable to specify    Point of impact:  Unable to specify    Entrapped after fall: no    Protective equipment: none    Suspicion of alcohol use: no    Suspicion of drug use: no    Tetanus status:  Up to date  Prior to arrival data:     Bystander interventions:  None    Patient ambulatory at scene: yes      Blood loss:  None    Responsiveness at scene:  Alert    Orientation at scene:  Person    Loss of consciousness: no      Amnesic to event: no      Airway interventions:  None    Breathing interventions:  None    IV access status:  None    IO access:  None    Fluids administered:  None    Cardiac interventions:  None    Medications administered:  None    Immobilization:  None    Airway condition since incident:  Stable    Breathing condition since incident:  Stable    Circulation condition since incident:  Stable    Mental status condition since incident:  Stable    Disability condition since incident:  Stable  Associated symptoms: no abdominal pain, no back pain, no blindness, no chest pain, no difficulty breathing, no headaches, no hearing loss, no loss of consciousness, no nausea, no neck pain, no seizures and no vomiting    Risk factors: CAD and kidney disease    Risk factors: no AICD, no anticoagulation therapy, no asthma, no beta blocker therapy, no CABG, no CHF, no COPD, no diabetes, no dialysis, no hemophilia, no pacemaker, no past MI and no steroid use        Review of Systems   Unable to perform ROS: Dementia   HENT: Negative for hearing loss.    Eyes: Negative for blindness.   Cardiovascular: Negative for chest pain.   Gastrointestinal: Negative for abdominal pain, nausea and vomiting.    Musculoskeletal: Negative for back pain and neck pain.   Neurological: Negative for seizures, loss of consciousness and headaches.   All other systems reviewed and are negative.      Past Medical History:   Diagnosis Date   • Arthritis    • Cervical spine disease     remotely suggested surgical intervention.  Patient deferred.    • Cervical spine disease    • CKD (chronic kidney disease) 10/30/2020   • Coronary artery disease    • Dementia (CMS/HCC)    • Dyslipidemia    • Hypertension    • Impotence    • Osteoarthritis 10/30/2020   • Palpitations    • Pneumonia    • Vertigo        No Known Allergies    Past Surgical History:   Procedure Laterality Date   • ARM LESION/CYST EXCISION Left 7/30/2020    Procedure: WIDE EXCISION MELANOMA LEFT ARM INTERMEDIATE CLOSURE;  Surgeon: Roman Mohan MD;  Location:  [facility];  Service: General;  Laterality: Left;   • ARTERIAL BYPASS SURGERY     • BRONCHOSCOPY N/A 5/24/2018    Procedure: BRONCHOSCOPY WITH WASHINGS;  Surgeon: Leonides Quarles MD;  Location:  [facility];  Service: Pulmonary   • CHOLECYSTECTOMY WITH INTRAOPERATIVE CHOLANGIOGRAM N/A 5/16/2018    Procedure: OPEN CHOLECYSTECTOMY;  Surgeon: Rosie Montalvo MD;  Location:  [facility];  Service: General   • COLONOSCOPY N/A 3/30/2018    Procedure: COLONOSCOPY;  Surgeon: Simone Valderrama MD;  [facility]:  [facility];  Service: Gastroenterology   • CORONARY ARTERY BYPASS GRAFT     • KNEE ARTHROPLASTY, PARTIAL REPLACEMENT      Lt , 2015   • REPLACEMENT TOTAL KNEE      Rt   • SENTINEL NODE BIOPSY Left 7/30/2020    Procedure: SENTINEL NODE INJECTION AND BIOPSY LEFT AXILLA;  Surgeon: Roman Mohan MD;  Location:  [facility];  Service: General;  Laterality: Left;       Family History   Problem Relation Age of Onset   • No Known Problems Mother    • No Known Problems Father    • No Known Problems Sister    • No Known Problems Brother        Social History     Socioeconomic History   • Marital status:      Spouse name: Not on  file   • Number of children: Not on file   • Years of education: Not on file   • Highest education level: Not on file   Tobacco Use   • Smoking status: Former Smoker     Packs/day: 1.50     Years: 7.00     Pack years: 10.50     Types: Cigarettes, Pipe, Cigars     Quit date: 1986     Years since quittin.7   • Smokeless tobacco: Never Used   Substance and Sexual Activity   • Alcohol use: Yes     Comment: occassionally    • Drug use: No   • Sexual activity: Defer           Objective   Physical Exam  Vitals and nursing note reviewed.   Constitutional:       General: He is not in acute distress.     Appearance: Normal appearance. He is well-developed. He is not toxic-appearing or diaphoretic.   HENT:      Head: Normocephalic and atraumatic.      Right Ear: External ear normal.      Left Ear: External ear normal.      Nose: Nose normal.      Mouth/Throat:      Pharynx: No oropharyngeal exudate.      Tonsils: No tonsillar exudate.   Eyes:      General: Lids are normal.      Conjunctiva/sclera: Conjunctivae normal.      Pupils: Pupils are equal, round, and reactive to light.   Neck:      Thyroid: No thyromegaly.   Cardiovascular:      Rate and Rhythm: Normal rate and regular rhythm.      Pulses: Normal pulses.      Heart sounds: Normal heart sounds, S1 normal and S2 normal.   Pulmonary:      Effort: Pulmonary effort is normal. No tachypnea or respiratory distress.      Breath sounds: Normal breath sounds. No decreased breath sounds, wheezing or rales.   Chest:      Chest wall: No tenderness.   Abdominal:      General: Bowel sounds are normal. There is no distension.      Palpations: Abdomen is soft.      Tenderness: There is no abdominal tenderness. There is no guarding or rebound.   Musculoskeletal:         General: No tenderness or deformity. Normal range of motion.      Cervical back: Full passive range of motion without pain, normal range of motion and neck supple.   Lymphadenopathy:      Cervical: No cervical  adenopathy.   Skin:     General: Skin is warm and dry.      Coloration: Skin is not pale.      Findings: No erythema or rash.   Neurological:      Mental Status: He is alert.      GCS: GCS eye subscore is 4. GCS verbal subscore is 5. GCS motor subscore is 6.      Cranial Nerves: No cranial nerve deficit.      Sensory: No sensory deficit.   Psychiatric:         Speech: Speech normal.         Behavior: Behavior is slowed.         Thought Content: Thought content normal.         Cognition and Memory: Cognition is impaired. Memory is impaired.         Judgment: Judgment normal.         Procedures           ED Course  ED Course as of Mar 12 0259   Tue Mar 09, 2021   0531 IMPRESSION:  Motion degraded exam, but no acute abnormalities are identified.   CT Head Without Contrast [ES]   0531 IMPRESSION:  Motion degraded exam, but no acute fracture or malalignment is identified.   CT Cervical Spine Without Contrast [ES]      ED Course User Index  [ES] Luis Gee MD                                           MDM  Number of Diagnoses or Management Options  Fall, initial encounter: new and requires workup  Injury of head, initial encounter: new and requires workup     Amount and/or Complexity of Data Reviewed  Tests in the radiology section of CPT®: reviewed and ordered  Review and summarize past medical records: yes  Independent visualization of images, tracings, or specimens: yes    Risk of Complications, Morbidity, and/or Mortality  Presenting problems: moderate  Diagnostic procedures: moderate  Management options: moderate    Patient Progress  Patient progress: stable      Final diagnoses:   Fall, initial encounter   Injury of head, initial encounter            Luis Gee MD  03/12/21 0259

## 2021-03-25 NOTE — OUTREACH NOTE
SNF Follow-up Note      Responses   Acute Facility Discharged From  [place]   Acute Discharge Date  10/30/20   Name of the Skilled Nursing Facility?  [facility]   Tier Level of the Skilled Nursing Facility  2   Purpose of SNF Admission  PT, OT, SN   Estimated length of stay for the patient?  short term   Who is the insurance provider or payor of patient stay?  Private Pay   Progression of Patient?  RNRAAD spoke with Luz Maria at [facility],  patient is Private Pay but is still expected to dc to home           Evi Vaughn RN  Ambulatory     3/25/2021, 15:43 EDT

## 2021-04-08 NOTE — OUTREACH NOTE
SNF Follow-up Note      Responses   Acute Facility Discharged From  [place]   Acute Discharge Date  10/30/20   Name of the Skilled Nursing Facility?  [facility]   Tier Level of the Skilled Nursing Facility  2   Purpose of SNF Admission  PT, OT   Estimated length of stay for the patient?  Long Term   Who is the insurance provider or payor of patient stay?  Private Pay   Progression of Patient?  RN-JUAREZ spoke with Luz Maria at The [facility]. Patient is now going to stay for Long Term Care.          Evi Vaughn RN  Ambulatory     4/8/2021, 14:57 EDT

## 2021-05-23 PROBLEM — J96.01 ACUTE RESPIRATORY FAILURE WITH HYPOXIA AND HYPERCAPNIA (HCC): Status: RESOLVED | Noted: 2018-05-21 | Resolved: 2021-01-01

## 2021-05-23 PROBLEM — J96.02 ACUTE RESPIRATORY FAILURE WITH HYPOXIA AND HYPERCAPNIA (HCC): Status: RESOLVED | Noted: 2018-05-21 | Resolved: 2021-01-01

## 2021-05-23 PROBLEM — J98.11 COLLAPSE OF LEFT LUNG: Status: RESOLVED | Noted: 2018-05-23 | Resolved: 2021-01-01

## 2021-05-23 PROBLEM — K82.9 GALLBLADDER DISEASE: Status: RESOLVED | Noted: 2018-05-16 | Resolved: 2021-01-01

## 2021-05-23 PROBLEM — A41.9 SEPSIS (HCC): Status: RESOLVED | Noted: 2020-10-23 | Resolved: 2021-01-01

## 2021-07-05 NOTE — PROGRESS NOTES
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    PROBLEM LIST:   1. CAD:  a. February 2006: Off-pump CABG x4, sequential LIMA to first diagonal and LAD, saphenous graft to OM1, and PL of the circumflex, Kody Jeronimo MD.    b. February 2012: MPS per [facility], normal perfusion 51%.   c. 5/23/18 echo: EF 60-65%, diastolic dysfunction, mild to moderate TR, severe pulmonary HTN  d. 5/26/21 2D Echo: LVEF 61-65%. Mild AR. Trace MR. Mild TR RVSP due to TR elevated 35-45.  2. WCT 5/21 30 beat run [place] admitted w Pneumonia           5/21 echo [place] EF 65% rvsp 40. [place] initiated  3. HTN  a. 6/16 echo, CUS  [facility]  4. Dyslipidemia:   a. August 2015: Total cholesterol 102, triglycerides 92, HDL 30, and LDL 54.   5. Palpitations:  a. 2012, Holter with greater than 3200 PVCs  6. Impotence.    7. Cervical spine disease, remotely suggested surgical intervention. Patient deferred.    8. Vertigo.   9. 10/23/20 Hospitalization for RLL Pneumonia  10. 1/21 Pneumonia; Bilateral with sepsis   11. 5/23/21 Pneumonia Bilateral; sepsis  12. Prior surgeries:   a. Right knee replacement December 2007.    b.  left knee replacement per Dr. Ruiz 2016  c. Melanoma excision- Left arm/axilla with sentinel node biopsy      13. Dementia- SNF 2021      CC:   Chief Complaint   Patient presents with   • Coronary Artery Disease       Allergies  No Known Allergies    Current Medications    Current Outpatient Medications:   •  acetaminophen (Tylenol 8 Hour) 650 MG 8 hr tablet, Take 650 mg by mouth Every 6 (Six) Hours As Needed for Mild Pain ., Disp: , Rfl:   •  aspirin 81 MG EC tablet, Take 81 mg by mouth daily., Disp: , Rfl:   •  atorvastatin (LIPITOR) 20 MG tablet, Take 20 mg by mouth Daily., Disp: , Rfl:    •  bisacodyl (DULCOLAX) 10 MG suppository, Insert 10 mg into the rectum Every 12 (Twelve) Hours As Needed for Constipation., Disp: , Rfl:   •  Cyanocobalamin (Vitamin B-12) 3000 MCG sublingual tablet, Place 3,000 mcg under the tongue., Disp: , Rfl:   •  Dextran 70-Hypromellose (NATURAL BALANCE TEARS OP), Apply 1 drop to eye(s) as directed by provider 2 (two) times a day. Prior to [facility] Admission, Patient was on: left eye, Disp: , Rfl:   •  donepezil (ARICEPT) 10 MG tablet, Take 10 mg by mouth Every Night., Disp: , Rfl: 3  •  folic acid (FOLVITE) 1 MG tablet, Take 1 tablet by mouth Daily., Disp:  , Rfl:   •  lactulose (CHRONULAC) 10 GM/15ML solution, Take 20 g by mouth Every 12 (Twelve) Hours As Needed., Disp: , Rfl:   •  Melatonin 3 MG capsule, Take 6 mg by mouth Every Night., Disp: , Rfl:   •  memantine (NAMENDA) 10 MG tablet, Take 10 mg by mouth 2 (Two) Times a Day., Disp: , Rfl:   •  metoprolol succinate XL (TOPROL-XL) 25 MG 24 hr tablet, Take 1 tablet by mouth Daily., Disp: , Rfl:   •  mirtazapine (REMERON) 30 MG tablet, Take 30 mg by mouth Every Night., Disp: , Rfl:   •  Omega-3 Fatty Acids (FISH OIL) 1000 MG capsule capsule, Take 1,000 mg by mouth Daily With Breakfast., Disp: , Rfl:   •  polyethylene glycol (MIRALAX) 17 g packet, Take 17 g by mouth Daily., Disp:  , Rfl:   •  sertraline (ZOLOFT) 50 MG tablet, Take 50 mg by mouth Every Night., Disp: , Rfl:   •  Sodium Phosphates (fleet enema) 7-19 GM/118ML enema, Insert 1 enema into the rectum Every 12 (Twelve) Hours As Needed (constipation)., Disp: , Rfl:   •  vitamin D (ERGOCALCIFEROL) 02053 UNITS capsule capsule, Take 50,000 Units by mouth 1 (One) Time Per Week. Prior to [facility] Admission, Patient was on: takes on mondays, Disp: , Rfl:       History of Present Illness   Porsha Soni is a 80 y.o. year old male here for follow up.  He said advancement of his cognitive impairment.  He is now in a skilled nursing facility.  He was admitted to [facility] in  "May with pneumonitis.  At that time he was noted with wide-complex tachycardia and placed on beta-blockade.  Echocardiogram revealed preserved LV function.  He has had no recurrence of issues since discharge.          OBJECTIVE:  Vitals:    07/07/21 1056   BP: 124/78   BP Location: Right arm   Patient Position: Sitting   Pulse: 70   SpO2: 95%   Weight: 80.3 kg (177 lb)   Height: 177.8 cm (70\")     Body mass index is 25.4 kg/m².    Constitutional:       Appearance: Healthy appearance. Not in distress.      Comments: Slower mentation oriented  In wheelchair accompanied by spouse   Neck:      Vascular: No JVR. JVD normal.   Pulmonary:      Effort: Pulmonary effort is normal.      Breath sounds: Normal breath sounds. No wheezing. No rhonchi. No rales.   Chest:      Chest wall: Not tender to palpatation.   Cardiovascular:      PMI at left midclavicular line. Normal rate. Regular rhythm. Normal S1. Normal S2.      Murmurs: There is no murmur.      No gallop. No click. No rub.   Pulses:     Intact distal pulses.   Edema:     Peripheral edema absent.   Abdominal:      General: Bowel sounds are normal.      Palpations: Abdomen is soft.      Tenderness: There is no abdominal tenderness.   Musculoskeletal: Normal range of motion.         General: No tenderness. Skin:     General: Skin is warm and dry.   Neurological:      General: No focal deficit present.      Mental Status: Alert and oriented to person, place and time.         Diagnostic Data:  Procedures      ASSESSMENT:   Diagnosis Plan   1. Coronary artery disease involving native coronary artery of native heart without angina pectoris     2. Essential hypertension     3. Palpitations         PLAN:  CAD post remote surgical vascularization preserved LV function.  Measures should be taken to improve quality of life.  As dementia is worsened I would not recommend ischemic evaluation unless acute issue    Hypertension controlled metoprolol    Dyslipidemia  on omega-3 fatty " acid currently on atorvastatin    Wide-complex tachycardia-in setting of sepsis with acceptable electrolytes preserved LV function continue beta-blockade        Dwayne Swartz MD, FACC

## 2021-08-04 PROBLEM — N39.0 URINARY TRACT INFECTION WITHOUT HEMATURIA: Status: ACTIVE | Noted: 2021-01-01

## 2021-08-04 NOTE — ED NOTES
I called lab about sticking patient for second troponin, I spoke with Anam.     Brian Casiano  08/04/21 1946

## 2021-08-04 NOTE — ED PROVIDER NOTES
Subjective     History provided by:  EMS personnel   used: No    Altered Mental Status  Presenting symptoms: confusion and lethargy    Presenting symptoms: no behavior changes, no combativeness, no disorientation, no memory loss, no partial responsiveness and no unresponsiveness    Severity:  Severe  Most recent episode:  Today  Episode history:  Continuous  Timing:  Constant  Progression:  Worsening  Chronicity:  New  Context: dementia and nursing home resident    Context: not alcohol use, not drug use, not head injury, not homeless, taking medications as prescribed, not recent change in medication, not recent illness and not recent infection    Associated symptoms: no abdominal pain, normal movement, no agitation, no bladder incontinence, no decreased appetite, no depression, no difficulty breathing, no eye deviation, no fever, no hallucinations, no headaches, no light-headedness, no nausea, no palpitations, no rash, no seizures, no slurred speech, no suicidal behavior, no visual change, no vomiting and no weakness        Review of Systems   Constitutional: Positive for fatigue. Negative for activity change, appetite change, chills, decreased appetite, diaphoresis and fever.   HENT: Negative for congestion, ear pain and sore throat.    Eyes: Negative for redness.   Respiratory: Negative for cough, chest tightness, shortness of breath and wheezing.    Cardiovascular: Negative for chest pain, palpitations and leg swelling.   Gastrointestinal: Negative for abdominal pain, diarrhea, nausea and vomiting.   Genitourinary: Negative for bladder incontinence, dysuria and urgency.   Musculoskeletal: Negative for arthralgias, back pain, myalgias and neck pain.   Skin: Negative for pallor, rash and wound.   Neurological: Negative for dizziness, seizures, speech difficulty, weakness, light-headedness and headaches.   Psychiatric/Behavioral: Positive for confusion. Negative for agitation, behavioral  problems, decreased concentration, hallucinations and memory loss.   All other systems reviewed and are negative.      Past Medical History:   Diagnosis Date   • Arthritis    • Asymptomatic PVCs 6/14/2016   • BPH with urinary obstruction 11/17/2018   • Cervical spine disease     remotely suggested surgical intervention.  Patient deferred.    • Chronic kidney disease, stage 3a (CMS/HCC) 10/30/2020   • Coronary artery disease    • COVID-19     Jan 2021   • Dementia (CMS/[facility])    • Diverticulosis    • Dyslipidemia    • Folic acid deficiency anemia    • Hypertension    • Impotence    • Malignant melanoma (CMS/[facility]) 07/2020    Left arm   • Osteoarthritis 10/30/2020   • Pneumonia    • Vertigo        No Known Allergies    Past Surgical History:   Procedure Laterality Date   • ARM LESION/CYST EXCISION Left 7/30/2020    Procedure: WIDE EXCISION MELANOMA LEFT ARM INTERMEDIATE CLOSURE;  Surgeon: Roman Mohan MD;  Location:  [facility];  Service: General;  Laterality: Left;   • ARTERIAL BYPASS SURGERY     • BRONCHOSCOPY N/A 5/24/2018    Procedure: BRONCHOSCOPY WITH WASHINGS;  Surgeon: Leonides Quarles MD;  Location:  [facility];  Service: Pulmonary   • CHOLECYSTECTOMY WITH INTRAOPERATIVE CHOLANGIOGRAM N/A 5/16/2018    Procedure: OPEN CHOLECYSTECTOMY;  Surgeon: Rosie Montalvo MD;  Location:  [facility];  Service: General   • COLONOSCOPY N/A 3/30/2018    Procedure: COLONOSCOPY;  Surgeon: Simone Valderrama MD;  Location: [facility] OR;  Service: Gastroenterology   • CORONARY ARTERY BYPASS GRAFT     • KNEE ARTHROPLASTY, PARTIAL REPLACEMENT      Lt , 2015   • REPLACEMENT TOTAL KNEE      Rt   • SENTINEL NODE BIOPSY Left 7/30/2020    Procedure: SENTINEL NODE INJECTION AND BIOPSY LEFT AXILLA;  Surgeon: Roman Mohan MD;  Location:  [facility];  Service: General;  Laterality: Left;       Family History   Problem Relation Age of Onset   • No Known Problems Mother    • No Known Problems Father    • No Known Problems Sister    • No Known Problems  Brother        Social History     Socioeconomic History   • Marital status:      Spouse name: Not on file   • Number of children: Not on file   • Years of education: Not on file   • Highest education level: Not on file   Tobacco Use   • Smoking status: Former Smoker     Packs/day: 1.50     Years: 7.00     Pack years: 10.50     Types: Cigarettes, Pipe, Cigars     Quit date: 1986     Years since quittin.1   • Smokeless tobacco: Never Used   Vaping Use   • Vaping Use: Never used   Substance and Sexual Activity   • Alcohol use: Yes     Comment: occassionally    • Drug use: No   • Sexual activity: Defer           Objective   Physical Exam  Vitals and nursing note reviewed.   Constitutional:       General: He is not in acute distress.     Appearance: Normal appearance. He is well-developed. He is ill-appearing. He is not toxic-appearing or diaphoretic.   HENT:      Head: Normocephalic and atraumatic.      Right Ear: External ear normal.      Left Ear: External ear normal.      Nose: Nose normal.      Mouth/Throat:      Pharynx: No oropharyngeal exudate.      Tonsils: No tonsillar exudate.   Eyes:      General: Lids are normal.      Conjunctiva/sclera: Conjunctivae normal.      Pupils: Pupils are equal, round, and reactive to light.   Neck:      Thyroid: No thyromegaly.   Cardiovascular:      Rate and Rhythm: Normal rate and regular rhythm.      Pulses: Normal pulses.      Heart sounds: Normal heart sounds, S1 normal and S2 normal.   Pulmonary:      Effort: Pulmonary effort is normal. No tachypnea or respiratory distress.      Breath sounds: Normal breath sounds. No decreased breath sounds, wheezing or rales.   Chest:      Chest wall: No tenderness.   Abdominal:      General: Bowel sounds are normal. There is no distension.      Palpations: Abdomen is soft.      Tenderness: There is no abdominal tenderness. There is no guarding or rebound.   Musculoskeletal:         General: No tenderness or deformity.  Normal range of motion.      Cervical back: Full passive range of motion without pain, normal range of motion and neck supple.   Lymphadenopathy:      Cervical: No cervical adenopathy.   Skin:     General: Skin is warm and dry.      Coloration: Skin is not pale.      Findings: No erythema or rash.   Neurological:      Mental Status: He is oriented to person, place, and time. He is lethargic.      GCS: GCS eye subscore is 4. GCS verbal subscore is 5. GCS motor subscore is 6.      Cranial Nerves: No cranial nerve deficit.      Sensory: No sensory deficit.   Psychiatric:         Speech: Speech normal.         Thought Content: Thought content normal.         Judgment: Judgment normal.         Procedures           ED Course  ED Course as of Aug 04 2253   Wed Aug 04, 2021   1542 IMPRESSION:     1. No acute parenchymal mass, hemorrhage, or midline shift  2. Atrophy  3. The overall appearance is very similar to the prior study   CT Head Without Contrast [ES]   1542 IMPRESSION:  1.  Since prior exam, interval improvement of CHF/edema.  2.  Small left effusion noted but overall decreased.  3.  Otherwise stable chest.   XR Chest 1 View [ES]   1643 Sinus rhythm with frequent premature ventricular complexes  Nonspecific T wave abnormality  Prolonged QT  Abnormal ECG  When compared with ECG of 23-MAY-2021 17:49,  No significant change was found  Vent. rate 74 BPM  KS interval 156 ms  QRS duration 90 ms  QT/QTcB 444/492 ms  P-R-T axes -26 1 -27   ECG 12 Lead [ES]   1855 Patient will need to be admitted for altered mental status.  Treating empirically at this time for sepsis.  It was reported from the [facility] that the patient's mental status is just more slow than usual, denied nausea, vomiting, diarrhea, fever, chills, chest pain, or shortness of breath.  Nuys any recent injury and/or any recent trauma.    [ES]   1917 IMPRESSION:  1.  Stable left basilar consolidation which may represent pneumonia or  chronic atelectasis.  2.   Prostate enlargement with urinary bladder wall thickening and  bladder diverticulum indicative of chronic partial bladder outlet  obstruction.  3.  Diverticulosis without diverticulitis.  4.  Other nonacute findings described above.   CT Chest Without Contrast Diagnostic [ES]   1917 IMPRESSION:  1.  Stable left basilar consolidation which may represent pneumonia or  chronic atelectasis.  2.  Prostate enlargement with urinary bladder wall thickening and  bladder diverticulum indicative of chronic partial bladder outlet  obstruction.  3.  Diverticulosis without diverticulitis.  4.  Other nonacute findings described above.   CT Abdomen Pelvis Without Contrast [ES]   2252 Discussed with Dr. Lopez.  Patient admitted, see orders.    [BC]      ED Course User Index  [BC] Leonidas Rivera MD  [ES] Luis Gee MD                                           MDM  Number of Diagnoses or Management Options     Amount and/or Complexity of Data Reviewed  Clinical lab tests: reviewed and ordered  Tests in the radiology section of CPT®: reviewed and ordered  Tests in the medicine section of CPT®: reviewed and ordered  Decide to obtain previous medical records or to obtain history from someone other than the patient: yes  Review and summarize past medical records: yes  Discuss the patient with other providers: yes  Independent visualization of images, tracings, or specimens: yes    Risk of Complications, Morbidity, and/or Mortality  Presenting problems: high  Diagnostic procedures: high  Management options: high    Critical Care  Total time providing critical care:  minutes    Patient Progress  Patient progress: other (comment) (CRITICAL.)      Final diagnoses:   Urinary tract infection without hematuria, site unspecified   Acute kidney injury (TYRELL) with acute tubular necrosis (ATN) (CMS/HCC)   Hypernatremia   Dehydration   Elevated troponin       ED Disposition  ED Disposition     ED Disposition Condition Comment     Decision to Admit  Level of Care: Telemetry [5]   Diagnosis: Urinary tract infection without hematuria, site unspecified [[id number]]   Admitting Physician: SHAMA ROMO [[id number]]   Attending Physician: SHAMA ROMO [[id number]]   Certification: I Certify That Inpatient Hospital Services Are Medically Necessary For Greater Than 2 Midnights            No follow-up provider specified.       Medication List      No changes were made to your prescriptions during this visit.          Leonidas Rivera MD  08/04/21 [id number]

## 2021-08-05 NOTE — CONSULTS
Palliative Care Initial Consult     Attending Physician: Evert Brown, *  Referring Provider: Evert Brown.    Palliative care reason for consult:GOC/ACP support for patient and family.   Code Status:   Code Status and Medical Interventions:   Ordered at: 08/05/21 0913     Limited Support to NOT Include:    Intubation     Code Status:    CPR     Medical Interventions (Level of Support Prior to Arrest):    Limited      Advanced Directives: Advance Directive Status: Patient does not have advance directive   Healthcare surrogate: Wife Helen Soni  Goals of Care: Patients wife Helen states she would want her  to have CPR however would not want him to be put on a ventilator and would like him to return to the [facility] at the time of his discharge. .    HPI:  Porsha Soni is a 80 y.o. male admitted on 8/4/2021 with altered mental status from the [facility]. He has a past medical history of BPH, CKD IIIa, COVID 19 in January of 2021, dementia, malignant melanoma and hypertension. He was here at [facility] in May of 2021 with sepsis/bilateral pneumonia. At the time of that discharge was sent to a SNF.    On 8/4/21 he presented to the ED of [facility] for for further evaluation of altered mental status at The [facility]. Upon arrival to the ED, vital signs were T 98.6F, pulse 79, RR 16, and /76 with room air oxygen saturation of 100%.   CPK was found to be 544.  Creatinine found to be 2.63.  Lactate was found to be 2.2 and mag 2.7.  White blood cells found be 12.32 with MCV 1 of 5.3 with known history of folate deficiency.  Urinalysis concerning for underlying urinary tract infection.  Imaging studies with CT abdomen pelvis revealing stable left basilar consolidation representing pneumonia or chronic atelectasis and prostate enlargement with urinary bladder wall thickening and bladder diverticulum indicative of chronic partial bladder outlet obstruction.  CT chest with left basilar consolidation  with volume loss with considerations to include persistent pneumonia versus chronic atelectasis with minuscule pleural fluid noted and stable cardiomegaly with changes of prior CABG.  CT head was performed upon presentation given delirium with no known acute parenchymal mass, hemorrhage or midline shift.    According to nursing home report Mr. Soni appetite/intake has been declining and he had been feeding him self with worsening confusion. The NH states that he was talking in short sentences but has been slowing down over the last week.             ROS: Negative except as above in HPI.     Past Medical History:   Diagnosis Date   • Arthritis    • Asymptomatic PVCs 6/14/2016   • BPH with urinary obstruction 11/17/2018   • Cervical spine disease     remotely suggested surgical intervention.  Patient deferred.    • Chronic kidney disease, stage 3a (CMS/HCC) 10/30/2020   • Coronary artery disease    • COVID-19     Jan 2021   • Dementia (CMS/[facility])    • Diverticulosis    • Dyslipidemia    • Folic acid deficiency anemia    • Hypertension    • Impotence    • Malignant melanoma (CMS/[facility]) 07/2020    Left arm   • Osteoarthritis 10/30/2020   • Pneumonia    • Vertigo      Past Surgical History:   Procedure Laterality Date   • ARM LESION/CYST EXCISION Left 7/30/2020    Procedure: WIDE EXCISION MELANOMA LEFT ARM INTERMEDIATE CLOSURE;  Surgeon: Roman Mohan MD;  Location:  [facility];  Service: General;  Laterality: Left;   • ARTERIAL BYPASS SURGERY     • BRONCHOSCOPY N/A 5/24/2018    Procedure: BRONCHOSCOPY WITH WASHINGS;  Surgeon: Leonides Quarles MD;  Location:  [facility];  Service: Pulmonary   • CHOLECYSTECTOMY WITH INTRAOPERATIVE CHOLANGIOGRAM N/A 5/16/2018    Procedure: OPEN CHOLECYSTECTOMY;  Surgeon: Rosie Montalvo MD;  Location:  [facility];  Service: General   • COLONOSCOPY N/A 3/30/2018    Procedure: COLONOSCOPY;  Surgeon: Simone Valderrama MD;  Location:  [facility];  Service: Gastroenterology   • CORONARY ARTERY BYPASS  GRAFT     • KNEE ARTHROPLASTY, PARTIAL REPLACEMENT      Lt , 2015   • REPLACEMENT TOTAL KNEE      Rt   • SENTINEL NODE BIOPSY Left 2020    Procedure: SENTINEL NODE INJECTION AND BIOPSY LEFT AXILLA;  Surgeon: Roman Mohan MD;  Location: [facility];  Service: General;  Laterality: Left;     Social History     Socioeconomic History   • Marital status:      Spouse name: Not on file   • Number of children: Not on file   • Years of education: Not on file   • Highest education level: Not on file   Tobacco Use   • Smoking status: Former Smoker     Packs/day: 1.50     Years: 7.00     Pack years: 10.50     Types: Cigarettes, Pipe, Cigars     Quit date: 1986     Years since quittin.1   • Smokeless tobacco: Never Used   Vaping Use   • Vaping Use: Never used   Substance and Sexual Activity   • Alcohol use: Yes     Comment: occassionally    • Drug use: No   • Sexual activity: Defer     Family History   Problem Relation Age of Onset   • No Known Problems Mother    • No Known Problems Father    • No Known Problems Sister    • No Known Problems Brother        No Known Allergies    Current Facility-Administered Medications   Medication Dose Route Frequency Provider Last Rate Last Admin   • acetaminophen (TYLENOL) tablet 500 mg  500 mg Oral Q6H PRN Evert Brown DO       • atorvastatin (LIPITOR) tablet 20 mg  20 mg Oral Daily Evert Brown DO   20 mg at 21 0951   • bacitracin-polymyxin b (POLYSPORIN) ophthalmic ointment   Both Eyes Q12H Ava Adame PA-C   1 application at 21 0949   • bisacodyl (DULCOLAX) suppository 10 mg  10 mg Rectal Q12H PRN Evert Brown DO       • carboxymethylcellulose (REFRESH PLUS) 0.5 % ophthalmic solution 1 drop  1 drop Left Eye BID Evert Brown DO   1 drop at 21 0949   • cefepime 2 gm IVPB in 100 ml NS (VTB)  2 g Intravenous Q12H Evert Brown DO       • [START ON 2021] cholecalciferol (VITAMIN D3)  capsule 50,000 Units  50,000 Units Oral Weekly Evert Brown DO       • donepezil (ARICEPT) tablet 10 mg  10 mg Oral Nightly Evert Brown DO       • folic acid (FOLVITE) tablet 1 mg  1 mg Oral Daily Evert Brown DO   1 mg at 08/05/21 0951   • guaiFENesin (ROBITUSSIN) 100 MG/5ML oral solution 200 mg  200 mg Oral Q8H PRN Evert Brown DO       • heparin (porcine) 5000 UNIT/ML injection 5,000 Units  5,000 Units Subcutaneous Q12H Jefe Lopez MD   5,000 Units at 08/05/21 0821   • lactulose (CHRONULAC) 10 GM/15ML solution 20 g  20 g Oral Q12H PRN Evert Brown DO       • melatonin tablet 5 mg  5 mg Oral Nightly Evert Brown DO       • memantine (NAMENDA) tablet 10 mg  10 mg Oral Q12H Evert Brown DO   10 mg at 08/05/21 0951   • metoprolol tartrate (LOPRESSOR) tablet 12.5 mg  12.5 mg Oral Q12H Evert Brown DO   12.5 mg at 08/05/21 1038   • metroNIDAZOLE (FLAGYL) 500 mg/100mL IVPB  500 mg Intravenous Q8H Jefe Lopez MD   Stopped at 08/05/21 0945   • mirtazapine (REMERON) tablet 30 mg  30 mg Oral Nightly Evert Brown DO       • nitroglycerin (NITROSTAT) SL tablet 0.4 mg  0.4 mg Sublingual Q5 Min PRN Jefe Lopez MD       • Pharmacy Consult - Pharmacy to dose   Does not apply Continuous PRN Jefe Lopez MD       • polyethylene glycol (MIRALAX) packet 17 g  17 g Oral Daily Evert Brown DO   17 g at 08/05/21 0951   • sertraline (ZOLOFT) tablet 50 mg  50 mg Oral Nightly Evert Brown DO       • sodium chloride 0.9 % flush 10 mL  10 mL Intravenous PRN Jefe Lopez MD       • sodium chloride 0.9 % flush 10 mL  10 mL Intravenous Q12H Jefe Lopez MD   10 mL at 08/05/21 0900   • sodium chloride 0.9 % flush 10 mL  10 mL Intravenous PRN Jefe Lopez MD       • sodium chloride 0.9 % infusion  75 mL/hr Intravenous Continuous Ava Adame,  "PA-C 75 mL/hr at 08/05/21 1251 75 mL/hr at 08/05/21 1251     Pharmacy Consult - Pharmacy to dose,   sodium chloride, 75 mL/hr, Last Rate: 75 mL/hr (08/05/21 1251)      •  acetaminophen  •  bisacodyl  •  guaiFENesin  •  lactulose  •  nitroglycerin  •  Pharmacy Consult - Pharmacy to dose  •  [COMPLETED] Insert peripheral IV **AND** sodium chloride  •  sodium chloride    Current medication reviewed for route, type, dose and frequency and are current per MAR.    Palliative Performance Scale Score:     BP 98/53 (BP Location: Right arm, Patient Position: Lying) Comment: rn Adriana AWARE  Pulse 75   Temp 98.4 °F (36.9 °C) (Axillary)   Resp 20   Ht 177.8 cm (70\")   Wt 76.8 kg (169 lb 4.8 oz)   SpO2 99%   BMI 24.29 kg/m²     Intake/Output Summary (Last 24 hours) at 8/5/2021 1425  Last data filed at 8/5/2021 1203  Gross per 24 hour   Intake 2600 ml   Output --   Net 2600 ml       PE:  General Appearance:    Chronically ill appearing, awake questionable alertness, [place] cooperative, NAD   HEENT:    NC/AT, without obvious abnormality, EOMI, anicteric, rt. Lt eye discharge/redness    Neck:   supple, trachea midline, no JVD   Lungs:     Clear and diminished in bilateral bases, no wheezing, rhonchi , or rales.    Heart:    RRR, normal S1 and S2, no M/R/G   Abdomen:     Soft, NT, ND, NABS    Extremities:   Moves all extremities, no edema   Pulses:   Pulses palpable and equal bilaterally   Skin:   Warm, dry   Neurologic:   Awake, questionable alertness, did not follow command   Psych:   Confused(dementia)       Labs:   Results from last 7 days   Lab Units 08/05/21  0847   WBC 10*3/mm3 13.62*   HEMOGLOBIN g/dL 13.6   HEMATOCRIT % 44.8   PLATELETS 10*3/mm3 254     Results from last 7 days   Lab Units 08/05/21  0846   SODIUM mmol/L 159*  159*   POTASSIUM mmol/L 4.2  4.2   CHLORIDE mmol/L 124*  124*   CO2 mmol/L 23.6  23.6   BUN mg/dL 90*  90*   CREATININE mg/dL 2.18*  2.18*   GLUCOSE mg/dL 92  92   CALCIUM mg/dL 10.2  10.2 "     Results from last 7 days   Lab Units 08/05/21  0846   SODIUM mmol/L 159*  159*   POTASSIUM mmol/L 4.2  4.2   CHLORIDE mmol/L 124*  124*   CO2 mmol/L 23.6  23.6   BUN mg/dL 90*  90*   CREATININE mg/dL 2.18*  2.18*   CALCIUM mg/dL 10.2  10.2   BILIRUBIN mg/dL 0.5   ALK PHOS U/L 122*   ALT (SGPT) U/L 34   AST (SGOT) U/L 23   GLUCOSE mg/dL 92  92     Imaging Results (Last 72 Hours)     Procedure Component Value Units Date/Time    FL Video Swallow Single Contrast [[id number]] Collected: 08/05/21 1025     Updated: 08/05/21 1028    Narrative:      EXAMINATION: FL VIDEO SWALLOW SINGLE-CONTRAST-      CLINICAL INDICATION:     Aspiration r/o; N39.0-Urinary tract infection,  site not specified; N17.0-Acute kidney failure with tubular necrosis;  E87.0-Hyperosmolality and hypernatremia; E86.0-Dehydration; R77.8-Other  specified abnormalities of plasma proteins     TECHNIQUE: Modified barium swallow was performed utilizing video  fluoroscopy in conjunction with the Speech Pathology team. The patient  ingested multiple barium media including thin barium, nectar, and honey  liquid as well as barium pudding and a barium pudding coated cracker.      FLUOROSCOPY TIME: 1.1 minute     COMPARISON: 05/24/2021      FINDINGS:   Premature loss is noted with vallecular pooling.  Silent aspiration with thin liquids. Transient penetration of thin  liquids when presented with spoon. No additional episodes of penetration  or aspiration.          Impression:      1.  Silent aspiration with thin liquids.  2. Please see dysphasia notes for further details.     This report was finalized on 8/5/2021 10:26 AM by Dr. Adebayo Dodd MD.       CT Chest Without Contrast Diagnostic [[id number]] Collected: 08/04/21 1902     Updated: 08/04/21 1906    Narrative:      EXAM:    CT Chest Without Intravenous Contrast     EXAM DATE:    8/4/2021 5:48 PM     CLINICAL HISTORY:    Chest pain or SOB, pleurisy or effusion suspected     TECHNIQUE:    Axial  computed tomography images of the chest without intravenous  contrast.  Sagittal and coronal reformatted images were created and  reviewed.  This CT exam was performed using one or more of the following  dose reduction techniques:  automated exposure control, adjustment of  the mA and/or kV according to patient size, and/or use of iterative  reconstruction technique.     COMPARISON:    05/23/2021     FINDINGS:    Lungs:  Consolidation of the left lower lobe is noted with volume loss  and trace effusion more likely related to chronic atelectasis but  superimposed pneumonia is not excluded.  Linear atelectasis right lung  base.    Pleural space:  Unremarkable.  No pneumothorax.  No significant  effusion.    Heart:  Moderate cardiomegaly with severe coronary artery  calcifications.  Stable changes of previous CABG.  No significant  pericardial effusion.    Bones/joints:  Stable degenerative changes thoracic spine.  No acute  fracture.  No dislocation.    Soft tissues:  Unremarkable.    Vasculature:  Atherosclerosis aorta is stable. No aneurysm.    Lymph nodes:  Unremarkable.  No enlarged lymph nodes.       Impression:      1.  Stable left basilar consolidation with volume loss with  considerations to include persistent pneumonia versus chronic  atelectasis. Miniscule left pleural fluid is noted.  2.  Right basilar atelectasis.  3.  Stable cardiomegaly and changes of prior CABG.  4.  Other nonacute findings as above.     This report was finalized on 8/4/2021 7:04 PM by Dr. Adebayo Dodd MD.       CT Abdomen Pelvis Without Contrast [[id number]] Collected: 08/04/21 1818     Updated: 08/04/21 1904    Narrative:      EXAM:    CT Abdomen and Pelvis Without Intravenous Contrast     EXAM DATE:    8/4/2021 5:48 PM     CLINICAL HISTORY:    Abdominal pain, acute, nonlocalized     TECHNIQUE:    Axial computed tomography images of the abdomen and pelvis without  intravenous contrast.  Sagittal and coronal reformatted images  were  created and reviewed.  This CT exam was performed using one or more of  the following dose reduction techniques:  automated exposure control,  adjustment of the mA and/or kV according to patient size, and/or use of  iterative reconstruction technique.     COMPARISON:    05/23/2021     FINDINGS:    Lung bases:  Left basilar consolidation with volume loss.    Heart:  Cardiomegaly.      ABDOMEN:    Liver:  Unremarkable.    Gallbladder and bile ducts:  Prior cholecystectomy.  No ductal  dilation.    Pancreas:  Fatty infiltration of pancreas.  No ductal dilation.    Spleen:  Unremarkable.  No splenomegaly.    Adrenals:  Unremarkable.  No mass.    Kidneys and ureters:  No renal or ureteral stones or hydronephrosis.    Stomach and bowel:  Sigmoid diverticulosis is noted without evidence  of acute diverticulitis.  No obstruction.      PELVIS:    Appendix:  Normal appendix which is located in the right upper  quadrant.    Bladder:  Incompletely distended urinary bladder with urinary bladder  wall thickening.  Right posterior lateral urinary bladder wall  diverticulum is noted compatible with chronic partial bladder outlet  obstruction and increase bladder pressures.  No stones.    Reproductive:  Prostate enlargement.      ABDOMEN and PELVIS:    Intraperitoneal space:  Unremarkable.  No free air.  No significant  fluid collection.    Bones/joints:  Stable bony structures.  No acute fracture.  No  dislocation.    Soft tissues:  Unremarkable.    Vasculature:  Atherosclerosis is stable.  No abdominal aortic  aneurysm.    Lymph nodes:  Unremarkable.  No enlarged lymph nodes.       Impression:      1.  Stable left basilar consolidation which may represent pneumonia or  chronic atelectasis.  2.  Prostate enlargement with urinary bladder wall thickening and  bladder diverticulum indicative of chronic partial bladder outlet  obstruction.  3.  Diverticulosis without diverticulitis.  4.  Other nonacute findings described above.      This report was finalized on 8/4/2021 7:02 PM by Dr. Adebayo Dodd MD.       XR Chest 1 View [[id number]] Collected: 08/04/21 1535     Updated: 08/04/21 1538    Narrative:      EXAM:    XR Chest, 1 View     EXAM DATE:    8/4/2021 2:57 PM     CLINICAL HISTORY:    ams     TECHNIQUE:    Frontal view of the chest.     COMPARISON:    05/23/2021     FINDINGS:    Lungs:  Unremarkable.  No consolidation.    Pleural space:  Small left effusion.  Left effusion has decreased.  No  pneumothorax.    Heart:  Changes of prior CABG.    Mediastinum:  Unremarkable.    Bones/joints:  Unremarkable.    Soft tissues:  Edema has markedly improved since the previous exam.       Impression:      1.  Since prior exam, interval improvement of CHF/edema.  2.  Small left effusion noted but overall decreased.  3.  Otherwise stable chest.     This report was finalized on 8/4/2021 3:36 PM by Dr. Adebayo Dodd MD.       CT Head Without Contrast [[id number]] Collected: 08/04/21 1507     Updated: 08/04/21 1510    Narrative:      CT HEAD WO CONTRAST-     CLINICAL INDICATION: Delirium        COMPARISON: 05/23/2021      TECHNIQUE: Axial images of the brain were obtained with out intravenous  contrast.  Reformatted images were created in the sagittal and coronal  planes.     DOSE:     Radiation dose reduction techniques were utilized per ALARA protocol.  Automated exposure control was initiated through either or CareDose or  DoseRigSefaira software packages by  protocol.           FINDINGS:   Today's study shows no mass, hemorrhage, or midline shift.   Atrophy and ventriculomegaly  There is no evidence of acute ischemia.  I do not see epidural or subdural hematoma.  The gray-white differentiation is appropriate.   The bone window setting images show no destructive calvarial lesion or  acute calvarial fracture.   The posterior fossa is unremarkable.          Impression:         1. No acute parenchymal mass, hemorrhage, or midline shift  2.  Atrophy  3. The overall appearance is very similar to the prior study     This report was finalized on 8/4/2021 3:08 PM by Dr. Cliff Brody MD.             Diagnostics: Reviewed    A: Porsha Soni is a 80 y.o. male with altered mental status from the [facility]. He has a past medical history of BPH, CKD IIIa, COVID 19 in January of 2021, dementia, malignant melanoma and hypertension. He was here at [facility] in May of 2021 with sepsis/bilateral pneumonia. At the time of that discharge was sent to a SNF.    On 8/4/21 he presented to the ED of [facility] for for further evaluation of altered mental status at [facility]. Upon arrival to the ED, vital signs were T 98.6F, pulse 79, RR 16, and /76 with room air oxygen saturation of 100%.   CPK was found to be 544.  Creatinine found to be 2.63.  Lactate was found to be 2.2 and mag 2.7.  White blood cells found be 12.32 with MCV 1 of 5.3 with known history of folate deficiency.  Urinalysis concerning for underlying urinary tract infection.  Imaging studies with CT abdomen pelvis revealing stable left basilar consolidation representing pneumonia or chronic atelectasis and prostate enlargement with urinary bladder wall thickening and bladder diverticulum indicative of chronic partial bladder outlet obstruction.  CT chest with left basilar consolidation with volume loss with considerations to include persistent pneumonia versus chronic atelectasis with minuscule pleural fluid noted and stable cardiomegaly with changes of prior CABG.  CT head was performed upon presentation given delirium with no known acute parenchymal mass, hemorrhage or midline shift.    According to nursing home report Mr. Soni appetite/intake has been declining and he had been feeding him self with worsening confusion. The NH states that he was talking in short sentences but has been slowing down over the last week.                P:   Palliative care was consulted to discuss GOC/ACP, support for  family.  I was able to speak with patients wife Helen this morning regarding her goals of care for her . She states that he has carried a diagnosis of dementia for eight years now and has progressively declined. She states that she would like us to continue to treat and to do CPR if necessary however has decided that she would not want him to be intubated. We had a long discussion about what her  Porsha would want moving forward, ie. If he became unable to eat would he have wanted a feeding tube or would he just want to be let go peacefully, she said it was something they had never talked about and that she and his sons have a good relationship and that they leaned on each other for support. She states that she would really like to keep him here with her but realizes that this is a selfish reason and states that this is something that she will have to do a lot of thinking about.      We appreciate the consult and the opportunity to participate in Porsha Soni's care. We will continue to follow along. Please do not hesitate to contact us regarding further symptom management or goals of care needs, including after hours or on weekends via our on call provider at [phone].     Time: 45 minutes spent reviewing medical and medication records, assessing and examining patient, discussing with family, answering questions, providing some guidance about a plan and documentation of care, and coordinating care with other healthcare members, with > 50% time spent face to face.     Meena Joaquin, APRN    8/5/2021

## 2021-08-05 NOTE — PLAN OF CARE
Goal Outcome Evaluation:              Outcome Summary: pt is still confused, pt has had several incontinent episodes, went for swallowing eval, tolerated well, awaiting results at this time

## 2021-08-05 NOTE — CASE MANAGEMENT/SOCIAL WORK
Discharge Planning Assessment   [facility]     Patient Name: Porsha Soni  MRN: [id number]  Today's Date: 8/5/2021    Admit Date: 8/4/2021    Discharge Needs Assessment     Row Name 08/05/21 0931       Living Environment    Lives With  facility resident    Current Living Arrangements  extended care facility    Quality of Family Relationships  helpful;involved;supportive    Able to Return to Prior Arrangements  yes       Resource/Environmental Concerns    Resource/Environmental Concerns  none       Transition Planning    Patient/Family Anticipates Transition to  long-term care facility    Patient/Family Anticipated Services at Transition  skilled nursing    Transportation Anticipated  health plan transportation EMS or [facility]       Discharge Needs Assessment    Current Outpatient/Agency/Support Group  skilled nursing facility    Equipment Currently Used at Home  none    Concerns to be Addressed  no discharge needs identified    Anticipated Changes Related to Illness  none    Equipment Needed After Discharge  none    Outpatient/Agency/Support Group Needs  skilled nursing facility    Discharge Facility/Level of Care Needs  nursing facility, skilled        Discharge Plan     Row Name 08/05/21 0932       Plan    Plan Pt is a resident at [facility]. Pt has palliative consult. SS spoke with pt's spouse and [facility] to confirm private pay bed hold. Pt's spouse states pt may need [facility] or EMS arranged for transportation at discharge depending on medical status. SS to follow and assist.    Patient/Family in Agreement with Plan  yes        Demographic Summary     Row Name 08/05/21 0931       General Information    Referral Source  nursing    Reason for Consult  -- admission pt        AURORA Herrera

## 2021-08-05 NOTE — MBS/VFSS/FEES
Acute Care - Speech Language Pathology   Swallow Initial Evaluation  [facility]   MODIFIED BARIUM SWALLOW STUDY     Patient Name: Porsha Soni  : 1940  MRN: [id number]  Today's Date: 2021             Admit Date: 2021    Porsha Soni  presents to the radiology suite this am from 114/14 to participate in an instrumental MBS to assess safety/efficacy of swallowing fnx, determine safest/least restrictive diet.  He was admitted on 21 w/ a UTI w/ hematuria.  He has a medical history significant for BPH, CKD, COVID-19 in 2021, CKD IIIa, dementia, malignant meloma, and hypertension.  He was last admitted to this facility in May 2021 with sepsis with bilateral pneumonia.  He was discharged from here to SNF after May 2021 hospitalization.    Mr. Soni is well known to speech therapy department from previous admissions w/ participation in multiple instrumental evaluations.  He has a premorbid modified baseline po diet of mechanical soft solids, ground meats, thin liquids.          Social History     Socioeconomic History   • Marital status:      Spouse name: Not on file   • Number of children: Not on file   • Years of education: Not on file   • Highest education level: Not on file   Tobacco Use   • Smoking status: Former Smoker     Packs/day: 1.50     Years: 7.00     Pack years: 10.50     Types: Cigarettes, Pipe, Cigars     Quit date: 1986     Years since quittin.1   • Smokeless tobacco: Never Used   Vaping Use   • Vaping Use: Never used   Substance and Sexual Activity   • Alcohol use: Yes     Comment: occassionally    • Drug use: No   • Sexual activity: Defer        EXAM:    XR Chest, 1 View     EXAM DATE:    2021 2:57 PM     CLINICAL HISTORY:    ams     TECHNIQUE:    Frontal view of the chest.     COMPARISON:    2021     FINDINGS:    Lungs:  Unremarkable.  No consolidation.    Pleural space:  Small left effusion.  Left effusion has decreased.  No  pneumothorax.    Heart:   Changes of prior CABG.    Mediastinum:  Unremarkable.    Bones/joints:  Unremarkable.    Soft tissues:  Edema has markedly improved since the previous exam.     IMPRESSION:  1.  Since prior exam, interval improvement of CHF/edema.  2.  Small left effusion noted but overall decreased.  3.  Otherwise stable chest.     This report was finalized on 8/4/2021 3:36 PM by Dr. Adebayo Dodd MD.    ------------------------------------------------------------------------------------------------------------------------    EXAM:    CT Chest Without Intravenous Contrast     EXAM DATE:    8/4/2021 5:48 PM     CLINICAL HISTORY:    Chest pain or SOB, pleurisy or effusion suspected     TECHNIQUE:    Axial computed tomography images of the chest without intravenous  contrast.  Sagittal and coronal reformatted images were created and  reviewed.  This CT exam was performed using one or more of the following  dose reduction techniques:  automated exposure control, adjustment of  the mA and/or kV according to patient size, and/or use of iterative  reconstruction technique.     COMPARISON:    05/23/2021     FINDINGS:    Lungs:  Consolidation of the left lower lobe is noted with volume loss  and trace effusion more likely related to chronic atelectasis but  superimposed pneumonia is not excluded.  Linear atelectasis right lung  base.    Pleural space:  Unremarkable.  No pneumothorax.  No significant  effusion.    Heart:  Moderate cardiomegaly with severe coronary artery  calcifications.  Stable changes of previous CABG.  No significant  pericardial effusion.    Bones/joints:  Stable degenerative changes thoracic spine.  No acute  fracture.  No dislocation.    Soft tissues:  Unremarkable.    Vasculature:  Atherosclerosis aorta is stable. No aneurysm.    Lymph nodes:  Unremarkable.  No enlarged lymph nodes.     IMPRESSION:  1.  Stable left basilar consolidation with volume loss with  considerations to include persistent pneumonia versus  chronic  atelectasis. Miniscule left pleural fluid is noted.  2.  Right basilar atelectasis.  3.  Stable cardiomegaly and changes of prior CABG.  4.  Other nonacute findings as above.     This report was finalized on 8/4/2021 7:04 PM by Dr. Adebayo Dodd MD.    ---------------------------------------------------------------------------------------------------------------------------    EXAM DATE:    8/4/2021 5:48 PM     CLINICAL HISTORY:    Abdominal pain, acute, nonlocalized     TECHNIQUE:    Axial computed tomography images of the abdomen and pelvis without  intravenous contrast.  Sagittal and coronal reformatted images were  created and reviewed.  This CT exam was performed using one or more of  the following dose reduction techniques:  automated exposure control,  adjustment of the mA and/or kV according to patient size, and/or use of  iterative reconstruction technique.     COMPARISON:    05/23/2021     FINDINGS:    Lung bases:  Left basilar consolidation with volume loss.    Heart:  Cardiomegaly.      ABDOMEN:    Liver:  Unremarkable.    Gallbladder and bile ducts:  Prior cholecystectomy.  No ductal  dilation.    Pancreas:  Fatty infiltration of pancreas.  No ductal dilation.    Spleen:  Unremarkable.  No splenomegaly.    Adrenals:  Unremarkable.  No mass.    Kidneys and ureters:  No renal or ureteral stones or hydronephrosis.    Stomach and bowel:  Sigmoid diverticulosis is noted without evidence  of acute diverticulitis.  No obstruction.      PELVIS:    Appendix:  Normal appendix which is located in the right upper  quadrant.    Bladder:  Incompletely distended urinary bladder with urinary bladder  wall thickening.  Right posterior lateral urinary bladder wall  diverticulum is noted compatible with chronic partial bladder outlet  obstruction and increase bladder pressures.  No stones.    Reproductive:  Prostate enlargement.      ABDOMEN and PELVIS:    Intraperitoneal space:  Unremarkable.  No free air.  No  significant  fluid collection.    Bones/joints:  Stable bony structures.  No acute fracture.  No  dislocation.    Soft tissues:  Unremarkable.    Vasculature:  Atherosclerosis is stable.  No abdominal aortic  aneurysm.    Lymph nodes:  Unremarkable.  No enlarged lymph nodes.     IMPRESSION:  1.  Stable left basilar consolidation which may represent pneumonia or  chronic atelectasis.  2.  Prostate enlargement with urinary bladder wall thickening and  bladder diverticulum indicative of chronic partial bladder outlet  obstruction.  3.  Diverticulosis without diverticulitis.  4.  Other nonacute findings described above.     This report was finalized on 8/4/2021 7:02 PM by Dr. Adebayo Dodd MD.    Diet Orders (active) (From admission, onward)     Start     Ordered    08/05/21 0955  Diet Soft Texture; Ground; Thin  Diet Effective Now      08/05/21 0956                Pt is observed on room air w/o complications.     Risks and benefits of the procedure are explained w/ verbalizing understanding/agreement to participate.     Pt is positioned upright and centered in a soft strap supportive chair to accept multiple po presentations of solid cracker, puree, honey thick, nectar thick, and thin liquids via spoon, cup and straw, along w/ whole placebo pill in puree. Pt does not self feed across this evaluation.     All views are from the lateral plane.     Facial/oral structures are symmetrical upon observation.  Limited observation reveals no lingual deviation.  Pt does not follow directives for lingual protrusion. Oral mucosa are moist, pink and clean. Secretions are clear, thin and well controlled. OROM/NUBIA is generally weak to imitate oral postures. Gag is not assessed. Volitional cough is unable to be assessed as pt does not follow this directive. Vocal quality is mildly weak in intensity, clear in quality w/ intelligible speech. Pt is a/a and cooperative to particpate.  Pt is noted w/ baseline dementia, follows some simple  directives, and participates in simple conversational exchanges.     Upon po presentations, adequate bolus anticipation w/ good labial seal for bolus clearance via spoon bowl and suction via straw.  Slightly weak labial seal evidenced for bolus clearance via cup rim.  Bolus formation, manipulation, and control are generally weak overall and slightly prolonged w/ mixed phasic-rotary mastication pattern. A-p transit is timely w/o significant oral residue. Tongue base retraction and linguavelar seal are weak and delayed w/ premature spillage to the tongue base w/ honey thick liquids, and to the pyriform sinuses w/ nectar thick and thin liquids.  Penetration w/ silent aspiration of thin liquids via cup only evidenced before the swallow. No other laryngeal penetration or aspiration is evidenced before the swallow.     Pharyngeal swallow is timely w/ slightly weak hyolaryngeal elevation and slightly decreased epiglottic inversion. Pharyngeal contraction is adequate w/o significant residue. No laryngeal penetration or aspiration evidenced during or after the swallow.     Whole placebo pill is unable to be manipulated for swallowing and is expelled to SLP following multiple requests.     Full esophageal sweep reveals no mucosal abnormalities. Motility is wfl w/o retrograde flow noted.         Visit Dx:     ICD-10-CM ICD-9-CM   1. Urinary tract infection without hematuria, site unspecified  N39.0 599.0   2. Acute kidney injury (TYRELL) with acute tubular necrosis (ATN) (CMS/HCC)  N17.0 584.5   3. Hypernatremia  E87.0 276.0   4. Dehydration  E86.0 276.51   5. Elevated troponin  R77.8 790.6     Patient Active Problem List   Diagnosis   • Insomnia   • Asymptomatic PVCs   • CAD in native artery   • Essential hypertension   • Dyslipidemia   • Elevated prostate specific antigen (PSA)   • Physical deconditioning   • Impaired mobility and ADLs   • Respiratory failure with hypoxia and hypercapnia (CMS/HCC)   • BPH with urinary  obstruction   • Pneumonia of both lower lobes due to infectious organism   • Urinary tract infection without hematuria     Past Medical History:   Diagnosis Date   • Arthritis    • Asymptomatic PVCs 6/14/2016   • BPH with urinary obstruction 11/17/2018   • Cervical spine disease     remotely suggested surgical intervention.  Patient deferred.    • Chronic kidney disease, stage 3a (CMS/HCC) 10/30/2020   • Coronary artery disease    • COVID-19     Jan 2021   • Dementia (CMS/[facility])    • Diverticulosis    • Dyslipidemia    • Folic acid deficiency anemia    • Hypertension    • Impotence    • Malignant melanoma (CMS/[facility]) 07/2020    Left arm   • Osteoarthritis 10/30/2020   • Pneumonia    • Vertigo      Past Surgical History:   Procedure Laterality Date   • ARM LESION/CYST EXCISION Left 7/30/2020    Procedure: WIDE EXCISION MELANOMA LEFT ARM INTERMEDIATE CLOSURE;  Surgeon: Roman Mohan MD;  Location:  [facility];  Service: General;  Laterality: Left;   • ARTERIAL BYPASS SURGERY     • BRONCHOSCOPY N/A 5/24/2018    Procedure: BRONCHOSCOPY WITH WASHINGS;  Surgeon: Leonides Quarles MD;  Location:  [facility];  Service: Pulmonary   • CHOLECYSTECTOMY WITH INTRAOPERATIVE CHOLANGIOGRAM N/A 5/16/2018    Procedure: OPEN CHOLECYSTECTOMY;  Surgeon: Rosie Montalvo MD;  Location:  [facility];  Service: General   • COLONOSCOPY N/A 3/30/2018    Procedure: COLONOSCOPY;  Surgeon: Simone Valderrama MD;  Location:  [facility];  Service: Gastroenterology   • CORONARY ARTERY BYPASS GRAFT     • KNEE ARTHROPLASTY, PARTIAL REPLACEMENT      Lt , 2015   • REPLACEMENT TOTAL KNEE      Rt   • SENTINEL NODE BIOPSY Left 7/30/2020    Procedure: SENTINEL NODE INJECTION AND BIOPSY LEFT AXILLA;  Surgeon: Roman Mohan MD;  Location:  [facility];  Service: General;  Laterality: Left;        IMPRESSION:  Pt presents w/ overall oral weakness and mildly prolonged mastication of solids and oral preparation of all consistencies.  He is noted w/ penetration w/ silent  aspiration of thin liquids via cup only evidenced before the swallow.  No other laryngeal penetration or aspiration across this evaluation.      Pt is felt to most benefit from a modified po diet of mechanical soft solids, ground meats, thin liquids via straw only due to silent aspiration before the swallow w/ cup presentations.  No cup presentations. Medications are recommended to be crushed in puree/thins due to pt general confusion w/ baseline dementia.  He is recommended for 1:1 feeding assist due to general confusion and weakness noted.       SLP Recommendation and Plan       Recommendations:   1. Mechanical soft solids, ground meats, thin liquids.   2. NO cup presentations. Straws only.   3. Meds crushed in puree/thins.   4. Joseph precautions.   5. Oral care protocol.   6. Universal aspiration precautions.   7. 1:1 feeding assist.     No further SLP f/u warranted at this time as pt is being placed on his premorbid modified baseline po diet.     D/w pt results and recommendations w/ verbal understanding and agreement.     D/w RN results and recommendations w/ verbal understanding and agreement.     Thank you for allowing me to participate in the care of your patient-  Josephine Berry M.S., CFY-SLP          Patient was not wearing a face mask during this therapy encounter. Therapist used appropriate personal protective equipment including mask, eye protection and gloves.  Mask used was standard procedure mask. Appropriate PPE was worn during the entire therapy session. The patient coughed during this evaluation. Therapist was within 6 feet for 15 minutes or more during the evaluation. Hand hygiene was completed before and after therapy session. Patient is not in enhanced droplet precautions.              EDUCATION  The patient has been educated in the following areas:   Dysphagia (Swallowing Impairment) Oral Care/Hydration Modified Diet Instruction.              Time Calculation:   Time Calculation- SLP     Row Name  08/05/21 1015             Time Calculation- SLP    SLP Received On  08/05/21  -        User Key  (r) = Recorded By, (t) = Taken By, (c) = Cosigned By    Initials Name Provider Type    Josephine Johnson MS, CFY-SLP Speech and Language Pathologist          Therapy Charges for Today     Code Description Service Date Service Provider Modifiers Qty    82996375155 HC ST MOTION FLUORO EVAL SWALLOW 8 8/5/2021 Josephine Berry MS, TITUS-SLP GN 1               Josephine Berry MS, TITUS-SLP  8/5/2021

## 2021-08-05 NOTE — H&P
[facility] Medicine Services  History & Physical    Patient Identification:  Name:  Porsha Soni  Age:  80 y.o.  Sex:  male  :  1940  MRN:  [id number]   Visit Number:  [id number]  Admit Date: 2021   Primary Care Physician:  Padmini Terrazas APRN    Subjective     Chief complaint: Sent from [facility] due to altered mental status    History of presenting illness:      Porsha Soni is a 80 y.o. male with past medical history significant for BPH, CKD, COVID-19 in 2021, CKD IIIa, dementia, malignant meloma, and hypertension.  He was last admitted to this facility in May 2021 with sepsis with bilateral pneumonia.  He was discharged from here to SNF after May 2021 hospitalization.    On 21 he presented to the ED of [facility] for for further evaluation of altered mental status at [facility]. Upon arrival to the ED, vital signs were T 98.6F, pulse 79, RR 16, and /76 with room air oxygen saturation of 100%.   CPK was found to be 544.  Creatinine found to be 2.63.  Lactate was found to be 2.2 and mag 2.7.  White blood cells found be 12.32 with MCV 1 of 5.3 with known history of folate deficiency.  Urinalysis concerning for underlying urinary tract infection.  Imaging studies with CT abdomen pelvis revealing stable left basilar consolidation representing pneumonia or chronic atelectasis and prostate enlargement with urinary bladder wall thickening and bladder diverticulum indicative of chronic partial bladder outlet obstruction.  CT chest with left basilar consolidation with volume loss with considerations to include persistent pneumonia versus chronic atelectasis with minuscule pleural fluid noted and stable cardiomegaly with changes of prior CABG.  CT head was performed upon presentation given delirium with no known acute parenchymal mass, hemorrhage or midline shift.      Given dementia with altered mental status, much of HPI. ROS, and PMH have been obtained from [facility]  past.  He is reportedly eating and drinking worse with worsening confusion.  He reportedly talks short sentences but has progressively slowed.   He reportedly self feeding and visiting with wife with slow decline over the past one week.   I discussed him with [facility] nurse Ravi with reports of nearly a week of slow decline. He reports he frequently has off days where he eats less and is less active, but usually turns around in 24 hours.  He reports given his difficulty with turn around over several days, he was sent to the ED for further evaluation and treatment.      ---------------------------------------------------------------------------------------------------------------------   Review of Systems   Unable to perform ROS: Dementia        ---------------------------------------------------------------------------------------------------------------------   Past Medical History:   Diagnosis Date   • Arthritis    • Asymptomatic PVCs 6/14/2016   • BPH with urinary obstruction 11/17/2018   • Cervical spine disease     remotely suggested surgical intervention.  Patient deferred.    • Chronic kidney disease, stage 3a (CMS/HCC) 10/30/2020   • Coronary artery disease    • COVID-19 Jan 2021   • Dementia (CMS/[facility])    • Diverticulosis    • Dyslipidemia    • Folic acid deficiency anemia    • Hypertension    • Impotence    • Malignant melanoma (CMS/HCC) 07/2020    Left arm   • Osteoarthritis 10/30/2020   • Pneumonia    • Vertigo      Past Surgical History:   Procedure Laterality Date   • ARM LESION/CYST EXCISION Left 7/30/2020    Procedure: WIDE EXCISION MELANOMA LEFT ARM INTERMEDIATE CLOSURE;  Surgeon: Roman Mohan MD;  Location: [facility] OR;  Service: General;  Laterality: Left;   • ARTERIAL BYPASS SURGERY     • BRONCHOSCOPY N/A 5/24/2018    Procedure: BRONCHOSCOPY WITH WASHINGS;  Surgeon: Leonides Quarles MD;  Location: [facility] OR;  Service: Pulmonary   • CHOLECYSTECTOMY WITH INTRAOPERATIVE CHOLANGIOGRAM N/A 5/16/2018     Procedure: OPEN CHOLECYSTECTOMY;  Surgeon: Rosie Montalvo MD;  Location:  [facility] OR;  Service: General   • COLONOSCOPY N/A 3/30/2018    Procedure: COLONOSCOPY;  Surgeon: Simone Valderrama MD;  Location:  [facility] OR;  Service: Gastroenterology   • CORONARY ARTERY BYPASS GRAFT     • KNEE ARTHROPLASTY, PARTIAL REPLACEMENT      Lt , 2015   • REPLACEMENT TOTAL KNEE      Rt   • SENTINEL NODE BIOPSY Left 2020    Procedure: SENTINEL NODE INJECTION AND BIOPSY LEFT AXILLA;  Surgeon: Roman Mohan MD;  Location:  [facility] OR;  Service: General;  Laterality: Left;     Family History   Problem Relation Age of Onset   • No Known Problems Mother    • No Known Problems Father    • No Known Problems Sister    • No Known Problems Brother      Social History     Socioeconomic History   • Marital status:      Spouse name: Not on file   • Number of children: Not on file   • Years of education: Not on file   • Highest education level: Not on file   Tobacco Use   • Smoking status: Former Smoker     Packs/day: 1.50     Years: 7.00     Pack years: 10.50     Types: Cigarettes, Pipe, Cigars     Quit date: 1986     Years since quittin.1   • Smokeless tobacco: Never Used   Vaping Use   • Vaping Use: Never used   Substance and Sexual Activity   • Alcohol use: Yes     Comment: occassionally    • Drug use: No   • Sexual activity: Defer     ---------------------------------------------------------------------------------------------------------------------   Allergies:  Patient has no known allergies.  ---------------------------------------------------------------------------------------------------------------------   Home medications:    Medications below are reported home medications pulling from within the system; at this time, these medications have not been reconciled unless otherwise specified and are in the verification process for further verifcation as current home medications.  (Not in a hospital  admission)      Hospital Scheduled Meds:  atorvastatin, 20 mg, Oral, Daily  bacitracin-polymyxin b, , Both Eyes, Q12H  carboxymethylcellulose, 1 drop, Left Eye, BID  cefepime, 2 g, Intravenous, Once  cefepime, 2 g, Intravenous, Q12H  [START ON 8/9/2021] cholecalciferol, 50,000 Units, Oral, Weekly  donepezil, 10 mg, Oral, Nightly  folic acid, 1 mg, Oral, Daily  heparin (porcine), 5,000 Units, Subcutaneous, Q12H  melatonin, 5 mg, Oral, Nightly  memantine, 10 mg, Oral, Q12H  metoprolol tartrate, 12.5 mg, Oral, Q12H  metroNIDAZOLE, 500 mg, Intravenous, Q8H  mirtazapine, 30 mg, Oral, Nightly  polyethylene glycol, 17 g, Oral, Daily  sertraline, 50 mg, Oral, Nightly  sodium chloride, 10 mL, Intravenous, Q12H      Pharmacy Consult - Pharmacy to dose,   sodium chloride, 100 mL/hr, Last Rate: 100 mL/hr (08/05/21 0821)        Current listed hospital scheduled medications may not yet reflect those currently placed in orders that are signed and held awaiting patient's arrival to floor.   ---------------------------------------------------------------------------------------------------------------------     Objective     Vital Signs:  Temp:  [97.5 °F (36.4 °C)-98.6 °F (37 °C)] 98.6 °F (37 °C)  Heart Rate:  [62-79] 69  Resp:  [16-17] 17  BP: ()/(51-85) 121/65      08/04/21  1330   Weight: 79.4 kg (175 lb)     Body mass index is 25.11 kg/m².  ---------------------------------------------------------------------------------------------------------------------       Physical Exam  Vitals and nursing note reviewed.   Constitutional:       Appearance: Normal appearance.      Comments: Alert to self. Hearing aid in place in left    HENT:      Head: Atraumatic.   Eyes:      General:         Right eye: Discharge present. No foreign body.         Left eye: Discharge present.No foreign body.      Conjunctiva/sclera:      Right eye: Right conjunctiva is injected. Exudate present.      Left eye: Left conjunctiva is injected. Exudate  present.      Comments: Green mucopurulent discharge in both eyes.    Cardiovascular:      Rate and Rhythm: Normal rate and regular rhythm.      Heart sounds: S1 normal and S2 normal.   Pulmonary:      Effort: No tachypnea or bradypnea.      Breath sounds: Examination of the right-lower field reveals decreased breath sounds. Examination of the left-lower field reveals decreased breath sounds. Decreased breath sounds present. No wheezing, rhonchi or rales.   Chest:      Chest wall: No mass or deformity.   Abdominal:      Palpations: Abdomen is soft.      Tenderness: There is no abdominal tenderness.   Musculoskeletal:      Right lower leg: No swelling. No edema.      Left lower leg: No swelling. No edema.   Skin:     General: Skin is warm and dry.   Neurological:      Mental Status: He is alert.      Comments: Alert to self; Difficulty following commands   Psychiatric:      Comments: Confused.  Difficulty assessing.                ---------------------------------------------------------------------------------------------------------------------  EKG:                      I have personally looked at both the EKG and the telemetry strips.  ---------------------------------------------------------------------------------------------------------------------   Results from last 7 days   Lab Units 08/04/21  1540 08/04/21  1428   CRP mg/dL  --  0.80*   LACTATE mmol/L 2.2* 2.2*   WBC 10*3/mm3  --  12.32*   HEMOGLOBIN g/dL  --  15.1   HEMATOCRIT %  --  51.4*   MCV fL  --  105.3*   MCHC g/dL  --  29.4*   PLATELETS 10*3/mm3  --  287   INR  1.08  --          Results from last 7 days   Lab Units 08/05/21  0226 08/04/21  1428   SODIUM mmol/L 153* 155*   POTASSIUM mmol/L 4.8 4.3   MAGNESIUM mg/dL  --  2.7*   CHLORIDE mmol/L 123* 120*   CO2 mmol/L 16.6* 23.6   BUN mg/dL 89* 105*   CREATININE mg/dL 2.34* 2.63*   EGFR IF NONAFRICN AM mL/min/1.73 27* 23*   CALCIUM mg/dL 9.0 10.0   GLUCOSE mg/dL 92 128*   ALBUMIN g/dL  --  3.69    BILIRUBIN mg/dL  --  0.4   ALK PHOS U/L  --  132*   AST (SGOT) U/L  --  33   ALT (SGPT) U/L  --  46*   Estimated Creatinine Clearance: 27.8 mL/min (A) (by C-G formula based on SCr of 2.34 mg/dL (H)).  No results found for: AMMONIA  Results from last 7 days   Lab Units 08/04/21 2049 08/04/21  1428   CK TOTAL U/L  --  544*   TROPONIN T ng/mL 0.041* 0.043*     Results from last 7 days   Lab Units 08/04/21  1428   PROBNP pg/mL 1,404.0     No results found for: HGBA1C  Lab Results   Component Value Date    TSH 1.610 08/04/2021    FREET4 1.15 08/04/2021     No results found for: PREGTESTUR, PREGSERUM, HCG, HCGQUANT  Pain Management Panel     Pain Management Panel Latest Ref Rng & Units 10/23/2020 10/19/2020    CREATININE UR mg/dL - 220.5    AMPHETAMINES SCREEN, URINE Negative Negative -    BARBITURATES SCREEN Negative Negative -    BENZODIAZEPINE SCREEN, URINE Negative Negative -    BUPRENORPHINEUR Negative Negative -    COCAINE SCREEN, URINE Negative Negative -    METHADONE SCREEN, URINE Negative Negative -        No results found for: BLOODCX  No results found for: URINECX  No results found for: WOUNDCX  No results found for: STOOLCX      ---------------------------------------------------------------------------------------------------------------------  Imaging Results (Last 7 Days)     Procedure Component Value Units Date/Time    CT Chest Without Contrast Diagnostic [[id number]] Collected: 08/04/21 1902     Updated: 08/04/21 1906    Narrative:      EXAM:    CT Chest Without Intravenous Contrast     EXAM DATE:    8/4/2021 5:48 PM     CLINICAL HISTORY:    Chest pain or SOB, pleurisy or effusion suspected     TECHNIQUE:    Axial computed tomography images of the chest without intravenous  contrast.  Sagittal and coronal reformatted images were created and  reviewed.  This CT exam was performed using one or more of the following  dose reduction techniques:  automated exposure control, adjustment of  the mA and/or kV  according to patient size, and/or use of iterative  reconstruction technique.     COMPARISON:    05/23/2021     FINDINGS:    Lungs:  Consolidation of the left lower lobe is noted with volume loss  and trace effusion more likely related to chronic atelectasis but  superimposed pneumonia is not excluded.  Linear atelectasis right lung  base.    Pleural space:  Unremarkable.  No pneumothorax.  No significant  effusion.    Heart:  Moderate cardiomegaly with severe coronary artery  calcifications.  Stable changes of previous CABG.  No significant  pericardial effusion.    Bones/joints:  Stable degenerative changes thoracic spine.  No acute  fracture.  No dislocation.    Soft tissues:  Unremarkable.    Vasculature:  Atherosclerosis aorta is stable. No aneurysm.    Lymph nodes:  Unremarkable.  No enlarged lymph nodes.       Impression:      1.  Stable left basilar consolidation with volume loss with  considerations to include persistent pneumonia versus chronic  atelectasis. Miniscule left pleural fluid is noted.  2.  Right basilar atelectasis.  3.  Stable cardiomegaly and changes of prior CABG.  4.  Other nonacute findings as above.     This report was finalized on 8/4/2021 7:04 PM by Dr. Adebayo Dodd MD.       CT Abdomen Pelvis Without Contrast [[id number]] Collected: 08/04/21 1818     Updated: 08/04/21 1904    Narrative:      EXAM:    CT Abdomen and Pelvis Without Intravenous Contrast     EXAM DATE:    8/4/2021 5:48 PM     CLINICAL HISTORY:    Abdominal pain, acute, nonlocalized     TECHNIQUE:    Axial computed tomography images of the abdomen and pelvis without  intravenous contrast.  Sagittal and coronal reformatted images were  created and reviewed.  This CT exam was performed using one or more of  the following dose reduction techniques:  automated exposure control,  adjustment of the mA and/or kV according to patient size, and/or use of  iterative reconstruction technique.     COMPARISON:    05/23/2021      FINDINGS:    Lung bases:  Left basilar consolidation with volume loss.    Heart:  Cardiomegaly.      ABDOMEN:    Liver:  Unremarkable.    Gallbladder and bile ducts:  Prior cholecystectomy.  No ductal  dilation.    Pancreas:  Fatty infiltration of pancreas.  No ductal dilation.    Spleen:  Unremarkable.  No splenomegaly.    Adrenals:  Unremarkable.  No mass.    Kidneys and ureters:  No renal or ureteral stones or hydronephrosis.    Stomach and bowel:  Sigmoid diverticulosis is noted without evidence  of acute diverticulitis.  No obstruction.      PELVIS:    Appendix:  Normal appendix which is located in the right upper  quadrant.    Bladder:  Incompletely distended urinary bladder with urinary bladder  wall thickening.  Right posterior lateral urinary bladder wall  diverticulum is noted compatible with chronic partial bladder outlet  obstruction and increase bladder pressures.  No stones.    Reproductive:  Prostate enlargement.      ABDOMEN and PELVIS:    Intraperitoneal space:  Unremarkable.  No free air.  No significant  fluid collection.    Bones/joints:  Stable bony structures.  No acute fracture.  No  dislocation.    Soft tissues:  Unremarkable.    Vasculature:  Atherosclerosis is stable.  No abdominal aortic  aneurysm.    Lymph nodes:  Unremarkable.  No enlarged lymph nodes.       Impression:      1.  Stable left basilar consolidation which may represent pneumonia or  chronic atelectasis.  2.  Prostate enlargement with urinary bladder wall thickening and  bladder diverticulum indicative of chronic partial bladder outlet  obstruction.  3.  Diverticulosis without diverticulitis.  4.  Other nonacute findings described above.     This report was finalized on 8/4/2021 7:02 PM by Dr. Adebayo Dodd MD.       XR Chest 1 View [[id number]] Collected: 08/04/21 1535     Updated: 08/04/21 1538    Narrative:      EXAM:    XR Chest, 1 View     EXAM DATE:    8/4/2021 2:57 PM     CLINICAL HISTORY:    ams     TECHNIQUE:     Frontal view of the chest.     COMPARISON:    05/23/2021     FINDINGS:    Lungs:  Unremarkable.  No consolidation.    Pleural space:  Small left effusion.  Left effusion has decreased.  No  pneumothorax.    Heart:  Changes of prior CABG.    Mediastinum:  Unremarkable.    Bones/joints:  Unremarkable.    Soft tissues:  Edema has markedly improved since the previous exam.       Impression:      1.  Since prior exam, interval improvement of CHF/edema.  2.  Small left effusion noted but overall decreased.  3.  Otherwise stable chest.     This report was finalized on 8/4/2021 3:36 PM by Dr. Adebayo Dodd MD.       CT Head Without Contrast [[id number]] Collected: 08/04/21 1507     Updated: 08/04/21 1510    Narrative:      CT HEAD WO CONTRAST-     CLINICAL INDICATION: Delirium        COMPARISON: 05/23/2021      TECHNIQUE: Axial images of the brain were obtained with out intravenous  contrast.  Reformatted images were created in the sagittal and coronal  planes.     DOSE:     Radiation dose reduction techniques were utilized per ALARA protocol.  Automated exposure control was initiated through either or [employer] or  DoseRight software packages by  protocol.           FINDINGS:   Today's study shows no mass, hemorrhage, or midline shift.   Atrophy and ventriculomegaly  There is no evidence of acute ischemia.  I do not see epidural or subdural hematoma.  The gray-white differentiation is appropriate.   The bone window setting images show no destructive calvarial lesion or  acute calvarial fracture.   The posterior fossa is unremarkable.          Impression:         1. No acute parenchymal mass, hemorrhage, or midline shift  2. Atrophy  3. The overall appearance is very similar to the prior study     This report was finalized on 8/4/2021 3:08 PM by Dr. Cliff Brody MD.             Cultures:  No results found for: BLOODCX, URINECX, WOUNDCX, MRSACX, RESPCX, STOOLCX    Last echocardiogram:  Results for orders placed  during the hospital encounter of 05/23/21    Adult Transthoracic Echo Complete W/ Cont if Necessary Per Protocol    Interpretation Summary  · Normal left ventricular cavity size and wall thickness noted. All left ventricular wall segments contract normally.  · Left ventricular ejection fraction appears to be 61 - 65%.  · . The aortic valve exhibits sclerosis. The aortic valve appears trileaflet. Mild aortic valve regurgitation is present. No aortic valve stenosis is present.  · The mitral valve is structurally normal with no significant stenosis present. Trace mitral valve regurgitation is present.  · Mild tricuspid valve regurgitation is present. Estimated right ventricular systolic pressure from tricuspid regurgitation is mildly elevated (35-45 mmHg).  · There is no evidence of pericardial effusion.          I have personally reviewed the above radiology images and read the final radiology report on 08/05/21  ---------------------------------------------------------------------------------------------------------------------  Assessment / Plan     Active Hospital Problems    Diagnosis  POA   • Urinary tract infection without hematuria [N39.0]  Yes       ASSESSMENT/PLAN:  -Severe sepsis, present on presentation, with leukocytosis, elevated C-RP, lactate>2, UTI, AMS:  -UTI, complicated by underlying partial chronic bladder outlet syndrome with bladder diverticulum:   -Left chronic basilar consolidation:   -Lactic acidosis:   -Acute conjunctivitis +/- blepharitis:   • IV cefepime, flagyl, and vancomycin on board for broad spectrum coverage.   • Follow urine culture results.    • MRSA of nares +, IV Vancomycin for now given ongoing consolidation.   • Follow peripheral blood cultures.   • Follow daily CBC.  • Polysporin added for bilateral conjunctivitis.   • Culture ordered of mucopurulent discharge.    • SLP evaluation.   • Midline consult ordered given difficulty with access and need for further IV abx and further  blood draws.      -Acute hypoxemic respiratory failure:   · Orders placed to titrate oxygen saturation greater than or equal to 90%.    · Treat acute vs. Chronic consolidation as outlined.     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   · No inpatient Urology coverage until 8/10/21.    · Outpatient Urology follow-up.    · Follow urine culture.     -Hypernatremia:   · Repeat Sodium improved but remains high.   · Continue IV fluids at 100 cc/hr given concern for poor oral intake.   · Repeat BMP q6.   · SLP consultation added.     -TYRELL on CKD IIIb:   · Continue IV fluids. (EF from 05/21 61-65%)  · Avoid nephrotoxins when possible.   · Q6 BMP.     -Elevated CPK with question of early rhabdo?   · Repeat CPK.   · Given mental status, unclear if he had a fall?   · No known falls at SNF.      -Elevated troponin T:   -ASCVD s/p CABG 4v in 2005 sequential LIMA to first diagonal and LAD, saphenous graft to OM1, and PL of the circumflex  -Mild aortic valve regurgitation:   -Wide complex tachycardia in May 2021:   · Continuous cardiac monitoring.   · Echocardiogram from 05/2021 reviewed.    · Outpatient office visit with Dr. Dwayne Swartz reviewed from 07/07 with noted preserved LV function and measures to improve quality of life with recommendation for no ischemic evaluation unless there is an acute issue given worsening dementia.      -Macrocytosis with known hx of folate deficiency:   · Hydrate.   · Continue folate supplement.   · Repeat CBC.       -Metabolic encephalopathy from sepsis and UTI superimposed on known advanced dementia  -Advanced dementia  · Supportive care.   · CT head without acute change.   · Palliative care consulted to assist with family support and overall goals of care in patient with worsening dementia.        -Prolonged QTc  -Likely 2/2 hypermagnesemia:   · Continue to hydrate.   · Repeat EKG x1.    ·   ----------  -DVT  prophylaxis: SQ heparin  -Activity: Bedrest  -Expected length of stay: INPATIENT status due to the need for care which can only be reasonably provided in an hospital setting such as aggressive/expedited ancillary services and/or consultation services, the necessity for IV medications, close physician monitoring and/or the possible need for procedures.  In such, I feel patient’s risk for adverse outcomes and need for care warrant INPATIENT evaluation and predict the patient’s care encounter to likely last beyond 2 midnights.  -Disposition: Return to SNF at [place]    High risk secondary to severe sepsis with UTI, pneumonia with MRSA+ nares swab, advancing dementia, dehydration, hypernatremia with poor oral intake        Ava Adame PA-C  08/05/21  09:00 EDT  Pager # [phone]  ---------------------------------------------------------------------------------------------------------------------

## 2021-08-05 NOTE — ED NOTES
Pt moved to room 14 at this time and placed on hospital bed, pt tolerated move well.  Pts brief checked and changed, pt repositioned for comfort and given fresh warm blanket, WCTM.     Paola Parker RN  08/05/21 0695

## 2021-08-05 NOTE — NURSING NOTE
Pt rolled, Brief changed, coccyx reddened open area noted on right upper coccyx, and right lower buttox on upper leg.  Pt will continued to be rolled and cleaned as needed and per protocol.

## 2021-08-06 NOTE — CASE MANAGEMENT/SOCIAL WORK
Discharge Planning Assessment  HECTOR Cuenca     Patient Name: Porsha Soni  MRN: [id number]  Today's Date: 8/6/2021    Admit Date: 8/4/2021    Discharge Needs Assessment    No documentation.       Discharge Plan     Row Name 08/06/21 1200       Plan    Plan Pt was admitted on 08/04/21 from [facility]. Pt has private pay bed hold. SS to follow.          Cindy Das

## 2021-08-06 NOTE — PROGRESS NOTES
"Palliative Care Daily Progress Note     S: Medical record reviewed, followed up with Primary RN Zoraida regarding patient's condition. When Palliative care entered the room the patient was sleeping and did not appear to be in any distress.      O:   Palliative Performance Scale Score: 30%   /68 (BP Location: Right arm, Patient Position: Lying)   Pulse 67   Temp 97.6 °F (36.4 °C) (Axillary)   Resp 16   Ht 177.8 cm (70\")   Wt 75.8 kg (167 lb)   SpO2 95%   BMI 23.96 kg/m²     Intake/Output Summary (Last 24 hours) at 8/6/2021 1323  Last data filed at 8/6/2021 0900  Gross per 24 hour   Intake 220 ml   Output --   Net 220 ml       PE:  General Appearance:    Chronically ill appearing, awake questionable alertness, [place] cooperative, NAD   HEENT:    NC/AT, without obvious abnormality, EOMI, anicteric, rt. Lt eye discharge/redness    Neck:   supple, trachea midline, no JVD   Lungs:     Clear and diminished in bilateral bases, no wheezing, rhonchi , or rales.    Heart:    RRR, normal S1 and S2, no M/R/G   Abdomen:     Soft, NT, ND, NABS    Extremities:   Moves all extremities, no edema   Pulses:   Pulses palpable and equal bilaterally   Skin:   Warm, dry   Neurologic:   Awake, questionable alertness, did not follow command   Psych:   Confused(dementia)           Meds: Reviewed and changes noted.    Labs:   Results from last 7 days   Lab Units 08/06/21  1221   WBC 10*3/mm3 8.68   HEMOGLOBIN g/dL 12.3*   HEMATOCRIT % 42.8   PLATELETS 10*3/mm3 206     Results from last 7 days   Lab Units 08/06/21  1221   SODIUM mmol/L 156*   POTASSIUM mmol/L 3.7   CHLORIDE mmol/L 127*   CO2 mmol/L 20.2*   BUN mg/dL 68*   CREATININE mg/dL 1.71*   GLUCOSE mg/dL 102*   CALCIUM mg/dL 9.0     Results from last 7 days   Lab Units 08/06/21  1221 08/05/21  1528 08/05/21  0846   SODIUM mmol/L 156*   < > 159*  159*   POTASSIUM mmol/L 3.7   < > 4.2  4.2   CHLORIDE mmol/L 127*   < > 124*  124*   CO2 mmol/L 20.2*   < > 23.6  23.6   BUN mg/dL " 68*   < > 90*  90*   CREATININE mg/dL 1.71*   < > 2.18*  2.18*   CALCIUM mg/dL 9.0   < > 10.2  10.2   BILIRUBIN mg/dL  --   --  0.5   ALK PHOS U/L  --   --  122*   ALT (SGPT) U/L  --   --  34   AST (SGOT) U/L  --   --  23   GLUCOSE mg/dL 102*   < > 92  92    < > = values in this interval not displayed.     Imaging Results (Last 72 Hours)     Procedure Component Value Units Date/Time    FL Video Swallow Single Contrast [[id number]] Collected: 08/05/21 1025     Updated: 08/05/21 1028    Narrative:      EXAMINATION: FL VIDEO SWALLOW SINGLE-CONTRAST-      CLINICAL INDICATION:     Aspiration r/o; N39.0-Urinary tract infection,  site not specified; N17.0-Acute kidney failure with tubular necrosis;  E87.0-Hyperosmolality and hypernatremia; E86.0-Dehydration; R77.8-Other  specified abnormalities of plasma proteins     TECHNIQUE: Modified barium swallow was performed utilizing video  fluoroscopy in conjunction with the Speech Pathology team. The patient  ingested multiple barium media including thin barium, nectar, and honey  liquid as well as barium pudding and a barium pudding coated cracker.      FLUOROSCOPY TIME: 1.1 minute     COMPARISON: 05/24/2021      FINDINGS:   Premature loss is noted with vallecular pooling.  Silent aspiration with thin liquids. Transient penetration of thin  liquids when presented with spoon. No additional episodes of penetration  or aspiration.          Impression:      1.  Silent aspiration with thin liquids.  2. Please see dysphasia notes for further details.     This report was finalized on 8/5/2021 10:26 AM by Dr. Adebayo Dodd MD.       CT Chest Without Contrast Diagnostic [[id number]] Collected: 08/04/21 1902     Updated: 08/04/21 1906    Narrative:      EXAM:    CT Chest Without Intravenous Contrast     EXAM DATE:    8/4/2021 5:48 PM     CLINICAL HISTORY:    Chest pain or SOB, pleurisy or effusion suspected     TECHNIQUE:    Axial computed tomography images of the chest without  intravenous  contrast.  Sagittal and coronal reformatted images were created and  reviewed.  This CT exam was performed using one or more of the following  dose reduction techniques:  automated exposure control, adjustment of  the mA and/or kV according to patient size, and/or use of iterative  reconstruction technique.     COMPARISON:    05/23/2021     FINDINGS:    Lungs:  Consolidation of the left lower lobe is noted with volume loss  and trace effusion more likely related to chronic atelectasis but  superimposed pneumonia is not excluded.  Linear atelectasis right lung  base.    Pleural space:  Unremarkable.  No pneumothorax.  No significant  effusion.    Heart:  Moderate cardiomegaly with severe coronary artery  calcifications.  Stable changes of previous CABG.  No significant  pericardial effusion.    Bones/joints:  Stable degenerative changes thoracic spine.  No acute  fracture.  No dislocation.    Soft tissues:  Unremarkable.    Vasculature:  Atherosclerosis aorta is stable. No aneurysm.    Lymph nodes:  Unremarkable.  No enlarged lymph nodes.       Impression:      1.  Stable left basilar consolidation with volume loss with  considerations to include persistent pneumonia versus chronic  atelectasis. Miniscule left pleural fluid is noted.  2.  Right basilar atelectasis.  3.  Stable cardiomegaly and changes of prior CABG.  4.  Other nonacute findings as above.     This report was finalized on 8/4/2021 7:04 PM by Dr. Adebayo Dodd MD.       CT Abdomen Pelvis Without Contrast [[id number]] Collected: 08/04/21 1818     Updated: 08/04/21 1904    Narrative:      EXAM:    CT Abdomen and Pelvis Without Intravenous Contrast     EXAM DATE:    8/4/2021 5:48 PM     CLINICAL HISTORY:    Abdominal pain, acute, nonlocalized     TECHNIQUE:    Axial computed tomography images of the abdomen and pelvis without  intravenous contrast.  Sagittal and coronal reformatted images were  created and reviewed.  This CT exam was performed  using one or more of  the following dose reduction techniques:  automated exposure control,  adjustment of the mA and/or kV according to patient size, and/or use of  iterative reconstruction technique.     COMPARISON:    05/23/2021     FINDINGS:    Lung bases:  Left basilar consolidation with volume loss.    Heart:  Cardiomegaly.      ABDOMEN:    Liver:  Unremarkable.    Gallbladder and bile ducts:  Prior cholecystectomy.  No ductal  dilation.    Pancreas:  Fatty infiltration of pancreas.  No ductal dilation.    Spleen:  Unremarkable.  No splenomegaly.    Adrenals:  Unremarkable.  No mass.    Kidneys and ureters:  No renal or ureteral stones or hydronephrosis.    Stomach and bowel:  Sigmoid diverticulosis is noted without evidence  of acute diverticulitis.  No obstruction.      PELVIS:    Appendix:  Normal appendix which is located in the right upper  quadrant.    Bladder:  Incompletely distended urinary bladder with urinary bladder  wall thickening.  Right posterior lateral urinary bladder wall  diverticulum is noted compatible with chronic partial bladder outlet  obstruction and increase bladder pressures.  No stones.    Reproductive:  Prostate enlargement.      ABDOMEN and PELVIS:    Intraperitoneal space:  Unremarkable.  No free air.  No significant  fluid collection.    Bones/joints:  Stable bony structures.  No acute fracture.  No  dislocation.    Soft tissues:  Unremarkable.    Vasculature:  Atherosclerosis is stable.  No abdominal aortic  aneurysm.    Lymph nodes:  Unremarkable.  No enlarged lymph nodes.       Impression:      1.  Stable left basilar consolidation which may represent pneumonia or  chronic atelectasis.  2.  Prostate enlargement with urinary bladder wall thickening and  bladder diverticulum indicative of chronic partial bladder outlet  obstruction.  3.  Diverticulosis without diverticulitis.  4.  Other nonacute findings described above.     This report was finalized on 8/4/2021 7:02 PM by   Adebayo Dodd MD.       XR Chest 1 View [[id number]] Collected: 08/04/21 1535     Updated: 08/04/21 1538    Narrative:      EXAM:    XR Chest, 1 View     EXAM DATE:    8/4/2021 2:57 PM     CLINICAL HISTORY:    ams     TECHNIQUE:    Frontal view of the chest.     COMPARISON:    05/23/2021     FINDINGS:    Lungs:  Unremarkable.  No consolidation.    Pleural space:  Small left effusion.  Left effusion has decreased.  No  pneumothorax.    Heart:  Changes of prior CABG.    Mediastinum:  Unremarkable.    Bones/joints:  Unremarkable.    Soft tissues:  Edema has markedly improved since the previous exam.       Impression:      1.  Since prior exam, interval improvement of CHF/edema.  2.  Small left effusion noted but overall decreased.  3.  Otherwise stable chest.     This report was finalized on 8/4/2021 3:36 PM by Dr. Adebayo Dodd MD.       CT Head Without Contrast [[id number]] Collected: 08/04/21 1507     Updated: 08/04/21 1510    Narrative:      CT HEAD WO CONTRAST-     CLINICAL INDICATION: Delirium        COMPARISON: 05/23/2021      TECHNIQUE: Axial images of the brain were obtained with out intravenous  contrast.  Reformatted images were created in the sagittal and coronal  planes.     DOSE:     Radiation dose reduction techniques were utilized per ALARA protocol.  Automated exposure control was initiated through either or [employer] or  [employer] software packages by  protocol.           FINDINGS:   Today's study shows no mass, hemorrhage, or midline shift.   Atrophy and ventriculomegaly  There is no evidence of acute ischemia.  I do not see epidural or subdural hematoma.  The gray-white differentiation is appropriate.   The bone window setting images show no destructive calvarial lesion or  acute calvarial fracture.   The posterior fossa is unremarkable.          Impression:         1. No acute parenchymal mass, hemorrhage, or midline shift  2. Atrophy  3. The overall appearance is very similar to the  prior study     This report was finalized on 8/4/2021 3:08 PM by Dr. Cliff Brody MD.               Diagnostics: Reviewed    A: Porsha Soni is a 80 y.o. yo male with  altered mental status from the [facility]. He has a past medical history of BPH, CKD IIIa, COVID 19 in January of 2021, dementia, malignant melanoma and hypertension. He was here at [facility] in May of 2021 with sepsis/bilateral pneumonia. At the time of that discharge was sent to a SNF.     On 8/4/21 he presented to the ED of [facility] for for further evaluation of altered mental status at [facility]. Upon arrival to the ED, vital signs were T 98.6F, pulse 79, RR 16, and /76 with room air oxygen saturation of 100%.   CPK was found to be 544.  Creatinine found to be 2.63.  Lactate was found to be 2.2 and mag 2.7.  White blood cells found be 12.32 with MCV 1 of 5.3 with known history of folate deficiency.  Urinalysis concerning for underlying urinary tract infection.  Imaging studies with CT abdomen pelvis revealing stable left basilar consolidation representing pneumonia or chronic atelectasis and prostate enlargement with urinary bladder wall thickening and bladder diverticulum indicative of chronic partial bladder outlet obstruction.  CT chest with left basilar consolidation with volume loss with considerations to include persistent pneumonia versus chronic atelectasis with minuscule pleural fluid noted and stable cardiomegaly with changes of prior CABG.  CT head was performed upon presentation given delirium with no known acute parenchymal mass, hemorrhage or midline shift.     According to nursing home report Mr. Soni appetite/intake has been declining and he had been feeding him self with worsening confusion. The NH states that he was talking in short sentences but has been slowing down over the last week.    Today 8/6/2021  He was afebrile, HR 67, RR 16, and BP of 119/68 and was saturating 95% on room air                  P:  I attempted to  reach out to patients wife Helen this afternoon and have left a message. Plan for patient at time of discharge is to return to [facility].      We will continue to follow along. Please do not hesitate to contact us regarding further sx mgmt or GOC needs, including after hours or on weekends via our on call provider at [phone].     Meena Joaquin, APRN    8/6/2021

## 2021-08-06 NOTE — CONSULTS
Referring Provider: Dr. Llanes  Reason for Consultation: Acute renal failure hyernatremia    Chief complaint mental status change    Subjective .     History of present illness: Patient is nonverbal I got most of the history from the chart and the nurse Porsha Soni is a 80 y.o. male with past medical history significant for BPH, CKD, COVID-19 in January 2021, CKD IIIa, dementia, malignant meloma, and hypertension.  He was last admitted to this facility in May 2021 with sepsis with bilateral pneumonia.  He was discharged from here to SNF after May 2021 hospitalization.     On 8/4/21 he presented to the ED of [facility] for for further evaluation of altered mental status at [facility]. Upon arrival to the ED, vital signs were T 98.6F, pulse 79, RR 16, and /76 with room air oxygen saturation of 100%.   CPK was found to be 544.  Creatinine found to be 2.63.  Lactate was found to be 2.2 and mag 2.7.  White blood cells found be 12.32 with MCV 1 of 5.3 with known history of folate deficiency.  Urinalysis concerning for underlying urinary tract infection.  Imaging studies with CT abdomen pelvis revealing stable left basilar consolidation representing pneumonia or chronic atelectasis and prostate enlargement with urinary bladder wall thickening and bladder diverticulum indicative of chronic partial bladder outlet obstruction.  CT chest with left basilar consolidation with volume loss with considerations to include persistent pneumonia versus chronic atelectasis with minuscule pleural fluid noted and stable cardiomegaly with changes of prior CABG.  CT head was performed upon presentation given delirium with no known acute parenchymal mass, hemorrhage or midline shift.        Given dementia with altered mental status, much of HPI. ROS, and PMH have been obtained from SNF past.  He is reportedly eating and drinking worse with worsening confusion.  He reportedly talks short sentences but has progressively slowed.   He  reportedly self feeding and visiting with wife with slow decline over the past one week.   I discussed him with SNF nurse Ravi with reports of nearly a week of slow decline. He reports he frequently has off days where he eats less and is less active, but usually turns around in 24 hours.  He reports given his difficulty with turn around over several days, he was sent to the ED for further evaluation and treatment.    Review of Systems  Cannot be obtained as the patient is demented    History  Past Medical History:   Diagnosis Date   • Arthritis    • Asymptomatic PVCs 6/14/2016   • BPH with urinary obstruction 11/17/2018   • Cervical spine disease     remotely suggested surgical intervention.  Patient deferred.    • Chronic kidney disease, stage 3a (CMS/[facility]) 10/30/2020   • Coronary artery disease    • COVID-19     Jan 2021   • Dementia (CMS/[facility])    • Diverticulosis    • Dyslipidemia    • Folic acid deficiency anemia    • Hypertension    • Impotence    • Malignant melanoma (CMS/[facility]) 07/2020    Left arm   • Osteoarthritis 10/30/2020   • Pneumonia    • Vertigo    ,   Past Surgical History:   Procedure Laterality Date   • ARM LESION/CYST EXCISION Left 7/30/2020    Procedure: WIDE EXCISION MELANOMA LEFT ARM INTERMEDIATE CLOSURE;  Surgeon: Roman Mohan MD;  Location:  [facility];  Service: General;  Laterality: Left;   • ARTERIAL BYPASS SURGERY     • BRONCHOSCOPY N/A 5/24/2018    Procedure: BRONCHOSCOPY WITH WASHINGS;  Surgeon: Leonides Quarles MD;  Location:  [facility];  Service: Pulmonary   • CHOLECYSTECTOMY WITH INTRAOPERATIVE CHOLANGIOGRAM N/A 5/16/2018    Procedure: OPEN CHOLECYSTECTOMY;  Surgeon: Rosie Montalvo MD;  Location:  [facility];  Service: General   • COLONOSCOPY N/A 3/30/2018    Procedure: COLONOSCOPY;  Surgeon: Simone Valderrama MD;  Location:  [facility];  Service: Gastroenterology   • CORONARY ARTERY BYPASS GRAFT     • KNEE ARTHROPLASTY, PARTIAL REPLACEMENT      Lt , 2015   • REPLACEMENT TOTAL KNEE      Rt    • SENTINEL NODE BIOPSY Left 2020    Procedure: SENTINEL NODE INJECTION AND BIOPSY LEFT AXILLA;  Surgeon: Roman Mohan MD;  [facility];  Service: General;  Laterality: Left;   ,   Family History   Problem Relation Age of Onset   • No Known Problems Mother    • No Known Problems Father    • No Known Problems Sister    • No Known Problems Brother    ,   Social History     Tobacco Use   • Smoking status: Former Smoker     Packs/day: 1.50     Years: 7.00     Pack years: 10.50     Types: Cigarettes, Pipe, Cigars     Quit date: 1986     Years since quittin.1   • Smokeless tobacco: Never Used   Vaping Use   • Vaping Use: Never used   Substance Use Topics   • Alcohol use: Yes     Comment: occassionally    • Drug use: No    and Allergies:  Patient has no known allergies.    Objective     Lab Results (last 24 hours)     Procedure Component Value Units Date/Time    Basic Metabolic Panel [[id number]]  (Abnormal) Collected: 21 0533    Specimen: Blood Updated: 21 0600     Glucose 87 mg/dL      BUN 73 mg/dL      Creatinine 1.96 mg/dL      Sodium 159 mmol/L      Potassium 4.3 mmol/L      Comment: Slight hemolysis detected by analyzer. Results may be affected.        Chloride 130 mmol/L      CO2 21.2 mmol/L      Calcium 9.1 mg/dL      eGFR Non African Amer 33 mL/min/1.73      BUN/Creatinine Ratio 37.2     Anion Gap 7.8 mmol/L     Narrative:      GFR Normal >60  Chronic Kidney Disease <60  Kidney Failure <15      Basic Metabolic Panel [[id number]]  (Abnormal) Collected: 21 0043    Specimen: Blood Updated: 21 0154     Glucose 90 mg/dL      BUN 74 mg/dL      Creatinine 1.92 mg/dL      Sodium 157 mmol/L      Potassium 4.1 mmol/L      Comment: Slight hemolysis detected by analyzer. Results may be affected.        Chloride 129 mmol/L      CO2 18.8 mmol/L      Calcium 9.1 mg/dL      eGFR Non African Amer 34 mL/min/1.73      BUN/Creatinine Ratio 38.5     Anion Gap 9.2 mmol/L     Narrative:       GFR Normal >60  Chronic Kidney Disease <60  Kidney Failure <15      Basic Metabolic Panel [[id number]]  (Abnormal) Collected: 08/05/21 1754    Specimen: Blood Updated: 08/05/21 1832     Glucose 93 mg/dL      BUN 78 mg/dL      Creatinine 1.94 mg/dL      Sodium 158 mmol/L      Potassium 4.0 mmol/L      Chloride 127 mmol/L      CO2 21.7 mmol/L      Calcium 9.3 mg/dL      eGFR Non African Amer 33 mL/min/1.73      BUN/Creatinine Ratio 40.2     Anion Gap 9.3 mmol/L     Narrative:      GFR Normal >60  Chronic Kidney Disease <60  Kidney Failure <15      Basic Metabolic Panel [[id number]]  (Abnormal) Collected: 08/05/21 1528    Specimen: Blood Updated: 08/05/21 1622     Glucose 92 mg/dL      BUN 79 mg/dL      Creatinine 1.91 mg/dL      Sodium 155 mmol/L      Potassium 4.0 mmol/L      Chloride 126 mmol/L      CO2 19.6 mmol/L      Calcium 9.2 mg/dL      eGFR Non African Amer 34 mL/min/1.73      BUN/Creatinine Ratio 41.4     Anion Gap 9.4 mmol/L     Narrative:      GFR Normal >60  Chronic Kidney Disease <60  Kidney Failure <15      Blood Culture - Blood, Arm, Right [[id number]] Collected: 08/04/21 1530    Specimen: Blood from Arm, Right Updated: 08/05/21 1615     Blood Culture No growth at 24 hours    Wound Culture - Wound, Eye [[id number]] Collected: 08/05/21 1106    Specimen: Wound from Eye Updated: 08/05/21 1604     Gram Stain Moderate (3+) WBCs seen      Few (2+) Gram positive bacilli      Rare (1+) Gram positive cocci in pairs    Blood Culture - Blood, Arm, Left [[id number]] Collected: 08/04/21 1540    Specimen: Blood from Arm, Left Updated: 08/05/21 1600     Blood Culture No growth at 24 hours    Urine Culture - Urine, Urine, Catheter In/Out [[id number]]  (Abnormal) Collected: 08/04/21 1903    Specimen: Urine, Catheter In/Out Updated: 08/05/21 1313     Urine Culture <25,000 CFU/mL Gram Negative Bacilli    Narrative:      Call if further workup needed.            Imaging Results (Last 24 Hours)     ** No results found for the  last 24 hours. **          Vital Signs   Temp:  [97.9 °F (36.6 °C)-98.8 °F (37.1 °C)] 98 °F (36.7 °C)  Heart Rate:  [65-80] 67  Resp:  [18-20] 18  BP: ()/(50-77) 98/50    Physical Exam:     General Appearance:   Patient is nonverbal   Head:    Normocephalic, without obvious abnormality   Eyes:          Eyes are closed   Ears:    Ears appear intact    Throat:   No oral lesions, no thrush, oral mucosa moist   Neck:   No adenopathy,no thyromegaly, no carotid bruit, no JVD   Back:    no tenderness to percussion, range of motion normal   Lungs:     Clear to auscultation,respirations regular, even and                  unlabored    Heart:    Regular rhythm and normal rate, normal S1 and S2, no            murmur, no gallop, no rub, no click   Chest Wall:    No abnormalities observed   Abdomen:     Normal bowel sounds, no masses, no organomegaly, soft        non-tender, non-distended, no guarding, no rebound                tenderness   Rectal:     Deferred   Extremities:  Not following commands no edema, no cyanosis, no             redness   Pulses:   Pulses palpable and equal bilaterally   Skin:   No petechiae, no nodules, bruising or rash   Lymph nodes:   No palpable adenopathy   Neurologic:  Nonverbal demented     Results Review:   I reviewed the patient's new clinical results.  I personally viewed and interpreted the patient's EKG/Telemetry data      Assessment/Plan     Active Problems:    Urinary tract infection without hematuria    1-acute renal failure on chronic kidney disease stage III: Patient baseline creatinine is 1.3 has gone up to 1.9 most likely secondary to dehydration we will gently hydrate the patient which is being going on right now with D5 water I will check ultrasound of bilateral kidney to rule out obstruction    2-hypernatremia: Patient is not eating or drinking much so we will continue D5 water as prescribed by the primary    3-UTI we will do ultrasound of the kidney to rule out obstruction and  patient is already on antibiotics    4-elevated CPK: We will continue hydration and check CPK level again as it is already better from 500-300  Prognosis guarded  Discussed with ROQUE Vargas MD  08/06/21  08:55 EDT

## 2021-08-06 NOTE — PLAN OF CARE
Goal Outcome Evaluation:              Outcome Summary: Patient is sleeping at this time. No acute distress noted. Patient is confused. Patient tolerated medications well. VS stable. Will continue to monitor and follow plan of care.

## 2021-08-06 NOTE — PROGRESS NOTES
Patient Identification:  Name:  Porsha Soni  Age:  80 y.o.  Sex:  male  :  1940  MRN:  [id number]  Visit Number:  [id number]  Primary Care Provider:  Padmini Terrazas APRN    Length of stay:  2    Subjective/Interval History/Consultants/Procedures     Chief complaint: Hypernatremia, follow-up sepsis UTI, pneumonia    HPI/Hospital course:     Porsha Soni is an 80 y.o. male with past medical history significant for BPH, CKD, COVID-19 in 2021, CKD IIIa, dementia, malignant meloma, and hypertension.  He was last admitted to this facility in May 2021 with sepsis with bilateral pneumonia.  He was discharged from here to SNF after May 2021 hospitalization. He was sent to [facility] ED on  from [facility] due to worsening confusion superimposed on underlying dementia.  He was admitted on  for further evaluation and treatment of UTI, PNA, and hypernatremia.  Please see admitting H&P for further details.     Mr. Soni was started on broad spectrum IV abx coverage on admission with cefepime, flagyl, and vancomycin. Midline consult was placed due to difficulty with phlebotomy sticks to obtain blood and need for regular BMPs to monitor sodium with initial hypernatremia.       Palliative care was consulted to assist with family support and goals of care in setting of progressively worsening dementia.  Assistance was much appreciated with evaluation by Meena Joaquin NP.     Given persistent hypernatremia in spite of fluid hydration with recent poor oral intake, Nephrology was consulted to assist with further treatment.       Consultants:   Consults     Date and Time Order Name Status Description    2021  7:46 AM Inpatient Nephrology Consult      2021  8:10 AM Inpatient Palliative Care MD Consult Completed             Procedures:   · Midline placement on 21    Subjective:      Mr. Soni is evaluated resting in bed.  He answers to name.  He is lethargic.  He does tell me he is tired.  He is  initially snoring upon entry to the room.      Discussed with AM RN  with  Zoraida with no known acute events overnight. Room location at the time of evaluation was ED 308B.      ----------------------------------------------------------------------------------------------------------------------  Current Hospital Meds:  atorvastatin, 20 mg, Oral, Daily  bacitracin-polymyxin b, , Both Eyes, Q12H  carboxymethylcellulose, 1 drop, Left Eye, BID  cefepime, 2 g, Intravenous, Q12H  [START ON 8/9/2021] cholecalciferol, 50,000 Units, Oral, Weekly  donepezil, 10 mg, Oral, Nightly  ethyl alcohol, 1 application, Nasal, BID  famotidine, 20 mg, Oral, Daily  folic acid, 1 mg, Oral, Daily  heparin (porcine), 5,000 Units, Subcutaneous, Q12H  lactobacillus acidophilus, 1 capsule, Oral, Daily  magic barrier cream, , Topical, 4x Daily  melatonin, 5 mg, Oral, Nightly  memantine, 10 mg, Oral, Q12H  metoprolol tartrate, 12.5 mg, Oral, Q12H  metroNIDAZOLE, 500 mg, Intravenous, Q8H  mirtazapine, 30 mg, Oral, Nightly  polyethylene glycol, 17 g, Oral, Daily  sertraline, 50 mg, Oral, Nightly  sodium chloride, 10 mL, Intravenous, Q12H  vancomycin, 1,000 mg, Intravenous, Q24H      dextrose, 75 mL/hr, Last Rate: 75 mL/hr (08/06/21 0801)      ----------------------------------------------------------------------------------------------------------------------      Objective     Vital Signs:  Temp:  [97.6 °F (36.4 °C)-98.2 °F (36.8 °C)] 97.9 °F (36.6 °C)  Heart Rate:  [60-80] 60  Resp:  [16-20] 16  BP: ()/(50-68) 112/50      08/04/21  1330 08/05/21  1412 08/06/21  0500   Weight: 79.4 kg (175 lb) 76.8 kg (169 lb 4.8 oz) 75.8 kg (167 lb)     Body mass index is 23.96 kg/m².    Intake/Output Summary (Last 24 hours) at 8/6/2021 1428  Last data filed at 8/6/2021 1300  Gross per 24 hour   Intake 340 ml   Output --   Net 340 ml     I/O this shift:  In: 240 [P.O.:240]  Out: -   Diet Soft Texture; Ground;  Thin  ----------------------------------------------------------------------------------------------------------------------    Physical Exam  Vitals and nursing note reviewed.   Constitutional:       Appearance: He is ill-appearing.      Interventions: Nasal cannula in place.      Comments: Chronically ill-appearing   HENT:      Head: Normocephalic and atraumatic.   Eyes:      General: Lids are normal.      Conjunctiva/sclera:      Left eye: Exudate present.        Comments: Left eye ectropion with erythema.  Lashes with mucopurulent residue matting them together    Cardiovascular:      Rate and Rhythm: Normal rate and regular rhythm.      Heart sounds: S1 normal and S2 normal.   Pulmonary:      Effort: No tachypnea or bradypnea.      Breath sounds: No decreased breath sounds, wheezing, rhonchi or rales.   Abdominal:      General: Bowel sounds are normal.      Palpations: Abdomen is soft.      Tenderness: There is no abdominal tenderness.   Musculoskeletal:      Right lower leg: No swelling. No edema.      Left lower leg: No swelling. No edema.   Skin:     General: Skin is warm and dry.   Neurological:      Mental Status: He is lethargic.   Psychiatric:         Behavior: Behavior is cooperative.      Comments: Lethargic but arouses and cooperates.  Reports he is tired during examination.              ----------------------------------------------------------------------------------------------------------------------  Tele:      ----------------------------------------------------------------------------------------------------------------------  Results from last 7 days   Lab Units 08/05/21  0824 08/04/21  2049 08/04/21  1428   CK TOTAL U/L 348*  --  544*   TROPONIN T ng/mL  --  0.041* 0.043*   PROBNP pg/mL  --   --  1,404.0     Results from last 7 days   Lab Units 08/06/21  1221 08/05/21  0847 08/05/21  0846 08/04/21  1540 08/04/21  1428   CRP mg/dL 2.07*  --  1.20*  --  0.80*   LACTATE mmol/L  --  1.7  --  2.2*  2.2*   WBC 10*3/mm3 8.68 13.62*  --   --  12.32*   HEMOGLOBIN g/dL 12.3* 13.6  --   --  15.1   HEMATOCRIT % 42.8 44.8  --   --  51.4*   MCV fL 107.5* 104.9*  --   --  105.3*   MCHC g/dL 28.7* 30.4*  --   --  29.4*   PLATELETS 10*3/mm3 206 254  --   --  287   INR   --   --   --  1.08  --          Results from last 7 days   Lab Units 08/06/21  1221 08/06/21  0533 08/06/21  0043 08/05/21  1528 08/05/21  0846 08/05/21  0226 08/04/21  1428   SODIUM mmol/L 156* 159* 157*   < > 159*  159*   < > 155*   POTASSIUM mmol/L 3.7 4.3 4.1   < > 4.2  4.2   < > 4.3   MAGNESIUM mg/dL  --   --   --   --   --   --  2.7*   CHLORIDE mmol/L 127* 130* 129*   < > 124*  124*   < > 120*   CO2 mmol/L 20.2* 21.2* 18.8*   < > 23.6  23.6   < > 23.6   BUN mg/dL 68* 73* 74*   < > 90*  90*   < > 105*   CREATININE mg/dL 1.71* 1.96* 1.92*   < > 2.18*  2.18*   < > 2.63*   EGFR IF NONAFRICN AM mL/min/1.73 39* 33* 34*   < > 29*  29*   < > 23*   CALCIUM mg/dL 9.0 9.1 9.1   < > 10.2  10.2   < > 10.0   GLUCOSE mg/dL 102* 87 90   < > 92  92   < > 128*   ALBUMIN g/dL  --   --   --   --  3.50  --  3.69   BILIRUBIN mg/dL  --   --   --   --  0.5  --  0.4   ALK PHOS U/L  --   --   --   --  122*  --  132*   AST (SGOT) U/L  --   --   --   --  23  --  33   ALT (SGPT) U/L  --   --   --   --  34  --  46*    < > = values in this interval not displayed.   Estimated Creatinine Clearance: 36.3 mL/min (A) (by C-G formula based on SCr of 1.71 mg/dL (H)).  No results found for: AMMONIA      Blood Culture   Date Value Ref Range Status   08/04/2021 No growth at 24 hours  Preliminary   08/04/2021 No growth at 24 hours  Preliminary     Urine Culture   Date Value Ref Range Status   08/04/2021 <25,000 CFU/mL Gram Negative Bacilli (A)  Final     No results found for: WOUNDCX  No results found for: STOOLCX  ----------------------------------------------------------------------------------------------------------------------  Imaging Results (Last 24 Hours)     ** No results  found for the last 24 hours. 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   · Follow urine culture <25,000.    · MRSA of nares +, IV Vancomycin for now given ongoing consolidation.  Discussed with pharmacy.     · Follow peripheral blood cultures with no growth thus far.  · Follow daily CBC.  · Polysporin added for bilateral conjunctivitis.   · Culture ordered of mucopurulent discharge from eye, pending at present.   · SLP evaluation appreciated on 08/05/21  · Midline placement appreciated  · WBC is improving.    · C-RP slightly increased today.   · Add lactobacillus for gut luna protection.       -Acute hypoxemic respiratory failure:   · Orders placed to titrate oxygen saturation greater than or equal to 90%.    · Treat acute vs. Chronic consolidation as outlined.      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   · No inpatient Urology coverage until 8/10/21.    · Outpatient Urology follow-up.    · Urine culture without significant growth thus far      -Hypernatremia:   · Repeat Sodium remains elevated.   · Nephrology consultation placed.    · IV fluids transitioned to D5W @ 75cc/hr (discussed with attending physician).     · Repeat BMP q6.      -TYRELL on CKD IIIb:   · Continue IV  fluids. (EF from 05/21 61-65%)  · Creatinine improving.   · Avoid nephrotoxins when possible.   · Q6 BMP.      -Elevated CPK with question of early rhabdo?   · Repeat CPK on 08/05 downtrending; repeat with next 1430 lab draw for BMP.   · Given mental status, unclear if he had a fall?   · No known falls at SNF.       -Elevated troponin T:   -ASCVD s/p CABG 4v in 2005 sequential LIMA to first diagonal and LAD, saphenous graft to OM1, and PL of the circumflex  -Mild aortic valve regurgitation:   -Wide complex tachycardia in May 2021:   · Continuous cardiac monitoring.   · Echocardiogram from 05/2021 reviewed.    · Outpatient office visit with Dr. Dwayne Swartz reviewed from 07/07 with noted preserved LV function and measures to improve quality of life with recommendation for no ischemic evaluation unless there is an acute issue given worsening dementia.       -Macrocytosis with known hx of folate deficiency:   · Hydrate.   · Continue folate supplement.   · Repeat CBC.         -Metabolic encephalopathy from sepsis and UTI superimposed on known advanced dementia  -Advanced dementia  · Supportive care.   · CT head without acute change.   · Palliative care consult appreciated.            -Prolonged QTc  -Likely 2/2 hypermagnesemia:   · Continue to hydrate.   · Repeat EKG x1 on 08/05 reviewed with prolonged QTc.          -----------  -DVT prophylaxis: SQ Heparin  -GI prophylaxis: Add Pepcid/Add lactobacillus  -Activity:  Up with assistance  -Disposition: return to SNF at [facility]  -Diet:   Dietary Orders (From admission, onward)     Start     Ordered    08/06/21 0800  Dietary Nutrition Supplements Boost Plus (Ensure Enlive, Ensure Plus); chocolate  Daily With Breakfast, Lunch & Dinner     Question Answer Comment   Select Supplement Boost Plus (Ensure Enlive, Ensure Plus)    Flavor: chocolate        08/05/21 1844    08/05/21 0955  Diet Soft Texture; Ground; Thin  Diet Effective Now     Question Answer Comment   Diet Texture /  Consistency Soft Texture    Select Texture: Ground    Fluid Consistency Thin        08/05/21 5366                    The patient is high risk due to the following diagnoses/reasons:  Hypernatremia, sepsis 2/2 UTI & PNA, acute bilateral conjunctivitis superimposed on ectropion of left eye        Ava Adame PA-C  08/06/21  14:28 EDT  Pager # [phone]

## 2021-08-06 NOTE — PLAN OF CARE
Goal Outcome Evaluation:  Patient shows no s/s of distress noted. Wound care completed. Will continue with plan of care.

## 2021-08-06 NOTE — PROGRESS NOTES
Patient initiated on vancomycin for sepsis/PNA. Based on patient's age, weight and SCr, will start vancomycin at 1000 mg IV q24h. Will check a trough level at steady state and follow.    Thank you for this consult,  Jose RollinsD

## 2021-08-06 NOTE — NURSING NOTE
Patient with stage 2 PI to coccyx and right lower gluteal.  See flow sheet documentation.  Will treat with Magic Barrier Cream four times daily.  PI prevention orders initiated.

## 2021-08-07 NOTE — PLAN OF CARE
Problem: Adult Inpatient Plan of Care  Goal: Plan of Care Review  Outcome: Ongoing, Progressing  Goal: Patient-Specific Goal (Individualized)  Outcome: Ongoing, Progressing  Goal: Absence of Hospital-Acquired Illness or Injury  Outcome: Ongoing, Progressing  Intervention: Identify and Manage Fall Risk  Recent Flowsheet Documentation  Taken 8/7/2021 0845 by Eddi Bucio RN  Safety Promotion/Fall Prevention:   safety round/check completed   fall prevention program maintained  Intervention: Prevent Skin Injury  Recent Flowsheet Documentation  Taken 8/7/2021 0845 by Eddi Bucio RN  Body Position: side-lying, left  Skin Protection:   adhesive use limited   pulse oximeter probe site changed  Intervention: Prevent Infection  Recent Flowsheet Documentation  Taken 8/7/2021 0845 by Eddi Bucio RN  Infection Prevention:   cohorting utilized   rest/sleep promoted  Goal: Optimal Comfort and Wellbeing  Outcome: Ongoing, Progressing  Intervention: Provide Person-Centered Care  Recent Flowsheet Documentation  Taken 8/7/2021 0845 by Eddi Bucio RN  Trust Relationship/Rapport:   care explained   thoughts/feelings acknowledged  Goal: Readiness for Transition of Care  Outcome: Ongoing, Progressing     Problem: Skin Injury Risk Increased  Goal: Skin Health and Integrity  Outcome: Ongoing, Progressing  Intervention: Optimize Skin Protection  Recent Flowsheet Documentation  Taken 8/7/2021 0845 by Eddi Bucio RN  Pressure Reduction Techniques:   frequent weight shift encouraged   heels elevated off bed   weight shift assistance provided  Head of Bed (HOB): HOB elevated  Pressure Reduction Devices: pressure-redistributing mattress utilized  Skin Protection:   adhesive use limited   pulse oximeter probe site changed  Intervention: Promote and Optimize Oral Intake  Recent Flowsheet Documentation  Taken 8/7/2021 0845 by Eddi Bucio RN  Oral Nutrition Promotion: rest periods promoted     Problem: Fall Injury  Risk  Goal: Absence of Fall and Fall-Related Injury  Outcome: Ongoing, Progressing  Intervention: Identify and Manage Contributors to Fall Injury Risk  Recent Flowsheet Documentation  Taken 8/7/2021 0845 by Eddi Bucio, RN  Medication Review/Management: medications reviewed  Intervention: Promote Injury-Free Environment  Recent Flowsheet Documentation  Taken 8/7/2021 0845 by Eddi Bucio, RN  Safety Promotion/Fall Prevention:   safety round/check completed   fall prevention program maintained   Goal Outcome Evaluation:

## 2021-08-07 NOTE — PLAN OF CARE
Goal Outcome Evaluation:              Outcome Summary: Patient is resting quietly in bed. Confused. Alert to self. Blister noted to right upper back. New wound consult added. VS stable. No acute distree noted. Will continue to monitor and follow plan of care.

## 2021-08-07 NOTE — PROGRESS NOTES
Interval History:     Patient Complaints: Daughter at bedside.  Patient is very drowsy.  Nonverbal.  She reports no acute issues like vomiting or diarrhea or shortness of breath.        Vital Signs  Temp:  [96.4 °F (35.8 °C)-98.1 °F (36.7 °C)] 98.1 °F (36.7 °C)  Heart Rate:  [54-72] 57  Resp:  [16-18] 16  BP: ()/(50-70) 120/69    Physical Exam:    General:           No distress      HEENT:  Dry mucous membranes               Neck:  No JVD       Lungs:    Clear anteriorly   Heart:   Regular,  no rub       Abdomen:   Normal bowel sounds, soft non-tender, non-distended, no guarding       Extremities:  No edema       Skin: No petechiae                Results Review:    I reviewed the patient's new clinical results.    Lab Results (last 24 hours)     Procedure Component Value Units Date/Time    Basic Metabolic Panel [[id number]]  (Abnormal) Collected: 08/07/21 1233    Specimen: Blood Updated: 08/07/21 1307     Glucose 93 mg/dL      BUN 50 mg/dL      Creatinine 1.63 mg/dL      Sodium 149 mmol/L      Potassium 4.2 mmol/L      Comment: Slight hemolysis detected by analyzer. Results may be affected.        Chloride 124 mmol/L      CO2 18.6 mmol/L      Calcium 8.8 mg/dL      eGFR Non African Amer 41 mL/min/1.73      BUN/Creatinine Ratio 30.7     Anion Gap 6.4 mmol/L     Narrative:      GFR Normal >60  Chronic Kidney Disease <60  Kidney Failure <15      Blood Gas, Arterial With Co-Ox [[id number]]  (Abnormal) Collected: 08/07/21 0934    Specimen: Arterial Blood Updated: 08/07/21 0938     Site Left Radial     Dc's Test Positive     pH, Arterial 7.335 pH units      Comment: 84 Value below reference range        pCO2, Arterial 42.0 mm Hg      pO2, Arterial 78.3 mm Hg      Comment: 84 Value below reference range        HCO3, Arterial 22.4 mmol/L      Base Excess, Arterial -3.4 mmol/L      O2 Saturation, Arterial 96.6 %      Hemoglobin, Blood Gas 11.9 g/dL      Comment: 84 Value below reference range        Hematocrit, Blood  Gas 36.4 %      Comment: 84 Value below reference range        Oxyhemoglobin 95.0 %      Methemoglobin 0.30 %      Carboxyhemoglobin 1.4 %      A-a Gradiant 17.5 mmHg      CO2 Content 23.7 mmol/L      Temperature 0.0 C      Barometric Pressure for Blood Gas 729 mmHg      Modality Room Air     FIO2 21 %      Ventilator Mode NA     Note --     Collected by [id number]     Comment: Meter: [id number]     :  [id number]        pH, Temp Corrected --     pCO2, Temperature Corrected --     pO2, Temperature Corrected --    Wound Culture - Wound, Eye [[id number]]  (Abnormal) Collected: 08/05/21 1106    Specimen: Wound from Eye Updated: 08/07/21 0907     Wound Culture Rare Gram Positive Cocci      Scant growth (1+) Normal Skin Meli     Gram Stain Moderate (3+) WBCs seen      Few (2+) Gram positive bacilli      Rare (1+) Gram positive cocci in pairs    Basic Metabolic Panel [[id number]]  (Abnormal) Collected: 08/07/21 0637    Specimen: Blood Updated: 08/07/21 0740     Glucose 107 mg/dL      BUN 54 mg/dL      Creatinine 1.57 mg/dL      Sodium 153 mmol/L      Potassium 3.5 mmol/L      Chloride 125 mmol/L      CO2 20.5 mmol/L      Calcium 8.7 mg/dL      eGFR Non African Amer 43 mL/min/1.73      BUN/Creatinine Ratio 34.4     Anion Gap 7.5 mmol/L     Narrative:      GFR Normal >60  Chronic Kidney Disease <60  Kidney Failure <15      CK [[id number]]  (Normal) Collected: 08/07/21 0637    Specimen: Blood Updated: 08/07/21 0740     Creatine Kinase 52 U/L     CBC & Differential [[id number]]  (Abnormal) Collected: 08/07/21 0138    Specimen: Blood Updated: 08/07/21 0234    Narrative:      The following orders were created for panel order CBC & Differential.  Procedure                               Abnormality         Status                     ---------                               -----------         ------                     CBC Auto Differential[[id number]]        Abnormal            Final result                 Please view results  for these tests on the individual orders.    CBC Auto Differential [[id number]]  (Abnormal) Collected: 08/07/21 0138    Specimen: Blood Updated: 08/07/21 0234     WBC 9.92 10*3/mm3      RBC 3.83 10*6/mm3      Hemoglobin 11.9 g/dL      Hematocrit 39.9 %      .2 fL      MCH 31.1 pg      MCHC 29.8 g/dL      RDW 13.4 %      RDW-SD 51.5 fl      MPV 9.3 fL      Platelets 182 10*3/mm3      Neutrophil % 52.4 %      Lymphocyte % 22.4 %      Monocyte % 7.4 %      Eosinophil % 17.1 %      Basophil % 0.5 %      Immature Grans % 0.2 %      Neutrophils, Absolute 5.20 10*3/mm3      Lymphocytes, Absolute 2.22 10*3/mm3      Monocytes, Absolute 0.73 10*3/mm3      Eosinophils, Absolute 1.70 10*3/mm3      Basophils, Absolute 0.05 10*3/mm3      Immature Grans, Absolute 0.02 10*3/mm3      nRBC 0.0 /100 WBC     Comprehensive Metabolic Panel [[id number]]  (Abnormal) Collected: 08/07/21 0138    Specimen: Blood Updated: 08/07/21 0231     Glucose 127 mg/dL      BUN 56 mg/dL      Creatinine 1.60 mg/dL      Sodium 153 mmol/L      Potassium 3.5 mmol/L      Chloride 126 mmol/L      CO2 19.7 mmol/L      Calcium 8.8 mg/dL      Total Protein 5.7 g/dL      Albumin 2.72 g/dL      ALT (SGPT) 21 U/L      AST (SGOT) 13 U/L      Alkaline Phosphatase 90 U/L      Total Bilirubin 0.4 mg/dL      eGFR Non African Amer 42 mL/min/1.73      Globulin 3.0 gm/dL      A/G Ratio 0.9 g/dL      BUN/Creatinine Ratio 35.0     Anion Gap 7.3 mmol/L     Narrative:      GFR Normal >60  Chronic Kidney Disease <60  Kidney Failure <15      C-reactive Protein [[id number]]  (Abnormal) Collected: 08/07/21 0138    Specimen: Blood Updated: 08/07/21 0231     C-Reactive Protein 1.60 mg/dL     Magnesium [[id number]]  (Normal) Collected: 08/07/21 0138    Specimen: Blood Updated: 08/07/21 0231     Magnesium 2.1 mg/dL           Imaging Results (Last 24 Hours)     ** No results found for the last 24 hours. **          Assessment and Plan:    1.  Acute renal failure  2.  Metabolic  acidosis  3.  Hypernatremia  4.  Metabolic encephalopathy  5.  Complicated UTI    The patient is radiologically and clinically not in congestive heart failure although there is a history of the same.  I will go ahead and give him 2 L of hypotonic bicarbonate solution and reassess in the morning    Paresh Landers MD  08/07/21  15:53 EDT

## 2021-08-07 NOTE — PROGRESS NOTES
Patient Identification:  Name:  Porsha Soni  Age:  80 y.o.  Sex:  male  :  1940  MRN:  [id number]  Visit Number:  [id number]  Primary Care Provider:  Padmini Terrazas APRN    Length of stay:  3    Subjective/Interval History/Consultants/Procedures     Chief complaint: Hypernatremia, follow-up sepsis UTI, pneumonia    HPI/Hospital course:     Porsha Soni is an 80 y.o. male with past medical history significant for BPH, CKD, COVID-19 in 2021, CKD IIIa, dementia, malignant meloma, and hypertension.  He was last admitted to this facility in May 2021 with sepsis with bilateral pneumonia.  He was discharged from here to SNF after May 2021 hospitalization. He was sent to [facility] ED on  from [facility] due to worsening confusion superimposed on underlying dementia.  He was admitted on  for further evaluation and treatment of UTI, PNA, and hypernatremia.  Please see admitting H&P for further details.     Mr. Soni was started on broad spectrum IV abx coverage on admission with cefepime, flagyl, and vancomycin. Midline consult was placed due to difficulty with phlebotomy sticks to obtain blood and need for regular BMPs to monitor sodium with initial hypernatremia.       Palliative care was consulted to assist with family support and goals of care in setting of progressively worsening dementia.  Assistance was much appreciated with evaluation by Meena Joaquin NP.     Given persistent hypernatremia in spite of fluid hydration with recent poor oral intake, Nephrology was consulted to assist with further treatment.       Consultants:   Consults     Date and Time Order Name Status Description    2021  7:46 AM Inpatient Nephrology Consult      2021  8:10 AM Inpatient Palliative Care MD Consult Completed             Procedures:   · Midline placement on 21    Subjective:      Mr. Soni is resting in bed.  He is sleeping but easily awakens.  He is able to tell me his name but not much else.         Discussed with AM ROQUE Bucio with no known acute events overnight.  Blood pressures were within normal limits.       ----------------------------------------------------------------------------------------------------------------------  Current Hospital Meds:  atorvastatin, 20 mg, Oral, Daily  bacitracin-polymyxin b, , Both Eyes, Q12H  carboxymethylcellulose, 1 drop, Left Eye, BID  cefepime, 2 g, Intravenous, Q12H  [START ON 8/9/2021] cholecalciferol, 50,000 Units, Oral, Weekly  donepezil, 10 mg, Oral, Nightly  ethyl alcohol, 1 application, Nasal, BID  famotidine, 20 mg, Oral, Daily  folic acid, 1 mg, Oral, Daily  heparin (porcine), 5,000 Units, Subcutaneous, Q12H  lactobacillus acidophilus, 1 capsule, Oral, Daily  magic barrier cream, , Topical, 4x Daily  melatonin, 5 mg, Oral, Nightly  memantine, 10 mg, Oral, Q12H  metroNIDAZOLE, 500 mg, Intravenous, Q8H  mirtazapine, 15 mg, Oral, Nightly  polyethylene glycol, 17 g, Oral, Daily  sertraline, 50 mg, Oral, Nightly  sodium chloride, 10 mL, Intravenous, Q12H  vancomycin, 1,000 mg, Intravenous, Q24H      dextrose, 75 mL/hr, Last Rate: 75 mL/hr (08/06/21 2004)      ----------------------------------------------------------------------------------------------------------------------      Objective     Vital Signs:  Temp:  [96.4 °F (35.8 °C)-98 °F (36.7 °C)] 97.8 °F (36.6 °C)  Heart Rate:  [60-72] 72  Resp:  [16-18] 18  BP: (112-128)/(50-70) 128/69      08/05/21  1412 08/06/21  0500 08/07/21  0500   Weight: 76.8 kg (169 lb 4.8 oz) 75.8 kg (167 lb) 77 kg (169 lb 11.2 oz)     Body mass index is 24.35 kg/m².    Intake/Output Summary (Last 24 hours) at 8/7/2021 0913  Last data filed at 8/7/2021 0800  Gross per 24 hour   Intake 880 ml   Output --   Net 880 ml     No intake/output data recorded.  Diet Soft Texture; Ground; Thin  ----------------------------------------------------------------------------------------------------------------------    Physical  Exam  Vitals and nursing note reviewed.   Constitutional:       Appearance: He is ill-appearing.      Interventions: Nasal cannula in place.      Comments: Chronically ill-appearing   HENT:      Head: Normocephalic and atraumatic.   Eyes:      General: Lids are normal.      Conjunctiva/sclera:      Left eye: Exudate present.        Comments: Left eye ectropion with erythema.  Ectropion erythema and    Cardiovascular:      Rate and Rhythm: Normal rate and regular rhythm.      Heart sounds: S1 normal and S2 normal.   Pulmonary:      Effort: No tachypnea or bradypnea.      Breath sounds: No decreased breath sounds, wheezing, rhonchi or rales.   Abdominal:      General: Bowel sounds are normal.      Palpations: Abdomen is soft.      Tenderness: There is no abdominal tenderness.   Musculoskeletal:      Right lower leg: No swelling. No edema.      Left lower leg: No swelling. No edema.   Skin:     General: Skin is warm and dry.   Neurological:      Mental Status: He is lethargic.   Psychiatric:         Behavior: Behavior is cooperative.      Comments: Lethargic but arouses. Alert to name.               ----------------------------------------------------------------------------------------------------------------------  Tele:          ----------------------------------------------------------------------------------------------------------------------  Results from last 7 days   Lab Units 08/07/21  0637 08/05/21  0824 08/04/21 2049 08/04/21  1428   CK TOTAL U/L 52 348*  --  544*   TROPONIN T ng/mL  --   --  0.041* 0.043*   PROBNP pg/mL  --   --   --  1,404.0     Results from last 7 days   Lab Units 08/07/21  0138 08/06/21  1221 08/05/21  0847 08/05/21  0846 08/04/21  1540 08/04/21  1428 08/04/21  1428   CRP mg/dL 1.60* 2.07*  --  1.20*  --    < > 0.80*   LACTATE mmol/L  --   --  1.7  --  2.2*  --  2.2*   WBC 10*3/mm3 9.92 8.68 13.62*  --   --    < > 12.32*   HEMOGLOBIN g/dL 11.9* 12.3* 13.6  --   --    < > 15.1    HEMATOCRIT % 39.9 42.8 44.8  --   --    < > 51.4*   MCV fL 104.2* 107.5* 104.9*  --   --    < > 105.3*   MCHC g/dL 29.8* 28.7* 30.4*  --   --    < > 29.4*   PLATELETS 10*3/mm3 182 206 254  --   --    < > 287   INR   --   --   --   --  1.08  --   --     < > = values in this interval not displayed.         Results from last 7 days   Lab Units 08/07/21  0637 08/07/21  0138 08/06/21  1726 08/05/21  1528 08/05/21  0846 08/05/21  0226 08/04/21  1428   SODIUM mmol/L 153* 153* 154*   < > 159*  159*   < > 155*   POTASSIUM mmol/L 3.5 3.5 3.6   < > 4.2  4.2   < > 4.3   MAGNESIUM mg/dL  --  2.1  --   --   --   --  2.7*   CHLORIDE mmol/L 125* 126* 125*   < > 124*  124*   < > 120*   CO2 mmol/L 20.5* 19.7* 20.9*   < > 23.6  23.6   < > 23.6   BUN mg/dL 54* 56* 62*   < > 90*  90*   < > 105*   CREATININE mg/dL 1.57* 1.60* 1.71*   < > 2.18*  2.18*   < > 2.63*   EGFR IF NONAFRICN AM mL/min/1.73 43* 42* 39*   < > 29*  29*   < > 23*   CALCIUM mg/dL 8.7 8.8 9.0   < > 10.2  10.2   < > 10.0   GLUCOSE mg/dL 107* 127* 104*   < > 92  92   < > 128*   ALBUMIN g/dL  --  2.72*  --   --  3.50  --  3.69   BILIRUBIN mg/dL  --  0.4  --   --  0.5  --  0.4   ALK PHOS U/L  --  90  --   --  122*  --  132*   AST (SGOT) U/L  --  13  --   --  23  --  33   ALT (SGPT) U/L  --  21  --   --  34  --  46*    < > = values in this interval not displayed.   Estimated Creatinine Clearance: 40.2 mL/min (A) (by C-G formula based on SCr of 1.57 mg/dL (H)).  No results found for: AMMONIA      Blood Culture   Date Value Ref Range Status   08/04/2021 No growth at 24 hours  Preliminary   08/04/2021 No growth at 24 hours  Preliminary     Urine Culture   Date Value Ref Range Status   08/04/2021 <25,000 CFU/mL Gram Negative Bacilli (A)  Final     No results found for: WOUNDCX  No results found for: STOOLCX  ----------------------------------------------------------------------------------------------------------------------  Imaging Results (Last 24 Hours)      Procedure Component Value Units Date/Time    US Renal Bilateral [[id number]] Collected: 08/06/21 1615     Updated: 08/06/21 1619    Narrative:      EXAMINATION: US RENAL BILATERAL-      CLINICAL INDICATION:     Acute renal failure; N39.0-Urinary tract  infection, site not specified; N17.0-Acute kidney failure with tubular  necrosis; E87.0-Hyperosmolality and hypernatremia; E86.0-Dehydration;  R77.8-Other specified abnormalities of plasma proteins     TECHNIQUE: Multiplanar gray scale sonographic imaging of the kidneys.  Color Doppler implemented.     COMPARISON: NONE      FINDINGS:      RIGHT: The right kidney measures 9.1 cm in length. No mass,  hydronephrosis, or shadowing stone.     LEFT: The left kidney measures 12.2 cm in length. No mass,  hydronephrosis, or shadowing stone.       Impression:      Unremarkable bilateral renal ultrasound.      This report was finalized on 8/6/2021 4:16 PM by Dr. Adebayo Dodd MD.           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   · Follow urine culture <25,000.    · MRSA of nares +, IV Vancomycin for now given ongoing consolidation.  Discussed with pharmacy.     · Follow peripheral blood cultures with no growth thus far.  · Follow daily CBC.  · Polysporin added for bilateral conjunctivitis.   · Culture ordered  of mucopurulent discharge from eye, pending at present for finalized results but with preliminary growth of gram positive cocci.    · SLP evaluation appreciated on 08/05/21.  · Midline placement appreciated.  · WBC normalized; C-RP improving.   · Continue lactobacillus.   · Continue ethyl alcohol swabs of nares for MRSA colonization.      -Acute hypoxemic respiratory failure:   · Orders placed to titrate oxygen saturation greater than or equal to 90%.    · Treat acute vs. chronic consolidation as outlined.     -Ongoing lethargy:   -Metabolic encephalopathy from sepsis and UTI superimposed on known advanced dementia  -Advanced dementia  · Decrease nightly Remeron to 15mg from 30mg in the event it is contributing to his lethargy.    · Check ABG.   · CT head on admission without acute source of AMS.  · Continue Aricept.   · Continue Namenda.        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   · No inpatient Urology coverage until 8/10/21.    · Outpatient Urology follow-up.    · Urine culture with <25k colonies gram negative bacilli.      -Hypernatremia:   · Sodium improved to 153 over the past 24 hours.    · Nephrology assistance appreciated.    · Continue D5W @ 75cc/h  · Repeat BMP q6.      -TYRELL on CKD IIIb:   · Continue IV fluids. (EF from 05/21 61-65%)  · Creatinine improving.   · Avoid nephrotoxins when possible.   · Q6 BMP.   · US renal unremarkable.       -Elevated CPK, now normalized.   · CPK now normalized  · No known falls at SNF per discussion with SNF staff.       -Elevated troponin T:   -ASCVD s/p CABG 4v in 2005 sequential LIMA to first diagonal and LAD, saphenous graft to OM1, and PL of the circumflex  -Mild aortic valve regurgitation:   -Wide complex tachycardia in May 2021:   -Sinus bradycardia in the 50s:   · Continuous cardiac monitoring.   · Echocardiogram from 05/2021 reviewed.    · Outpatient office visit with Dr. Rice  Maxime reviewed from 07/07 with noted preserved LV function and measures to improve quality of life with recommendation for no ischemic evaluation unless there is an acute issue given worsening dementia.    · Held Metoprolol 12.5mg BID for now.       -Macrocytosis with known hx of folate deficiency:   · Hydrate.   · Continue folate supplement.   · Repeat CBC.           -Prolonged QTc  -Likely 2/2 hypermagnesemia:   · Continue to hydrate.   · Repeat EKG x1 on 08/05 reviewed with prolonged QTc.      -Hx Melanoma:   · 07/2020 pathology reviewed.     -----------  -DVT prophylaxis: SQ Heparin  -GI prophylaxis: Pepcid/Lactobacillus   -Activity:  Up with assistance  -Disposition: return to SNF at [facility]  -Diet:   Dietary Orders (From admission, onward)     Start     Ordered    08/06/21 0800  Dietary Nutrition Supplements Boost Plus (Ensure Enlive, Ensure Plus); chocolate  Daily With Breakfast, Lunch & Dinner     Question Answer Comment   Select Supplement Boost Plus (Ensure Enlive, Ensure Plus)    Flavor: chocolate        08/05/21 1844    08/05/21 0955  Diet Soft Texture; Ground; Thin  Diet Effective Now     Question Answer Comment   Diet Texture / Consistency Soft Texture    Select Texture: Ground    Fluid Consistency Thin        08/05/21 0956                  The patient is high risk due to the following diagnoses/reasons:  Hypernatremia, sepsis 2/2 UTI & PNA, acute bilateral conjunctivitis superimposed on ectropion of left eye        Ava Adame PA-C  08/07/21  09:13 EDT  Pager # [phone]

## 2021-08-08 NOTE — PLAN OF CARE
Problem: Adult Inpatient Plan of Care  Goal: Plan of Care Review  Outcome: Ongoing, Progressing  Goal: Patient-Specific Goal (Individualized)  Outcome: Ongoing, Progressing  Goal: Absence of Hospital-Acquired Illness or Injury  Outcome: Ongoing, Progressing  Intervention: Identify and Manage Fall Risk  Recent Flowsheet Documentation  Taken 8/8/2021 0801 by Eddi Bucio RN  Safety Promotion/Fall Prevention:   safety round/check completed   fall prevention program maintained  Intervention: Prevent Skin Injury  Recent Flowsheet Documentation  Taken 8/8/2021 0801 by Eddi Bucio RN  Body Position: side-lying, left  Skin Protection:   adhesive use limited   pulse oximeter probe site changed  Goal: Optimal Comfort and Wellbeing  Outcome: Ongoing, Progressing  Intervention: Provide Person-Centered Care  Recent Flowsheet Documentation  Taken 8/8/2021 0801 by Eddi Bucio RN  Trust Relationship/Rapport:   care explained   thoughts/feelings acknowledged  Goal: Readiness for Transition of Care  Outcome: Ongoing, Progressing     Problem: Skin Injury Risk Increased  Goal: Skin Health and Integrity  Outcome: Ongoing, Progressing  Intervention: Optimize Skin Protection  Recent Flowsheet Documentation  Taken 8/8/2021 0801 by Eddi Bucio RN  Pressure Reduction Techniques:   frequent weight shift encouraged   heels elevated off bed   weight shift assistance provided  Head of Bed (HOB): HOB elevated  Pressure Reduction Devices: pressure-redistributing mattress utilized  Skin Protection:   adhesive use limited   pulse oximeter probe site changed  Intervention: Promote and Optimize Oral Intake  Recent Flowsheet Documentation  Taken 8/8/2021 0801 by Eddi Bucio RN  Oral Nutrition Promotion: rest periods promoted     Problem: Fall Injury Risk  Goal: Absence of Fall and Fall-Related Injury  Outcome: Ongoing, Progressing  Intervention: Identify and Manage Contributors to Fall Injury Risk  Recent Flowsheet  Documentation  Taken 8/8/2021 0801 by Eddi Bucio, RN  Medication Review/Management: medications reviewed  Intervention: Promote Injury-Free Environment  Recent Flowsheet Documentation  Taken 8/8/2021 0801 by Eddi Bucio, RN  Safety Promotion/Fall Prevention:   safety round/check completed   fall prevention program maintained     Problem: UTI (Urinary Tract Infection)  Goal: Improved Infection Symptoms  Outcome: Ongoing, Progressing   Goal Outcome Evaluation:

## 2021-08-08 NOTE — NURSING NOTE
Wound consult for linear excoriation to the Rt upper back. This was present on admission. Area in non blanchable with a partial thickness loss of the dermis. New order for Venelex Q shift

## 2021-08-08 NOTE — PROGRESS NOTES
Interval History:     Patient Complaints: Daughter at bedside.  Patient is more awake alert and not short of breath and eating better.  No vomiting or diarrhea or abdominal pain        Vital Signs  Temp:  [97.5 °F (36.4 °C)-98.4 °F (36.9 °C)] 97.7 °F (36.5 °C)  Heart Rate:  [52-65] 65  Resp:  [16-18] 18  BP: ()/(52-83) 114/83    Physical Exam:    General:           No distress      HEENT:  No pallor               Neck:  No JVD       Lungs:    Clear   Heart:   no rub       Abdomen:   Normal bowel sounds, soft non-tender, non-distended, no guarding       Extremities:  No edema                        Results Review:    I reviewed the patient's new clinical results.    Lab Results (last 24 hours)     Procedure Component Value Units Date/Time    Basic Metabolic Panel [[id number]]  (Abnormal) Collected: 08/08/21 1234    Specimen: Blood Updated: 08/08/21 1328     Glucose 97 mg/dL      BUN 36 mg/dL      Creatinine 1.42 mg/dL      Sodium 145 mmol/L      Potassium 4.1 mmol/L      Chloride 117 mmol/L      CO2 22.1 mmol/L      Calcium 8.7 mg/dL      eGFR Non African Amer 48 mL/min/1.73      BUN/Creatinine Ratio 25.4     Anion Gap 5.9 mmol/L     Narrative:      GFR Normal >60  Chronic Kidney Disease <60  Kidney Failure <15      Wound Culture - Wound, Eye [[id number]]  (Abnormal) Collected: 08/05/21 1106    Specimen: Wound from Eye Updated: 08/08/21 0834     Wound Culture Rare Staphylococcus aureus, MRSA     Comment: Methicillin resistant Staphylococcus aureus, Patient may be an isolation risk.         Scant growth (1+) Normal Skin Meli     Gram Stain Moderate (3+) WBCs seen      Few (2+) Gram positive bacilli      Rare (1+) Gram positive cocci in pairs    Basic Metabolic Panel [[id number]]  (Abnormal) Collected: 08/08/21 0732    Specimen: Blood Updated: 08/08/21 0834     Glucose 129 mg/dL      BUN 38 mg/dL      Creatinine 1.40 mg/dL      Sodium 148 mmol/L      Potassium 3.3 mmol/L      Chloride 118 mmol/L      CO2 22.9  mmol/L      Calcium 8.3 mg/dL      eGFR Non African Amer 49 mL/min/1.73      BUN/Creatinine Ratio 27.1     Anion Gap 7.1 mmol/L     Narrative:      GFR Normal >60  Chronic Kidney Disease <60  Kidney Failure <15      Blood Gas, Venous With Co-Ox [[id number]]  (Abnormal) Collected: 08/08/21 0409    Specimen: Venous Blood Updated: 08/08/21 0410     Site Lab     pH, Venous 7.377 pH Units      pCO2, Venous 40.2 mm Hg      Comment: 84 Value below reference range        pO2, Venous 57.4 mm Hg      Comment: 83 Value above reference range        HCO3, Venous 23.6 mmol/L      Base Excess, Venous -1.5 mmol/L      O2 Saturation, Venous 91.4 %      Comment: 83 Value above reference range        Hemoglobin, Blood Gas 12.3 g/dL      Comment: 84 Value below reference range        CO2 Content 24.8 mmol/L      Temperature 37.0 C      Barometric Pressure for Blood Gas 728 mmHg      Modality Room Air     FIO2 21 %      Ventilator Mode NA     Collected by [id number]     Comment: Meter: [id number]     :  [id number]        Oxyhemoglobin Venous 89.9 %      Comment: 83 Value above reference range        Carboxyhemoglobin Venous 1.5 %      Methemoglobin Venous 0.1 %     Comprehensive Metabolic Panel [[id number]]  (Abnormal) Collected: 08/08/21 0141    Specimen: Blood Updated: 08/08/21 0208     Glucose 134 mg/dL      BUN 41 mg/dL      Creatinine 1.45 mg/dL      Sodium 149 mmol/L      Potassium 3.2 mmol/L      Chloride 121 mmol/L      CO2 21.2 mmol/L      Calcium 8.4 mg/dL      Total Protein 5.2 g/dL      Albumin 2.49 g/dL      ALT (SGPT) 19 U/L      AST (SGOT) 16 U/L      Alkaline Phosphatase 78 U/L      Total Bilirubin 0.3 mg/dL      eGFR Non African Amer 47 mL/min/1.73      Globulin 2.7 gm/dL      A/G Ratio 0.9 g/dL      BUN/Creatinine Ratio 28.3     Anion Gap 6.8 mmol/L     Narrative:      GFR Normal >60  Chronic Kidney Disease <60  Kidney Failure <15      CBC & Differential [[id number]]  (Abnormal) Collected: 08/08/21 0141     Specimen: Blood Updated: 08/08/21 0147    Narrative:      The following orders were created for panel order CBC & Differential.  Procedure                               Abnormality         Status                     ---------                               -----------         ------                     CBC Auto Differential[[id number]]        Abnormal            Final result                 Please view results for these tests on the individual orders.    CBC Auto Differential [[id number]]  (Abnormal) Collected: 08/08/21 0141    Specimen: Blood Updated: 08/08/21 0147     WBC 8.51 10*3/mm3      RBC 3.45 10*6/mm3      Hemoglobin 10.8 g/dL      Hematocrit 36.0 %      .3 fL      MCH 31.3 pg      MCHC 30.0 g/dL      RDW 13.3 %      RDW-SD 50.9 fl      MPV 9.6 fL      Platelets 163 10*3/mm3      Neutrophil % 50.9 %      Lymphocyte % 22.8 %      Monocyte % 7.1 %      Eosinophil % 18.6 %      Basophil % 0.4 %      Immature Grans % 0.2 %      Neutrophils, Absolute 4.34 10*3/mm3      Lymphocytes, Absolute 1.94 10*3/mm3      Monocytes, Absolute 0.60 10*3/mm3      Eosinophils, Absolute 1.58 10*3/mm3      Basophils, Absolute 0.03 10*3/mm3      Immature Grans, Absolute 0.02 10*3/mm3      nRBC 0.0 /100 WBC     Basic Metabolic Panel [[id number]]  (Abnormal) Collected: 08/07/21 1734    Specimen: Blood Updated: 08/07/21 1813     Glucose 89 mg/dL      BUN 46 mg/dL      Creatinine 1.65 mg/dL      Sodium 150 mmol/L      Potassium 4.1 mmol/L      Comment: Slight hemolysis detected by analyzer. Results may be affected.        Chloride 125 mmol/L      CO2 16.0 mmol/L      Calcium 8.7 mg/dL      eGFR Non African Amer 40 mL/min/1.73      BUN/Creatinine Ratio 27.9     Anion Gap 9.0 mmol/L     Narrative:      GFR Normal >60  Chronic Kidney Disease <60  Kidney Failure <15            Imaging Results (Last 24 Hours)     ** No results found for the last 24 hours. **          Assessment and Plan:    1.  Acute renal failure  2.  Metabolic  acidosis  3.  Hypernatremia  4.  Metabolic encephalopathy  5.  Complicated UTI    He is more alert.  His encephalopathy is better.  His creatinine and sodium and acidemia is also better.  Will give an additional 1.5 L of hypotonic bicarbonate solution    Paresh Landers MD  08/08/21  13:38 EDT

## 2021-08-08 NOTE — PROGRESS NOTES
Patient Identification:  Name:  Porsha Soni  Age:  80 y.o.  Sex:  male  :  1940  MRN:  [id number]  Visit Number:  [id number]  Primary Care Provider:  Padmini Terrazas APRN    Length of stay:  4    Subjective/Interval History/Consultants/Procedures     Chief complaint: Hypokalemia; follow-up Hypernatremia, follow-up sepsis UTI, pneumonia    HPI/Hospital course:     Porsha Soni is an 80 y.o. male with past medical history significant for BPH, CKD, COVID-19 in 2021, CKD IIIa, dementia, malignant meloma, and hypertension.  He was last admitted to this facility in May 2021 with sepsis with bilateral pneumonia.  He was discharged from here to SNF after May 2021 hospitalization. He was sent to [facility] ED on  from [facility] due to worsening confusion superimposed on underlying dementia.  He was admitted on  for further evaluation and treatment of UTI, PNA, and hypernatremia.  Please see admitting H&P for further details.     Mr. Soni was started on broad spectrum IV abx coverage on admission with cefepime, flagyl, and vancomycin. Midline consult was placed due to difficulty with phlebotomy sticks to obtain blood and need for regular BMPs to monitor sodium with initial hypernatremia.       Palliative care was consulted to assist with family support and goals of care in setting of progressively worsening dementia.  Assistance was much appreciated with evaluation by Meena Joaquin NP.     Given persistent hypernatremia in spite of fluid hydration with recent poor oral intake, Nephrology was consulted to assist with further treatment.       Consultants:   Consults     Date and Time Order Name Status Description    2021  7:46 AM Inpatient Nephrology Consult      2021  8:10 AM Inpatient Palliative Care MD Consult Completed             Procedures:   · Midline placement on 21    Subjective:      Mr. Soni is resting in bed. He was up until 4AM and is lethargic this AM.  Following this he  was up to eat breakfast, eat lunch, drink coffee, and was napping deeply again on my second rounds to see him.       Discussed with JACKIE Montague with Mr. Soni more alert today.  He got up to eat meals and drink coffee for his wife, and has overall been more alert.      ----------------------------------------------------------------------------------------------------------------------  Current Hospital Meds:  atorvastatin, 20 mg, Oral, Daily  bacitracin-polymyxin b, , Both Eyes, Q12H  carboxymethylcellulose, 1 drop, Left Eye, BID  cefepime, 2 g, Intravenous, Q12H  [START ON 8/9/2021] cholecalciferol, 50,000 Units, Oral, Weekly  donepezil, 10 mg, Oral, Nightly  ethyl alcohol, 1 application, Nasal, BID  famotidine, 20 mg, Oral, Daily  folic acid, 1 mg, Oral, Daily  heparin (porcine), 5,000 Units, Subcutaneous, Q12H  lactobacillus acidophilus, 1 capsule, Oral, Daily  magic barrier cream, , Topical, 4x Daily  melatonin, 5 mg, Oral, Nightly  memantine, 10 mg, Oral, Q12H  metroNIDAZOLE, 500 mg, Intravenous, Q8H  mirtazapine, 15 mg, Oral, Nightly  polyethylene glycol, 17 g, Oral, Daily  sertraline, 50 mg, Oral, Nightly  sodium chloride, 10 mL, Intravenous, Q12H  vancomycin, 1,000 mg, Intravenous, Q24H      custom IV infusion builder,       ----------------------------------------------------------------------------------------------------------------------      Objective     Vital Signs:  Temp:  [97.5 °F (36.4 °C)-98.4 °F (36.9 °C)] 98 °F (36.7 °C)  Heart Rate:  [52-65] 65  Resp:  [16-18] 18  BP: ()/(52-83) 116/65      08/06/21  0500 08/07/21  0500 08/08/21  0300   Weight: 75.8 kg (167 lb) 77 kg (169 lb 11.2 oz) 78.7 kg (173 lb 9.6 oz)     Body mass index is 24.91 kg/m².    Intake/Output Summary (Last 24 hours) at 8/8/2021 1434  Last data filed at 8/8/2021 1409  Gross per 24 hour   Intake 3627 ml   Output 950 ml   Net 2677 ml     I/O this shift:  In: 1336 [I.V.:1336]  Out: -   Diet Soft Texture; Ground;  Thin  ----------------------------------------------------------------------------------------------------------------------    Physical Exam  Vitals and nursing note reviewed.   Constitutional:       Appearance: He is ill-appearing.      Interventions: Nasal cannula in place.      Comments: Chronically ill-appearing   HENT:      Head: Normocephalic and atraumatic.   Eyes:      General: Lids are normal.        Comments: Left eye ectropion with erythema is improving.    Cardiovascular:      Rate and Rhythm: Normal rate and regular rhythm.      Heart sounds: S1 normal and S2 normal.   Pulmonary:      Effort: No tachypnea or bradypnea.      Breath sounds: No decreased breath sounds, wheezing, rhonchi or rales.   Abdominal:      General: Bowel sounds are normal.      Palpations: Abdomen is soft.      Tenderness: There is no abdominal tenderness.   Musculoskeletal:      Right lower leg: No swelling. No edema.      Left lower leg: No swelling. No edema.   Skin:     General: Skin is warm and dry.   Neurological:      Mental Status: He is lethargic.   Psychiatric:         Behavior: Behavior is cooperative.      Comments: Lethargic but arouses. Alert to name.               ----------------------------------------------------------------------------------------------------------------------  Tele:          ----------------------------------------------------------------------------------------------------------------------  Results from last 7 days   Lab Units 08/07/21  0637 08/05/21  0824 08/04/21  2049 08/04/21  1428   CK TOTAL U/L 52 348*  --  544*   TROPONIN T ng/mL  --   --  0.041* 0.043*   PROBNP pg/mL  --   --   --  1,404.0     Results from last 7 days   Lab Units 08/08/21  0141 08/07/21  0138 08/06/21  1221 08/05/21  0847 08/05/21  0847 08/05/21  0846 08/04/21  1540 08/04/21  1428 08/04/21  1428   CRP mg/dL  --  1.60* 2.07*  --   --  1.20*  --    < > 0.80*   LACTATE mmol/L  --   --   --   --  1.7  --  2.2*  --  2.2*    WBC 10*3/mm3 8.51 9.92 8.68   < > 13.62*  --   --    < > 12.32*   HEMOGLOBIN g/dL 10.8* 11.9* 12.3*   < > 13.6  --   --    < > 15.1   HEMATOCRIT % 36.0* 39.9 42.8   < > 44.8  --   --    < > 51.4*   MCV fL 104.3* 104.2* 107.5*   < > 104.9*  --   --    < > 105.3*   MCHC g/dL 30.0* 29.8* 28.7*   < > 30.4*  --   --    < > 29.4*   PLATELETS 10*3/mm3 163 182 206   < > 254  --   --    < > 287   INR   --   --   --   --   --   --  1.08  --   --     < > = values in this interval not displayed.     Results from last 7 days   Lab Units 08/07/21  0934   PH, ARTERIAL pH units 7.335*   PO2 ART mm Hg 78.3*   PCO2, ARTERIAL mm Hg 42.0   HCO3 ART mmol/L 22.4     Results from last 7 days   Lab Units 08/08/21  1234 08/08/21  0732 08/08/21  0141 08/07/21  0637 08/07/21  0138 08/05/21  1528 08/05/21  0846 08/05/21  0226 08/04/21  1428   SODIUM mmol/L 145 148* 149*   < > 153*   < > 159*  159*   < > 155*   POTASSIUM mmol/L 4.1 3.3* 3.2*   < > 3.5   < > 4.2  4.2   < > 4.3   MAGNESIUM mg/dL  --   --   --   --  2.1  --   --   --  2.7*   CHLORIDE mmol/L 117* 118* 121*   < > 126*   < > 124*  124*   < > 120*   CO2 mmol/L 22.1 22.9 21.2*   < > 19.7*   < > 23.6  23.6   < > 23.6   BUN mg/dL 36* 38* 41*   < > 56*   < > 90*  90*   < > 105*   CREATININE mg/dL 1.42* 1.40* 1.45*   < > 1.60*   < > 2.18*  2.18*   < > 2.63*   EGFR IF NONAFRICN AM mL/min/1.73 48* 49* 47*   < > 42*   < > 29*  29*   < > 23*   CALCIUM mg/dL 8.7 8.3* 8.4*   < > 8.8   < > 10.2  10.2   < > 10.0   GLUCOSE mg/dL 97 129* 134*   < > 127*   < > 92  92   < > 128*   ALBUMIN g/dL  --   --  2.49*  --  2.72*  --  3.50  --  3.69   BILIRUBIN mg/dL  --   --  0.3  --  0.4  --  0.5  --  0.4   ALK PHOS U/L  --   --  78  --  90  --  122*  --  132*   AST (SGOT) U/L  --   --  16  --  13  --  23  --  33   ALT (SGPT) U/L  --   --  19  --  21  --  34  --  46*    < > = values in this interval not displayed.   Estimated Creatinine Clearance: 45.4 mL/min (A) (by C-G formula based on SCr of  1.42 mg/dL (H)).  No results found for: AMMONIA      Blood Culture   Date Value Ref Range Status   08/04/2021 No growth at 24 hours  Preliminary   08/04/2021 No growth at 24 hours  Preliminary     Urine Culture   Date Value Ref Range Status   08/04/2021 <25,000 CFU/mL Gram Negative Bacilli (A)  Final     No results found for: WOUNDCX  No results found for: STOOLCX  ----------------------------------------------------------------------------------------------------------------------  Imaging Results (Last 24 Hours)     ** No results found for the last 24 hours. 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   · Follow urine culture <25,000.    · MRSA of nares +, IV Vancomycin for now given ongoing consolidation.  Discussed with pharmacy.     · Follow peripheral blood cultures with no growth thus far.  · Follow daily CBC.  · Polysporin added for bilateral conjunctivitis.   · Culture ordered of mucopurulent discharge from eye with growth of MRSA on eye culture.    · SLP evaluation appreciated on 08/05/21.  · Midline placement appreciated.  · WBC normalized; C-RP improving.   · Continue lactobacillus.   · Continue ethyl alcohol swabs of nares for MRSA colonization.       -Hypokalemia:   · Electrolyte replacement per protocol.   · Continuous cardiac  monitoring.      -Acute hypoxemic respiratory failure:   · Orders placed to titrate oxygen saturation greater than or equal to 90%.    · Treat acute vs. chronic consolidation as outlined.       -Metabolic encephalopathy from sepsis and UTI superimposed on known advanced dementia  -Ongoing lethargy  -Advanced dementia  · Decrease nightly Remeron to 15mg from 30mg in the event it is contributing to his lethargy.    · Check ABG.   · CT head on admission without acute source of AMS.  · Continue Aricept.   · Continue Namenda.        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   · No inpatient Urology coverage until 8/10/21.    · Outpatient Urology follow-up.    · Urine culture with <25k colonies gram negative bacilli.      -Hypernatremia:   · Sodium continues to slowly improve.    · Nephrology assistance appreciated.    · Continue 1.5L of hypotonic bicarb per Nephrology.   · Repeat BMP q6.      -TYRELL on CKD IIIb:   · Continue IV fluids. (EF from 05/21 61-65%)  · Creatinine improving.   · Avoid nephrotoxins when possible.   · Q6 BMP.   · US renal unremarkable.       -Elevated CPK, now normalized.   · CPK now normalized  · No known falls at SNF per discussion with SNF staff.       -Elevated troponin T:   -ASCVD s/p CABG 4v in 2005 sequential LIMA to first diagonal and LAD, saphenous graft to OM1, and PL of the circumflex  -Mild aortic valve regurgitation:   -Wide complex tachycardia in May 2021:   -Sinus bradycardia in the 50s:   · Continuous cardiac monitoring.   · Echocardiogram from 05/2021 reviewed.    · Outpatient office visit with Dr. Dwayne Swartz reviewed from 07/07 with noted preserved LV function and measures to improve quality of life with recommendation for no ischemic evaluation unless there is an acute issue given worsening dementia.    · Held Metoprolol 12.5mg BID for now due to prior mild bradycardia on 08/07.       -Macrocytosis  with known hx of folate deficiency:   · Hydrate.   · Continue folate supplement.   · Repeat CBC.      -Prolonged QTc  -Likely 2/2 hypermagnesemia:   · Continue to hydrate.   · Repeat EKG x1 on 08/05 reviewed with prolonged QTc.      -Hx Melanoma:   · 07/2020 pathology reviewed.     -----------  -DVT prophylaxis: SQ Heparin  -GI prophylaxis: Pepcid/Lactobacillus   -Activity:  Up with assistance  -Disposition: return to SNF at ID  -Diet:   Dietary Orders (From admission, onward)     Start     Ordered    08/06/21 0800  Dietary Nutrition Supplements Boost Plus (Ensure Enlive, Ensure Plus); chocolate  Daily With Breakfast, Lunch & Dinner     Question Answer Comment   Select Supplement Boost Plus (Ensure Enlive, Ensure Plus)    Flavor: chocolate        08/05/21 1844    08/05/21 0955  Diet Soft Texture; Ground; Thin  Diet Effective Now     Question Answer Comment   Diet Texture / Consistency Soft Texture    Select Texture: Ground    Fluid Consistency Thin        08/05/21 0956                  The patient is high risk due to the following diagnoses/reasons:  Hypernatremia, sepsis 2/2 UTI & PNA, acute bilateral conjunctivitis superimposed on ectropion of left eye        Ava Adame PA-C  08/08/21  14:34 EDT  Pager # [phone]

## 2021-08-08 NOTE — PLAN OF CARE
Problem: Adult Inpatient Plan of Care  Goal: Plan of Care Review  Outcome: Ongoing, Progressing  Goal: Patient-Specific Goal (Individualized)  Outcome: Ongoing, Progressing  Goal: Absence of Hospital-Acquired Illness or Injury  Outcome: Ongoing, Progressing  Intervention: Identify and Manage Fall Risk  Recent Flowsheet Documentation  Taken 8/7/2021 1955 by Danielle Lee, RN  Safety Promotion/Fall Prevention:   room organization consistent   safety round/check completed   fall prevention program maintained  Intervention: Prevent Skin Injury  Recent Flowsheet Documentation  Taken 8/7/2021 1955 by Danielle Lee, RN  Skin Protection:   adhesive use limited   incontinence pads utilized  Intervention: Prevent Infection  Recent Flowsheet Documentation  Taken 8/7/2021 1955 by Danielle Lee, RN  Infection Prevention: rest/sleep promoted  Goal: Optimal Comfort and Wellbeing  Outcome: Ongoing, Progressing  Intervention: Provide Person-Centered Care  Recent Flowsheet Documentation  Taken 8/7/2021 1955 by Danielle Lee, RN  Trust Relationship/Rapport:   care explained   choices provided   emotional support provided   empathic listening provided   questions answered   questions encouraged   reassurance provided   thoughts/feelings acknowledged  Goal: Readiness for Transition of Care  Outcome: Ongoing, Progressing

## 2021-08-09 NOTE — PLAN OF CARE
Goal Outcome Evaluation:   Pt resting well this shift. No complaints or distress noted. Will continue to follow plan of care.

## 2021-08-09 NOTE — PROGRESS NOTES
Pharmacokinetics Service Note:     continues on day 4 of vancomycin 1000mg q24h for his sepsis.  A 24 hour post infusion level was reported as 16.3 mg/L. The AUC is calculated at 492 mg/L.hr. This is acceptable and consistent with improved clearance. Will continue the current dosage of 1000mg q24h. Will obtain a repeat level in a few days to guide further dosing.       Thank you,    Lenore Yates, PharmD  Pharmacy Resident  8/9/2021  16:48 EDT

## 2021-08-09 NOTE — CASE MANAGEMENT/SOCIAL WORK
Discharge Planning Assessment   [facility]     Patient Name: Porsha Soni  MRN: [id number]  Today's Date: 8/9/2021    Admit Date: 8/4/2021      Discharge Plan     Row Name 08/09/21 1554       Plan    Plan  Pt continues to have skilled bed reserved per Private Pay at [facility] per Camille.  SS will follow.        Continued Care and Services - Admitted Since 8/4/2021     Destination     Service Provider Request Status Selected Services Address Phone Fax Patient Preferred    [facility]  Pending - No Request Sent N/A [address] [place] [address] [phone] --            Selected Continued Care - Prior Encounters Includes selections from prior encounters from 5/6/2021 to 8/9/2021    Discharged on 5/28/2021 Admission date: 5/23/2021 - Discharge disposition: Skilled Nursing Facility (DC - External)    Destination     Service Provider Selected Services Address Phone Fax Patient Preferred    [facility]  Skilled Nursing [address] [place] [address] [phone] --                        JUDY OswaldW

## 2021-08-09 NOTE — PROGRESS NOTES
Nephrology Progress Note      Subjective     Patient was resting on bed comfortably sleepy and did not communicated.     Objective       Vital signs :     Temp:  [96.9 °F (36.1 °C)-98.1 °F (36.7 °C)] 97.2 °F (36.2 °C)  Heart Rate:  [59-68] 59  Resp:  [18-20] 18  BP: (100-134)/(58-83) 110/58      Intake/Output Summary (Last 24 hours) at 8/9/2021 0741  Last data filed at 8/9/2021 0311  Gross per 24 hour   Intake 3592 ml   Output --   Net 3592 ml       Physical Exam:    General Appearance : Not in acute distress  Lungs : clear to auscultation, respirations regular  Heart :  regular rhythm & normal rate, normal S1, S2 and no murmur, no rub  Abdomen : normal bowel sounds, no masses, no hepatomegaly, no splenomegaly, soft non-tender and no guarding  Extremities : moves extremities well, no edema, no cyanosis and no redness      Laboratory Data :     Albumin Albumin   Date Value Ref Range Status   08/09/2021 2.29 (L) 3.50 - 5.20 g/dL Final   08/08/2021 2.49 (L) 3.50 - 5.20 g/dL Final   08/07/2021 2.72 (L) 3.50 - 5.20 g/dL Final      Magnesium Magnesium   Date Value Ref Range Status   08/07/2021 2.1 1.6 - 2.4 mg/dL Final          PTH               No results found for: PTH    CBC and coagulation:  Results from last 7 days   Lab Units 08/09/21  0028 08/08/21  0141 08/07/21  0138 08/06/21  1221 08/06/21  1221 08/05/21  0847 08/05/21  0847 08/05/21  0846 08/04/21  1540 08/04/21  1428 08/04/21  1428   PROCALCITONIN ng/mL  --   --   --   --   --   --   --   --  0.11  --   --    LACTATE mmol/L  --   --   --   --   --   --  1.7  --  2.2*  --  2.2*   SED RATE mm/hr  --   --   --   --   --   --   --   --   --   --  15   CRP mg/dL  --   --  1.60*  --  2.07*  --   --  1.20*  --    < > 0.80*   WBC 10*3/mm3 8.72 8.51 9.92   < > 8.68   < > 13.62*  --   --    < > 12.32*   HEMOGLOBIN g/dL 10.4* 10.8* 11.9*   < > 12.3*   < > 13.6  --   --    < > 15.1   HEMATOCRIT % 35.3* 36.0* 39.9   < > 42.8   < > 44.8  --   --    < > 51.4*   MCV fL 105.1*  104.3* 104.2*   < > 107.5*   < > 104.9*  --   --    < > 105.3*   MCHC g/dL 29.5* 30.0* 29.8*   < > 28.7*   < > 30.4*  --   --    < > 29.4*   PLATELETS 10*3/mm3 155 163 182   < > 206   < > 254  --   --    < > 287   INR   --   --   --   --   --   --   --   --  1.08  --   --     < > = values in this interval not displayed.     Acid/base balance:  Results from last 7 days   Lab Units 08/07/21  0934   PH, ARTERIAL pH units 7.335*   PO2 ART mm Hg 78.3*   PCO2, ARTERIAL mm Hg 42.0   HCO3 ART mmol/L 22.4     Renal and electrolytes:  Results from last 7 days   Lab Units 08/09/21  0530 08/09/21  0028 08/08/21  1905 08/08/21  1234 08/08/21  1234 08/08/21  0732 08/08/21  0732 08/07/21  0637 08/07/21  0138 08/05/21  0226 08/04/21  1428   SODIUM mmol/L 141 142  142 144  --  145  --  148*   < > 153*   < > 155*   POTASSIUM mmol/L 3.6 3.6  3.6 3.8   < > 4.1   < > 3.3*   < > 3.5   < > 4.3   MAGNESIUM mg/dL  --   --   --   --   --   --   --   --  2.1  --  2.7*   CHLORIDE mmol/L 114* 114*  114* 115*   < > 117*   < > 118*   < > 126*   < > 120*   CO2 mmol/L 19.6* 23.2  23.2 21.6*   < > 22.1   < > 22.9   < > 19.7*   < > 23.6   BUN mg/dL 29* 30*  30* 33*   < > 36*   < > 38*   < > 56*   < > 105*   CREATININE mg/dL 1.34* 1.34*  1.34* 1.39*  --  1.42*  --  1.40*   < > 1.60*   < > 2.63*   EGFR IF NONAFRICN AM mL/min/1.73 51* 51*  51* 49*   < > 48*   < > 49*   < > 42*   < > 23*   CALCIUM mg/dL 8.2* 8.0*  8.0* 8.3*   < > 8.7   < > 8.3*   < > 8.8   < > 10.0    < > = values in this interval not displayed.     Estimated Creatinine Clearance: 49.2 mL/min (A) (by C-G formula based on SCr of 1.34 mg/dL (H)).    Liver and pancreatic function:  Results from last 7 days   Lab Units 08/09/21  0028 08/08/21  0141 08/07/21  0138   ALBUMIN g/dL 2.29* 2.49* 2.72*   BILIRUBIN mg/dL 0.3 0.3 0.4   ALK PHOS U/L 78 78 90   AST (SGOT) U/L 21 16 13   ALT (SGPT) U/L 14 19 21         Cardiac:  Results from last 7 days   Lab Units 08/04/21  1428   PROBNP pg/mL  1,404.0     Liver and pancreatic function:  Results from last 7 days   Lab Units 08/09/21  0028 08/08/21  0141 08/07/21  0138   ALBUMIN g/dL 2.29* 2.49* 2.72*   BILIRUBIN mg/dL 0.3 0.3 0.4   ALK PHOS U/L 78 78 90   AST (SGOT) U/L 21 16 13   ALT (SGPT) U/L 14 19 21       Medications :     atorvastatin, 20 mg, Oral, Daily  bacitracin-polymyxin b, , Both Eyes, Q12H  carboxymethylcellulose, 1 drop, Left Eye, BID  castor oil-balsam peru, , Topical, Q12H  cefepime, 2 g, Intravenous, Q12H  cholecalciferol, 50,000 Units, Oral, Weekly  donepezil, 10 mg, Oral, Nightly  ethyl alcohol, 1 application, Nasal, BID  famotidine, 20 mg, Oral, Daily  folic acid, 1 mg, Oral, Daily  heparin (porcine), 5,000 Units, Subcutaneous, Q12H  lactobacillus acidophilus, 1 capsule, Oral, Daily  magic barrier cream, , Topical, 4x Daily  melatonin, 5 mg, Oral, Nightly  memantine, 10 mg, Oral, Q12H  metroNIDAZOLE, 500 mg, Intravenous, Q8H  mirtazapine, 15 mg, Oral, Nightly  polyethylene glycol, 17 g, Oral, Daily  potassium chloride, 10 mEq, Intravenous, Q1H  sertraline, 50 mg, Oral, Nightly  sodium chloride, 10 mL, Intravenous, Q12H  vancomycin, 1,000 mg, Intravenous, Q24H             Assessment/Plan     1.  Acute renal failure  2.  Metabolic acidosis, non gap likely due to TYRELL  3.  Hypernatremia  4.  Metabolic encephalopathy  5.  Complicated UTI     Stable renal functions, persistent metabolic acidosis. I will increase the bicarb to 75meq/l with D5W.   Cr likely at baseline.       Alexandrea Coliler MD  08/09/21  07:41 EDT

## 2021-08-09 NOTE — PROGRESS NOTES
[facility] HOSPITALIST PROGRESS NOTE     Patient Identification:  Name:  Porsha Soni  Age:  80 y.o.  Sex:  male  :  1940  MRN:  [id number]  Visit Number:  [id number]  ROOM: [address] [place] [address]     Primary Care Provider:  Padmini Terrazas APRN    Length of stay in inpatient status:  5    Subjective     Chief Compliant:    Chief Complaint   Patient presents with   • Altered Mental Status       History of Presenting Illness:    Patient seen in follow-up for urinary tract infection.  Family present at bedside.  He has no acute complaints or concerns.  He has been tolerating diet.  Family states he seems more like himself. He has been afebrile.  No adverse events noted overnight.    Objective     Current Hospital Meds:atorvastatin, 20 mg, Oral, Daily  bacitracin-polymyxin b, , Both Eyes, Q12H  carboxymethylcellulose, 1 drop, Left Eye, BID  castor oil-balsam peru, , Topical, Q12H  cefepime, 2 g, Intravenous, Q12H  cholecalciferol, 50,000 Units, Oral, Weekly  donepezil, 10 mg, Oral, Nightly  ethyl alcohol, 1 application, Nasal, BID  famotidine, 20 mg, Oral, Daily  folic acid, 1 mg, Oral, Daily  heparin (porcine), 5,000 Units, Subcutaneous, Q12H  lactobacillus acidophilus, 1 capsule, Oral, Daily  magic barrier cream, , Topical, 4x Daily  melatonin, 5 mg, Oral, Nightly  memantine, 10 mg, Oral, Q12H  metroNIDAZOLE, 500 mg, Intravenous, Q8H  mirtazapine, 15 mg, Oral, Nightly  polyethylene glycol, 17 g, Oral, Daily  sertraline, 50 mg, Oral, Nightly  sodium chloride, 10 mL, Intravenous, Q12H  vancomycin, 1,000 mg, Intravenous, Q24H    custom IV infusion builder, , Last Rate: 125 mL/hr at 21 1034        Current Antimicrobial Therapy:  Anti-Infectives (From admission, onward)    Ordered     Dose/Rate Route Frequency Start Stop    21 1343  vancomycin 1 g/250 mL 0.9% NS (vial-mate)     Neema Mitchell, PharmD reviewed the order on 21 0913.   Ordering Provider: Ava Adame PA-C    1,000  mg  over 60 Minutes Intravenous Every 24 Hours 08/06/21 1600 08/16/21 1559    08/05/21 0814  cefepime 2 gm IVPB in 100 ml NS (VTB)     Neema Mitchell, PharmD reviewed the order on 08/09/21 0913.   Ordering Provider: Evert Brown DO    2 g  over 4 Hours Intravenous Every 12 Hours 08/05/21 2100 08/12/21 2059    08/05/21 0814  cefepime 2 gm IVPB in 100 ml NS (VTB)     Ordering Provider: Evert Brown DO    2 g  over 30 Minutes Intravenous Once 08/05/21 1000 08/05/21 1203    08/05/21 0706  metroNIDAZOLE (FLAGYL) 500 mg/100mL IVPB     Ordering Provider: Jeef Lopez MD    500 mg  over 60 Minutes Intravenous Every 8 Hours Scheduled 08/05/21 0800 08/10/21 0559    08/04/21 1549  vancomycin 1500 mg/500 mL 0.9% NS IVPB ([facility])     Ordering Provider: Luis Gee MD    20 mg/kg × 79.4 kg Intravenous Once 08/04/21 1551 08/04/21 2200    08/04/21 1549  piperacillin-tazobactam (ZOSYN) IVPB 4.5 g in 100 mL NS VTB     Ordering Provider: Luis Gee MD    4.5 g  over 30 Minutes Intravenous Once 08/04/21 1551 08/04/21 1733        Current Diuretic Therapy:  Diuretics (From admission, onward)    None        ----------------------------------------------------------------------------------------------------------------------  Vital Signs:  Temp:  [96.9 °F (36.1 °C)-98.2 °F (36.8 °C)] 98.2 °F (36.8 °C)  Heart Rate:  [59-68] 66  Resp:  [18-20] 18  BP: (100-134)/(56-67) 116/56  SpO2:  [95 %-98 %] 98 %  on   ;   Device (Oxygen Therapy): room air  Body mass index is 25.47 kg/m².    Wt Readings from Last 3 Encounters:   08/09/21 80.5 kg (177 lb 8 oz)   07/07/21 80.3 kg (177 lb)   05/28/21 81.3 kg (179 lb 4.8 oz)     Intake & Output (last 3 days)       08/06 0701 - 08/07 0700 08/07 0701 - 08/08 0700 08/08 0701 - 08/09 0700 08/09 0701 - 08/10 0700    P.O. 240 250 360     I.V. (mL/kg) 760 (9.9) 2041 (25.9) 3232 (40.1)     IV Piggyback        Total Intake(mL/kg) 1000 (13) 2291 (29.1) 3592  (44.6)     Urine (mL/kg/hr)  950 (0.5)      Total Output  950      Net +1000 +1341 +3592             Urine Unmeasured Occurrence 7 x 2 x 7 x     Stool Unmeasured Occurrence 1 x 1 x 1 x         Diet Soft Texture; Ground; Thin  ----------------------------------------------------------------------------------------------------------------------  Physical exam:  Constitutional: chronically ill-appearing male resting comfortably in bed, no acute distress.      HENT:  Head:  Normocephalic and atraumatic.  Mouth:  Moist mucous membranes.    Eyes:  Conjunctivae clear, left eye ectropion. No scleral icterus.    Neck:  Neck supple.  Cardiovascular:  Normal rate, regular rhythm and normal heart sounds with no murmur. No JVD.   Pulmonary/Chest:  No respiratory distress, no wheezes, no crackles, with normal breath sounds and good air movement. Unlabored. No accessory muscle use.  Abdominal:  Soft. No distension and no tenderness.  Bowel sounds present. No rebound or guarding.   Musculoskeletal:  No tenderness, and no deformity.  No red or swollen joints anywhere.    Neurological:  Alert and oriented to person, place, and time.  No cranial nerve deficit.   Nonfocal.   Skin:  Skin is warm and dry. No rash noted. No pallor.   Peripheral vascular:  No clubbing, no cyanosis, no edema. Pedal and tibial pulses 2/4 bilaterally.   ----------------------------------------------------------------------------------------------------------------------  Results from last 7 days   Lab Units 08/09/21  0028 08/08/21  0141 08/07/21  0138 08/06/21  1221 08/06/21  1221 08/05/21  0847 08/05/21  0847 08/05/21  0846 08/04/21  1540 08/04/21  1428 08/04/21  1428   CRP mg/dL  --   --  1.60*  --  2.07*  --   --  1.20*  --    < > 0.80*   LACTATE mmol/L  --   --   --   --   --   --  1.7  --  2.2*  --  2.2*   WBC 10*3/mm3 8.72 8.51 9.92   < > 8.68   < > 13.62*  --   --    < > 12.32*   HEMOGLOBIN g/dL 10.4* 10.8* 11.9*   < > 12.3*   < > 13.6  --   --    < >  15.1   HEMATOCRIT % 35.3* 36.0* 39.9   < > 42.8   < > 44.8  --   --    < > 51.4*   MCV fL 105.1* 104.3* 104.2*   < > 107.5*   < > 104.9*  --   --    < > 105.3*   MCHC g/dL 29.5* 30.0* 29.8*   < > 28.7*   < > 30.4*  --   --    < > 29.4*   PLATELETS 10*3/mm3 155 163 182   < > 206   < > 254  --   --    < > 287   INR   --   --   --   --   --   --   --   --  1.08  --   --     < > = values in this interval not displayed.     Results from last 7 days   Lab Units 08/07/21  0934   PH, ARTERIAL pH units 7.335*   PO2 ART mm Hg 78.3*   PCO2, ARTERIAL mm Hg 42.0   HCO3 ART mmol/L 22.4     Results from last 7 days   Lab Units 08/09/21  1140 08/09/21  0530 08/09/21  0028 08/08/21  0732 08/08/21  0141 08/07/21  0637 08/07/21  0138 08/05/21  0226 08/04/21  1428   SODIUM mmol/L 139 141 142  142   < > 149*   < > 153*   < > 155*   POTASSIUM mmol/L 4.5 3.6 3.6  3.6   < > 3.2*   < > 3.5   < > 4.3   MAGNESIUM mg/dL  --   --   --   --   --   --  2.1  --  2.7*   CHLORIDE mmol/L 113* 114* 114*  114*   < > 121*   < > 126*   < > 120*   CO2 mmol/L 18.7* 19.6* 23.2  23.2   < > 21.2*   < > 19.7*   < > 23.6   BUN mg/dL 28* 29* 30*  30*   < > 41*   < > 56*   < > 105*   CREATININE mg/dL 1.33* 1.34* 1.34*  1.34*   < > 1.45*   < > 1.60*   < > 2.63*   EGFR IF NONAFRICN AM mL/min/1.73 52* 51* 51*  51*   < > 47*   < > 42*   < > 23*   CALCIUM mg/dL 8.3* 8.2* 8.0*  8.0*   < > 8.4*   < > 8.8   < > 10.0   GLUCOSE mg/dL 110* 101* 130*  130*   < > 134*   < > 127*   < > 128*   ALBUMIN g/dL  --   --  2.29*  --  2.49*  --  2.72*   < > 3.69   BILIRUBIN mg/dL  --   --  0.3  --  0.3  --  0.4   < > 0.4   ALK PHOS U/L  --   --  78  --  78  --  90   < > 132*   AST (SGOT) U/L  --   --  21  --  16  --  13   < > 33   ALT (SGPT) U/L  --   --  14  --  19  --  21   < > 46*    < > = values in this interval not displayed.   Estimated Creatinine Clearance: 49.6 mL/min (A) (by C-G formula based on SCr of 1.33 mg/dL (H)).  No results found for: AMMONIA  Results from last  7 days   Lab Units 08/07/21  0637 08/05/21  0824 08/04/21  2049 08/04/21  1428   CK TOTAL U/L 52 348*  --  544*   TROPONIN T ng/mL  --   --  0.041* 0.043*     Results from last 7 days   Lab Units 08/04/21  1428   PROBNP pg/mL 1,404.0         No results found for: HGBA1C, POCGLU  Lab Results   Component Value Date    TSH 1.610 08/04/2021    FREET4 1.15 08/04/2021     No results found for: PREGTESTUR, PREGSERUM, HCG, HCGQUANT  Pain Management Panel     Pain Management Panel Latest Ref Rng & Units 10/23/2020 10/19/2020    CREATININE UR mg/dL - 220.5    AMPHETAMINES SCREEN, URINE Negative Negative -    BARBITURATES SCREEN Negative Negative -    BENZODIAZEPINE SCREEN, URINE Negative Negative -    BUPRENORPHINEUR Negative Negative -    COCAINE SCREEN, URINE Negative Negative -    METHADONE SCREEN, URINE Negative Negative -        Brief Urine Lab Results  (Last result in the past 365 days)      Color   Clarity   Blood   Leuk Est   Nitrite   Protein   CREAT   Urine HCG        08/04/21 1903 Yellow Turbid Small (1+) Large (3+) Negative 100 mg/dL (2+)             Blood Culture   Date Value Ref Range Status   08/04/2021 No growth at 5 days  Final   08/04/2021 No growth at 4 days  Preliminary     Urine Culture   Date Value Ref Range Status   08/04/2021 <25,000 CFU/mL Gram Negative Bacilli (A)  Final     Wound Culture   Date Value Ref Range Status   08/05/2021 Rare Staphylococcus aureus, MRSA (A)  Final     Comment:     Methicillin resistant Staphylococcus aureus, Patient may be an isolation risk.   08/05/2021 Scant growth (1+) Normal Skin Meli  Final     No results found for: STOOLCX  No results found for: RESPCX  No results found for: AFBCX  Results from last 7 days   Lab Units 08/07/21  0138 08/06/21  1221 08/05/21  0847 08/05/21  0846 08/04/21  1540 08/04/21  1428   PROCALCITONIN ng/mL  --   --   --   --  0.11  --    LACTATE mmol/L  --   --  1.7  --  2.2* 2.2*   SED RATE mm/hr  --   --   --   --   --  15   CRP mg/dL 1.60*  2.07*  --  1.20*  --  0.80*       I have personally looked at the labs and they are summarized above.  ----------------------------------------------------------------------------------------------------------------------  Detailed radiology reports for the last 24 hours:  Imaging Results (Last 24 Hours)     ** No results found for the last 24 hours. **        Assessment & Plan      Patient is an 80-year-old male with history significant for dementia, CKD 3a who presented from the [facility] for worsening confusion and diagnosed with a urinary tract infection.    #Urinary tract infection, complicated  #Severe sepsis, resolved  --Patient initially met sepsis criteria on admission and responded well to resuscitation and antibiotics.  Urinary tract infection complicated by possible chronic bladder outlet syndrome.  --Urine culture to be obtained, <25k cfu, bld cx ngtd  --c/w empiric antibiotics, de-escalate pending micro data    #Chronic left basilar consolidation  --patient hemodynamically stable on RA. Imaging reviewed. Will await final culture data but suspect this is chronic consolidation and atelectasis rather than a true focal pneumonia.  --Plan to de-escalate antibiotics pending culture data    #Acute conjunctivitis  --c/w polysporin    #Hypokalemia, monitor and replace as needed  #Advanced dementia without behavioral disturbances, stable  --Continue Remeron, Aricept, Namenda    #Chronic kidney disease, stage IIIb  --Avoid nephrotoxins, NSAIDs,    #Elevated troponin  #CAD status post CABG  --Likely demand related due to the above.  Echo and imaging/EKG reviewed continue to monitor, follow-up outpatient with cardiology    #Acute hypoxemic respiratory failure, resolved    Antibiotics: Vancomycin, cefepime  Steroids: None  GI prophylaxis: Pepcid  Dvt ppx: heparin  Activity: PT/OT  Diet: Modified  Dispo: Patient still requires IV antibiotics while we await speciation and culture data.  Case management following for  SNF placement.      Kailash Valdes DO  [facility] Hospitalist  08/09/21  16:10 EDT

## 2021-08-09 NOTE — PLAN OF CARE
Goal Outcome Evaluation:   Pt resting in bed, spouse at bedside. No complaints at this time. No signs or symptoms of distress noted. Vss. Will continue with plan of care.

## 2021-08-10 NOTE — DISCHARGE SUMMARY
[facility] HOSPITALISTS DISCHARGE SUMMARY    Patient Identification:  Name:  Porsha Soni  Age:  80 y.o.  Sex:  male  :  1940  MRN:  [id number]  Visit Number:  [id number]    Date of Admission: 2021  Date of Discharge:  8/10/2021     PCP: Padmini Terrazas APRN    DISCHARGE DIAGNOSIS  Urinary tract infection, complicated  Chronic left basilar in the left lower lobe  Acute conjunctivitis  Advanced dementia without behavioral disturbances  Chronic kidney disease, stage IIIb  Elevated troponin  CAD status post CABG    CONSULTS   None    PROCEDURES PERFORMED      HOSPITAL COURSE  Patient is a 80 y.o. male presented to [facility] with concern for worsening mental status on top of baseline dementia.  He was diagnosed with a urinary tract infection.  Blood and urine cultures were negative.  He initially received empiric coverage for both urinary tract infection and pneumonia, however imaging findings suggested that consolidation of left lower lobe was chronic and most likely atelectasis versus an infectious etiology.  He never displayed any upper respiratory symptoms or findings to suggest recurrent pneumonia.  He responded well to antibiotic therapy.  He was awake, alert, eating on the day of discharge.  Family present at bedside.he was back to his baseline and the decision was made to discharge back to the nursing facility.  He was discharged on Augmentin to complete treatment course for complicated urinary tract infection. Please see the admitting history and physical for further details.        VITAL SIGNS:  Temp:  [97.7 °F (36.5 °C)-98.5 °F (36.9 °C)] 98.1 °F (36.7 °C)  Heart Rate:  [56-78] 56  Resp:  [18-20] 20  BP: (111-132)/(50-71) 111/50  SpO2:  [96 %-98 %] 97 %  on   ;   Device (Oxygen Therapy): room air    Body mass index is 25.93 kg/m².  Wt Readings from Last 3 Encounters:   08/10/21 82 kg (180 lb 11.2 oz)   21 80.3 kg (177 lb)   21 81.3 kg (179 lb 4.8 oz)        PHYSICAL EXAM:  Constitutional: chronically ill-appearing male resting comfortably in bed, no acute distress.      HENT:  Head:  Normocephalic and atraumatic.  Mouth:  Moist mucous membranes.    Eyes:  Conjunctivae clear, left eye ectropion. No scleral icterus.   Cardiovascular:  Normal rate, regular rhythm and normal heart sounds with no murmur.  Pulmonary/Chest:  No respiratory distress, no wheezes, no crackles, with normal breath sounds and good air movement. Unlabored. No accessory muscle use.  Abdominal:  Soft. No distension and no tenderness.  Bowel sounds present. No rebound or guarding.   Musculoskeletal:  No tenderness, and no deformity.  No red or swollen joints anywhere.    Neurological:  Alert and oriented to person, place, and time.  No cranial nerve deficit. Nonfocal.   Skin:  Skin is warm and dry. No rash noted. No pallor.   Peripheral vascular:  No clubbing, no cyanosis, no edema. Pedal and tibial pulses 2/4 bilaterally.     DISCHARGE DISPOSITION   Stable    DISCHARGE MEDICATIONS:     Discharge Medications      New Medications      Instructions Start Date   amoxicillin-clavulanate 875-125 MG per tablet  Commonly known as: Augmentin   1 tablet, Oral, 2 Times Daily      bacitracin-polymyxin b 500-56857 UNIT/GM ophthalmic ointment  Commonly known as: POLYSPORIN   Both Eyes, Every 12 Hours Scheduled      famotidine 20 MG tablet  Commonly known as: PEPCID   20 mg, Oral, Daily   Start Date: August 11, 2021     magic barrier cream   1 application, Topical, 4 Times Daily      nitroglycerin 0.4 MG SL tablet  Commonly known as: NITROSTAT   0.4 mg, Sublingual, Every 5 Minutes PRN, Take no more than 3 doses in 15 minutes.         Continue These Medications      Instructions Start Date   acetaminophen 500 MG tablet  Commonly known as: TYLENOL   500 mg, Oral, Every 6 Hours PRN      atorvastatin 20 MG tablet  Commonly known as: LIPITOR   20 mg, Oral, Daily      bisacodyl 10 MG suppository  Commonly known as:  DULCOLAX   10 mg, Rectal, Every 12 Hours PRN      donepezil 10 MG tablet  Commonly known as: ARICEPT   10 mg, Oral, Nightly      fish oil 1000 MG capsule capsule   1,000 mg, Oral, Daily      folic acid 1 MG tablet  Commonly known as: FOLVITE   1 mg, Oral, Daily      guaiFENesin 100 MG/5ML syrup  Commonly known as: ROBITUSSIN   200 mg, Oral, Every 8 Hours PRN      lactulose 10 GM/15ML solution  Commonly known as: CHRONULAC   20 g, Oral, Every 12 Hours PRN      Melatonin 3 MG capsule   6 mg, Oral, Nightly      memantine 10 MG tablet  Commonly known as: NAMENDA   10 mg, Oral, 2 Times Daily      metoprolol tartrate 25 MG tablet  Commonly known as: LOPRESSOR   12.5 mg, Oral, 2 Times Daily, PTA: new script that started today 8/4/21       mirtazapine 30 MG tablet  Commonly known as: REMERON   30 mg, Oral, Nightly      Natural Balance Tears 0.1-0.3 % solution  Generic drug: Dextran 70-Hypromellose   1 drop, Ophthalmic, 2 times daily, PTA: in left eye       polyethylene glycol 17 g packet  Commonly known as: MIRALAX   17 g, Oral, Daily      sertraline 50 MG tablet  Commonly known as: ZOLOFT   50 mg, Oral, Nightly      TiZANidine 4 MG capsule  Commonly known as: ZANAFLEX   4 mg, Oral, Daily PRN      vitamin D 1.25 MG (06056 UT) capsule capsule  Commonly known as: ERGOCALCIFEROL   50,000 Units, Oral, Weekly, Prior to [facility] Admission, Patient was on: takes on mondays             Diet Instructions     Diet as Tolerated          Activity Instructions     Activity as Tolerated          Additional Instructions for the Follow-ups that You Need to Schedule     Discharge Follow-up with PCP   As directed       Currently Documented PCP:    Padmini Terrazas APRN    PCP Phone Number:    [phone]     Follow Up Details: [facility] hospital follow up            Contact information for follow-up providers     Padmini Terrazas APRN .    Specialty: Nurse Practitioner  Why: [facility] hospital follow up  Contact information:  [address]  [facility] [address]  [phone]                   Contact information for after-discharge care     Destination     [facility] .    Service: Skilled Nursing  Contact information:  [address]  [facility] [address]  [phone]                              TEST  RESULTS PENDING AT DISCHARGE       CODE STATUS  Code Status and Medical Interventions:   Ordered at: 08/05/21 0913     Limited Support to NOT Include:    Intubation     Code Status:    CPR     Medical Interventions (Level of Support Prior to Arrest):    Keisha Valdes DO  [facility]  08/10/21  18:13 EDT    Please note that this discharge summary required more than 30 minutes to complete.

## 2021-08-10 NOTE — PROGRESS NOTES
Nephrology Progress Note      Subjective     Pt lying on bed, not communicative.    Objective       Vital signs :     Temp:  [97.7 °F (36.5 °C)-98.5 °F (36.9 °C)] 97.7 °F (36.5 °C)  Heart Rate:  [61-78] 61  Resp:  [18] 18  BP: (112-124)/(56-67) 113/56      Intake/Output Summary (Last 24 hours) at 8/10/2021 0817  Last data filed at 8/10/2021 0610  Gross per 24 hour   Intake 2724 ml   Output --   Net 2724 ml       Physical Exam:    General Appearance : Not in acute distress  Lungs : clear to auscultation, respirations regular  Heart :  regular rhythm & normal rate, normal S1, S2 and no murmur, no rub  Abdomen : normal bowel sounds, no masses, no hepatomegaly, no splenomegaly, soft non-tender and no guarding  Extremities : moves extremities well, no edema, no cyanosis and no redness      Laboratory Data :     Albumin Albumin   Date Value Ref Range Status   08/09/2021 2.29 (L) 3.50 - 5.20 g/dL Final   08/08/2021 2.49 (L) 3.50 - 5.20 g/dL Final      Magnesium No results found for: MG       PTH               No results found for: PTH    CBC and coagulation:  Results from last 7 days   Lab Units 08/10/21  0129 08/09/21  0028 08/08/21  0141 08/07/21  0138 08/07/21  0138 08/06/21  1221 08/06/21  1221 08/05/21  0847 08/05/21  0847 08/05/21  0846 08/04/21  1540 08/04/21  1428 08/04/21  1428   PROCALCITONIN ng/mL  --   --   --   --   --   --   --   --   --   --  0.11  --   --    LACTATE mmol/L  --   --   --   --   --   --   --   --  1.7  --  2.2*  --  2.2*   SED RATE mm/hr  --   --   --   --   --   --   --   --   --   --   --   --  15   CRP mg/dL  --   --   --   --  1.60*  --  2.07*  --   --  1.20*  --    < > 0.80*   WBC 10*3/mm3 8.46 8.72 8.51   < > 9.92   < > 8.68   < > 13.62*  --   --    < > 12.32*   HEMOGLOBIN g/dL 11.4* 10.4* 10.8*   < > 11.9*   < > 12.3*   < > 13.6  --   --    < > 15.1   HEMATOCRIT % 37.4* 35.3* 36.0*   < > 39.9   < > 42.8   < > 44.8  --   --    < > 51.4*   MCV fL 100.8* 105.1* 104.3*   < > 104.2*   < >  107.5*   < > 104.9*  --   --    < > 105.3*   MCHC g/dL 30.5* 29.5* 30.0*   < > 29.8*   < > 28.7*   < > 30.4*  --   --    < > 29.4*   PLATELETS 10*3/mm3 149 155 163   < > 182   < > 206   < > 254  --   --    < > 287   INR   --   --   --   --   --   --   --   --   --   --  1.08  --   --     < > = values in this interval not displayed.     Acid/base balance:  Results from last 7 days   Lab Units 08/07/21  0934   PH, ARTERIAL pH units 7.335*   PO2 ART mm Hg 78.3*   PCO2, ARTERIAL mm Hg 42.0   HCO3 ART mmol/L 22.4     Renal and electrolytes:  Results from last 7 days   Lab Units 08/10/21  0129 08/09/21  1751 08/09/21  1449 08/09/21  1140 08/09/21  1140 08/09/21  0530 08/09/21  0530 08/09/21  0028 08/09/21  0028 08/07/21  0637 08/07/21  0138 08/05/21  0226 08/04/21  1428   SODIUM mmol/L 141 143  --   --  139  --  141  --  142  142   < > 153*   < > 155*   POTASSIUM mmol/L 4.0 4.5 4.7   < > 4.5   < > 3.6   < > 3.6  3.6   < > 3.5   < > 4.3   MAGNESIUM mg/dL  --   --   --   --   --   --   --   --   --   --  2.1  --  2.7*   CHLORIDE mmol/L 109* 114*  --   --  113*   < > 114*   < > 114*  114*   < > 126*   < > 120*   CO2 mmol/L 27.4 19.7*  --   --  18.7*   < > 19.6*   < > 23.2  23.2   < > 19.7*   < > 23.6   BUN mg/dL 25* 26*  --   --  28*   < > 29*   < > 30*  30*   < > 56*   < > 105*   CREATININE mg/dL 1.36* 1.35*  --   --  1.33*  --  1.34*  --  1.34*  1.34*   < > 1.60*   < > 2.63*   EGFR IF NONAFRICN AM mL/min/1.73 50* 51*  --   --  52*   < > 51*   < > 51*  51*   < > 42*   < > 23*   CALCIUM mg/dL 8.7 8.5*  --   --  8.3*   < > 8.2*   < > 8.0*  8.0*   < > 8.8   < > 10.0    < > = values in this interval not displayed.     Estimated Creatinine Clearance: 49.4 mL/min (A) (by C-G formula based on SCr of 1.36 mg/dL (H)).    Liver and pancreatic function:  Results from last 7 days   Lab Units 08/09/21  0028 08/08/21  0141 08/07/21  0138   ALBUMIN g/dL 2.29* 2.49* 2.72*   BILIRUBIN mg/dL 0.3 0.3 0.4   ALK PHOS U/L 78 78 90   AST  (SGOT) U/L 21 16 13   ALT (SGPT) U/L 14 19 21         Cardiac:  Results from last 7 days   Lab Units 08/04/21  1428   PROBNP pg/mL 1,404.0     Liver and pancreatic function:  Results from last 7 days   Lab Units 08/09/21  0028 08/08/21  0141 08/07/21  0138   ALBUMIN g/dL 2.29* 2.49* 2.72*   BILIRUBIN mg/dL 0.3 0.3 0.4   ALK PHOS U/L 78 78 90   AST (SGOT) U/L 21 16 13   ALT (SGPT) U/L 14 19 21       Medications :     atorvastatin, 20 mg, Oral, Daily  bacitracin-polymyxin b, , Both Eyes, Q12H  carboxymethylcellulose, 1 drop, Left Eye, BID  castor oil-balsam peru, , Topical, Q12H  cefepime, 2 g, Intravenous, Q12H  cholecalciferol, 50,000 Units, Oral, Weekly  donepezil, 10 mg, Oral, Nightly  famotidine, 20 mg, Oral, Daily  folic acid, 1 mg, Oral, Daily  heparin (porcine), 5,000 Units, Subcutaneous, Q12H  lactobacillus acidophilus, 1 capsule, Oral, Daily  magic barrier cream, , Topical, 4x Daily  melatonin, 5 mg, Oral, Nightly  memantine, 10 mg, Oral, Q12H  mirtazapine, 15 mg, Oral, Nightly  polyethylene glycol, 17 g, Oral, Daily  sertraline, 50 mg, Oral, Nightly  sodium chloride, 10 mL, Intravenous, Q12H  vancomycin, 1,000 mg, Intravenous, Q24H             Assessment/Plan     1.  Acute renal failure  2.  Metabolic acidosis, non gap likely due to TYRELL  3.  Hypernatremia  4.  Metabolic encephalopathy  5.  Complicated UTI     Metabolic acidosis has resolved, Stable renal functions, DC bicarb fluid.   Cr likely at baseline, I willl sign off, please call if any questions.       Alexandrea Collier MD  08/10/21  08:17 EDT

## 2021-08-10 NOTE — DISCHARGE PLACEMENT REQUEST
"Lorri Soni (80 y.o. Male)     Date of Birth Social Security Number Address Home Phone MRN    1940  [address] [place], [place] [id number] [phone] [id number]    Jain Marital Status          Presybeterian        Admission Date Admission Type Admitting Provider Attending Provider Department, Room/Bed    8/4/21 Emergency Evert Brown DO Williams, Curtis Anthony, DO [address], 3308/2S    Discharge Date Discharge Disposition Discharge Destination         Skilled Nursing Facility (DC - External)              Attending Provider: Kailash Valdes DO    Allergies: No Known Allergies    Isolation: Contact   Infection: MRSA (08/08/21)   Code Status: CPR    Ht: 177.8 cm (70\")   Wt: 82 kg (180 lb 11.2 oz)    Admission Cmt: None   Principal Problem: None                Active Insurance as of 8/4/2021     Primary Coverage     Payor Plan Insurance Group Employer/Plan Group    MEDICARE MEDICARE A & B      Payor Plan Address Payor Plan Phone Number Payor Plan Fax Number Effective Dates    [address] [phone]  7/1/1993 - None Entered    [facility] [id number]       Subscriber Name Subscriber Birth Date Member ID       LORRI SONI 1940 [id number]           Secondary Coverage     Payor Plan Insurance Group Employer/Plan Group    BANKERS FIDELITY BANKERS FIDELITY PLAN G     Payor Plan Address Payor Plan Phone Number Payor Plan Fax Number Effective Dates    [address] [phone]  4/1/1994 - None Entered    [facility] [address]       Subscriber Name Subscriber Birth Date Member ID       LORRI SONI 1940 [id number]                 Emergency Contacts      (Rel.) Home Phone Work Phone Mobile Phone    Helen Soni (Spouse) [phone] -- [phone]    Adrianne Zaldivar (Friend) [phone] -- --            Emergency Contact Information     Name Relation Home Work Mobile    Helen Soni Spouse [phone]    Adrianne Zaldivar Friend " [phone]            Insurance Information                MEDICARE/MEDICARE A & B Phone: [phone]    Subscriber: Porsha Soni Subscriber#: [id number]    Group#:  Precert#:         B[employer]ELITY/[employer]ELITY Phone: [phone]    Subscriber: Porsha Soni Subscriber#: [id number]    Group#: PLAN [id number] Precert#:           Treatment Team  Chat With All Active Members    Provider Relationship Specialty Contact    Kailash Valdes DO  Attending, Physician of Record Hospitalist  [phone]    Ava Adame PA-C  Physician Assistant Physician Assistant  [phone]    Gabby Ivan, PCT  Patient Care Technician --     Jefe Lopez MD  Consulting Physician Hospitalist  [phone]    Jaya Vargas MD  Consulting Physician Nephrology  [phone]    Meena Joaquin, APRN  Nurse Practitioner Palliative Care  [phone]    Jo Barton, RRT  Respiratory Therapist --  [phone]    Sumi Orourke, RN  Registered Nurse --     Selena Iverson, RN  Registered Nurse --           Problem List         Codes Noted - Resolved       Hospital    Urinary tract infection without hematuria ICD-10-CM: N39.0  ICD-9-CM: 599.0 8/4/2021 - Present       Non-Hospital    Pneumonia of both lower lobes due to infectious organism ICD-10-CM: J18.9  ICD-9-CM: 486 1/22/2021 - Present    BPH with urinary obstruction ICD-10-CM: N40.1, N13.8  ICD-9-CM: 600.01, 599.69 11/17/2018 - Present    Respiratory failure with hypoxia and hypercapnia (CMS/HCC) ICD-10-CM: J96.91, J96.92  ICD-9-CM: 518.81 5/23/2018 - Present    Physical deconditioning ICD-10-CM: R53.81  ICD-9-CM: 799.3 5/22/2018 - Present    Impaired mobility and ADLs ICD-10-CM: Z74.09, Z78.9  ICD-9-CM: V49.89 5/22/2018 - Present    Elevated prostate specific antigen (PSA) ICD-10-CM: R97.20  ICD-9-CM: 790.93 4/19/2018 - Present    Essential hypertension (Chronic) ICD-10-CM: I10  ICD-9-CM: 401.9 Unknown - Present    Dyslipidemia (Chronic)  ICD-10-CM: E78.5  ICD-9-CM: 272.4 Unknown - Present    Insomnia ICD-10-CM: G47.00  ICD-9-CM: 780.52 2016 - Present    Asymptomatic PVCs ICD-10-CM: I49.3  ICD-9-CM: 427.69 2016 - Present    CAD in native artery (Chronic) ICD-10-CM: I25.10  ICD-9-CM: 414.01 2016 - Present             History & Physical      Ava Adame PA-C at 21 0725     Attestation signed by Evert Brown DO at 21    I have reviewed this documentation and agree. Pt seen and examined with ROQUE Hurst.                       [facility] Medicine Services  History & Physical    Patient Identification:  Name:  Porsha Soni  Age:  80 y.o.  Sex:  male  :  1940  MRN:  [id number]   Visit Number:  [id number]  Admit Date: 2021   Primary Care Physician:  Padmini Terrazas APRN    Subjective     Chief complaint: Sent from [facility] due to altered mental status    History of presenting illness:      Porsha Soni is a 80 y.o. male with past medical history significant for BPH, CKD, COVID-19 in 2021, CKD IIIa, dementia, malignant meloma, and hypertension.  He was last admitted to this facility in May 2021 with sepsis with bilateral pneumonia.  He was discharged from here to SNF after May 2021 hospitalization.    On 21 he presented to the ED of [facility] for for further evaluation of altered mental status at [facility]. Upon arrival to the ED, vital signs were T 98.6F, pulse 79, RR 16, and /76 with room air oxygen saturation of 100%.   CPK was found to be 544.  Creatinine found to be 2.63.  Lactate was found to be 2.2 and mag 2.7.  White blood cells found be 12.32 with MCV 1 of 5.3 with known history of folate deficiency.  Urinalysis concerning for underlying urinary tract infection.  Imaging studies with CT abdomen pelvis revealing stable left basilar consolidation representing pneumonia or chronic atelectasis and prostate enlargement with urinary bladder wall  thickening and bladder diverticulum indicative of chronic partial bladder outlet obstruction.  CT chest with left basilar consolidation with volume loss with considerations to include persistent pneumonia versus chronic atelectasis with minuscule pleural fluid noted and stable cardiomegaly with changes of prior CABG.  CT head was performed upon presentation given delirium with no known acute parenchymal mass, hemorrhage or midline shift.      Given dementia with altered mental status, much of HPI. ROS, and PMH have been obtained from SNF past.  He is reportedly eating and drinking worse with worsening confusion.  He reportedly talks short sentences but has progressively slowed.   He reportedly self feeding and visiting with wife with slow decline over the past one week.   I discussed him with SNF nurse Ravi with reports of nearly a week of slow decline. He reports he frequently has off days where he eats less and is less active, but usually turns around in 24 hours.  He reports given his difficulty with turn around over several days, he was sent to the ED for further evaluation and treatment.      ---------------------------------------------------------------------------------------------------------------------   Review of Systems   Unable to perform ROS: Dementia        ---------------------------------------------------------------------------------------------------------------------   Past Medical History:   Diagnosis Date   • Arthritis    • Asymptomatic PVCs 6/14/2016   • BPH with urinary obstruction 11/17/2018   • Cervical spine disease     remotely suggested surgical intervention.  Patient deferred.    • Chronic kidney disease, stage 3a (CMS/HCC) 10/30/2020   • Coronary artery disease    • COVID-19 Jan 2021   • Dementia (CMS/HCC)    • Diverticulosis    • Dyslipidemia    • Folic acid deficiency anemia    • Hypertension    • Impotence    • Malignant melanoma (CMS/HCC) 07/2020    Left arm   •  Osteoarthritis 10/30/2020   • Pneumonia    • Vertigo      Past Surgical History:   Procedure Laterality Date   • ARM LESION/CYST EXCISION Left 2020    Procedure: WIDE EXCISION MELANOMA LEFT ARM INTERMEDIATE CLOSURE;  Surgeon: Roman Mohan MD;  Location:  [facility];  Service: General;  Laterality: Left;   • ARTERIAL BYPASS SURGERY     • BRONCHOSCOPY N/A 2018    Procedure: BRONCHOSCOPY WITH WASHINGS;  Surgeon: Leonides Quarles MD;  Location:  [facility];  Service: Pulmonary   • CHOLECYSTECTOMY WITH INTRAOPERATIVE CHOLANGIOGRAM N/A 2018    Procedure: OPEN CHOLECYSTECTOMY;  Surgeon: Rosie Montalvo MD;  Location:  [facility];  Service: General   • COLONOSCOPY N/A 3/30/2018    Procedure: COLONOSCOPY;  Surgeon: Simone Valderrama MD;  Location:  [facility];  Service: Gastroenterology   • CORONARY ARTERY BYPASS GRAFT     • KNEE ARTHROPLASTY, PARTIAL REPLACEMENT      Lt ,    • REPLACEMENT TOTAL KNEE      Rt   • SENTINEL NODE BIOPSY Left 2020    Procedure: SENTINEL NODE INJECTION AND BIOPSY LEFT AXILLA;  Surgeon: Roman Mohan MD;  Location:  [facility];  Service: General;  Laterality: Left;     Family History   Problem Relation Age of Onset   • No Known Problems Mother    • No Known Problems Father    • No Known Problems Sister    • No Known Problems Brother      Social History     Socioeconomic History   • Marital status:      Spouse name: Not on file   • Number of children: Not on file   • Years of education: Not on file   • Highest education level: Not on file   Tobacco Use   • Smoking status: Former Smoker     Packs/day: 1.50     Years: 7.00     Pack years: 10.50     Types: Cigarettes, Pipe, Cigars     Quit date: 1986     Years since quittin.1   • Smokeless tobacco: Never Used   Vaping Use   • Vaping Use: Never used   Substance and Sexual Activity   • Alcohol use: Yes     Comment: occassionally    • Drug use: No   • Sexual activity: Defer      ---------------------------------------------------------------------------------------------------------------------   Allergies:  Patient has no known allergies.  ---------------------------------------------------------------------------------------------------------------------   Home medications:    Medications below are reported home medications pulling from within the system; at this time, these medications have not been reconciled unless otherwise specified and are in the verification process for further verifcation as current home medications.  (Not in a hospital admission)      Hospital Scheduled Meds:  atorvastatin, 20 mg, Oral, Daily  bacitracin-polymyxin b, , Both Eyes, Q12H  carboxymethylcellulose, 1 drop, Left Eye, BID  cefepime, 2 g, Intravenous, Once  cefepime, 2 g, Intravenous, Q12H  [START ON 8/9/2021] cholecalciferol, 50,000 Units, Oral, Weekly  donepezil, 10 mg, Oral, Nightly  folic acid, 1 mg, Oral, Daily  heparin (porcine), 5,000 Units, Subcutaneous, Q12H  melatonin, 5 mg, Oral, Nightly  memantine, 10 mg, Oral, Q12H  metoprolol tartrate, 12.5 mg, Oral, Q12H  metroNIDAZOLE, 500 mg, Intravenous, Q8H  mirtazapine, 30 mg, Oral, Nightly  polyethylene glycol, 17 g, Oral, Daily  sertraline, 50 mg, Oral, Nightly  sodium chloride, 10 mL, Intravenous, Q12H      Pharmacy Consult - Pharmacy to dose,   sodium chloride, 100 mL/hr, Last Rate: 100 mL/hr (08/05/21 0821)        Current listed hospital scheduled medications may not yet reflect those currently placed in orders that are signed and held awaiting patient's arrival to floor.   ---------------------------------------------------------------------------------------------------------------------     Objective     Vital Signs:  Temp:  [97.5 °F (36.4 °C)-98.6 °F (37 °C)] 98.6 °F (37 °C)  Heart Rate:  [62-79] 69  Resp:  [16-17] 17  BP: ()/(51-85) 121/65      08/04/21  1330   Weight: 79.4 kg (175 lb)     Body mass index is 25.11  kg/m².  ---------------------------------------------------------------------------------------------------------------------       Physical Exam  Vitals and nursing note reviewed.   Constitutional:       Appearance: Normal appearance.      Comments: Alert to self. Hearing aid in place in left    HENT:      Head: Atraumatic.   Eyes:      General:         Right eye: Discharge present. No foreign body.         Left eye: Discharge present.No foreign body.      Conjunctiva/sclera:      Right eye: Right conjunctiva is injected. Exudate present.      Left eye: Left conjunctiva is injected. Exudate present.      Comments: Green mucopurulent discharge in both eyes.    Cardiovascular:      Rate and Rhythm: Normal rate and regular rhythm.      Heart sounds: S1 normal and S2 normal.   Pulmonary:      Effort: No tachypnea or bradypnea.      Breath sounds: Examination of the right-lower field reveals decreased breath sounds. Examination of the left-lower field reveals decreased breath sounds. Decreased breath sounds present. No wheezing, rhonchi or rales.   Chest:      Chest wall: No mass or deformity.   Abdominal:      Palpations: Abdomen is soft.      Tenderness: There is no abdominal tenderness.   Musculoskeletal:      Right lower leg: No swelling. No edema.      Left lower leg: No swelling. No edema.   Skin:     General: Skin is warm and dry.   Neurological:      Mental Status: He is alert.      Comments: Alert to self; Difficulty following commands   Psychiatric:      Comments: Confused.  Difficulty assessing.                ---------------------------------------------------------------------------------------------------------------------  EKG:                      I have personally looked at both the EKG and the telemetry strips.  ---------------------------------------------------------------------------------------------------------------------   Results from last 7 days   Lab Units 08/04/21  1540 08/04/21  3432   CRP  mg/dL  --  0.80*   LACTATE mmol/L 2.2* 2.2*   WBC 10*3/mm3  --  12.32*   HEMOGLOBIN g/dL  --  15.1   HEMATOCRIT %  --  51.4*   MCV fL  --  105.3*   MCHC g/dL  --  29.4*   PLATELETS 10*3/mm3  --  287   INR  1.08  --          Results from last 7 days   Lab Units 08/05/21  0226 08/04/21  1428   SODIUM mmol/L 153* 155*   POTASSIUM mmol/L 4.8 4.3   MAGNESIUM mg/dL  --  2.7*   CHLORIDE mmol/L 123* 120*   CO2 mmol/L 16.6* 23.6   BUN mg/dL 89* 105*   CREATININE mg/dL 2.34* 2.63*   EGFR IF NONAFRICN AM mL/min/1.73 27* 23*   CALCIUM mg/dL 9.0 10.0   GLUCOSE mg/dL 92 128*   ALBUMIN g/dL  --  3.69   BILIRUBIN mg/dL  --  0.4   ALK PHOS U/L  --  132*   AST (SGOT) U/L  --  33   ALT (SGPT) U/L  --  46*   Estimated Creatinine Clearance: 27.8 mL/min (A) (by C-G formula based on SCr of 2.34 mg/dL (H)).  No results found for: AMMONIA  Results from last 7 days   Lab Units 08/04/21 2049 08/04/21  1428   CK TOTAL U/L  --  544*   TROPONIN T ng/mL 0.041* 0.043*     Results from last 7 days   Lab Units 08/04/21  1428   PROBNP pg/mL 1,404.0     No results found for: HGBA1C  Lab Results   Component Value Date    TSH 1.610 08/04/2021    FREET4 1.15 08/04/2021     No results found for: PREGTESTUR, PREGSERUM, HCG, HCGQUANT  Pain Management Panel     Pain Management Panel Latest Ref Rng & Units 10/23/2020 10/19/2020    CREATININE UR mg/dL - 220.5    AMPHETAMINES SCREEN, URINE Negative Negative -    BARBITURATES SCREEN Negative Negative -    BENZODIAZEPINE SCREEN, URINE Negative Negative -    BUPRENORPHINEUR Negative Negative -    COCAINE SCREEN, URINE Negative Negative -    METHADONE SCREEN, URINE Negative Negative -        No results found for: BLOODCX  No results found for: URINECX  No results found for: WOUNDCX  No results found for: STOOLCX      ---------------------------------------------------------------------------------------------------------------------  Imaging Results (Last 7 Days)     Procedure Component Value Units Date/Time    CT  Chest Without Contrast Diagnostic [[id number]] Collected: 08/04/21 1902     Updated: 08/04/21 1906    Narrative:      EXAM:    CT Chest Without Intravenous Contrast     EXAM DATE:    8/4/2021 5:48 PM     CLINICAL HISTORY:    Chest pain or SOB, pleurisy or effusion suspected     TECHNIQUE:    Axial computed tomography images of the chest without intravenous  contrast.  Sagittal and coronal reformatted images were created and  reviewed.  This CT exam was performed using one or more of the following  dose reduction techniques:  automated exposure control, adjustment of  the mA and/or kV according to patient size, and/or use of iterative  reconstruction technique.     COMPARISON:    05/23/2021     FINDINGS:    Lungs:  Consolidation of the left lower lobe is noted with volume loss  and trace effusion more likely related to chronic atelectasis but  superimposed pneumonia is not excluded.  Linear atelectasis right lung  base.    Pleural space:  Unremarkable.  No pneumothorax.  No significant  effusion.    Heart:  Moderate cardiomegaly with severe coronary artery  calcifications.  Stable changes of previous CABG.  No significant  pericardial effusion.    Bones/joints:  Stable degenerative changes thoracic spine.  No acute  fracture.  No dislocation.    Soft tissues:  Unremarkable.    Vasculature:  Atherosclerosis aorta is stable. No aneurysm.    Lymph nodes:  Unremarkable.  No enlarged lymph nodes.       Impression:      1.  Stable left basilar consolidation with volume loss with  considerations to include persistent pneumonia versus chronic  atelectasis. Miniscule left pleural fluid is noted.  2.  Right basilar atelectasis.  3.  Stable cardiomegaly and changes of prior CABG.  4.  Other nonacute findings as above.     This report was finalized on 8/4/2021 7:04 PM by Dr. Adebayo Dodd MD.       CT Abdomen Pelvis Without Contrast [[id number]] Collected: 08/04/21 1818     Updated: 08/04/21 1904    Narrative:      EXAM:    CT  Abdomen and Pelvis Without Intravenous Contrast     EXAM DATE:    8/4/2021 5:48 PM     CLINICAL HISTORY:    Abdominal pain, acute, nonlocalized     TECHNIQUE:    Axial computed tomography images of the abdomen and pelvis without  intravenous contrast.  Sagittal and coronal reformatted images were  created and reviewed.  This CT exam was performed using one or more of  the following dose reduction techniques:  automated exposure control,  adjustment of the mA and/or kV according to patient size, and/or use of  iterative reconstruction technique.     COMPARISON:    05/23/2021     FINDINGS:    Lung bases:  Left basilar consolidation with volume loss.    Heart:  Cardiomegaly.      ABDOMEN:    Liver:  Unremarkable.    Gallbladder and bile ducts:  Prior cholecystectomy.  No ductal  dilation.    Pancreas:  Fatty infiltration of pancreas.  No ductal dilation.    Spleen:  Unremarkable.  No splenomegaly.    Adrenals:  Unremarkable.  No mass.    Kidneys and ureters:  No renal or ureteral stones or hydronephrosis.    Stomach and bowel:  Sigmoid diverticulosis is noted without evidence  of acute diverticulitis.  No obstruction.      PELVIS:    Appendix:  Normal appendix which is located in the right upper  quadrant.    Bladder:  Incompletely distended urinary bladder with urinary bladder  wall thickening.  Right posterior lateral urinary bladder wall  diverticulum is noted compatible with chronic partial bladder outlet  obstruction and increase bladder pressures.  No stones.    Reproductive:  Prostate enlargement.      ABDOMEN and PELVIS:    Intraperitoneal space:  Unremarkable.  No free air.  No significant  fluid collection.    Bones/joints:  Stable bony structures.  No acute fracture.  No  dislocation.    Soft tissues:  Unremarkable.    Vasculature:  Atherosclerosis is stable.  No abdominal aortic  aneurysm.    Lymph nodes:  Unremarkable.  No enlarged lymph nodes.       Impression:      1.  Stable left basilar consolidation  which may represent pneumonia or  chronic atelectasis.  2.  Prostate enlargement with urinary bladder wall thickening and  bladder diverticulum indicative of chronic partial bladder outlet  obstruction.  3.  Diverticulosis without diverticulitis.  4.  Other nonacute findings described above.     This report was finalized on 8/4/2021 7:02 PM by Dr. Adebayo Dodd MD.       XR Chest 1 View [[id number]] Collected: 08/04/21 1535     Updated: 08/04/21 1538    Narrative:      EXAM:    XR Chest, 1 View     EXAM DATE:    8/4/2021 2:57 PM     CLINICAL HISTORY:    ams     TECHNIQUE:    Frontal view of the chest.     COMPARISON:    05/23/2021     FINDINGS:    Lungs:  Unremarkable.  No consolidation.    Pleural space:  Small left effusion.  Left effusion has decreased.  No  pneumothorax.    Heart:  Changes of prior CABG.    Mediastinum:  Unremarkable.    Bones/joints:  Unremarkable.    Soft tissues:  Edema has markedly improved since the previous exam.       Impression:      1.  Since prior exam, interval improvement of CHF/edema.  2.  Small left effusion noted but overall decreased.  3.  Otherwise stable chest.     This report was finalized on 8/4/2021 3:36 PM by Dr. Adebayo Dodd MD.       CT Head Without Contrast [[id number]] Collected: 08/04/21 1507     Updated: 08/04/21 1510    Narrative:      CT HEAD WO CONTRAST-     CLINICAL INDICATION: Delirium        COMPARISON: 05/23/2021      TECHNIQUE: Axial images of the brain were obtained with out intravenous  contrast.  Reformatted images were created in the sagittal and coronal  planes.     DOSE:     Radiation dose reduction techniques were utilized per ALARA protocol.  Automated exposure control was initiated through either or [employer] or  [employer] software packages by  protocol.           FINDINGS:   Today's study shows no mass, hemorrhage, or midline shift.   Atrophy and ventriculomegaly  There is no evidence of acute ischemia.  I do not see epidural or subdural  hematoma.  The gray-white differentiation is appropriate.   The bone window setting images show no destructive calvarial lesion or  acute calvarial fracture.   The posterior fossa is unremarkable.          Impression:         1. No acute parenchymal mass, hemorrhage, or midline shift  2. Atrophy  3. The overall appearance is very similar to the prior study     This report was finalized on 8/4/2021 3:08 PM by Dr. Cliff Brody MD.             Cultures:  No results found for: BLOODCX, URINECX, WOUNDCX, MRSACX, RESPCX, STOOLCX    Last echocardiogram:  Results for orders placed during the hospital encounter of 05/23/21    Adult Transthoracic Echo Complete W/ Cont if Necessary Per Protocol    Interpretation Summary  · Normal left ventricular cavity size and wall thickness noted. All left ventricular wall segments contract normally.  · Left ventricular ejection fraction appears to be 61 - 65%.  · . The aortic valve exhibits sclerosis. The aortic valve appears trileaflet. Mild aortic valve regurgitation is present. No aortic valve stenosis is present.  · The mitral valve is structurally normal with no significant stenosis present. Trace mitral valve regurgitation is present.  · Mild tricuspid valve regurgitation is present. Estimated right ventricular systolic pressure from tricuspid regurgitation is mildly elevated (35-45 mmHg).  · There is no evidence of pericardial effusion.          I have personally reviewed the above radiology images and read the final radiology report on 08/05/21  ---------------------------------------------------------------------------------------------------------------------  Assessment / Plan     Active Hospital Problems    Diagnosis  POA   • Urinary tract infection without hematuria [N39.0]  Yes       ASSESSMENT/PLAN:  -Severe sepsis, present on presentation, with leukocytosis, elevated C-RP, lactate>2, UTI, AMS:  -UTI, complicated by underlying partial chronic bladder outlet syndrome with  bladder diverticulum:   -Left chronic basilar consolidation:   -Lactic acidosis:   -Acute conjunctivitis +/- blepharitis:   • IV cefepime, flagyl, and vancomycin on board for broad spectrum coverage.   • Follow urine culture results.    • MRSA of nares +, IV Vancomycin for now given ongoing consolidation.   • Follow peripheral blood cultures.   • Follow daily CBC.  • Polysporin added for bilateral conjunctivitis.   • Culture ordered of mucopurulent discharge.    • SLP evaluation.   • Midline consult ordered given difficulty with access and need for further IV abx and further blood draws.      -Acute hypoxemic respiratory failure:   · Orders placed to titrate oxygen saturation greater than or equal to 90%.    · Treat acute vs. Chronic consolidation as outlined.     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   · No inpatient Urology coverage until 8/10/21.    · Outpatient Urology follow-up.    · Follow urine culture.     -Hypernatremia:   · Repeat Sodium improved but remains high.   · Continue IV fluids at 100 cc/hr given concern for poor oral intake.   · Repeat BMP q6.   · SLP consultation added.     -TYRELL on CKD IIIb:   · Continue IV fluids. (EF from 05/21 61-65%)  · Avoid nephrotoxins when possible.   · Q6 BMP.     -Elevated CPK with question of early rhabdo?   · Repeat CPK.   · Given mental status, unclear if he had a fall?   · No known falls at [facility].      -Elevated troponin T:   -ASCVD s/p CABG 4v in 2005 sequential LIMA to first diagonal and LAD, saphenous graft to OM1, and PL of the circumflex  -Mild aortic valve regurgitation:   -Wide complex tachycardia in May 2021:   · Continuous cardiac monitoring.   · Echocardiogram from 05/2021 reviewed.    · Outpatient office visit with Dr. Dwayne Swartz reviewed from 07/07 with noted preserved LV function and measures to improve quality of life with recommendation for no ischemic evaluation  unless there is an acute issue given worsening dementia.      -Macrocytosis with known hx of folate deficiency:   · Hydrate.   · Continue folate supplement.   · Repeat CBC.       -Metabolic encephalopathy from sepsis and UTI superimposed on known advanced dementia  -Advanced dementia  · Supportive care.   · CT head without acute change.   · Palliative care consulted to assist with family support and overall goals of care in patient with worsening dementia.        -Prolonged QTc  -Likely 2/2 hypermagnesemia:   · Continue to hydrate.   · Repeat EKG x1.    ·   ----------  -DVT prophylaxis: SQ heparin  -Activity: Bedrest  -Expected length of stay: INPATIENT status due to the need for care which can only be reasonably provided in an hospital setting such as aggressive/expedited ancillary services and/or consultation services, the necessity for IV medications, close physician monitoring and/or the possible need for procedures.  In such, I feel patient’s risk for adverse outcomes and need for care warrant INPATIENT evaluation and predict the patient’s care encounter to likely last beyond 2 midnights.  -Disposition: Return to SNF at dc    High risk secondary to severe sepsis with UTI, pneumonia with MRSA+ nares swab, advancing dementia, dehydration, hypernatremia with poor oral intake        Ava Adame PA-C  08/05/21  09:00 EDT  Pager # [phone]  ---------------------------------------------------------------------------------------------------------------------             Electronically signed by Evert Brown DO at 08/05/21 2004       Vital Signs (last day)     Date/Time   Temp   Temp src   Pulse   Resp   BP   Patient Position   SpO2    08/10/21 1014   97.8 (36.6)   Oral   61   20   132/71   Lying   96    08/10/21 0610   97.7 (36.5)   Oral   61   18   113/56   Lying   97    08/10/21 0300   97.9 (36.6)   Axillary   78   18   112/63   Lying   98    08/09/21 1845   98.5 (36.9)   Oral   63   18    117/67   Lying   97    08/09/21 1432   98.2 (36.8)   Axillary   66   18   116/56   Lying   98    08/09/21 1047   98 (36.7)   Axillary   63   18   124/58   Lying   97    08/09/21 0621   97.2 (36.2)   Axillary   59   18   110/58   Lying   97    08/09/21 0252   96.9 (36.1)   Axillary   62   18   134/67   Lying   98              Lines, Drains & Airways    Active LDAs     Name:   Placement date:   Placement time:   Site:   Days:    Peripheral IV 08/10/21 0121 Left Antecubital   08/10/21    0121    Antecubital   less than 1                  Current Facility-Administered Medications   Medication Dose Route Frequency Provider Last Rate Last Admin   • acetaminophen (TYLENOL) tablet 500 mg  500 mg Oral Q6H PRN Evert Brown DO       • atorvastatin (LIPITOR) tablet 20 mg  20 mg Oral Daily Evert Brown DO   20 mg at 08/10/21 0813   • bacitracin-polymyxin b (POLYSPORIN) ophthalmic ointment   Both Eyes Q12H Ava Adame PA-C   Given at 08/10/21 0814   • bisacodyl (DULCOLAX) suppository 10 mg  10 mg Rectal Q12H PRN Evert Brown DO       • carboxymethylcellulose (REFRESH PLUS) 0.5 % ophthalmic solution 1 drop  1 drop Left Eye BID Evert Brown DO   1 drop at 08/10/21 0813   • castor oil-balsam peru (VENELEX) ointment   Topical Q12H Ava Adame PA-C   Given at 08/10/21 0814   • cholecalciferol (VITAMIN D3) capsule 50,000 Units  50,000 Units Oral Weekly Evert Brown DO   50,000 Units at 08/09/21 0858   • donepezil (ARICEPT) tablet 10 mg  10 mg Oral Nightly Evert Brown DO   10 mg at 08/1940   • famotidine (PEPCID) tablet 20 mg  20 mg Oral Daily Ava Adame PA-C   20 mg at 08/10/21 0814   • folic acid (FOLVITE) tablet 1 mg  1 mg Oral Daily Evert Brown DO   1 mg at 08/10/21 0814   • guaiFENesin (ROBITUSSIN) 100 MG/5ML oral solution 200 mg  200 mg Oral Q8H PRN Evert Brown DO       • heparin (porcine) 5000 UNIT/ML  injection 5,000 Units  5,000 Units Subcutaneous Q12H Jefe Lopez MD   5,000 Units at 08/10/21 0812   • lactobacillus acidophilus (RISAQUAD) capsule 1 capsule  1 capsule Oral Daily Ava Adame PA-C   1 capsule at 08/10/21 0813   • lactulose (CHRONULAC) 10 GM/15ML solution 20 g  20 g Oral Q12H PRN Evert Brown DO       • magic barrier cream   Topical 4x Daily Evert Brown DO   Given at 08/10/21 1108   • melatonin tablet 5 mg  5 mg Oral Nightly Evert Brown DO   5 mg at 08/09/21 1940   • memantine (NAMENDA) tablet 10 mg  10 mg Oral Q12H Evert Brown DO   10 mg at 08/10/21 0813   • mirtazapine (REMERON) tablet 15 mg  15 mg Oral Nightly Ava Adame PA-C   15 mg at 08/09/21 1940   • nitroglycerin (NITROSTAT) SL tablet 0.4 mg  0.4 mg Sublingual Q5 Min PRN Jefe Lopez MD       • polyethylene glycol (MIRALAX) packet 17 g  17 g Oral Daily Evert Brown DO   17 g at 08/10/21 0813   • potassium chloride (K-DUR,KLOR-CON) ER tablet 40 mEq  40 mEq Oral PRN Ava Adame PA-C        Or   • potassium chloride (KLOR-CON) packet 40 mEq  40 mEq Oral PRN Ava Adame PA-C        Or   • potassium chloride 10 mEq in 100 mL IVPB  10 mEq Intravenous Q1H PRN Ava Adame PA-C       • sertraline (ZOLOFT) tablet 50 mg  50 mg Oral Nightly Evert Brown DO   50 mg at 08/09/21 1940   • sodium chloride 0.9 % flush 10 mL  10 mL Intravenous PRN Jefe Lopez MD       • sodium chloride 0.9 % flush 10 mL  10 mL Intravenous Q12H Jefe Lopez MD   10 mL at 08/10/21 0815   • sodium chloride 0.9 % flush 10 mL  10 mL Intravenous PRN Jessica, Jefe Barney, MD           Lab Results (last 24 hours)     Procedure Component Value Units Date/Time    Basic Metabolic Panel [[id number]]  (Abnormal) Collected: 08/10/21 0842    Specimen: Blood Updated: 08/10/21 0934     Glucose 99 mg/dL      BUN 23 mg/dL       Creatinine 1.25 mg/dL      Sodium 142 mmol/L      Potassium 4.0 mmol/L      Chloride 112 mmol/L      CO2 21.7 mmol/L      Calcium 8.6 mg/dL      eGFR Non African Amer 55 mL/min/1.73      BUN/Creatinine Ratio 18.4     Anion Gap 8.3 mmol/L     Narrative:      GFR Normal >60  Chronic Kidney Disease <60  Kidney Failure <15      CBC & Differential [[id number]]  (Abnormal) Collected: 08/10/21 0129    Specimen: Blood Updated: 08/10/21 0311    Narrative:      The following orders were created for panel order CBC & Differential.  Procedure                               Abnormality         Status                     ---------                               -----------         ------                     CBC Auto Differential[[id number]]        Abnormal            Final result                 Please view results for these tests on the individual orders.    CBC Auto Differential [[id number]]  (Abnormal) Collected: 08/10/21 0129    Specimen: Blood Updated: 08/10/21 0311     WBC 8.46 10*3/mm3      RBC 3.71 10*6/mm3      Hemoglobin 11.4 g/dL      Hematocrit 37.4 %      .8 fL      MCH 30.7 pg      MCHC 30.5 g/dL      RDW 13.0 %      RDW-SD 48.5 fl      MPV 9.9 fL      Platelets 149 10*3/mm3      Neutrophil % 49.0 %      Lymphocyte % 26.8 %      Monocyte % 7.3 %      Eosinophil % 16.3 %      Basophil % 0.4 %      Immature Grans % 0.2 %      Neutrophils, Absolute 4.14 10*3/mm3      Lymphocytes, Absolute 2.27 10*3/mm3      Monocytes, Absolute 0.62 10*3/mm3      Eosinophils, Absolute 1.38 10*3/mm3      Basophils, Absolute 0.03 10*3/mm3      Immature Grans, Absolute 0.02 10*3/mm3      nRBC 0.0 /100 WBC     Basic Metabolic Panel [[id number]]  (Abnormal) Collected: 08/10/21 0129    Specimen: Blood Updated: 08/10/21 0218     Glucose 101 mg/dL      BUN 25 mg/dL      Creatinine 1.36 mg/dL      Sodium 141 mmol/L      Potassium 4.0 mmol/L      Chloride 109 mmol/L      CO2 27.4 mmol/L      Calcium 8.7 mg/dL      eGFR Non  Amer 50  mL/min/1.73      BUN/Creatinine Ratio 18.4     Anion Gap 4.6 mmol/L     Narrative:      GFR Normal >60  Chronic Kidney Disease <60  Kidney Failure <15      Basic Metabolic Panel [[id number]]  (Abnormal) Collected: 08/09/21 1751    Specimen: Blood Updated: 08/09/21 1829     Glucose 108 mg/dL      BUN 26 mg/dL      Creatinine 1.35 mg/dL      Sodium 143 mmol/L      Potassium 4.5 mmol/L      Chloride 114 mmol/L      CO2 19.7 mmol/L      Calcium 8.5 mg/dL      eGFR Non African Amer 51 mL/min/1.73      BUN/Creatinine Ratio 19.3     Anion Gap 9.3 mmol/L     Narrative:      GFR Normal >60  Chronic Kidney Disease <60  Kidney Failure <15      Vancomycin, Trough [[id number]]  (Normal) Collected: 08/09/21 1449    Specimen: Blood Updated: 08/09/21 1636     Vancomycin Trough 16.30 mcg/mL     Narrative:      Therapeutic Ranges for Vancomycin    Vancomycin Random   5.0-40.0 mcg/mL  Vancomycin Trough   5.0-20.0 mcg/mL  Vancomycin Peak     20.0-40.0 mcg/mL    Potassium [[id number]]  (Normal) Collected: 08/09/21 1449    Specimen: Blood Updated: 08/09/21 1630     Potassium 4.7 mmol/L      Comment: Slight hemolysis detected by analyzer. Results may be affected.           Orders (last 24 hrs)      Start     Ordered    08/11/21 0000  famotidine (PEPCID) 20 MG tablet  Daily      08/10/21 1226    08/10/21 1215  Discharge patient  Once      08/10/21 1226    08/10/21 0600  CBC & Differential  Daily      08/09/21 1933    08/10/21 0600  CBC Auto Differential  PROCEDURE ONCE      08/09/21 2208    08/10/21 0000  bacitracin-polymyxin b (POLYSPORIN) 500-97786 UNIT/GM ophthalmic ointment  Every 12 Hours Scheduled      08/10/21 1226    08/10/21 0000  Vitamins A & D (magic barrier cream)  4 Times Daily      08/10/21 1226    08/10/21 0000  nitroglycerin (NITROSTAT) 0.4 MG SL tablet  Every 5 Minutes PRN      08/10/21 1226    08/10/21 0000  amoxicillin-clavulanate (Augmentin) 875-125 MG per tablet  2 Times Daily      08/10/21 1226    08/09/21 1500   Vancomycin, Trough  Timed      08/09/21 0741    08/09/21 1500  A vancomycin trough level has been ordered prior to the 1600 dose 8/9. Draw level at 1500 on 8/9.  Hold dose if trough level is greater than 20 mg/L.  Nursing Communication  Once     Comments: A vancomycin trough level has been ordered prior to the 1600 dose 8/9. Draw level at 1500 on 8/9.  Hold dose if trough level is greater than 20 mg/L.    08/09/21 0741    08/09/21 1430  Potassium  Timed      08/09/21 1031    08/09/21 0900  cholecalciferol (VITAMIN D3) capsule 50,000 Units  Weekly      08/05/21 0813    08/09/21 0900  dextrose 5 % 1,000 mL with sodium bicarbonate 8.4 % 75 mEq infusion  Continuous      08/09/21 0743    08/08/21 2100  castor oil-balsam peru (VENELEX) ointment  Every 12 Hours Scheduled      08/08/21 1756    08/08/21 0700  Patient Currently On Electrolyte Replacement Protocol - Please Refer to MAR for Protocol Details  Misc Nursing Order (Specify)  Daily     Comments: Patient Currently On Electrolyte Replacement Protocol - Please Refer to MAR for Protocol Details    08/08/21 0659    08/08/21 0659  potassium chloride (K-DUR,KLOR-CON) ER tablet 40 mEq  As Needed      08/08/21 0659    08/08/21 0659  potassium chloride (KLOR-CON) packet 40 mEq  As Needed      08/08/21 0659    08/08/21 0659  potassium chloride 10 mEq in 100 mL IVPB  Every 1 Hour PRN      08/08/21 0659    08/07/21 2100  mirtazapine (REMERON) tablet 15 mg  Nightly      08/07/21 0859    08/06/21 1800  Wound Care  4 Times Daily      08/06/21 1308    08/06/21 1600  vancomycin 1 g/250 mL 0.9% NS (vial-mate)  Every 24 Hours,   Status:  Discontinued      08/06/21 1343    08/06/21 1600  lactobacillus acidophilus (RISAQUAD) capsule 1 capsule  Daily      08/06/21 1424    08/06/21 1600  famotidine (PEPCID) tablet 20 mg  Daily      08/06/21 1424    08/06/21 1400  magic barrier cream  4 Times Daily      08/06/21 1306    08/06/21 0800  Dietary Nutrition Supplements Boost Plus (Ensure Enlive,  Ensure Plus); chocolate  Daily With Breakfast, Lunch & Dinner      08/05/21 1844    08/05/21 2100  donepezil (ARICEPT) tablet 10 mg  Nightly      08/05/21 0813 08/05/21 2100  melatonin tablet 5 mg  Nightly      08/05/21 0813 08/05/21 2100  sertraline (ZOLOFT) tablet 50 mg  Nightly      08/05/21 0813 08/05/21 2100  cefepime 2 gm IVPB in 100 ml NS (VTB)  Every 12 Hours,   Status:  Discontinued      08/05/21 0814 08/05/21 2100  ethyl alcohol 62 % 1 each  2 Times Daily      08/05/21 1446    08/05/21 1000  atorvastatin (LIPITOR) tablet 20 mg  Daily      08/05/21 0813 08/05/21 1000  carboxymethylcellulose (REFRESH PLUS) 0.5 % ophthalmic solution 1 drop  2 Times Daily      08/05/21 0813 08/05/21 1000  folic acid (FOLVITE) tablet 1 mg  Daily      08/05/21 0813 08/05/21 1000  memantine (NAMENDA) tablet 10 mg  Every 12 Hours Scheduled      08/05/21 0813 08/05/21 1000  polyethylene glycol (MIRALAX) packet 17 g  Daily      08/05/21 0813    08/05/21 1000  bacitracin-polymyxin b (POLYSPORIN) ophthalmic ointment  Every 12 Hours Scheduled      08/05/21 0844    08/05/21 0900  sodium chloride 0.9 % flush 10 mL  Every 12 Hours Scheduled      08/05/21 0706    08/05/21 0900  heparin (porcine) 5000 UNIT/ML injection 5,000 Units  Every 12 Hours Scheduled      08/05/21 0706    08/05/21 0900  Intake & Output  Every Shift      08/05/21 0859    08/05/21 0813  acetaminophen (TYLENOL) tablet 500 mg  Every 6 Hours PRN      08/05/21 0813    08/05/21 0813  bisacodyl (DULCOLAX) suppository 10 mg  Every 12 Hours PRN      08/05/21 0813    08/05/21 0813  guaiFENesin (ROBITUSSIN) 100 MG/5ML oral solution 200 mg  Every 8 Hours PRN      08/05/21 0813    08/05/21 0813  lactulose (CHRONULAC) 10 GM/15ML solution 20 g  Every 12 Hours PRN      08/05/21 0813    08/05/21 0800  metroNIDAZOLE (FLAGYL) 500 mg/100mL IVPB  Every 8 Hours Scheduled      08/05/21 0706    08/05/21 0707  Daily Weights  Daily      08/05/21 0706 08/05/21 0706   sodium chloride 0.9 % flush 10 mL  As Needed      08/05/21 0706    08/05/21 0706  nitroglycerin (NITROSTAT) SL tablet 0.4 mg  Every 5 Minutes PRN      08/05/21 0706    08/04/21 2229  Basic Metabolic Panel  Every 6 Hours      08/04/21 2228    08/04/21 1435  sodium chloride 0.9 % flush 10 mL  As Needed      08/04/21 1436    Unscheduled  Telemetry - Pulse Oximetry  Continuous PRN     Comments: If Patient Develops Unresponsiveness, Acute Dyspnea, Cyanosis or Suspected Hypoxemia Start Continuous Pulse Ox Monitoring, Apply Oxygen & Notify Provider    08/05/21 0706    Unscheduled  Oxygen Therapy- Nasal Cannula; Titrate for SPO2: 90% - 95%  Continuous PRN     Comments: If Patient Develops Unresponsiveness, Acute Dyspnea, Cyanosis or Suspected Hypoxemia Start Continuous Pulse Ox Monitoring, Apply Oxygen & Notify Provider    08/05/21 0706    Unscheduled  ECG 12 Lead  As Needed     Comments: Nurse to Release if Patient Expericences Acute Chest Pain or Dysrhythmias    08/05/21 0706    Unscheduled  Potassium  As Needed     Comments: For Ventricular Arrhythmias      08/05/21 0706    Unscheduled  Magnesium  As Needed     Comments: For Ventricular Arrhythmias      08/05/21 0706    Unscheduled  Troponin  As Needed     Comments: For Chest Pain      08/05/21 0706    Unscheduled  Digoxin Level  As Needed     Comments: For Atrial Arrhythmias      08/05/21 0706    Unscheduled  Blood Gas, Arterial -With Co-Ox Panel: Yes  As Needed     Comments: Per O2 PolicyNotify Physician      08/05/21 0706    Unscheduled  Up With Assistance  As Needed      08/05/21 0706    Unscheduled  Assist With Feeding Patient  As Needed     Comments: 1:1 feeding assist    08/05/21 0956    Unscheduled  Apply Moisture Barrier After Any Incontinence  As Needed     Comments: Magic Barrier Cream    08/06/21 1308    Unscheduled  Magnesium  As Needed      08/08/21 0659    Unscheduled  Potassium  As Needed      08/08/21 0659    --  metoprolol tartrate (LOPRESSOR) 25 MG  tablet  2 Times Daily      08/05/21 0257    --  Dextran 70-Hypromellose (Natural Balance Tears) 0.1-0.3 % solution  2 times daily      08/05/21 0257    --  acetaminophen (TYLENOL) 500 MG tablet  Every 6 Hours PRN      08/05/21 0257    --  guaiFENesin (ROBITUSSIN) 100 MG/5ML syrup  Every 8 Hours PRN      08/05/21 0257    --  TiZANidine (ZANAFLEX) 4 MG capsule  Daily PRN      08/05/21 0257    --  SCANNED - TELEMETRY        08/04/21 0000    --  SCANNED - TELEMETRY        08/04/21 0000    --  SCANNED - TELEMETRY        08/04/21 0000    --  SCANNED - TELEMETRY        08/04/21 0000    --  SCANNED - TELEMETRY        08/04/21 0000    --  SCANNED - TELEMETRY        08/04/21 0000                Operative/Procedure Notes (last 24 hours) (Notes from 08/09/21 1229 through 08/10/21 1229)    No notes of this type exist for this encounter.            Physician Progress Notes (last 24 hours) (Notes from 08/09/21 1229 through 08/10/21 1229)      Alexandrea Collier MD at 08/10/21 0817          Nephrology Progress Note      Subjective     Pt lying on bed, not communicative.    Objective       Vital signs :     Temp:  [97.7 °F (36.5 °C)-98.5 °F (36.9 °C)] 97.7 °F (36.5 °C)  Heart Rate:  [61-78] 61  Resp:  [18] 18  BP: (112-124)/(56-67) 113/56      Intake/Output Summary (Last 24 hours) at 8/10/2021 0817  Last data filed at 8/10/2021 0610  Gross per 24 hour   Intake 2724 ml   Output --   Net 2724 ml       Physical Exam:    General Appearance : Not in acute distress  Lungs : clear to auscultation, respirations regular  Heart :  regular rhythm & normal rate, normal S1, S2 and no murmur, no rub  Abdomen : normal bowel sounds, no masses, no hepatomegaly, no splenomegaly, soft non-tender and no guarding  Extremities : moves extremities well, no edema, no cyanosis and no redness      Laboratory Data :     Albumin Albumin   Date Value Ref Range Status   08/09/2021 2.29 (L) 3.50 - 5.20 g/dL Final   08/08/2021 2.49 (L) 3.50 - 5.20 g/dL Final       Magnesium No results found for: MG       PTH               No results found for: PTH    CBC and coagulation:  Results from last 7 days   Lab Units 08/10/21  0129 08/09/21  0028 08/08/21  0141 08/07/21  0138 08/07/21  0138 08/06/21  1221 08/06/21  1221 08/05/21  0847 08/05/21  0847 08/05/21  0846 08/04/21  1540 08/04/21  1428 08/04/21  1428   PROCALCITONIN ng/mL  --   --   --   --   --   --   --   --   --   --  0.11  --   --    LACTATE mmol/L  --   --   --   --   --   --   --   --  1.7  --  2.2*  --  2.2*   SED RATE mm/hr  --   --   --   --   --   --   --   --   --   --   --   --  15   CRP mg/dL  --   --   --   --  1.60*  --  2.07*  --   --  1.20*  --    < > 0.80*   WBC 10*3/mm3 8.46 8.72 8.51   < > 9.92   < > 8.68   < > 13.62*  --   --    < > 12.32*   HEMOGLOBIN g/dL 11.4* 10.4* 10.8*   < > 11.9*   < > 12.3*   < > 13.6  --   --    < > 15.1   HEMATOCRIT % 37.4* 35.3* 36.0*   < > 39.9   < > 42.8   < > 44.8  --   --    < > 51.4*   MCV fL 100.8* 105.1* 104.3*   < > 104.2*   < > 107.5*   < > 104.9*  --   --    < > 105.3*   MCHC g/dL 30.5* 29.5* 30.0*   < > 29.8*   < > 28.7*   < > 30.4*  --   --    < > 29.4*   PLATELETS 10*3/mm3 149 155 163   < > 182   < > 206   < > 254  --   --    < > 287   INR   --   --   --   --   --   --   --   --   --   --  1.08  --   --     < > = values in this interval not displayed.     Acid/base balance:  Results from last 7 days   Lab Units 08/07/21  0934   PH, ARTERIAL pH units 7.335*   PO2 ART mm Hg 78.3*   PCO2, ARTERIAL mm Hg 42.0   HCO3 ART mmol/L 22.4     Renal and electrolytes:  Results from last 7 days   Lab Units 08/10/21  0129 08/09/21  1751 08/09/21  1449 08/09/21  1140 08/09/21  1140 08/09/21  0530 08/09/21  0530 08/09/21  0028 08/09/21  0028 08/07/21  0637 08/07/21  0138 08/05/21  0226 08/04/21  1428   SODIUM mmol/L 141 143  --   --  139  --  141  --  142  142   < > 153*   < > 155*   POTASSIUM mmol/L 4.0 4.5 4.7   < > 4.5   < > 3.6   < > 3.6  3.6   < > 3.5   < > 4.3   MAGNESIUM  mg/dL  --   --   --   --   --   --   --   --   --   --  2.1  --  2.7*   CHLORIDE mmol/L 109* 114*  --   --  113*   < > 114*   < > 114*  114*   < > 126*   < > 120*   CO2 mmol/L 27.4 19.7*  --   --  18.7*   < > 19.6*   < > 23.2  23.2   < > 19.7*   < > 23.6   BUN mg/dL 25* 26*  --   --  28*   < > 29*   < > 30*  30*   < > 56*   < > 105*   CREATININE mg/dL 1.36* 1.35*  --   --  1.33*  --  1.34*  --  1.34*  1.34*   < > 1.60*   < > 2.63*   EGFR IF NONAFRICN AM mL/min/1.73 50* 51*  --   --  52*   < > 51*   < > 51*  51*   < > 42*   < > 23*   CALCIUM mg/dL 8.7 8.5*  --   --  8.3*   < > 8.2*   < > 8.0*  8.0*   < > 8.8   < > 10.0    < > = values in this interval not displayed.     Estimated Creatinine Clearance: 49.4 mL/min (A) (by C-G formula based on SCr of 1.36 mg/dL (H)).    Liver and pancreatic function:  Results from last 7 days   Lab Units 08/09/21 0028 08/08/21 0141 08/07/21  0138   ALBUMIN g/dL 2.29* 2.49* 2.72*   BILIRUBIN mg/dL 0.3 0.3 0.4   ALK PHOS U/L 78 78 90   AST (SGOT) U/L 21 16 13   ALT (SGPT) U/L 14 19 21         Cardiac:  Results from last 7 days   Lab Units 08/04/21  1428   PROBNP pg/mL 1,404.0     Liver and pancreatic function:  Results from last 7 days   Lab Units 08/09/21 0028 08/08/21 0141 08/07/21  0138   ALBUMIN g/dL 2.29* 2.49* 2.72*   BILIRUBIN mg/dL 0.3 0.3 0.4   ALK PHOS U/L 78 78 90   AST (SGOT) U/L 21 16 13   ALT (SGPT) U/L 14 19 21       Medications :     atorvastatin, 20 mg, Oral, Daily  bacitracin-polymyxin b, , Both Eyes, Q12H  carboxymethylcellulose, 1 drop, Left Eye, BID  castor oil-balsam peru, , Topical, Q12H  cefepime, 2 g, Intravenous, Q12H  cholecalciferol, 50,000 Units, Oral, Weekly  donepezil, 10 mg, Oral, Nightly  famotidine, 20 mg, Oral, Daily  folic acid, 1 mg, Oral, Daily  heparin (porcine), 5,000 Units, Subcutaneous, Q12H  lactobacillus acidophilus, 1 capsule, Oral, Daily  magic barrier cream, , Topical, 4x Daily  melatonin, 5 mg, Oral, Nightly  memantine, 10 mg,  Oral, Q12H  mirtazapine, 15 mg, Oral, Nightly  polyethylene glycol, 17 g, Oral, Daily  sertraline, 50 mg, Oral, Nightly  sodium chloride, 10 mL, Intravenous, Q12H  vancomycin, 1,000 mg, Intravenous, Q24H             Assessment/Plan     1.  Acute renal failure  2.  Metabolic acidosis, non gap likely due to TYRELL  3.  Hypernatremia  4.  Metabolic encephalopathy  5.  Complicated UTI     Metabolic acidosis has resolved, Stable renal functions, DC bicarb fluid.   Cr likely at baseline, I willl sign off, please call if any questions.       Alexandrea Collier MD  08/10/21  08:17 EDT      Electronically signed by Alexandrea Collier MD at 08/10/21 0949     Kailash Valdes DO at 21 1610              [facility] HOSPITALIST PROGRESS NOTE     Patient Identification:  Name:  Porsha Soni  Age:  80 y.o.  Sex:  male  :  1940  MRN:  [id number]  Visit Number:  [id number]  ROOM: [address] [place] [address]     Primary Care Provider:  Padmini Terrazas APRN    Length of stay in inpatient status:  5    Subjective     Chief Compliant:    Chief Complaint   Patient presents with   • Altered Mental Status       History of Presenting Illness:    Patient seen in follow-up for urinary tract infection.  Family present at bedside.  He has no acute complaints or concerns.  He has been tolerating diet.  Family states he seems more like himself. He has been afebrile.  No adverse events noted overnight.    Objective     Current Hospital Meds:atorvastatin, 20 mg, Oral, Daily  bacitracin-polymyxin b, , Both Eyes, Q12H  carboxymethylcellulose, 1 drop, Left Eye, BID  castor oil-balsam peru, , Topical, Q12H  cefepime, 2 g, Intravenous, Q12H  cholecalciferol, 50,000 Units, Oral, Weekly  donepezil, 10 mg, Oral, Nightly  ethyl alcohol, 1 application, Nasal, BID  famotidine, 20 mg, Oral, Daily  folic acid, 1 mg, Oral, Daily  heparin (porcine), 5,000 Units, Subcutaneous, Q12H  lactobacillus acidophilus, 1 capsule, Oral, Daily  magic barrier cream, ,  Topical, 4x Daily  melatonin, 5 mg, Oral, Nightly  memantine, 10 mg, Oral, Q12H  metroNIDAZOLE, 500 mg, Intravenous, Q8H  mirtazapine, 15 mg, Oral, Nightly  polyethylene glycol, 17 g, Oral, Daily  sertraline, 50 mg, Oral, Nightly  sodium chloride, 10 mL, Intravenous, Q12H  vancomycin, 1,000 mg, Intravenous, Q24H    custom IV infusion builder, , Last Rate: 125 mL/hr at 08/09/21 1034        Current Antimicrobial Therapy:  Anti-Infectives (From admission, onward)    Ordered     Dose/Rate Route Frequency Start Stop    08/06/21 1343  vancomycin 1 g/250 mL 0.9% NS (vial-mate)     Neema Mitchell, PharmD reviewed the order on 08/09/21 0913.   Ordering Provider: Ava Adame PA-C    1,000 mg  over 60 Minutes Intravenous Every 24 Hours 08/06/21 1600 08/16/21 1559    08/05/21 0814  cefepime 2 gm IVPB in 100 ml NS (VTB)     Neema Mitchell, PharmD reviewed the order on 08/09/21 0913.   Ordering Provider: Evert Brown DO    2 g  over 4 Hours Intravenous Every 12 Hours 08/05/21 2100 08/12/21 2059    08/05/21 0814  cefepime 2 gm IVPB in 100 ml NS (VTB)     Ordering Provider: Evert Brown DO    2 g  over 30 Minutes Intravenous Once 08/05/21 1000 08/05/21 1203    08/05/21 0706  metroNIDAZOLE (FLAGYL) 500 mg/100mL IVPB     Ordering Provider: Jefe Lopez MD    500 mg  over 60 Minutes Intravenous Every 8 Hours Scheduled 08/05/21 0800 08/10/21 0559    08/04/21 1549  vancomycin 1500 mg/500 mL 0.9% NS IVPB ([facility])     Ordering Provider: Luis Gee MD    20 mg/kg × 79.4 kg Intravenous Once 08/04/21 1551 08/04/21 2200    08/04/21 1549  piperacillin-tazobactam (ZOSYN) IVPB 4.5 g in 100 mL NS VTB     Ordering Provider: Luis Gee MD    4.5 g  over 30 Minutes Intravenous Once 08/04/21 1551 08/04/21 2540        Current Diuretic Therapy:  Diuretics (From admission, onward)    None         ----------------------------------------------------------------------------------------------------------------------  Vital Signs:  Temp:  [96.9 °F (36.1 °C)-98.2 °F (36.8 °C)] 98.2 °F (36.8 °C)  Heart Rate:  [59-68] 66  Resp:  [18-20] 18  BP: (100-134)/(56-67) 116/56  SpO2:  [95 %-98 %] 98 %  on   ;   Device (Oxygen Therapy): room air  Body mass index is 25.47 kg/m².    Wt Readings from Last 3 Encounters:   08/09/21 80.5 kg (177 lb 8 oz)   07/07/21 80.3 kg (177 lb)   05/28/21 81.3 kg (179 lb 4.8 oz)     Intake & Output (last 3 days)       08/06 0701 - 08/07 0700 08/07 0701 - 08/08 0700 08/08 0701 - 08/09 0700 08/09 0701 - 08/10 0700    P.O. 240 250 360     I.V. (mL/kg) 760 (9.9) 2041 (25.9) 3232 (40.1)     IV Piggyback        Total Intake(mL/kg) 1000 (13) 2291 (29.1) 3592 (44.6)     Urine (mL/kg/hr)  950 (0.5)      Total Output  950      Net +1000 +1341 +3592             Urine Unmeasured Occurrence 7 x 2 x 7 x     Stool Unmeasured Occurrence 1 x 1 x 1 x         Diet Soft Texture; Ground; Thin  ----------------------------------------------------------------------------------------------------------------------  Physical exam:  Constitutional: chronically ill-appearing male resting comfortably in bed, no acute distress.      HENT:  Head:  Normocephalic and atraumatic.  Mouth:  Moist mucous membranes.    Eyes:  Conjunctivae clear, left eye ectropion. No scleral icterus.    Neck:  Neck supple.  Cardiovascular:  Normal rate, regular rhythm and normal heart sounds with no murmur. No JVD.   Pulmonary/Chest:  No respiratory distress, no wheezes, no crackles, with normal breath sounds and good air movement. Unlabored. No accessory muscle use.  Abdominal:  Soft. No distension and no tenderness.  Bowel sounds present. No rebound or guarding.   Musculoskeletal:  No tenderness, and no deformity.  No red or swollen joints anywhere.    Neurological:  Alert and oriented to person, place, and time.  No cranial nerve deficit.    Nonfocal.   Skin:  Skin is warm and dry. No rash noted. No pallor.   Peripheral vascular:  No clubbing, no cyanosis, no edema. Pedal and tibial pulses 2/4 bilaterally.   ----------------------------------------------------------------------------------------------------------------------  Results from last 7 days   Lab Units 08/09/21  0028 08/08/21  0141 08/07/21  0138 08/06/21  1221 08/06/21  1221 08/05/21  0847 08/05/21  0847 08/05/21  0846 08/04/21  1540 08/04/21  1428 08/04/21  1428   CRP mg/dL  --   --  1.60*  --  2.07*  --   --  1.20*  --    < > 0.80*   LACTATE mmol/L  --   --   --   --   --   --  1.7  --  2.2*  --  2.2*   WBC 10*3/mm3 8.72 8.51 9.92   < > 8.68   < > 13.62*  --   --    < > 12.32*   HEMOGLOBIN g/dL 10.4* 10.8* 11.9*   < > 12.3*   < > 13.6  --   --    < > 15.1   HEMATOCRIT % 35.3* 36.0* 39.9   < > 42.8   < > 44.8  --   --    < > 51.4*   MCV fL 105.1* 104.3* 104.2*   < > 107.5*   < > 104.9*  --   --    < > 105.3*   MCHC g/dL 29.5* 30.0* 29.8*   < > 28.7*   < > 30.4*  --   --    < > 29.4*   PLATELETS 10*3/mm3 155 163 182   < > 206   < > 254  --   --    < > 287   INR   --   --   --   --   --   --   --   --  1.08  --   --     < > = values in this interval not displayed.     Results from last 7 days   Lab Units 08/07/21  0934   PH, ARTERIAL pH units 7.335*   PO2 ART mm Hg 78.3*   PCO2, ARTERIAL mm Hg 42.0   HCO3 ART mmol/L 22.4     Results from last 7 days   Lab Units 08/09/21  1140 08/09/21  0530 08/09/21  0028 08/08/21  0732 08/08/21  0141 08/07/21  0637 08/07/21  0138 08/05/21  0226 08/04/21  1428   SODIUM mmol/L 139 141 142  142   < > 149*   < > 153*   < > 155*   POTASSIUM mmol/L 4.5 3.6 3.6  3.6   < > 3.2*   < > 3.5   < > 4.3   MAGNESIUM mg/dL  --   --   --   --   --   --  2.1  --  2.7*   CHLORIDE mmol/L 113* 114* 114*  114*   < > 121*   < > 126*   < > 120*   CO2 mmol/L 18.7* 19.6* 23.2  23.2   < > 21.2*   < > 19.7*   < > 23.6   BUN mg/dL 28* 29* 30*  30*   < > 41*   < > 56*   < > 105*    CREATININE mg/dL 1.33* 1.34* 1.34*  1.34*   < > 1.45*   < > 1.60*   < > 2.63*   EGFR IF NONAFRICN AM mL/min/1.73 52* 51* 51*  51*   < > 47*   < > 42*   < > 23*   CALCIUM mg/dL 8.3* 8.2* 8.0*  8.0*   < > 8.4*   < > 8.8   < > 10.0   GLUCOSE mg/dL 110* 101* 130*  130*   < > 134*   < > 127*   < > 128*   ALBUMIN g/dL  --   --  2.29*  --  2.49*  --  2.72*   < > 3.69   BILIRUBIN mg/dL  --   --  0.3  --  0.3  --  0.4   < > 0.4   ALK PHOS U/L  --   --  78  --  78  --  90   < > 132*   AST (SGOT) U/L  --   --  21  --  16  --  13   < > 33   ALT (SGPT) U/L  --   --  14  --  19  --  21   < > 46*    < > = values in this interval not displayed.   Estimated Creatinine Clearance: 49.6 mL/min (A) (by C-G formula based on SCr of 1.33 mg/dL (H)).  No results found for: AMMONIA  Results from last 7 days   Lab Units 08/07/21  0637 08/05/21  0824 08/04/21  2049 08/04/21  1428   CK TOTAL U/L 52 348*  --  544*   TROPONIN T ng/mL  --   --  0.041* 0.043*     Results from last 7 days   Lab Units 08/04/21  1428   PROBNP pg/mL 1,404.0         No results found for: HGBA1C, POCGLU  Lab Results   Component Value Date    TSH 1.610 08/04/2021    FREET4 1.15 08/04/2021     No results found for: PREGTESTUR, PREGSERUM, HCG, HCGQUANT  Pain Management Panel     Pain Management Panel Latest Ref Rng & Units 10/23/2020 10/19/2020    CREATININE UR mg/dL - 220.5    AMPHETAMINES SCREEN, URINE Negative Negative -    BARBITURATES SCREEN Negative Negative -    BENZODIAZEPINE SCREEN, URINE Negative Negative -    BUPRENORPHINEUR Negative Negative -    COCAINE SCREEN, URINE Negative Negative -    METHADONE SCREEN, URINE Negative Negative -        Brief Urine Lab Results  (Last result in the past 365 days)      Color   Clarity   Blood   Leuk Est   Nitrite   Protein   CREAT   Urine HCG        08/04/21 1903 Yellow Turbid Small (1+) Large (3+) Negative 100 mg/dL (2+)             Blood Culture   Date Value Ref Range Status   08/04/2021 No growth at 5 days  Final    08/04/2021 No growth at 4 days  Preliminary     Urine Culture   Date Value Ref Range Status   08/04/2021 <25,000 CFU/mL Gram Negative Bacilli (A)  Final     Wound Culture   Date Value Ref Range Status   08/05/2021 Rare Staphylococcus aureus, MRSA (A)  Final     Comment:     Methicillin resistant Staphylococcus aureus, Patient may be an isolation risk.   08/05/2021 Scant growth (1+) Normal Skin Meli  Final     No results found for: STOOLCX  No results found for: RESPCX  No results found for: AFBCX  Results from last 7 days   Lab Units 08/07/21  0138 08/06/21  1221 08/05/21  0847 08/05/21  0846 08/04/21  1540 08/04/21  1428   PROCALCITONIN ng/mL  --   --   --   --  0.11  --    LACTATE mmol/L  --   --  1.7  --  2.2* 2.2*   SED RATE mm/hr  --   --   --   --   --  15   CRP mg/dL 1.60* 2.07*  --  1.20*  --  0.80*       I have personally looked at the labs and they are summarized above.  ----------------------------------------------------------------------------------------------------------------------  Detailed radiology reports for the last 24 hours:  Imaging Results (Last 24 Hours)     ** No results found for the last 24 hours. **        Assessment & Plan      Patient is an 80-year-old male with history significant for dementia, CKD 3a who presented from the [facility] for worsening confusion and diagnosed with a urinary tract infection.    #Urinary tract infection, complicated  #Severe sepsis, resolved  --Patient initially met sepsis criteria on admission and responded well to resuscitation and antibiotics.  Urinary tract infection complicated by possible chronic bladder outlet syndrome.  --Urine culture to be obtained, <25k cfu, bld cx ngtd  --c/w empiric antibiotics, de-escalate pending micro data    #Chronic left basilar consolidation  --patient hemodynamically stable on RA. Imaging reviewed. Will await final culture data but suspect this is chronic consolidation and atelectasis rather than a true focal  pneumonia.  --Plan to de-escalate antibiotics pending culture data    #Acute conjunctivitis  --c/w polysporin    #Hypokalemia, monitor and replace as needed  #Advanced dementia without behavioral disturbances, stable  --Continue Remeron, Aricept, Namenda    #Chronic kidney disease, stage IIIb  --Avoid nephrotoxins, NSAIDs,    #Elevated troponin  #CAD status post CABG  --Likely demand related due to the above.  Echo and imaging/EKG reviewed continue to monitor, follow-up outpatient with cardiology    #Acute hypoxemic respiratory failure, resolved    Antibiotics: Vancomycin, cefepime  Steroids: None  GI prophylaxis: Pepcid  Dvt ppx: heparin  Activity: PT/OT  Diet: Modified  Dispo: Patient still requires IV antibiotics while we await speciation and culture data.  Case management following for SNF placement.      Kailash Valdes DO  [facility] Hospitalist  08/09/21  16:10 EDT      Electronically signed by Kailash Valdes DO at 08/09/21 1617       Consult Notes (last 24 hours) (Notes from 08/09/21 1230 through 08/10/21 1230)    No notes of this type exist for this encounter.         Physical Therapy Notes (last 24 hours) (Notes from 08/09/21 1230 through 08/10/21 1230)    No notes exist for this encounter.         Occupational Therapy Notes (last 24 hours) (Notes from 08/09/21 1230 through 08/10/21 1230)    No notes exist for this encounter.         Speech Language Pathology Notes (last 24 hours) (Notes from 08/09/21 1230 through 08/10/21 1230)    No notes exist for this encounter.         ADL Documentation (last day)     Date/Time Transferring Toileting Bathing Dressing Eating Communication Swallowing    08/10/21 0814  4 - completely dependent  4 - completely dependent  4 - completely dependent  4 - completely dependent  3 - assistive equipment and person  2 - difficulty speaking (not related to language barrier)  2 - difficulty swallowing liquids/foods    08/1939  4 - completely dependent   4 - completely dependent  4 - completely dependent  4 - completely dependent  3 - assistive equipment and person  2 - difficulty speaking (not related to language barrier)  2 - difficulty swallowing liquids/foods    08/09/21 0858  4 - completely dependent  4 - completely dependent  4 - completely dependent  4 - completely dependent  3 - assistive equipment and person  2 - difficulty speaking (not related to language barrier)  2 - difficulty swallowing liquids/foods                After Visit Summary  Porsha Soni  MRN: [id number]    Icon Date   8/4/2021 - 8/10/2021   Icon Location   [facility]   Icon Checklist header    Your Next Steps    Icon do   Do   Icon Checkbox     [facility] [address] - [phone] [facility] [phone] FX  1 amoxicillin-clavulanate  2 bacitracin-polymyxin b  3 famotidine  4 nitroglycerin   Icon Checkbox     these medications from any pharmacy with your printed prescription  1 magic barrier cream  Icon Location   Go  Jul 11 2022 Follow Up 11:30 AM   Dwayne Swartz MD  [facility]   [address] [place] [id number]   [phone]  -Bring photo ID, insurance card, and list of medications to appointment -If testing was completed outside of [facility] then patient must bring images on a disc -Copay will be collected at time of appointment -Established patients should arrive 15 minutes prior to appointment   After Visit Summary  Instructions     Icon medication changes this visit           Your medications have changed    Icon medications to start taking   START taking:   amoxicillin-clavulanate (Augmentin)    bacitracin-polymyxin b (POLYSPORIN)    famotidine (PEPCID)   Start taking on: August 11, 2021   magic barrier cream    nitroglycerin (NITROSTAT)   Review your updated medication list below.  Your Next Steps  Icon do   Do  Icon Location   Go      Why Get  Vaccinated?  Building defenses against COVID-19 is a team effort, and you are a brandt part of that team. Getting the COVID-19 vaccine adds one more layer of protection for you, your coworkers, and family. Here are ways you can build people’s confidence in the COVID-19 vaccines in your community and at home.     · Get vaccinated and enroll in the avVentasafe text messaging program to help CDC monitor vaccine safety.   · Tell others why you are getting vaccinated and encourage them to get vaccinated. Share your success story.    · Learn how to have conversations about COVID-19 vaccine with coworkers, family, and friends.     How do I schedule an appointment for a vaccine?  https://www.vaccines.gov/ helps you find locations that carry COVID-19 vaccines and their contact information. Because every location handles appointments differently, you will need to schedule your appointment directly with the location you choose.        If you have any questions about your recovery, please call the [facility] Nurse Call Center at [phone]. A registered nurse is available 24 hours a day 7 days a week to assist you.   If you have any COVID-19 related questions, please call [phone].  What's Next    What's Next          Follow up with Padmini Terrazas, APRN [address] [facility] [address]  [phone]   Jul 11 2022 Follow Up with Dwayne Swartz MD  Monday Jul 11, 2022 11:30 AM  -Bring photo ID, insurance card, and list of medications to appointment   -If testing was completed outside of [facility] then patient must bring images on a disc   -Copay will be collected at time of appointment   -Established patients should arrive 15 minutes prior to appointment [facility]  [address]   [facility] [address] [phone]   Your Allergies  Date Reviewed: 8/5/2021  Your Allergies   No active allergies   Patient Belongings Returned    Document Return of Belongings  Flowsheet    Were the patient bedside belongings sent home? --   Medications Retrieved from Pharmacy & Sent Home --   Belongings Sent to Safe --   Belongings sent with: --   Belongings Retrieved from Security & Sent Home --       Eventtushart Signup    Our records indicate that your [facility] account has been deactivated. If you would like to reactivate your account, please email [email or web address] or call [phone] to talk to our [employer] staff.  Medication List  Medication List     Morning Afternoon Evening Bedtime As Needed    acetaminophen 500 MG tablet  Commonly known as: TYLENOL  Take 500 mg by mouth Every 6 (Six) Hours As Needed for Mild Pain or Fever.        Icon medications to start taking   amoxicillin-clavulanate 875-125 MG per tablet  Commonly known as: Augmentin  Take 1 tablet by mouth 2 (Two) Times a Day.         atorvastatin 20 MG tablet  Commonly known as: LIPITOR  Take 20 mg by mouth Daily.  Last time this was given: 20 mg on August 10, 2021  8:13 AM        Icon medications to start taking   bacitracin-polymyxin b 500-35251 UNIT/GM ophthalmic ointment  Commonly known as: POLYSPORIN  Administer to both eyes Every 12 (Twelve) Hours.  Last time this was given: August 10, 2021  8:14 AM         bisacodyl 10 MG suppository  Commonly known as: DULCOLAX  Insert 10 mg into the rectum Every 12 (Twelve) Hours As Needed for Constipation.         donepezil 10 MG tablet  Commonly known as: ARICEPT  Take 10 mg by mouth Every Night.  Last time this was given: 10 mg on August 9, 2021  7:39 PM        Icon medications to start taking   famotidine 20 MG tablet  Commonly known as: PEPCID  Start taking on: August 11, 2021  Take 1 tablet by mouth Daily.  Last time this was given: 20 mg on August 10, 2021  8:14 AM         fish oil 1000 MG capsule capsule  Take 1,000 mg by mouth Daily.         folic acid 1 MG tablet  Commonly known as: FOLVITE  Take 1 tablet by mouth Daily.  Last time this was given: 1  mg on August 10, 2021  8:14 AM         guaiFENesin 100 MG/5ML syrup  Commonly known as: ROBITUSSIN  Take 200 mg by mouth Every 8 (Eight) Hours As Needed for Cough or Congestion.         lactulose 10 GM/15ML solution  Commonly known as: CHRONULAC  Take 20 g by mouth Every 12 (Twelve) Hours As Needed (constipation).        Icon medications to start taking   magic barrier cream  Apply 1 application topically to the appropriate area as directed 4 (Four) Times a Day.  Last time this was given: August 10, 2021 11:08 AM         Melatonin 3 MG capsule  Take 6 mg by mouth Every Night.  Last time this was given: Ask your nurse or doctor         memantine 10 MG tablet  Commonly known as: NAMENDA  Take 10 mg by mouth 2 (Two) Times a Day.  Last time this was given: 10 mg on August 10, 2021  8:13 AM         metoprolol tartrate 25 MG tablet  Commonly known as: LOPRESSOR  Take 12.5 mg by mouth 2 (Two) Times a Day. PTA: new script that started today 8/4/21  Last time this was given: 12.5 mg on August 7, 2021  8:45 AM         mirtazapine 30 MG tablet  Commonly known as: REMERON  Take 30 mg by mouth Every Night.  Last time this was given: 15 mg on August 9, 2021  7:40 PM         Natural Balance Tears 0.1-0.3 % solution  Apply 1 drop to eye(s) as directed by provider 2 (two) times a day. PTA: in left eye  Generic drug: Dextran 70-Hypromellose        Icon medications to start taking   nitroglycerin 0.4 MG SL tablet  Commonly known as: NITROSTAT  Place 1 tablet under the tongue Every 5 (Five) Minutes As Needed for Chest Pain (Only if SBP Greater Than 100). Take no more than 3 doses in 15 minutes.         polyethylene glycol 17 g packet  Commonly known as: MIRALAX  Take 17 g by mouth Daily.  Last time this was given: 17 g on August 10, 2021  8:13 AM         sertraline 50 MG tablet  Commonly known as: ZOLOFT  Take 50 mg by mouth Every Night.  Last time this was given: 50 mg on August 9, 2021  7:40 PM         TiZANidine 4 MG  capsule  Commonly known as: ZANAFLEX  Take 4 mg by mouth Daily As Needed for Muscle Spasms.         vitamin D 1.25 MG (91936 UT) capsule capsule  Commonly known as: ERGOCALCIFEROL  Take 50,000 Units by mouth 1 (One) Time Per Week. Prior to [facility] Admission, Patient was on: takes on mondays        Where to  your medications     Icon medication  information                   these medications at [facility] - [place], [place] - [address] [facility] [facility] - [phone]  - [phone] FX     amoxicillin-clavulanate • bacitracin-polymyxin b • famotidine • nitroglycerin  Address: [address], [place] [id number]   Phone: [phone]    Icon medication  information                   these medications from any pharmacy with your printed prescription     magic barrier cream  Always carry an updated list of your medications with you. If there is an emergency, a responder can quickly see what medications you are taking. Take this paperwork with you the next time you see your health care provider.              Opioid Resource    If you or someone you know needs information on substance abuse, please visit   https://www.findhelpnowky.org/ for listings of facilities and resources across [place].  Stroke Symptoms    · Call 911 or have someone take you to the Emergency Department if you have any of the following:  · Sudden numbness or weakness of your face, arm or leg especially on one side of the body  · Sudden confusion, difficulty speaking or trouble understanding   · Changes in your vision or loss of sight in one eye  · Sudden severe headache with no known cause  · Sudden dizziness, trouble walking, loss of balance or coordination     It is important to seek emergency care right away if you have further stroke symptoms. If you get emergency help quickly, the powerful clot-dissolving medicines can reduce the disabilities caused by a stroke.      For more information:  American Stroke  Association  8-948-2-STROKE  www.strokeassociation.org  Smoking Cessation    IF YOU SMOKE OR USE TOBACCO PLEASE READ THE FOLLOWING:  Why is smoking bad for me?  Smoking increases the risk of heart disease, lung disease, vascular disease, stroke, and cancer. If you smoke, STOP!     For more information:  Quit Now [place]  1-800-QUIT-NOW  https://[place].quitlogix.org/en-[place]/  Suicidal Feelings    If you feel like life is too tough and are thinking of suicide or injuring yourself, get help right away!  · Call 911  · Call a suicide hotline to speak to a counselor. 4-015-195-TALK or 5-065-DWVOMDL   Patient Experience    Thank you for choosing [facility]. You may receive a survey following your visit. Please take a moment to share what went well, where we need improvement, and which staff members deserve recognition. We value your input.           ? ?              YOU ARE THE MOST IMPORTANT FACTOR IN YOUR RECOVERY.      Follow all instructions carefully.      I have reviewed my discharge instructions with my nurse, including the following information, if applicable:                 Information about my illness and diagnosis              Follow up appointments (including lab draws)              Wound Care              Equipment Needs              Medications (new and continuing) along with side effects              Preventative information such as vaccines and smoking cessations              Diet              Pain              I know when to contact my Doctor's office or seek emergency care        I want my nurse to describe the side effects of my medications: YES NO   If the answer is no, I understand the side effects of my medications: YES NO   My nurse described the side effects of my medications in a way that I could understand: YES NO   I have taken my personal belongings and my own medications with me at discharge: YES NO       I have received this information and my questions have been answered. I have  discussed any concerns I see with this plan with the nurse or physician. I understand these instructions.     Signature of Patient or Responsible Person: _____________________________________     Date: _________________  Time: __________________     Signature of Healthcare Provider: _______________________________________  Date: _________________  Time: __________________          Discharge Summary    No notes of this type exist for this encounter.         Discharge Order (From admission, onward)     Start     Ordered    08/10/21 1215  Discharge patient  Once     Expected Discharge Date: 08/10/21    Discharge Disposition: Skilled Nursing Facility (DC - External)    Physician of Record for Attribution - Please select from Treatment Team: RAFAEL CORTEZ [[id number]]    Review needed by CMO to determine Physician of Record: No       Question Answer Comment   Physician of Record for Attribution - Please select from Treatment Team RAFAEL CORTEZ    Review needed by CMO to determine Physician of Record No        08/10/21 1226

## 2021-08-10 NOTE — PLAN OF CARE
Problem: Adult Inpatient Plan of Care  Goal: Plan of Care Review  Outcome: Ongoing, Progressing  Goal: Patient-Specific Goal (Individualized)  Outcome: Ongoing, Progressing  Goal: Absence of Hospital-Acquired Illness or Injury  Outcome: Ongoing, Progressing  Intervention: Identify and Manage Fall Risk  Recent Flowsheet Documentation  Taken 8/9/2021 1939 by Danielle Lee, RN  Safety Promotion/Fall Prevention:   room organization consistent   safety round/check completed   fall prevention program maintained  Intervention: Prevent Skin Injury  Recent Flowsheet Documentation  Taken 8/9/2021 1939 by Danielle Lee RN  Skin Protection:   adhesive use limited   incontinence pads utilized  Intervention: Prevent and Manage VTE (venous thromboembolism) Risk  Recent Flowsheet Documentation  Taken 8/9/2021 1939 by Danielle Lee RN  VTE Prevention/Management: (see MAR) other (see comments)  Intervention: Prevent Infection  Recent Flowsheet Documentation  Taken 8/9/2021 1939 by Danielle Lee, RN  Infection Prevention: rest/sleep promoted  Goal: Optimal Comfort and Wellbeing  Outcome: Ongoing, Progressing  Intervention: Provide Person-Centered Care  Recent Flowsheet Documentation  Taken 8/9/2021 1939 by Danielle Lee RN  Trust Relationship/Rapport:   care explained   choices provided   emotional support provided   empathic listening provided   questions answered   questions encouraged   reassurance provided   thoughts/feelings acknowledged  Goal: Readiness for Transition of Care  Outcome: Ongoing, Progressing

## 2021-08-10 NOTE — PROGRESS NOTES
"Palliative Care Daily Progress Note     S: 80-year-old white male with history of BPH, CKD, Covid infection in January of this year, malignant melanoma, recent hospitalization for bilateral pneumonia, and advanced dementia was brought in for decreased mentation and found to have a urinary tract infection.  Patient continues IV antibiotics and he has returned to his baseline mentation.  He is able to answer simple questions, is in no acute distress but does complain of some generalized pain.  He is hard of hearing but is pleasant and cooperative.  Long-term plan is to complete his treatment and return to the skilled nursing facility.  The patient is in no acute distress      O:   Palliative Performance Scale Score: 30%   /71 (BP Location: Right arm, Patient Position: Lying)   Pulse 61   Temp 97.8 °F (36.6 °C) (Oral)   Resp 20   Ht 177.8 cm (70\")   Wt 82 kg (180 lb 11.2 oz)   SpO2 96%   BMI 25.93 kg/m²     Intake/Output Summary (Last 24 hours) at 8/10/2021 1317  Last data filed at 8/10/2021 1014  Gross per 24 hour   Intake 2874 ml   Output --   Net 2874 ml       PE:  General Appearance:    Chronically ill appearing, alert, cooperative, NAD   HEENT:    NC/AT, without obvious abnormality, EOMI, anicteric    Neck:   supple, trachea midline, no JVD   Lungs:     CTAB without w/r/r    Heart:    RRR, normal S1 and S2, no M/R/G   Abdomen:     Soft, NT, ND, NABS    Extremities:   Moves all extremities, no edema       Skin:   Warm, dry   Neurologic:  Alert pleasant and cooperative   Psych:   Calm, appropriate         Meds: Reviewed     Labs:   Results from last 7 days   Lab Units 08/10/21  0129   WBC 10*3/mm3 8.46   HEMOGLOBIN g/dL 11.4*   HEMATOCRIT % 37.4*   PLATELETS 10*3/mm3 149     Results from last 7 days   Lab Units 08/10/21  0842   SODIUM mmol/L 142   POTASSIUM mmol/L 4.0   CHLORIDE mmol/L 112*   CO2 mmol/L 21.7*   BUN mg/dL 23   CREATININE mg/dL 1.25   GLUCOSE mg/dL 99   CALCIUM mg/dL 8.6     Results from " last 7 days   Lab Units 08/10/21  0842 08/09/21  0530 08/09/21  0028   SODIUM mmol/L 142   < > 142  142   POTASSIUM mmol/L 4.0   < > 3.6  3.6   CHLORIDE mmol/L 112*   < > 114*  114*   CO2 mmol/L 21.7*   < > 23.2  23.2   BUN mg/dL 23   < > 30*  30*   CREATININE mg/dL 1.25   < > 1.34*  1.34*   CALCIUM mg/dL 8.6   < > 8.0*  8.0*   BILIRUBIN mg/dL  --   --  0.3   ALK PHOS U/L  --   --  78   ALT (SGPT) U/L  --   --  14   AST (SGOT) U/L  --   --  21   GLUCOSE mg/dL 99   < > 130*  130*    < > = values in this interval not displayed.     Imaging Results (Last 72 Hours)     ** No results found for the last 72 hours. **            Diagnostics: Reviewed    A: Porsha Soni is an 80 y.o. yo male with dementia, CKD, BPH with recurrent urinary tract infections.  He continues his antibiotic therapy and plan is for return to the skilled nursing facility on completion.      P: Complete his antibiotic course and will return to his skilled nursing facility.  He appears to be back at his baseline mentation.      We will continue to follow along. Please do not hesitate to contact us regarding further sx mgmt or GOC needs, including after hours or on weekends via our on call provider at [phone].     Fabien Sultana MD    8/10/2021

## 2021-08-11 NOTE — OUTREACH NOTE
Ambulatory Case Management Note    SNF Follow-up    Questions/Answers      Responses   Acute Facility Discharged From  [facility]   Acute Discharge Date  08/10/21   Name of the Skilled Nursing Facility?  [facility]   Tier Level of the Skilled Nursing Facility  2   Purpose of SNF Admission  PT, OT   Estimated length of stay for the patient?  TBD   Who is the insurance provider or payor of patient stay?  Private Pay   Progression of Patient?  Patient noted to be private pay,  outreach scheduled to check patient's plan            Evi Vaughn RN  Ambulatory Case Management    8/11/2021, 15:45 EDT

## 2021-12-17 PROBLEM — I46.9 CARDIOPULMONARY ARREST (HCC): Status: ACTIVE | Noted: 2021-01-01

## 2021-12-17 NOTE — ED NOTES
Called [facility] they stated that they have no bed  At this time they are waiting list only at this time      Everton Connelly  12/17/21 0771

## 2021-12-17 NOTE — ED PROVIDER NOTES
Subjective   80-year-old male presented from a local long-term care facility due to concerns for cardiac arrest.  Patient's family noted he has been having a long history of difficulty swallowing and has had multiple complications with aspiration.  Nursing home staff noted they arrived to him unresponsive or bradycardic.  When they returned no pulse was noted.  Patient arrived with an LMA.  Patient was immediately transitioned to a bed and CPR was continued.  Patient was intubated on arrival.  IV access was obtained.  Patient received epinephrine.  CPR was continued.  Status critical          Review of Systems   Unable to perform ROS: Patient unresponsive       Past Medical History:   Diagnosis Date   • Arthritis    • Asymptomatic PVCs 6/14/2016   • BPH with urinary obstruction 11/17/2018   • Cervical spine disease     remotely suggested surgical intervention.  Patient deferred.    • Chronic kidney disease, stage 3a (CMS/HCC) 10/30/2020   • Coronary artery disease    • COVID-19     Jan 2021   • Dementia (CMS/[facility])    • Diverticulosis    • Dyslipidemia    • Folic acid deficiency anemia    • Hypertension    • Impotence    • Malignant melanoma (CMS/[facility]) 07/2020    Left arm   • Osteoarthritis 10/30/2020   • Pneumonia    • Vertigo        No Known Allergies    Past Surgical History:   Procedure Laterality Date   • ARM LESION/CYST EXCISION Left 7/30/2020    Procedure: WIDE EXCISION MELANOMA LEFT ARM INTERMEDIATE CLOSURE;  Surgeon: Roman Mohan MD;  Location:  [facility];  Service: General;  Laterality: Left;   • ARTERIAL BYPASS SURGERY     • BRONCHOSCOPY N/A 5/24/2018    Procedure: BRONCHOSCOPY WITH WASHINGS;  Surgeon: Leonides Quarles MD;  Location:  [facility];  Service: Pulmonary   • CHOLECYSTECTOMY WITH INTRAOPERATIVE CHOLANGIOGRAM N/A 5/16/2018    Procedure: OPEN CHOLECYSTECTOMY;  Surgeon: Rosie Montalvo MD;  Location:  [facility];  Service: General   • COLONOSCOPY N/A 3/30/2018    Procedure: COLONOSCOPY;  Surgeon:  Simone Valderrama MD;  Location: [facility] OR;  Service: Gastroenterology   • CORONARY ARTERY BYPASS GRAFT     • KNEE ARTHROPLASTY, PARTIAL REPLACEMENT      Lt , 2015   • REPLACEMENT TOTAL KNEE      Rt   • SENTINEL NODE BIOPSY Left 2020    Procedure: SENTINEL NODE INJECTION AND BIOPSY LEFT AXILLA;  Surgeon: Roman Mohan MD;  Location: [facility] OR;  Service: General;  Laterality: Left;       Family History   Problem Relation Age of Onset   • No Known Problems Mother    • No Known Problems Father    • No Known Problems Sister    • No Known Problems Brother        Social History     Socioeconomic History   • Marital status:    Tobacco Use   • Smoking status: Former Smoker     Packs/day: 1.50     Years: 7.00     Pack years: 10.50     Types: Cigarettes, Pipe, Cigars     Quit date: 1986     Years since quittin.5   • Smokeless tobacco: Never Used   Vaping Use   • Vaping Use: Never used   Substance and Sexual Activity   • Alcohol use: Yes     Comment: occassionally    • Drug use: No   • Sexual activity: Defer           Objective   Physical Exam  Constitutional:       General: He is in acute distress.      Appearance: He is well-developed. He is ill-appearing and toxic-appearing.   HENT:      Head: Normocephalic and atraumatic.      Mouth/Throat:      Mouth: Mucous membranes are moist.   Eyes:      Extraocular Movements: Extraocular movements intact.      Pupils: Pupils are equal, round, and reactive to light.   Neck:      Vascular: No JVD.   Cardiovascular:      Rate and Rhythm: Normal rate and regular rhythm.      Heart sounds: Normal heart sounds. No murmur heard.      Pulmonary:      Effort: Respiratory distress present. No tachypnea or accessory muscle usage.      Breath sounds: No stridor. Wheezing and rhonchi present. No decreased breath sounds or rales.      Comments: Emesis noted in the posterior pharynx and surrounding the epiglottis during intubation  Chest:      Chest wall: No deformity,  tenderness or crepitus.   Abdominal:      General: Bowel sounds are normal.      Palpations: Abdomen is soft.      Tenderness: There is no abdominal tenderness. There is no guarding or rebound.   Musculoskeletal:         General: Normal range of motion.      Cervical back: Normal range of motion and neck supple.      Right lower leg: No tenderness. No edema.      Left lower leg: No tenderness. No edema.   Lymphadenopathy:      Cervical: No cervical adenopathy.   Skin:     General: Skin is warm and dry.      Coloration: Skin is not cyanotic.      Findings: No ecchymosis or erythema.   Neurological:      General: No focal deficit present.      Mental Status: He is oriented to person, place, and time.      Cranial Nerves: No cranial nerve deficit.      Motor: No weakness.   Psychiatric:         Mood and Affect: Mood normal. Mood is not anxious.         Behavior: Behavior normal. Behavior is not agitated.         Intubation    Date/Time: 12/17/2021 6:42 AM  Performed by: David Brandon DO  Authorized by: David Brandon DO     Consent:     Consent obtained:  Emergent situation    Consent given by:  Healthcare agent    Risks discussed:  Aspiration, brain injury, dental trauma, laryngeal injury, bleeding, death, hypoxia and pneumothorax    Alternatives discussed:  No treatment, delayed treatment, alternative treatment, observation and referral  Pre-procedure details:     Patient status:  Unresponsive    Mallampati score:  I    Pretreatment medications:  None    Paralytics:  None  Procedure details:     Preoxygenation:  Bag valve mask    CPR in progress: yes      Intubation method:  Oral    Oral intubation technique:  Direct    Laryngoscope blade:  Mac 4    Tube size (mm):  7.0    Tube type:  Cuffed    Number of attempts:  1    Ventilation between attempts: no      Cricoid pressure: yes      Tube visualized through cords: yes    Placement assessment:     ETT to lip:  23    Tube secured with:  ETT esqueda    Breath  sounds:  Equal and absent over the epigastrium    Placement verification: chest rise, condensation, CXR verification, direct visualization, equal breath sounds, ETCO2 detector and tube exhalation      CXR findings:  ETT in proper place  Post-procedure details:     Patient tolerance of procedure:  Tolerated well, no immediate complications  Central Line At Bedside    Date/Time: 12/17/2021 6:43 AM  Performed by: David Brandon DO  Authorized by: Davdi Brandon DO     Consent:     Consent obtained:  Emergent situation    Consent given by:  Healthcare agent    Risks discussed:  Arterial puncture, incorrect placement, nerve damage, bleeding, infection and pneumothorax    Alternatives discussed:  No treatment, delayed treatment, alternative treatment, observation and referral  Pre-procedure details:     Hand hygiene: Hand hygiene performed prior to insertion      Sterile barrier technique: All elements of maximal sterile technique followed      Skin preparation:  2% chlorhexidine    Skin preparation agent: Skin preparation agent completely dried prior to procedure    Anesthesia (see MAR for exact dosages):     Anesthesia method:  None  Procedure details:     Location:  R femoral    Patient position:  Flat    Procedural supplies:  Triple lumen    Catheter size:  7 Fr    Landmarks identified: yes      Ultrasound guidance: no      Number of attempts:  1    Successful placement: yes    Post-procedure details:     Post-procedure:  Dressing applied and line sutured    Assessment:  Blood return through all ports and free fluid flow    Patient tolerance of procedure:  Tolerated well, no immediate complications  Chest Tube Insertion    Date/Time: 12/17/2021 4:35 PM  Performed by: Adebayo Wolf MD  Authorized by: Adebayo Wolf MD     Consent:     Consent obtained:  Verbal    Consent given by:  Spouse    Risks discussed:  Bleeding, damage to surrounding structures, incomplete drainage, infection and pain    Alternatives  discussed:  No treatment  Pre-procedure details:     Skin preparation:  Betadine  Procedure details:     Placement location:  R lateral    Scalpel size:  11    Tube size (Fr):  28    Dissection instrument:  Jessica clamp and finger    Ultrasound guidance: no      Tension pneumothorax: no      Tube connected to:  Suction    Drainage characteristics:  Air only    Suture material:  2-0 silk    Dressing:  4x4 sterile gauze and petrolatum-impregnated gauze  Post-procedure details:     Post-insertion x-ray findings: tube in good position      Patient tolerance of procedure:  Tolerated well, no immediate complications  Comments:      CXR confirmed placement.  Small pneumothorax remains present.  No leak in atrium.               ED Course  ED Course as of 12/18/21 1000   Fri Dec 17, 2021   0644 During transport and transfer to the ER [facility].  Patient was defibrillated by the automated AED provided by EMS.  CPR was continued.  Patient received epinephrine, sodium bicarbonate, magnesium, calcium chloride, and aggressive IV fluid resuscitation.  CPR was continued.  Short duration of pulse was noted.  However, CPR had to be continued due to deteriorating pulse.  CPR was continued using epinephrine.  Patient received amiodarone due to concerns for possible ventricular tachycardia on rhythm check.  Patient was defibrillated again.  CPR was continued.  Repeat rhythm check noted pulses with tachycardia.  Right femoral central line was placed.  Patient status required central access for further treatment.  Sterile technique was used prior to procedure.  The area was cleaned using chlorahexadine.  Sterile field was maintained.  Lidocaine applied as local anesthetic.  US guidance used by sterile technique to insure proper placement.  Placement was confirmed with return flow.  Imaging ordered as need to further confirmation of proper placement.  Procedure tolerated well by the patient. [SF]   0676 Chest x-ray confirmed proper placement  of tubing.  Concerns for bilateral pneumonia.  CT chest confirmed these findings as well.  Patient is on aggressive IV antibiotic therapy.  Significant leukocytosis noted.  Severe sepsis protocol initiated on arrival.  Head CT without contrast noted findings consistent with chronic dementia changes but also noted concerns for possible anoxic brain injury.  Recommended follow-up imaging.  CT the abdomen pelvis noted no acute abnormality. [SF]   0649 Patient required initial Levophed drip.  Transition to epinephrine drip.  Blood pressure improved radically.  Patient was deescalated from epinephrine to Levophed and Ryan-Synephrine due to persistent tachycardia.  Blood pressure has remained critical.  Patient has received aggressive IV fluid resuscitation and broad-spectrum antibiotic coverage.  Neurological status reveals some nonpurposeful movement.  However, patient does not require sedation at this point time. [SF]   0650 Care of this patient was transferred to the next attending physician at shift change.  Complete discussion of presentation, labs, imaging, care, and expected course occurred during transition of providers.  Vitals stable at transfer of care. [KP]   0650 Electronically signed by David Brandon DO, 12/17/21, 6:50 AM EST.   [SF]   0954 WBC(!!): 39.46 [KP]   0955 White count stable relative to prior [KP]   0955 Hemoglobin: 13.4 [KP]   0955 Platelets: 397 [KP]   1023 Creatinine(!): 2.03 [KP]   1024 Troponin T(!!): 0.685 [KP]   1024 Lactate(!!): 6.8 [KP]   1024 CO2(!): 11.3 [KP]   1024 Bicarb worsening.  Lactic acid downtrending.  Mild increase in troponin and creatinine.  Patient is on norepinephrine 0.24 mcg/kg/min and phenylephrine 2 mcg/kg/min.  Will start vasopressin with plan to down titrate phenylephrine as tolerated.  Patient received single dose of Zosyn at 1 AM, will order repeat doses on extended infusion.  Pharmacy to dose vancomycin.  We will continue to follow labs.  Repeat CXR to assess  pneumothorax.  Admission delayed secondary to hospital and regional overcrowding. [KP]   1138 XR Chest AP  IMPRESSION:    Again noted is slightly increased now moderate size right  pneumothorax. [KP]   1336 Troponin T(!!): 0.739 [KP]   1347 EKG at 1344 hrs. EKG notes accelerated junctional rhythm however believe there are P waves noted in V1.  Likely sinus tachycardia 130 bpm.  UT <200 MS, QRS 80, QTc 518, regular axis.  No significant ST deviation or T wave [place] is concerning for acute ischemia. [KP]   1505 Admit to Dr. Sapp, hospitalist, will replace Pleurx drain with chest tube. [KP]   1636 Pleurx removed, chest tube placed.  No complications.  Connected to waterseal and suction.  Pending CXR. [KP]   1652 XR Chest AP  IMPRESSION:    ET tube tip at level clavicles. NG tube in the stomach. Right chest  tube identified in the right chest with persistent small right apical  pneumothorax slightly increased from previous study. [KP]   1923 BCID, PCR(!!): Staph aureus. mecA/C and MREJ (methicillin resistance gene) detected. Identification by BCID2 PCR. [KP]   1923 Blood culture positive for MRSA, on vancomycin [KP]   1923 Patient lost to bed in CCU. Kickback to ED, not admitted to hospitalist. Hospitalist discussed with wife, patient is DNR. [KP]   2017 Care of this patient was transferred to the next attending physician at shift change.  Complete discussion of presentation, labs, imaging, care, and expected course occurred during transition of providers.  Vitals stable at transfer of care.  Electronically signed by Adebayo Wolf MD, 12/17/21, 8:17 PM EST.   [KP]   Sat Dec 18, 2021   0814 Patient reassessed.  Developed a temperature of 106 Fahrenheit overnight, received rectal Tylenol.  Patient remains unresponsive to noxious stimuli. [KP]   0937 Called to bedside by nurse who noted patient's wife is requested terminal extubation.  Added earlier reassessed patient this morning whose only reported event overnight  was development of temperature of 106 Fahrenheit which he received Tylenol.  Patient precious on Levophed 0.08 mcg/kg/min and vasopressin 0.04 units per min.  He is completely unresponsive to noxious stimuli.  Ventilation settings AC VC , RR 20, FiO2 50%, PEEP of 5.  On these settings and drips he is dynamically stable saturating 94%.  Patient 34 hours after arrival is not on sedation, has no response to noxious stimuli, no doll's eye reflex, pupils 2 mm and unreactive, no gag reflex. Discussed with wife regarding prognosis best assessed from 48 to 72 hours after initial insult.  At this time prognosis remains poor and wife still continues to request withdrawal of care.  Nursing staff and respiratory to coordinate for terminal extubation. [KP]   0958 Patient terminally extubated and drips discontinued at 9:53 AM. Patient in asystole at 955 with no cardiac tones. Exam remains unchanged from above. Time of death 9:55 AM. All questions answered. [KP]      ED Course User Index  [KP] Adebayo Wolf MD  [SF] David Brandon, DO                                                 MDM  Number of Diagnoses or Management Options  Cardiopulmonary arrest (HCC): new and requires workup  Closed fracture of multiple ribs of both sides, initial encounter: new and requires workup  Ileus (HCC): new and requires workup  Pneumonia due to infectious organism, unspecified laterality, unspecified part of lung: new and requires workup  Traumatic pneumothorax, initial encounter: new and requires workup     Amount and/or Complexity of Data Reviewed  Clinical lab tests: reviewed  Tests in the radiology section of CPT®: reviewed  Tests in the medicine section of CPT®: reviewed  Decide to obtain previous medical records or to obtain history from someone other than the patient: yes        Final diagnoses:   Cardiopulmonary arrest (HCC)   Pneumonia due to infectious organism, unspecified laterality, unspecified part of lung   Traumatic  pneumothorax, initial encounter   Closed fracture of multiple ribs of both sides, initial encounter   Ileus (HCC)   Cardiogenic shock (HCC)       ED Disposition  ED Disposition     ED Disposition Condition Comment      Level of Care: Critical Care [6]  Diagnosis: Cardiopulmonary arrest (HCC) [447467]  Certification: I Certify That Inpatient Hospital Services Are Medically Necessary For Greater Than 2 Midnights            No follow-up provider specified.       Medication List      No changes were made to your prescriptions during this visit.          Adebayo Wolf MD  21 1654       Adebayo Wolf MD  21       Adebayo Wolf MD  21 1000

## 2021-12-17 NOTE — ED NOTES
ekg performed by tech @ 4772. Shown  to Dr. Brandon at bedside.      Kody Castañeda  12/17/21 0006

## 2021-12-17 NOTE — ED NOTES
Went to collect troponin, and Dr. Wolf is doing a sterile procedure. Will collect after he is finished.      Crystal Alfredo, PCT  12/17/21 7667

## 2021-12-17 NOTE — H&P
[facility]    Patient Identification:  Name:  Porsha Soni  Age:  80 y.o.  Sex:  male  :  1940  MRN:  [id number]   Visit Number:  [id number]  Admit Date: 2021   Room number:  101/01  Primary Care Physician:  Padmini Terrazas APRN     Subjective     Chief complaint:    Chief Complaint   Patient presents with   • Cardiac Arrest       History of presenting illness:  80 y.o. male with hx of advanced dementia, CKD, CAD s/p CABG who presented after being found unresponsive at nursing home.  Patient was found to be pulseless around 2238. CPR initiated. Patient arrived at 2300 to our facility in arrest. Briefly got rosc shortly after intubation but again lost pulse until 2316 and rosc finally maintained. Per report patient has had frequent aspiration events at nursing home and that was likely cause of arrest. Patient developed R sided pneumothorax and chest tube placed. Patient initially required 3 pressors, now down to 2. Saturating well on ventilator on my evaluation. Patient has been unresponsive off sedation since presentation. History obtained from ED staff and wife. Patient's wife notes he has been declining at nursing home for a while now with worsening quality of life.     I had long goals of care conversation with patient's wife bedside. She notes patient has had worsening quality of life and she does not think he would want life support with such poor prognosis. She made him DNR and initially reported he would want full support otherwise. Our plan was to get repeat CT head tomorrow and discuss goals of care in the morning. I was called back bedside as she wanted to discuss goals of care again. She noted she is not ready to take him off the ventilator or pressors but would not want to escalate care. She noted she would like him to be comfort care and unless he declines will plan on discussing withdrawing care again tomorrow. Discussed PRN medications  to keep him comfortable if he starts to look uncomfortable. Discussed high probability he would decline overnight, in particular with comfort measures. Wife wanted time to discuss with her children. I discussed if our goal was him to have the most likelihood to live until tomorrow, we would not do comfort measures. After considering, she reported she would still like him to be comfort measures as it is unlikely his children will come to see him.     After I discussed goals of care with wife it was discovered the ICU bed had been taken by rusty in house emergent patient. Wife ok with patient staying in the emergency room until bed available. Discussed plan of care with Dr. Wolf who will assume care until patient gets bed. He was agreeable for me to place orders.   ---------------------------------------------------------------------------------------------------------------------   Review of Systems   Constitutional: Negative.    HENT: Negative.    Eyes: Negative.    Respiratory: Negative.    Cardiovascular: Negative.    Gastrointestinal: Negative.    Endocrine: Negative.    Genitourinary: Negative.    Musculoskeletal: Negative.    Skin: Negative.    Allergic/Immunologic: Negative.    Neurological: Negative.    Hematological: Negative.    Psychiatric/Behavioral: Negative.      ---------------------------------------------------------------------------------------------------------------------   Past Medical History:   Diagnosis Date   • Arthritis    • Asymptomatic PVCs 6/14/2016   • BPH with urinary obstruction 11/17/2018   • Cervical spine disease     remotely suggested surgical intervention.  Patient deferred.    • Chronic kidney disease, stage 3a (CMS/HCC) 10/30/2020   • Coronary artery disease    • COVID-19 Jan 2021   • Dementia (CMS/HCC)    • Diverticulosis    • Dyslipidemia    • Folic acid deficiency anemia    • Hypertension    • Impotence    • Malignant melanoma (CMS/HCC) 07/2020    Left arm   •  Osteoarthritis 10/30/2020   • Pneumonia    • Vertigo      Past Surgical History:   Procedure Laterality Date   • ARM LESION/CYST EXCISION Left 2020    Procedure: WIDE EXCISION MELANOMA LEFT ARM INTERMEDIATE CLOSURE;  Surgeon: Roman Mohan MD;  Location:  [facility];  Service: General;  Laterality: Left;   • ARTERIAL BYPASS SURGERY     • BRONCHOSCOPY N/A 2018    Procedure: BRONCHOSCOPY WITH WASHINGS;  Surgeon: Leonides Quarles MD;  Location:  [facility];  Service: Pulmonary   • CHOLECYSTECTOMY WITH INTRAOPERATIVE CHOLANGIOGRAM N/A 2018    Procedure: OPEN CHOLECYSTECTOMY;  Surgeon: Rosie Montalvo MD;  Location:  [facility];  Service: General   • COLONOSCOPY N/A 3/30/2018    Procedure: COLONOSCOPY;  Surgeon: Simone Valderrama MD;  Location:  [facility];  Service: Gastroenterology   • CORONARY ARTERY BYPASS GRAFT     • KNEE ARTHROPLASTY, PARTIAL REPLACEMENT      Lt ,    • REPLACEMENT TOTAL KNEE      Rt   • SENTINEL NODE BIOPSY Left 2020    Procedure: SENTINEL NODE INJECTION AND BIOPSY LEFT AXILLA;  Surgeon: Roman Mohan MD;  Location:  [facility];  Service: General;  Laterality: Left;     Family History   Problem Relation Age of Onset   • No Known Problems Mother    • No Known Problems Father    • No Known Problems Sister    • No Known Problems Brother      Social History     Socioeconomic History   • Marital status:    Tobacco Use   • Smoking status: Former Smoker     Packs/day: 1.50     Years: 7.00     Pack years: 10.50     Types: Cigarettes, Pipe, Cigars     Quit date: 1986     Years since quittin.5   • Smokeless tobacco: Never Used   Vaping Use   • Vaping Use: Never used   Substance and Sexual Activity   • Alcohol use: Yes     Comment: occassionally    • Drug use: No   • Sexual activity: Defer     ---------------------------------------------------------------------------------------------------------------------   Allergies:  Patient has no known  allergies.  ---------------------------------------------------------------------------------------------------------------------   Medications below are reported home medications pulling from within the system; at this time, these medications have not been reconciled unless otherwise specified and are in the verification process for further verifcation as current home medications.    Prior to Admission Medications     Prescriptions Last Dose Informant Patient Reported? Taking?    atorvastatin (LIPITOR) 20 MG tablet 12/16/2021 care home Yes Yes    Take 20 mg by mouth Daily.    Dextran 70-Hypromellose (Natural Balance Tears) 0.1-0.3 % solution 12/16/2021 Nursing Home Yes Yes    Apply 1 drop to eye(s) as directed by provider 2 (two) times a day. PTA: in left eye    donepezil (ARICEPT) 10 MG tablet 12/16/2021 care home Yes Yes    Take 10 mg by mouth Every Night.    doxycycline (VIBRAMYCIN) 100 MG capsule 12/16/2021 care home Yes Yes    Take 100 mg by mouth 2 (Two) Times a Day.    erythromycin (ROMYCIN) 5 MG/GM ophthalmic ointment 12/16/2021 care home Yes Yes    Administer 1 application to both eyes Every 6 (Six) Hours.    famotidine (PEPCID) 20 MG tablet 12/16/2021  No Yes    Take 1 tablet by mouth Daily.    folic acid (FOLVITE) 1 MG tablet 12/16/2021 care home No Yes    Take 1 tablet by mouth Daily.    ipratropium-albuterol (DUO-NEB) 0.5-2.5 mg/3 ml nebulizer 12/16/2021 care home Yes Yes    Take 3 mL by nebulization 3 (Three) Times a Day.    Melatonin 3 MG capsule 12/16/2021 care home Yes Yes    Take 6 mg by mouth Every Night.    memantine (NAMENDA) 10 MG tablet 12/16/2021 care home Yes Yes    Take 10 mg by mouth 2 (Two) Times a Day.    methylPREDNISolone (MEDROL) 4 MG dose pack  Nursing Home Yes Yes    Take  by mouth 2 (Two) Times a Day. Take as directed on package instructions.  Prior to [facility] Admission, Patient was on:  Not started yet start on 12/17/21    metoprolol tartrate (LOPRESSOR)  25 MG tablet 12/16/2021 halfway Yes Yes    Take 12.5 mg by mouth 2 (Two) Times a Day.    mirtazapine (REMERON) 30 MG tablet 12/16/2021 halfway Yes Yes    Take 30 mg by mouth Every Night.    Omega-3 Fatty Acids (FISH OIL) 1000 MG capsule capsule 12/16/2021 halfway Yes Yes    Take 1,000 mg by mouth Daily.    polyethylene glycol (MIRALAX) 17 g packet 12/16/2021 Nursing Home No Yes    Take 17 g by mouth Daily.    sertraline (ZOLOFT) 50 MG tablet 12/16/2021 halfway Yes Yes    Take 50 mg by mouth Every Night.    acetaminophen (TYLENOL) 500 MG tablet Unknown Nursing Home Yes No    Take 500 mg by mouth Every 6 (Six) Hours As Needed for Mild Pain  or Fever.    albuterol (PROVENTIL) (2.5 MG/3ML) 0.083% nebulizer solution Unknown Nursing Home Yes No    Take 2.5 mg by nebulization Every 6 (Six) Hours As Needed for Wheezing.    bisacodyl (DULCOLAX) 10 MG suppository Unknown Nursing Home Yes No    Insert 10 mg into the rectum Every 12 (Twelve) Hours As Needed for Constipation.    guaiFENesin (ROBITUSSIN) 100 MG/5ML syrup Unknown Nursing Home Yes No    Take 200 mg by mouth Every 8 (Eight) Hours As Needed for Cough or Congestion.    lactulose (CHRONULAC) 10 GM/15ML solution Unknown Nursing Home Yes No    Take 20 g by mouth Every 12 (Twelve) Hours As Needed (constipation).    nitroglycerin (NITROSTAT) 0.4 MG SL tablet Unknown  No No    Place 1 tablet under the tongue Every 5 (Five) Minutes As Needed for Chest Pain (Only if SBP Greater Than 100). Take no more than 3 doses in 15 minutes.    Sodium Phosphates (fleet enema) 7-19 GM/118ML enema Unknown Nursing Home Yes No    Insert 1 enema into the rectum 2 (Two) Times a Day As Needed (constpation).    TiZANidine (ZANAFLEX) 4 MG capsule Unknown Nursing Home Yes No    Take 4 mg by mouth Daily As Needed for Muscle Spasms.        Objective     Vital Signs:  Temp:  [97.3 °F (36.3 °C)-99.7 °F (37.6 °C)] 99.7 °F (37.6 °C)  Heart Rate:  [0-144] 122  Resp:  [16-26] 26  BP:  ()/(37-88) 123/64  FiO2 (%):  [70 %-100 %] 70 %    Mean Arterial Pressure (Non-Invasive) for the past 24 hrs (Last 3 readings):   Noninvasive MAP (mmHg)   12/17/21 1437 88   12/17/21 1432 86   12/17/21 1427 78     SpO2:  [36 %-100 %] 100 %  on  Flow (L/min):  [15] 15;   Device (Oxygen Therapy): ventilator  Body mass index is 25.94 kg/m².    Wt Readings from Last 3 Encounters:   12/17/21 82 kg (180 lb 12.4 oz)   08/10/21 82 kg (180 lb 11.2 oz)   07/07/21 80.3 kg (177 lb)      ---------------------------------------------------------------------------------------------------------------------   Physical Exam:  GENERAL:  Patient intubated and sedated.   SKIN:  Bruising on arms.   EYES:  Pupils constricted and fixed.   HEAD:  Normocephalic. Atraumatic.   EARS/NOSE/MOUTH/THROAT:  Septum midline.  No excoriations or nasal discharge. No stomatitis or ulcers.  Lips normal. Oropharynx without lesions or exudates.  NECK:  Supple with good range of motion; no thyromegaly or masses  LYMPHATICS:  No cervical, supraclavicular, axillary adenopathy.  RESP:  Lungs crackles bilaterally.   CARDIAC:  Regular rate and rhythm without murmurs, rubs or gallops. Normal S1,S2. No edema  GI:  Soft, nontender, no hepatosplenomegaly, normal bowel sounds  MSK:  No clubbing or cyanosis, No joint swelling noted in hands  NEUROLOGICAL:  Unresponsive not on sedation.     ---------------------------------------------------------------------------------------------------------------------  EKG:    ECG 12 Lead   Final Result   Test Reason : repeat for possible ischemia   Blood Pressure :   */*   mmHG   Vent. Rate : 130 BPM     Atrial Rate : 133 BPM      P-R Int :   * ms          QRS Dur :  80 ms       QT Int : 352 ms       P-R-T Axes :   *  20   0 degrees      QTc Int : 518 ms      atrial flutter/ fib   Abnormal ECG   When compared with ECG of 16-DEC-2021 23:08,   heart rate is faster and regular   Confirmed by Janene Ham ([id number]) on  12/17/2021 4:28:33 PM      Referred By:            Confirmed By: Janene Ham      ECG 12 Lead   Final Result   Test Reason : Chest Pain   Blood Pressure :   */*   mmHG   Vent. Rate : 114 BPM     Atrial Rate : 141 BPM      P-R Int :   * ms          QRS Dur : 106 ms       QT Int : 356 ms       P-R-T Axes :   *  24 -45 degrees      QTc Int : 490 ms      Atrial fibrillation with rapid ventricular response   ST depression, consider subendocardial injury   Abnormal QRS-T angle, consider primary T wave abnormality   Abnormal ECG   When compared with ECG of 05-AUG-2021 14:46,   Atrial fibrillation has replaced Sinus rhythm   Vent. rate has increased BY  46 BPM   ST now depressed in Lateral leads   Nonspecific T wave abnormality no longer evident in Anterior leads   Confirmed by Janene Ham ([id number]) on 12/17/2021 1:41:53 PM      Referred By: FORD           Confirmed By: Janene Ham          Telemetry:     I have personally looked at both the EKG and the telemetry strips.    Last echocardiogram:  Results for orders placed during the hospital encounter of 05/23/21    Adult Transthoracic Echo Complete W/ Cont if Necessary Per Protocol    Interpretation Summary  · Normal left ventricular cavity size and wall thickness noted. All left ventricular wall segments contract normally.  · Left ventricular ejection fraction appears to be 61 - 65%.  · . The aortic valve exhibits sclerosis. The aortic valve appears trileaflet. Mild aortic valve regurgitation is present. No aortic valve stenosis is present.  · The mitral valve is structurally normal with no significant stenosis present. Trace mitral valve regurgitation is present.  · Mild tricuspid valve regurgitation is present. Estimated right ventricular systolic pressure from tricuspid regurgitation is mildly elevated (35-45 mmHg).  · There is no evidence of pericardial  effusion.    --------------------------------------------------------------------------------------------------------------------  Labs:  Results from last 7 days   Lab Units 12/17/21 0917 12/17/21 0308 12/16/21 2355 12/16/21  1316   PROCALCITONIN ng/mL  --   --  0.13  --    LACTATE mmol/L 6.8* 7.1* 11.1*  --    CRP mg/dL  --   --   --  <0.30   WBC 10*3/mm3 39.46*  --  39.40* 14.05*   HEMOGLOBIN g/dL 13.4  --  11.2* 13.5   HEMATOCRIT % 47.0  --  39.8 45.1   MCV fL 109.6*  --  110.9* 103.9*   MCHC g/dL 28.5*  --  28.1* 29.9*   PLATELETS 10*3/mm3 397  --  385 382   INR   --   --  1.90*  --      Results from last 7 days   Lab Units 12/17/21  1601   PH, ARTERIAL pH units 7.386   PO2 ART mm Hg 199.0*   PCO2, ARTERIAL mm Hg 24.5*   HCO3 ART mmol/L 14.7*     Results from last 7 days   Lab Units 12/17/21 0917 12/16/21 2355 12/16/21  1316   SODIUM mmol/L 159* 158* 153*   POTASSIUM mmol/L 4.3 4.1 3.9   CHLORIDE mmol/L 126* 120* 118*   CO2 mmol/L 11.3* 15.0* 23.9   BUN mg/dL 57* 55* 60*   CREATININE mg/dL 2.03* 1.95* 1.89*   EGFR IF NONAFRICN AM mL/min/1.73 32* 33* 34*   CALCIUM mg/dL 9.1 10.2 9.8   GLUCOSE mg/dL 129* 270* 132*   ALBUMIN g/dL 2.77* 2.81* 3.64   BILIRUBIN mg/dL 0.4 0.3 0.2   ALK PHOS U/L 166* 164* 141*   AST (SGOT) U/L 258* 258* 245*   ALT (SGPT) U/L 441* 401* 406*   Estimated Creatinine Clearance: 33.1 mL/min (A) (by C-G formula based on SCr of 2.03 mg/dL (H)).    No results found for: AMMONIA  Results from last 7 days   Lab Units 12/17/21  1223 12/17/21  0917 12/17/21  0446 12/16/21  2355 12/16/21  2355   TROPONIN T ng/mL 0.739* 0.685* 0.655*   < > 0.067*   PROBNP pg/mL  --   --   --   --  785.6    < > = values in this interval not displayed.         No results found for: HGBA1C, POCGLU  Lab Results   Component Value Date    TSH 1.610 08/04/2021    FREET4 1.15 08/04/2021     No results found for: PREGTESTUR, PREGSERUM, HCG, HCGQUANT  Pain Management Panel     Pain Management Panel Latest Ref Rng & Units  10/23/2020 10/19/2020    CREATININE UR mg/dL - 220.5    AMPHETAMINES SCREEN, URINE Negative Negative -    BARBITURATES SCREEN Negative Negative -    BENZODIAZEPINE SCREEN, URINE Negative Negative -    BUPRENORPHINEUR Negative Negative -    COCAINE SCREEN, URINE Negative Negative -    METHADONE SCREEN, URINE Negative Negative -        Brief Urine Lab Results  (Last result in the past 365 days)      Color   Clarity   Blood   Leuk Est   Nitrite   Protein   CREAT   Urine HCG        12/16/21 2357 Yellow   Slightly Cloudy   Large (3+)   Small (1+)   Negative   30 mg/dL (1+)               No results found for: BLOODCX  No results found for: URINECX  No results found for: WOUNDCX  No results found for: STOOLCX    I have personally looked at the labs and they are summarized above.  ----------------------------------------------------------------------------------------------------------------------  Detailed radiology reports for the last 24 hours:    Imaging Results (Last 24 Hours)     Procedure Component Value Units Date/Time    XR Chest AP [[id number]] Collected: 12/17/21 1647     Updated: 12/17/21 1650    Narrative:      EXAM:    XR Chest, 1 View     EXAM DATE:    12/17/2021 4:31 PM     CLINICAL HISTORY:    chest tube verification; I46.9-Cardiac arrest, cause unspecified;  J18.9-Pneumonia, unspecified organism; S27.0XXA-Traumatic pneumothorax,  initial encounter; S22.43XA-Multiple fractures of ribs, bilateral,  initial encounter for closed fracture; K56.7-Ileus, unspecified     TECHNIQUE:    Frontal view of the chest.     COMPARISON:    12/17/2021     FINDINGS:    LUNGS:  Minimal left lower lobe airspace disease noted.    PLEURAL SPACE:  Tiny left pleural effusion.    HEART:  Unremarkable.  No cardiomegaly.    MEDIASTINUM:  Unremarkable.    BONES/JOINTS:  Unremarkable.    TUBES, LINES AND DEVICES:  ET tube tip at level clavicles. NG tube in  the stomach. Right chest tube identified in the right chest with  persistent  small right apical pneumothorax slightly increased from  previous study.       Impression:        ET tube tip at level clavicles. NG tube in the stomach. Right chest  tube identified in the right chest with persistent small right apical  pneumothorax slightly increased from previous study.     This report was finalized on 12/17/2021 4:48 PM by Dr. Deangelo Soni MD.       XR Chest AP [[id number]] Collected: 12/17/21 1539     Updated: 12/17/21 1545    Narrative:      EXAM:    XR Chest, 1 View     EXAM DATE:    12/17/2021 3:06 PM     CLINICAL HISTORY:    monitor ptx; I46.9-Cardiac arrest, cause unspecified; J18.9-Pneumonia,  unspecified organism; S27.0XXA-Traumatic pneumothorax, initial  encounter; S22.43XA-Multiple fractures of ribs, bilateral, initial  encounter for closed fracture; K56.7-Ileus, unspecified     TECHNIQUE:    Frontal view of the chest.     COMPARISON:    12/17/2021     FINDINGS:    LUNGS:  Left lung base airspace disease again noted.    PLEURAL SPACE:  Now only tiny right apical pneumothorax.  Tiny left  pleural effusion.    HEART: Cardiomegaly.    MEDIASTINUM:  Unremarkable.    BONES/JOINTS:  Unremarkable.    TUBES, LINES AND DEVICES:  ET tube tip at level clavicles. NG tube in  the stomach.  Right chest tube again identified.       Impression:      1.  Now only tiny right apical pneumothorax.  2.  Tiny left pleural effusion.  3.  Left lung base airspace disease again noted.     This report was finalized on 12/17/2021 3:43 PM by Dr. Deangelo Soni MD.       XR Chest AP [[id number]] Collected: 12/17/21 1120     Updated: 12/17/21 1123    Narrative:      EXAM:    XR Chest, 1 View     EXAM DATE:    12/17/2021 10:36 AM     CLINICAL HISTORY:    reassesss ptx. pt tachycardic.; I46.9-Cardiac arrest, cause  unspecified; J18.9-Pneumonia, unspecified organism; S27.0XXA-Traumatic  pneumothorax, initial encounter; S22.43XA-Multiple fractures of ribs,  bilateral, initial encounter for closed fracture;  K56.7-Ileus,  unspecified     TECHNIQUE:    Frontal view of the chest.     COMPARISON:    12/17/2021     FINDINGS:    LUNGS:  Unremarkable.  No consolidation.    PLEURAL SPACE:  Again noted is slightly increased now moderate size  right pneumothorax.    HEART:  Unremarkable.  No cardiomegaly.    MEDIASTINUM:  Unremarkable.    BONES/JOINTS:  Unremarkable.    TUBES, LINES AND DEVICES:  ET tube tip below level of clavicles. NG  tube in the stomach. A right chest tube is identified.       Impression:        Again noted is slightly increased now moderate size right  pneumothorax.     This report was finalized on 12/17/2021 11:21 AM by Dr. Deangelo Soni MD.       CT Abdomen Pelvis Without Contrast [[id number]] Collected: 12/17/21 0226     Updated: 12/17/21 0229    Narrative:      CT Abdomen Pelvis WO    INDICATION:   Status post cardiac arrest. Abdominal pain.    TECHNIQUE:   CT of the abdomen and pelvis without IV contrast. Coronal and sagittal reconstructions were obtained.  Radiation dose reduction techniques included automated exposure control or exposure modulation based on body size. Count of known CT and cardiac nuc  med studies performed in previous 12 months: 11.     COMPARISON:   8/4/2021    FINDINGS:  Please see chest CT report dictated separately. Exam is degraded by motion artifact as well as streak artifact from the patient's arms. NG tube tip is present in the stomach. Gallbladder is surgically absent. The kidneys are nonobstructed. No renal or  ureteral stones are seen. There is fatty atrophy of the pancreas. Solid abdominal organs are otherwise grossly normal. Urinary bladder is decompressed by Ambrocio catheter. Prostate gland is enlarged.    Fat-containing left inguinal hernia is noted. Large amount of stool is noted in the lower colon suggesting some constipation. There is colonic diverticulosis without acute diverticulitis. The majority of the colon is distended with gas. The appendix is  normal and is  positioned just below the liver. Gas-filled small bowel loops may reflect an ileus. No free air is seen. There is a central venous catheter in the right groin with the tip in the right common iliac vein. There is lumbar degenerative  disease.      Impression:        1. Large amount of stool in the lower colon with remainder of the colon being distended with gas. No evidence of acute colitis. There is diverticulosis without diverticulitis.  2. Probable small bowel ileus.  3. Nonobstructed kidneys.  4. Normal appendix.  5. Additional findings as above.          Signer Name: Andrzej Roman MD   Signed: 12/17/2021 2:26 AM   Workstation Name: [facility]    Radiology [facility]    XR Chest 1 View [[id number]] Collected: 12/17/21 0222     Updated: 12/17/21 0224    Narrative:      CR Chest 1 Vw    INDICATION:   Chest tube placement for pneumothorax.     COMPARISON:    Earlier same day    FINDINGS:  Portable AP view(s) of the chest.  Smallbore right chest tube has been placed. There has been partial reexpansion of the right pneumothorax, but a moderate-sized pneumothorax remains. No left-sided pneumothorax is seen. Pulmonary infiltrates are again  identified. Endotracheal tube is in good position in the mid trachea. NG tube is in the stomach.      Impression:      Improved but persistent right pneumothorax following chest tube placement.    Signer Name: Andrzej Roman MD   Signed: 12/17/2021 2:22 AM   Workstation Name: [facility]    CT Chest Without Contrast Diagnostic [[id number]] Collected: 12/17/21 0203     Updated: 12/17/21 0205    Narrative:      CT Chest WO    INDICATION:   Status post cardiac arrest.    TECHNIQUE:   CT of the thorax without IV contrast. Coronal and sagittal reconstructions were obtained.  Radiation dose reduction techniques included automated exposure control or exposure modulation based on body size. Count of known CT and cardiac nuc med  studies  performed in previous 12 months: 11.     COMPARISON:   8/4/2021    FINDINGS:  Endotracheal tube is in good position in the mid trachea. NG tube tip is in the stomach. Exam is motion degraded. There is a large right pneumothorax. No left-sided pneumothorax is seen. There are multifocal infiltrates in both lungs, most severe in the  left lower lobe. These are compatible with pneumonia. Consider aspiration. No adenopathy is identified. There is no pleural or pericardial effusion. There are multiple right-sided rib fractures, presumably the result of chest compressions. These involve  ribs numbers 1 and 3 through 8. Left-sided rib fractures numbers 3 through 5 are noted. Patient is status post CABG. Please see abdomen and pelvis CT report dictated separately.      Impression:        1. Large right pneumothorax. Results have previously been called to the emergency Department.  2. Bilateral rib fractures, presumably due to chest compressions.  3. Bilateral pneumonia, most severe in the left lower lobe. Consider aspiration.    Signer Name: nAdrzej Roman MD   Signed: 12/17/2021 2:03 AM   Workstation Name: [email or web address]    [facility]    CT Head Without Contrast [[id number]] Collected: 12/17/21 0139     Updated: 12/17/21 0141    Narrative:      HISTORY:   Cardiac arrest status change    TECHNIQUE:   CT head without contrast. Radiation dose reduction techniques included automated exposure control or exposure modulation based on body size. Radiation audit for number of CT and nuclear cardiology exams performed in the last year: 13.      COMPARISON:   Head CT from 8/4/2021.    FINDINGS:   There is encephalomalacic change at the bilateral anterior temporal lobes with dilatation of the temporal horns. This also enlargement of the sylvian fissure bilaterally. Please correlate for any history of Alzheimer type dementia. Findings are chronic.  There is no evidence for acute intracranial hemorrhage or  extra-axial fluid collection. There is patchy white matter low-attenuation that is nonspecific but likely due to small vessel disease. There is streak artifact level brainstem. If there is  clinical concern for global hypoxic ischemic insult due to cardiac arrest, short-term follow-up imaging is recommended to reassess.. No acute cortical infarct is appreciated. No intracranial mass effect. There has been cataract surgery bilaterally.    The mastoid air cells are clear. Is mild mucosal disease in the left maxillary sinus.      Impression:        1. No acute intracranial abnormalities appreciated. If there is however clinical concern for recent global hypoxic ischemic insult, short-term follow-up imaging is recommended.  2. Atrophy with a pattern suggestive of an Alzheimer type dementia. Please correlate further clinically.  3. Probable sequelae of small vessel disease.    Signer Name: Tonia Boswell MD   Signed: 12/17/2021 1:39 AM   Workstation Name: [email or web address]    [facility] Chest 1 View [[id number]] Collected: 12/17/21 0124     Updated: 12/17/21 0126    Narrative:      CR Chest 1 Vw    INDICATION:   Chest pain. Cardiac arrest. Tube and line placement.     COMPARISON:    8/4/2021    FINDINGS:  Portable AP view(s) of the chest.  Endotracheal tube is in good position in the mid trachea. NG tube tip is in the stomach. Heart size is normal. Patient is status post CABG. There are nonspecific bilateral infiltrates, and there is a small left pleural  effusion. There is a large right pneumothorax. No evidence of tension.      Impression:      Large right pneumothorax.    NOTIFICATION: Critical Value/emergent results were called by telephone at the time of interpretation on 12/17/2021 1:23 AM to David Brandon MD who verbally acknowledged these results.    Signer Name: Andrzej Roman MD   Signed: 12/17/2021 1:24 AM   Workstation Name: [email or web address]    [facility]  impressions for the last 30 days of radiology reports:    CT Abdomen Pelvis Without Contrast    Result Date: 12/17/2021  1. Large amount of stool in the lower colon with remainder of the colon being distended with gas. No evidence of acute colitis. There is diverticulosis without diverticulitis. 2. Probable small bowel ileus. 3. Nonobstructed kidneys. 4. Normal appendix. 5. Additional findings as above. Signer Name: Andrzej Roman MD  Signed: 12/17/2021 2:26 AM  Workstation Name: [facility]    CT Head Without Contrast    Result Date: 12/17/2021  1. No acute intracranial abnormalities appreciated. If there is however clinical concern for recent global hypoxic ischemic insult, short-term follow-up imaging is recommended. 2. Atrophy with a pattern suggestive of an Alzheimer type dementia. Please correlate further clinically. 3. Probable sequelae of small vessel disease. Signer Name: Tonia Boswell MD  Signed: 12/17/2021 1:39 AM  Workstation Name: [facility]  Radiology [facility]    CT Chest Without Contrast Diagnostic    Result Date: 12/17/2021  1. Large right pneumothorax. Results have previously been called to the emergency Department. 2. Bilateral rib fractures, presumably due to chest compressions. 3. Bilateral pneumonia, most severe in the left lower lobe. Consider aspiration. Signer Name: Andrzej Roman MD  Signed: 12/17/2021 2:03 AM  Workstation Name: [facility]    XR Chest 1 View    Result Date: 12/17/2021  Improved but persistent right pneumothorax following chest tube placement. Signer Name: Andrzej Roman MD  Signed: 12/17/2021 2:22 AM  Workstation Name: [facility]    XR Chest 1 View    Result Date: 12/17/2021  Large right pneumothorax. NOTIFICATION: Critical Value/emergent results were called by telephone at the time of interpretation on 12/17/2021 1:23 AM to David Brandon MD who  verbally acknowledged these results. Signer Name: Andrzej Roman MD  Signed: 12/17/2021 1:24 AM  Workstation Name: [facility]  [facility] [place]    XR Chest AP    Result Date: 12/17/2021    ET tube tip at level clavicles. NG tube in the stomach. Right chest tube identified in the right chest with persistent small right apical pneumothorax slightly increased from previous study.  This report was finalized on 12/17/2021 4:48 PM by Dr. Deangelo Soni MD.      XR Chest AP    Result Date: 12/17/2021  1.  Now only tiny right apical pneumothorax. 2.  Tiny left pleural effusion. 3.  Left lung base airspace disease again noted.  This report was finalized on 12/17/2021 3:43 PM by Dr. Deangelo Soni MD.      XR Chest AP    Result Date: 12/17/2021    Again noted is slightly increased now moderate size right pneumothorax.  This report was finalized on 12/17/2021 11:21 AM by Dr. Deangelo Soni MD.      I have personally looked at the radiology images and read the final radiology report.    Assessment & Plan       #Acute hypoxic respiratory failure 2/2 aspiration pneumonia   #Post cardiac arrest   #Septic shock  #Lactic acidosis   #R sided pneumothorax   #Troponin elevation after cardiac arrest   #Suspected anoxic brain injury   #Oliguric TYRELL on CKD  #Shock liver   #Severe hypernatremia  #Advanced dementia     Wife has decided to make patient comfort Mesures, no escalation of care, but is not ready to withdrawal care. Will continue levophed and vasopressin and wean as able. Recommend to wean fio2 on ventilator. Wife does not want further testing or any escalation of care. Will continue abx, meropenem and vanc. Had ordered surgery consult for chest tube management, pnuemothorax slightly bigger on last imaging but hypoxia improving.     Code status: Comfort measures. No escalation of care.     Dispo: Admit to CCU when bed available. Poor prognosis.         VTE Prophylaxis:   Mechanical Order History:     None       Pharmalogical Order History:      Ordered     Dose Route Frequency Stop    Signed and Held  heparin (porcine) 5000 UNIT/ML injection 5,000 Units         5,000 Units SC Every 8 Hours Scheduled --                  Ever Sapp MD  [facility]  12/17/21  17:12 EST

## 2021-12-17 NOTE — PROGRESS NOTES
Pharmacokinetics Service Note:    Mr. Soni has been evaluated for vancomycin dosing to treat his sepsis and aspiration pneumonia.  TYRELL noted with an estimated ClCr ~ 33 mL/min using today's serum Cr of 2.03 mg/dL (usual baseline Cr ~ 1.3-1.6 mg/dL).  He received a 1.75 gm loading dose ~ 0200.  No further dosing is needed today.  Will follow his serum Cr trend and will obtain a random level tomorrow AM to guide further dosing.      Thank you.  Neema Mitchell, Pharm.D.  12/17/2021  14:48 EST

## 2021-12-18 NOTE — ED NOTES
Spoke with selene at [facility] coroners office. Patient released by      Catrachito Bell RN  12/18/21 2641

## 2021-12-18 NOTE — ED NOTES
Patients wife made the decision to withdraw care at this time. MD, myself and lead RN at bedside for discussion.      Stefan Sanchez RN  12/18/21 [id number]

## 2021-12-18 NOTE — ED NOTES
Patient's rectal temperature obtained, patient has small amount of stool noted to brief, shelby care performed, clean brief and brittney placed.      Radha Medel, RN  12/18/21 4905

## 2021-12-18 NOTE — ED NOTES
Patient has small amount of brown stool noted, shelby care performed. Bed change performed at this time, patient repositioned, pillows placed under left side, patient right side lying.     Radha Medel, RN  12/18/21 0693

## 2021-12-18 NOTE — CONSULTS
[facility] HOSPITALIST CONSULT NOTE     Consults    Patient Identification:  Name:  Porsha Soni  Age:  80 y.o.  Sex:  male  :  1940  MRN:  [id number]  Visit Number:  [id number]  Room number:    Primary care provider:  Padmini Terrazas APRN    Chief Complaint:    Chief Complaint   Patient presents with   • Cardiac Arrest       History of Presenting Illness:    80 y.o. male with hx of advanced dementia, CKD, CAD s/p CABG who presented after being found unresponsive at nursing home.  Patient was found to be pulseless around 2238. CPR initiated. Patient arrived at 2300 to our facility in arrest. Briefly got rosc shortly after intubation but again lost pulse until 2316 and rosc finally maintained. Per report patient has had frequent aspiration events at nursing home and that was likely cause of arrest. Patient developed R sided pneumothorax and chest tube placed. Patient initially required 3 pressors, now down to 2. Saturating well on ventilator on my evaluation. Patient has been unresponsive off sedation since presentation. History obtained from ED staff and wife. Patient's wife notes he has been declining at nursing home for a while now with worsening quality of life.      I had long goals of care conversation with patient's wife bedside. She notes patient has had worsening quality of life and she does not think he would want life support with such poor prognosis. She made him DNR and initially reported he would want full support otherwise. Our plan was to get repeat CT head tomorrow and discuss goals of care in the morning. I was called back bedside as she wanted to discuss goals of care again. She noted she is not ready to take him off the ventilator or pressors but would not want to escalate care. She noted she would like him to be comfort care and unless he declines will plan on discussing withdrawing care again tomorrow. Discussed PRN medications to keep him comfortable if he  starts to look uncomfortable. Discussed high probability he would decline overnight, in particular with comfort measures. Wife wanted time to discuss with her children. I discussed if our goal was him to have the most likelihood to live until tomorrow, we would not do comfort measures. After considering, she reported she would still like him to be comfort measures as it is unlikely his children will come to see him.      After I discussed goals of care with wife it was discovered the ICU bed had been taken by rusty in house emergent patient. Wife ok with patient staying in the emergency room until bed available. Discussed plan of care with Dr. Wolf who will assume care until patient gets bed. He was agreeable for me to place orders.     ---------------------------------------------------------------------------------------------------------------------  Review of Systems   ---------------------------------------------------------------------------------------------------------------------   Past History:  Family History   Problem Relation Age of Onset   • No Known Problems Mother    • No Known Problems Father    • No Known Problems Sister    • No Known Problems Brother      Past Medical History:   Diagnosis Date   • Arthritis    • Asymptomatic PVCs 6/14/2016   • BPH with urinary obstruction 11/17/2018   • Cervical spine disease     remotely suggested surgical intervention.  Patient deferred.    • Chronic kidney disease, stage 3a (CMS/[facility]) 10/30/2020   • Coronary artery disease    • COVID-19 Jan 2021   • Dementia (CMS/[facility])    • Diverticulosis    • Dyslipidemia    • Folic acid deficiency anemia    • Hypertension    • Impotence    • Malignant melanoma (CMS/HCC) 07/2020    Left arm   • Osteoarthritis 10/30/2020   • Pneumonia    • Vertigo      Past Surgical History:   Procedure Laterality Date   • ARM LESION/CYST EXCISION Left 7/30/2020    Procedure: WIDE EXCISION MELANOMA LEFT ARM INTERMEDIATE CLOSURE;  Surgeon:  Roman Mohan MD;  Location:  [facility];  Service: General;  Laterality: Left;   • ARTERIAL BYPASS SURGERY     • BRONCHOSCOPY N/A 2018    Procedure: BRONCHOSCOPY WITH WASHINGS;  Surgeon: Leonides Quarles MD;  Location:  [facility];  Service: Pulmonary   • CHOLECYSTECTOMY WITH INTRAOPERATIVE CHOLANGIOGRAM N/A 2018    Procedure: OPEN CHOLECYSTECTOMY;  Surgeon: Rosie Montalvo MD;  Location:  [facility];  Service: General   • COLONOSCOPY N/A 3/30/2018    Procedure: COLONOSCOPY;  Surgeon: Simone Valderrama MD;  Location:  [facility];  Service: Gastroenterology   • CORONARY ARTERY BYPASS GRAFT     • KNEE ARTHROPLASTY, PARTIAL REPLACEMENT      Lt , 2015   • REPLACEMENT TOTAL KNEE      Rt   • SENTINEL NODE BIOPSY Left 2020    Procedure: SENTINEL NODE INJECTION AND BIOPSY LEFT AXILLA;  Surgeon: Roamn Mohan MD;  Location:  [facility];  Service: General;  Laterality: Left;     Social History     Socioeconomic History   • Marital status:    Tobacco Use   • Smoking status: Former Smoker     Packs/day: 1.50     Years: 7.00     Pack years: 10.50     Types: Cigarettes, Pipe, Cigars     Quit date: 1986     Years since quittin.5   • Smokeless tobacco: Never Used   Vaping Use   • Vaping Use: Never used   Substance and Sexual Activity   • Alcohol use: Yes     Comment: occassionally    • Drug use: No   • Sexual activity: Defer     ---------------------------------------------------------------------------------------------------------------------   Allergies:  Patient has no known allergies.  ---------------------------------------------------------------------------------------------------------------------  Medications below are reported home medications pulling from within the system; at this time, these medications have not been reconciled unless otherwise specified and are in the verification process for further verifcation as current home medications.  Prior to Admission Medications     Prescriptions  Last Dose Informant Patient Reported? Taking?    atorvastatin (LIPITOR) 20 MG tablet 12/16/2021 shelter Yes Yes    Take 20 mg by mouth Daily.    Dextran 70-Hypromellose (Natural Balance Tears) 0.1-0.3 % solution 12/16/2021 Nursing [place] Yes Yes    Apply 1 drop to eye(s) as directed by provider 2 (two) times a day. PTA: in left eye    donepezil (ARICEPT) 10 MG tablet 12/16/2021 shelter Yes Yes    Take 10 mg by mouth Every Night.    doxycycline (VIBRAMYCIN) 100 MG capsule 12/16/2021 shelter Yes Yes    Take 100 mg by mouth 2 (Two) Times a Day.    erythromycin (ROMYCIN) 5 MG/GM ophthalmic ointment 12/16/2021 shelter Yes Yes    Administer 1 application to both eyes Every 6 (Six) Hours.    famotidine (PEPCID) 20 MG tablet 12/16/2021  No Yes    Take 1 tablet by mouth Daily.    folic acid (FOLVITE) 1 MG tablet 12/16/2021 shelter No Yes    Take 1 tablet by mouth Daily.    ipratropium-albuterol (DUO-NEB) 0.5-2.5 mg/3 ml nebulizer 12/16/2021 shelter Yes Yes    Take 3 mL by nebulization 3 (Three) Times a Day.    Melatonin 3 MG capsule 12/16/2021 shelter Yes Yes    Take 6 mg by mouth Every Night.    memantine (NAMENDA) 10 MG tablet 12/16/2021 shelter Yes Yes    Take 10 mg by mouth 2 (Two) Times a Day.    methylPREDNISolone (MEDROL) 4 MG dose pack  Nursing Home Yes Yes    Take  by mouth 2 (Two) Times a Day. Take as directed on package instructions.  Prior to [facility] [place] Admission, Patient was on:  Not started yet start on 12/17/21    metoprolol tartrate (LOPRESSOR) 25 MG tablet 12/16/2021 shelter Yes Yes    Take 12.5 mg by mouth 2 (Two) Times a Day.    mirtazapine (REMERON) 30 MG tablet 12/16/2021 shelter Yes Yes    Take 30 mg by mouth Every Night.    Omega-3 Fatty Acids (FISH OIL) 1000 MG capsule capsule 12/16/2021 shelter Yes Yes    Take 1,000 mg by mouth Daily.    polyethylene glycol (MIRALAX) 17 g packet 12/16/2021 Nursing Home No Yes    Take 17 g by mouth Daily.    sertraline  (ZOLOFT) 50 MG tablet 12/16/2021 correction Yes Yes    Take 50 mg by mouth Every Night.    acetaminophen (TYLENOL) 500 MG tablet Unknown Nursing Home Yes No    Take 500 mg by mouth Every 6 (Six) Hours As Needed for Mild Pain  or Fever.    albuterol (PROVENTIL) (2.5 MG/3ML) 0.083% nebulizer solution Unknown Nursing Home Yes No    Take 2.5 mg by nebulization Every 6 (Six) Hours As Needed for Wheezing.    bisacodyl (DULCOLAX) 10 MG suppository Unknown Nursing Home Yes No    Insert 10 mg into the rectum Every 12 (Twelve) Hours As Needed for Constipation.    guaiFENesin (ROBITUSSIN) 100 MG/5ML syrup Unknown Nursing Home Yes No    Take 200 mg by mouth Every 8 (Eight) Hours As Needed for Cough or Congestion.    lactulose (CHRONULAC) 10 GM/15ML solution Unknown Nursing Home Yes No    Take 20 g by mouth Every 12 (Twelve) Hours As Needed (constipation).    nitroglycerin (NITROSTAT) 0.4 MG SL tablet Unknown  No No    Place 1 tablet under the tongue Every 5 (Five) Minutes As Needed for Chest Pain (Only if SBP Greater Than 100). Take no more than 3 doses in 15 minutes.    Sodium Phosphates (fleet enema) 7-19 GM/118ML enema Unknown Nursing Home Yes No    Insert 1 enema into the rectum 2 (Two) Times a Day As Needed (constpation).    TiZANidine (ZANAFLEX) 4 MG capsule Unknown Nursing Home Yes No    Take 4 mg by mouth Daily As Needed for Muscle Spasms.        Hospital Meds:  meropenem, 1 g, Intravenous, Q12H  Vancomycin Pharmacy Intermittent Dosing, , Does not apply, Daily      norepinephrine, 0.02-0.3 mcg/kg/min, Last Rate: 0.08 mcg/kg/min (12/18/21 7828)  Pharmacy Consult,   sodium chloride, 125 mL/hr, Last Rate: 125 mL/hr (12/18/21 8044)  vasopressin, 0.04 Units/min, Last Rate: 0.04 Units/min (12/18/21 3426)      Current listed hospital scheduled medications may not yet reflect those currently placed in orders that are signed and held awaiting patient's arrival to floor.    ---------------------------------------------------------------------------------------------------------------------   Vital Signs:  Temp:  [102.9 °F (39.4 °C)-106 °F (41.1 °C)] 106 °F (41.1 °C)  Heart Rate:  [] 89  Resp:  [26] 26  BP: ()/(40-95) 125/49  FiO2 (%):  [50 %-70 %] 50 %    Mean Arterial Pressure (Non-Invasive) for the past 24 hrs (Last 3 readings):   Noninvasive MAP (mmHg)   12/18/21 0652 74   12/18/21 0642 84   12/18/21 0613 68     SpO2:  [90 %-100 %] 94 %  on   ;   Device (Oxygen Therapy): ventilator  Body mass index is 25.94 kg/m².    Wt Readings from Last 1 Encounters:   12/17/21 0002 82 kg (180 lb 12.4 oz)     Wt Readings from Last 3 Encounters:   12/17/21 82 kg (180 lb 12.4 oz)   08/10/21 82 kg (180 lb 11.2 oz)   07/07/21 80.3 kg (177 lb)           ---------------------------------------------------------------------------------------------------------------------   Physical exam:  GENERAL:  Patient intubated and sedated.   SKIN:  Bruising on arms.   EYES:  Pupils constricted and fixed.   HEAD:  Normocephalic. Atraumatic.   EARS/NOSE/MOUTH/THROAT:  Septum midline.  No excoriations or nasal discharge. No stomatitis or ulcers.  Lips normal. Oropharynx without lesions or exudates.  NECK:  Supple with good range of motion; no thyromegaly or masses  LYMPHATICS:  No cervical, supraclavicular, axillary adenopathy.  RESP:  Lungs crackles bilaterally.   CARDIAC:  Regular rate and rhythm without murmurs, rubs or gallops. Normal S1,S2. No edema  GI:  Soft, nontender, no hepatosplenomegaly, normal bowel sounds  MSK:  No clubbing or cyanosis, No joint swelling noted in hands  NEUROLOGICAL:  Unresponsive not on sedation. Eyes open without blinking. Pupils not reactive.     ---------------------------------------------------------------------------------------------------------------------   I have personally looked at both the EKG and the telemetry  strips.  ---------------------------------------------------------------------------------------------------------------------   Results from last 7 days   Lab Units 12/17/21 0917 12/17/21 0308 12/16/21 2355 12/16/21  1316   CRP mg/dL  --   --   --  <0.30   LACTATE mmol/L 6.8* 7.1* 11.1*  --    WBC 10*3/mm3 39.46*  --  39.40* 14.05*   HEMOGLOBIN g/dL 13.4  --  11.2* 13.5   HEMATOCRIT % 47.0  --  39.8 45.1   MCV fL 109.6*  --  110.9* 103.9*   MCHC g/dL 28.5*  --  28.1* 29.9*   PLATELETS 10*3/mm3 397  --  385 382   INR   --   --  1.90*  --      Results from last 7 days   Lab Units 12/18/21  0009   PH, ARTERIAL pH units 7.443   PO2 ART mm Hg 126.0*   PCO2, ARTERIAL mm Hg 21.6*   HCO3 ART mmol/L 14.7*     Results from last 7 days   Lab Units 12/17/21 0917 12/16/21 2355 12/16/21  1316   SODIUM mmol/L 159* 158* 153*   POTASSIUM mmol/L 4.3 4.1 3.9   CHLORIDE mmol/L 126* 120* 118*   CO2 mmol/L 11.3* 15.0* 23.9   BUN mg/dL 57* 55* 60*   CREATININE mg/dL 2.03* 1.95* 1.89*   EGFR IF NONAFRICN AM mL/min/1.73 32* 33* 34*   CALCIUM mg/dL 9.1 10.2 9.8   GLUCOSE mg/dL 129* 270* 132*   ALBUMIN g/dL 2.77* 2.81* 3.64   BILIRUBIN mg/dL 0.4 0.3 0.2   ALK PHOS U/L 166* 164* 141*   AST (SGOT) U/L 258* 258* 245*   ALT (SGPT) U/L 441* 401* 406*   Estimated Creatinine Clearance: 33.1 mL/min (A) (by C-G formula based on SCr of 2.03 mg/dL (H)).  No results found for: AMMONIA  Results from last 7 days   Lab Units 12/17/21  1659 12/17/21  1223 12/17/21  0917   TROPONIN T ng/mL 0.541* 0.739* 0.685*     Results from last 7 days   Lab Units 12/16/21  2355   PROBNP pg/mL 785.6         No results found for: HGBA1C, POCGLU  Lab Results   Component Value Date    TSH 1.610 08/04/2021    FREET4 1.15 08/04/2021     No results found for: PREGTESTUR, PREGSERUM, HCG, HCGQUANT  Pain Management Panel     Pain Management Panel Latest Ref Rng & Units 10/23/2020 10/19/2020    CREATININE UR mg/dL - 220.5    AMPHETAMINES SCREEN, URINE Negative Negative -     BARBITURATES SCREEN Negative Negative -    BENZODIAZEPINE SCREEN, URINE Negative Negative -    BUPRENORPHINEUR Negative Negative -    COCAINE SCREEN, URINE Negative Negative -    METHADONE SCREEN, URINE Negative Negative -        Brief Urine Lab Results  (Last result in the past 365 days)      Color   Clarity   Blood   Leuk Est   Nitrite   Protein   CREAT   Urine HCG        12/16/21 2357 Yellow   Slightly Cloudy   Large (3+)   Small (1+)   Negative   30 mg/dL (1+)               Blood Culture   Date Value Ref Range Status   12/16/2021 No growth at 24 hours  Preliminary   12/16/2021 Abnormal Stain (C)  Preliminary     Results from last 7 days   Lab Units 12/16/21  2357   NITRITE UA  Negative   WBC UA /HPF 3-5*   BACTERIA UA /HPF Trace*   SQUAM EPITHEL UA /HPF None Seen     No results found for: URINECX  No results found for: WOUNDCX  No results found for: STOOLCX  Results from last 7 days   Lab Units 12/17/21  0917 12/17/21  0308 12/16/21  2355 12/16/21  1316   PROCALCITONIN ng/mL  --   --  0.13  --    LACTATE mmol/L 6.8* 7.1* 11.1*  --    CRP mg/dL  --   --   --  <0.30       I have personally looked at the labs and they are summarized above.  ---------------------------------------------------------------------------------------------------------------------   Detailed radiology reports for the last 24 hours:    Imaging Results (Last 24 Hours)     Procedure Component Value Units Date/Time    XR Chest 1 View [[id number]] Collected: 12/18/21 0109     Updated: 12/18/21 0111    Narrative:      CR Chest 1 Vw    INDICATION:   Right chest tube and right pneumothorax     COMPARISON:    L 1721    FINDINGS:  Portable AP view(s) of the chest.    Endotracheal tube is in good position. Right chest tube is present and stable.    Left lower lobe atelectasis is stable. No right pneumothorax is visible.       Impression:      There continues to be left lower lobe atelectasis    Endotracheal tube and right chest tube are in good  position. No definite right pneumothorax is visible    Signer Name: Xavi Alfaro MD   Signed: 12/18/2021 1:09 AM   Workstation Name: [email or web address]-    [facility]    XR Chest AP [[id number]] Collected: 12/17/21 1647     Updated: 12/17/21 1650    Narrative:      EXAM:    XR Chest, 1 View     EXAM DATE:    12/17/2021 4:31 PM     CLINICAL HISTORY:    chest tube verification; I46.9-Cardiac arrest, cause unspecified;  J18.9-Pneumonia, unspecified organism; S27.0XXA-Traumatic pneumothorax,  initial encounter; S22.43XA-Multiple fractures of ribs, bilateral,  initial encounter for closed fracture; K56.7-Ileus, unspecified     TECHNIQUE:    Frontal view of the chest.     COMPARISON:    12/17/2021     FINDINGS:    LUNGS:  Minimal left lower lobe airspace disease noted.    PLEURAL SPACE:  Tiny left pleural effusion.    HEART:  Unremarkable.  No cardiomegaly.    MEDIASTINUM:  Unremarkable.    BONES/JOINTS:  Unremarkable.    TUBES, LINES AND DEVICES:  ET tube tip at level clavicles. NG tube in  the stomach. Right chest tube identified in the right chest with  persistent small right apical pneumothorax slightly increased from  previous study.       Impression:        ET tube tip at level clavicles. NG tube in the stomach. Right chest  tube identified in the right chest with persistent small right apical  pneumothorax slightly increased from previous study.     This report was finalized on 12/17/2021 4:48 PM by Dr. Deangelo Soni MD.       XR Chest AP [[id number]] Collected: 12/17/21 1539     Updated: 12/17/21 1545    Narrative:      EXAM:    XR Chest, 1 View     EXAM DATE:    12/17/2021 3:06 PM     CLINICAL HISTORY:    monitor ptx; I46.9-Cardiac arrest, cause unspecified; J18.9-Pneumonia,  unspecified organism; S27.0XXA-Traumatic pneumothorax, initial  encounter; S22.43XA-Multiple fractures of ribs, bilateral, initial  encounter for closed fracture; K56.7-Ileus, unspecified     TECHNIQUE:    Frontal view of the  chest.     COMPARISON:    12/17/2021     FINDINGS:    LUNGS:  Left lung base airspace disease again noted.    PLEURAL SPACE:  Now only tiny right apical pneumothorax.  Tiny left  pleural effusion.    HEART: Cardiomegaly.    MEDIASTINUM:  Unremarkable.    BONES/JOINTS:  Unremarkable.    TUBES, LINES AND DEVICES:  ET tube tip at level clavicles. NG tube in  the stomach.  Right chest tube again identified.       Impression:      1.  Now only tiny right apical pneumothorax.  2.  Tiny left pleural effusion.  3.  Left lung base airspace disease again noted.     This report was finalized on 12/17/2021 3:43 PM by Dr. Deangelo Soni MD.       XR Chest AP [[id number]] Collected: 12/17/21 1120     Updated: 12/17/21 1123    Narrative:      EXAM:    XR Chest, 1 View     EXAM DATE:    12/17/2021 10:36 AM     CLINICAL HISTORY:    reassesss ptx. pt tachycardic.; I46.9-Cardiac arrest, cause  unspecified; J18.9-Pneumonia, unspecified organism; S27.0XXA-Traumatic  pneumothorax, initial encounter; S22.43XA-Multiple fractures of ribs,  bilateral, initial encounter for closed fracture; K56.7-Ileus,  unspecified     TECHNIQUE:    Frontal view of the chest.     COMPARISON:    12/17/2021     FINDINGS:    LUNGS:  Unremarkable.  No consolidation.    PLEURAL SPACE:  Again noted is slightly increased now moderate size  right pneumothorax.    HEART:  Unremarkable.  No cardiomegaly.    MEDIASTINUM:  Unremarkable.    BONES/JOINTS:  Unremarkable.    TUBES, LINES AND DEVICES:  ET tube tip below level of clavicles. NG  tube in the stomach. A right chest tube is identified.       Impression:        Again noted is slightly increased now moderate size right  pneumothorax.     This report was finalized on 12/17/2021 11:21 AM by Dr. Deangelo Soni MD.           Final impressions for the last 30 days of radiology reports:    CT Abdomen Pelvis Without Contrast    Result Date: 12/17/2021  1. Large amount of stool in the lower colon with remainder of the colon  being distended with gas. No evidence of acute colitis. There is diverticulosis without diverticulitis. 2. Probable small bowel ileus. 3. Nonobstructed kidneys. 4. Normal appendix. 5. Additional findings as above. Signer Name: Andrzej Roman MD  Signed: 12/17/2021 2:26 AM  Workstation Name: [facility]    CT Head Without Contrast    Result Date: 12/17/2021  1. No acute intracranial abnormalities appreciated. If there is however clinical concern for recent global hypoxic ischemic insult, short-term follow-up imaging is recommended. 2. Atrophy with a pattern suggestive of an Alzheimer type dementia. Please correlate further clinically. 3. Probable sequelae of small vessel disease. Signer Name: Tonia Boswell MD  Signed: 12/17/2021 1:39 AM  Workstation Name: [facility]    CT Chest Without Contrast Diagnostic    Result Date: 12/17/2021  1. Large right pneumothorax. Results have previously been called to the emergency Department. 2. Bilateral rib fractures, presumably due to chest compressions. 3. Bilateral pneumonia, most severe in the left lower lobe. Consider aspiration. Signer Name: Andrzej Roman MD  Signed: 12/17/2021 2:03 AM  Workstation Name: [facility]    XR Chest 1 View    Result Date: 12/18/2021  There continues to be left lower lobe atelectasis Endotracheal tube and right chest tube are in good position. No definite right pneumothorax is visible Signer Name: Xavi Alfaro MD  Signed: 12/18/2021 1:09 AM  [facility]    XR Chest 1 View    Result Date: 12/17/2021  Improved but persistent right pneumothorax following chest tube placement. Signer Name: Andrzej Roman MD  Signed: 12/17/2021 2:22 AM  Workstation Name: [facility]    XR Chest 1 View    Result Date: 12/17/2021  Large right pneumothorax. NOTIFICATION:  Critical Value/emergent results were called by telephone at the time of interpretation on 12/17/2021 1:23 AM to David Brandon MD who verbally acknowledged these results. Signer Name: Andrzej Roman MD  Signed: 12/17/2021 1:24 AM  Workstation Name: [email or web address]  [facility]    XR Chest AP    Result Date: 12/17/2021    ET tube tip at level clavicles. NG tube in the stomach. Right chest tube identified in the right chest with persistent small right apical pneumothorax slightly increased from previous study.  This report was finalized on 12/17/2021 4:48 PM by Dr. Deangelo Soni MD.      XR Chest AP    Result Date: 12/17/2021  1.  Now only tiny right apical pneumothorax. 2.  Tiny left pleural effusion. 3.  Left lung base airspace disease again noted.  This report was finalized on 12/17/2021 3:43 PM by Dr. Deangelo Soni MD.      XR Chest AP    Result Date: 12/17/2021    Again noted is slightly increased now moderate size right pneumothorax.  This report was finalized on 12/17/2021 11:21 AM by Dr. Deangelo Soni MD.      I have personally looked at the radiology images and read the final radiology report.  ----------------------------------------------------------------------------------------------------------------------  Assessment & Plan      #Acute hypoxic respiratory failure 2/2 aspiration pneumonia   #Post cardiac arrest   #Septic shock  #Lactic acidosis   #R sided pneumothorax   #Troponin elevation after cardiac arrest   #Suspected anoxic brain injury   #Oliguric TYRELL on CKD  #Shock liver   #Severe hypernatremia  #Advanced dementia      Wife has decided to make patient comfort Mesures, no escalation of care, but is not ready to withdrawal care. Will continue levophed and vasopressin and wean as able. Recommend to wean fio2 on ventilator. Wife does not want further testing or any escalation of care. Will continue abx, meropenem and vanc. Had ordered surgery consult for chest tube management,  pnuemothorax slightly bigger on last imaging but hypoxia improving. Will start PRN dilaudid, PRN ativan to keep patient comfortable.      Code status: Comfort measures. No escalation of care.      Dispo: Admit to CCU when bed available. Poor prognosis. ED to resume care until bed available.       Thank you for the consult.    Ever Sapp MD  [facility] Medicine Team  12/18/21  07:08 EST

## 2021-12-18 NOTE — ED NOTES
Post mortem care completed by Jennifer Baeza RN and myself.      Stefan Sanchez RN  12/18/21 6154

## 2021-12-18 NOTE — ED NOTES
Attempted to call patient family at this time with no answer.      Stefan Sanchez, RN  12/18/21 7064

## 2021-12-18 NOTE — ED NOTES
Time of death called at 09:55. Verified by Two RN's and MD. Family has requested the services of [facility] in [facility].      Stefan Sanchez RN  [phone]

## 2021-12-18 NOTE — ED NOTES
[facility] case ID number [id number]. Marshal Boyd was the [facility] representative and she states that we are free to release the body to the  home.      Stefan Sanchez RN  [phone]

## 2021-12-18 NOTE — ED NOTES
Ice packs placed to bilateral axilla and bilateral groin area per Dr. Gee.     Radha Medel, RN  12/18/21 0754

## 2021-12-20 LAB
BACTERIA SPEC AEROBE CULT: ABNORMAL
GRAM STN SPEC: ABNORMAL
GRAM STN SPEC: ABNORMAL
ISOLATED FROM: ABNORMAL

## 2025-02-09 NOTE — ED NOTES
Call[facility] to [facility] to [facility] and the phone was static and cutting in and out the woman stated their phones are not working.      Samia Dobbs, RN  12/17/20 1907     [place]

## (undated) DEVICE — INSUFFLATION NEEDLE TO ESTABLISH PNEUMOPERITONEUM.: Brand: INSUFFLATION NEEDLE

## (undated) DEVICE — TUBING, SUCTION, 3/16" X 10', STRAIGHT: Brand: MEDLINE

## (undated) DEVICE — GLV SURG SENSICARE W/ALOE PF LF 7.5 STRL

## (undated) DEVICE — COR LAP CHOLE: Brand: MEDLINE INDUSTRIES, INC.

## (undated) DEVICE — ANTIBACTERIAL UNDYED BRAIDED (POLYGLACTIN 910), SYNTHETIC ABSORBABLE SUTURE: Brand: COATED VICRYL

## (undated) DEVICE — 1860S HEALTH CARE RESPIRATOR N95 120EA/C: Brand: 3M™

## (undated) DEVICE — UNDERCAST PADDING: Brand: DEROYAL

## (undated) DEVICE — GLV SURG BIOGEL LTX PF 7 1/2

## (undated) DEVICE — BNDG ADHS CURAD FLX/FABRC 2X4IN STRL LF

## (undated) DEVICE — ENCORE® LATEX MICRO SIZE 7, STERILE LATEX POWDER-FREE SURGICAL GLOVE: Brand: ENCORE

## (undated) DEVICE — ENDOPOUCH RETRIEVER SPECIMEN RETRIEVAL BAGS: Brand: ENDOPOUCH RETRIEVER

## (undated) DEVICE — ROSEBUD DISSECTORS: Brand: DEROYAL

## (undated) DEVICE — TROCAR: Brand: KII SLEEVE

## (undated) DEVICE — Device: Brand: DEFENDO AIR/WATER/SUCTION AND BIOPSY VALVE

## (undated) DEVICE — HOLDER: Brand: DEROYAL

## (undated) DEVICE — ENDOCUT SCISSOR TIP, DISPOSABLE: Brand: RENEW

## (undated) DEVICE — 2, DISPOSABLE SUCTION/IRRIGATOR WITH DISPOSABLE TIP: Brand: STRYKEFLOW

## (undated) DEVICE — CVR HNDL LIGHT RIGID

## (undated) DEVICE — TUBING, SUCTION, 1/4" X 20', STRAIGHT: Brand: MEDLINE INDUSTRIES, INC.

## (undated) DEVICE — BLD SCLPL SS NO24 STRL

## (undated) DEVICE — TRAP,MUCUS SPECIMEN,40CC: Brand: MEDLINE

## (undated) DEVICE — DECANT BG O JET

## (undated) DEVICE — SYR LUERLOK 30CC

## (undated) DEVICE — SINGLE PORT MANIFOLD: Brand: NEPTUNE 2

## (undated) DEVICE — DISH PETRI 3.5IN MD STRL LF

## (undated) DEVICE — APPL CHLORAPREP W/TINT 26ML ORNG

## (undated) DEVICE — SUT SILK 2/0 TIES 18IN A185H

## (undated) DEVICE — SUT SILK 2/0 PS 18IN 1588H

## (undated) DEVICE — ENDOPATH XCEL BLADELESS TROCARS WITH STABILITY SLEEVES: Brand: ENDOPATH XCEL

## (undated) DEVICE — DRP C/ARM W/BAND W/CLIPS 41X74IN

## (undated) DEVICE — UNDYED BRAIDED (POLYGLACTIN 910), SYNTHETIC ABSORBABLE SUTURE: Brand: COATED VICRYL

## (undated) DEVICE — PAD GRND REM POLYHESIVE A/ DISP

## (undated) DEVICE — [HIGH FLOW INSUFFLATOR,  DO NOT USE IF PACKAGE IS DAMAGED,  KEEP DRY,  KEEP AWAY FROM SUNLIGHT,  PROTECT FROM HEAT AND RADIOACTIVE SOURCES.]: Brand: PNEUMOSURE

## (undated) DEVICE — SUT ETHLN 5/0 PC3 PLSTC 18IN 1865G

## (undated) DEVICE — SUT SILK 2/0 SH 30IN K833H

## (undated) DEVICE — Device: Brand: ENDOGATOR

## (undated) DEVICE — Device

## (undated) DEVICE — GOWN,REINF,POLY,ECL,PP SLV,XL: Brand: MEDLINE

## (undated) DEVICE — JACKSON-PRATT 100CC BULB RESERVOIR: Brand: CARDINAL HEALTH

## (undated) DEVICE — CONN Y IRR DISP 1P/U

## (undated) DEVICE — SUT ETHLN 3/0 FS1 30IN 669H

## (undated) DEVICE — SKIN AFFIX SURG ADHESIVE 72/CS 0.55ML: Brand: MEDLINE

## (undated) DEVICE — Device: Brand: MEDEX

## (undated) DEVICE — GOWN,PREVENTION PLUS,XXLARGE,STERILE: Brand: MEDLINE

## (undated) DEVICE — DRN WND HUBLSS FLUT FULL PERF SIL10MM

## (undated) DEVICE — ST CATH CHOLANG WKARLAN/BALN 2LUM 4F 1.25

## (undated) DEVICE — ELECTRD BLD EDGE/INSUL1P SFTY SLV 2.75IN

## (undated) DEVICE — STCKNT STRL 4X48 IN

## (undated) DEVICE — PK MINOR SPLT 10

## (undated) DEVICE — SYR LUERLOK 5CC

## (undated) DEVICE — SUT SILK 3/0 TIES 18IN A184H

## (undated) DEVICE — SUT GUT CHRM 3/0 SH 27IN G122H

## (undated) DEVICE — INTRAOPERATIVE COVER KIT, 10 PACK: Brand: SITE-RITE

## (undated) DEVICE — TROCAR: Brand: KII FIOS FIRST ENTRY

## (undated) DEVICE — PENCL E/S HNDSWCH PUSHBTN HOLSTR 10FT

## (undated) DEVICE — APPL CHLORAPREP W/ALC 26ML CLR

## (undated) DEVICE — SUT ETHLN 3/0 PSLX 30IN 1683H

## (undated) DEVICE — DRSNG WND BORDR/ADHS NONADHR/GZ LF 4X10IN STRL

## (undated) DEVICE — BNDG ELAS ELITE V/CLOSE 3IN 5YD LF STRL